# Patient Record
Sex: FEMALE | Race: WHITE | Employment: OTHER | URBAN - METROPOLITAN AREA
[De-identification: names, ages, dates, MRNs, and addresses within clinical notes are randomized per-mention and may not be internally consistent; named-entity substitution may affect disease eponyms.]

---

## 2016-11-03 LAB — INR PPP: 2 (ref 2–3)

## 2017-01-02 PROBLEM — R33.9 URINARY RETENTION: Status: ACTIVE | Noted: 2017-01-02

## 2017-01-06 ENCOUNTER — TRANSFERRED RECORDS (OUTPATIENT)
Dept: HEALTH INFORMATION MANAGEMENT | Facility: CLINIC | Age: 77
End: 2017-01-06

## 2017-01-06 ENCOUNTER — ANTICOAGULATION THERAPY VISIT (OUTPATIENT)
Dept: NURSING | Facility: CLINIC | Age: 77
End: 2017-01-06
Payer: COMMERCIAL

## 2017-01-06 DIAGNOSIS — I82.409 DVT (DEEP VENOUS THROMBOSIS) (H): ICD-10-CM

## 2017-01-06 DIAGNOSIS — Z79.01 LONG-TERM (CURRENT) USE OF ANTICOAGULANTS: Primary | ICD-10-CM

## 2017-01-06 DIAGNOSIS — I26.99 PULMONARY EMBOLISM (H): ICD-10-CM

## 2017-01-06 LAB — INR PPP: 2.1

## 2017-01-06 PROCEDURE — 99207 ZZC NO CHARGE NURSE ONLY: CPT | Performed by: FAMILY MEDICINE

## 2017-01-06 NOTE — MR AVS SNAPSHOT
Ángela Dumont   1/6/2017   Anticoagulation Therapy Visit    Description:  76 year old female   Provider:  Joaquin Rockwell MD   Department:  [facility]           INR as of 1/6/2017     Selected INR 2.1 (1/6/2017)      Anticoagulation Summary as of 1/6/2017     INR goal 2.0-3.0   Selected INR 2.1 (1/6/2017)   Full instructions 2 mg on Sun, Tue; 4 mg all other days   Next INR check 2/3/2017    Indications   Long-term (current) use of anticoagulants [Z79.01] [Z79.01]  Pulmonary embolism (H) [I26.99]  DVT (deep venous thrombosis) (H) [I82.409]         Description     Left detailed vm as advised to resume normal dosing and recheck in 1 month per        Contact Numbers     Clinic Number:         January 2017 Details    Sun Mon Tue Wed Thu Fri Sat     1               2               3               4               5               6      4 mg   See details      7      4 mg           8      2 mg         9      4 mg         10      2 mg         11      4 mg         12      4 mg         13      4 mg         14      4 mg           15      2 mg         16      4 mg         17      2 mg         18      4 mg         19      4 mg         20      4 mg         21      4 mg           22      2 mg         23      4 mg         24      2 mg         25      4 mg         26      4 mg         27      4 mg         28      4 mg           29      2 mg         30      4 mg         31      2 mg              Date Details   01/06 This INR check               How to take your warfarin dose     To take:  2 mg Take 2 of the 1 mg tablets.    To take:  4 mg Take 4 of the 1 mg tablets.           February 2017 Details    Sun Mon Tue Wed Thu Fri Sat        1      4 mg         2      4 mg         3            4                 5               6               7               8               9               10               11                 12               13               14               15               16               17               18                  19               20               21               22               23               24               25                 26               27               28                    Date Details   No additional details    Date of next INR:  2/3/2017         How to take your warfarin dose     To take:  4 mg Take 4 of the 1 mg tablets.

## 2017-01-06 NOTE — PROGRESS NOTES
ANTICOAGULATION FOLLOW-UP CLINIC VISIT    Patient Name:  Ángela Dumont  Date:  1/6/2017  Contact Type:  fax    SUBJECTIVE:     Patient Findings     Positives No Problem Findings           OBJECTIVE    INR   Date Value Ref Range Status   01/06/2017 2.1  Final       ASSESSMENT / PLAN  INR assessment THER    Recheck INR In: 4 WEEKS    INR Location Home INR      Anticoagulation Summary as of 1/6/2017     INR goal 2.0-3.0   Selected INR 2.1 (1/6/2017)   Maintenance plan 2 mg (1 mg x 2) on Sun, Tue; 4 mg (1 mg x 4) all other days   Full instructions 2 mg on Sun, Tue; 4 mg all other days   Weekly total 24 mg   No change documented Dorita Panda RN   Plan last modified Dorita Panda RN (12/9/2016)   Next INR check 2/3/2017   Target end date     Indications   Long-term (current) use of anticoagulants [Z79.01] [Z79.01]  Pulmonary embolism (H) [I26.99]  DVT (deep venous thrombosis) (H) [I82.409]         Anticoagulation Episode Summary     INR check location     Preferred lab     Send INR reminders to RV NURSE    Comments       Anticoagulation Care Providers     Provider Role Specialty Phone number    Joaquin Rockwell MD [facility] [phone]            See the Encounter Report to view Anticoagulation Flowsheet and Dosing Calendar (Go to Encounters tab in chart review, and find the Anticoagulation Therapy Visit)    Dosage adjustment made based on physician directed care plan.    Dorita Panda RN

## 2017-01-16 ENCOUNTER — ANTICOAGULATION THERAPY VISIT (OUTPATIENT)
Dept: NURSING | Facility: CLINIC | Age: 77
End: 2017-01-16
Payer: COMMERCIAL

## 2017-01-16 DIAGNOSIS — Z79.01 LONG-TERM (CURRENT) USE OF ANTICOAGULANTS: Primary | ICD-10-CM

## 2017-01-16 DIAGNOSIS — I26.99 PULMONARY EMBOLISM (H): ICD-10-CM

## 2017-01-16 DIAGNOSIS — I82.409 DVT (DEEP VENOUS THROMBOSIS) (H): ICD-10-CM

## 2017-01-16 LAB — INR PPP: 2.1

## 2017-01-16 PROCEDURE — 99207 ZZC NO CHARGE NURSE ONLY: CPT | Performed by: FAMILY MEDICINE

## 2017-01-16 NOTE — MR AVS SNAPSHOT
Ángela VITALY Dumont   1/16/2017   Anticoagulation Therapy Visit    Description:  76 year old female   Provider:  Joaquin Rockwell MD   Department:  [facility]           INR as of 1/16/2017     Selected INR 2.1 (1/16/2017)      Anticoagulation Summary as of 1/16/2017     INR goal 2.0-3.0   Selected INR 2.1 (1/16/2017)   Full instructions 2 mg on Sun, Tue; 4 mg all other days   Next INR check 2/3/2017    Indications   Long-term (current) use of anticoagulants [Z79.01] [Z79.01]  Pulmonary embolism (H) [I26.99]  DVT (deep venous thrombosis) (H) [I82.409]         Contact Numbers     Clinic Number:         January 2017 Details    Sun Mon Tue Wed Thu Fri Sat     1               2               3               4               5               6               7                 8               9               10               11               12               13               14                 15               16      4 mg   See details      17      2 mg         18      4 mg         19      4 mg         20      4 mg         21      4 mg           22      2 mg         23      4 mg         24      2 mg         25      4 mg         26      4 mg         27      4 mg         28      4 mg           29      2 mg         30      4 mg         31      2 mg              Date Details   01/16 This INR check               How to take your warfarin dose     To take:  2 mg Take 2 of the 1 mg tablets.    To take:  4 mg Take 4 of the 1 mg tablets.           February 2017 Details    Sun Mon Tue Wed Thu Fri Sat        1      4 mg         2      4 mg         3            4                 5               6               7               8               9               10               11                 12               13               14               15               16               17               18                 19               20               21               22               23               24               25                 26                27               28                    Date Details   No additional details    Date of next INR:  2/3/2017         How to take your warfarin dose     To take:  4 mg Take 4 of the 1 mg tablets.

## 2017-01-16 NOTE — PROGRESS NOTES
ANTICOAGULATION FOLLOW-UP CLINIC VISIT    Patient Name:  Ángela Dumont  Date:  1/16/2017  Contact Type:  Telephone    SUBJECTIVE:     Patient Findings     Positives No Problem Findings           OBJECTIVE    INR   Date Value Ref Range Status   01/16/2017 2.1  Final       ASSESSMENT / PLAN  INR assessment THER    Recheck INR In: 3 WEEKS    INR Location Clinic      Anticoagulation Summary as of 1/16/2017     INR goal 2.0-3.0   Selected INR 2.1 (1/16/2017)   Maintenance plan 2 mg (1 mg x 2) on Sun, Tue; 4 mg (1 mg x 4) all other days   Full instructions 2 mg on Sun, Tue; 4 mg all other days   Weekly total 24 mg   Plan last modified Dorita Panda RN (12/9/2016)   Next INR check 2/3/2017   Target end date     Indications   Long-term (current) use of anticoagulants [Z79.01] [Z79.01]  Pulmonary embolism (H) [I26.99]  DVT (deep venous thrombosis) (H) [I82.409]         Anticoagulation Episode Summary     INR check location     Preferred lab     Send INR reminders to RV NURSE    Comments       Anticoagulation Care Providers     Provider Role Specialty Phone number    Joaquin Rockwell MD [facility] [phone]            See the Encounter Report to view Anticoagulation Flowsheet and Dosing Calendar (Go to Encounters tab in chart review, and find the Anticoagulation Therapy Visit)    Dosage adjustment made based on physician directed care plan.    Dorita Panda RN

## 2017-02-03 ENCOUNTER — TRANSFERRED RECORDS (OUTPATIENT)
Dept: HEALTH INFORMATION MANAGEMENT | Facility: CLINIC | Age: 77
End: 2017-02-03

## 2017-02-03 ENCOUNTER — ANTICOAGULATION THERAPY VISIT (OUTPATIENT)
Dept: FAMILY MEDICINE | Facility: CLINIC | Age: 77
End: 2017-02-03
Payer: COMMERCIAL

## 2017-02-03 DIAGNOSIS — I82.409 DVT (DEEP VENOUS THROMBOSIS) (H): ICD-10-CM

## 2017-02-03 DIAGNOSIS — Z79.01 LONG-TERM (CURRENT) USE OF ANTICOAGULANTS: Primary | ICD-10-CM

## 2017-02-03 DIAGNOSIS — I26.99 PULMONARY EMBOLISM (H): ICD-10-CM

## 2017-02-03 LAB
INR PPP: 2.7
INR PPP: 2.7 (ref 2–3)

## 2017-02-03 PROCEDURE — 99207 ZZC NO CHARGE NURSE ONLY: CPT | Performed by: FAMILY MEDICINE

## 2017-02-03 NOTE — MR AVS SNAPSHOT
Ángela Dumont   2/3/2017   Anticoagulation Therapy Visit    Description:  76 year old female   Provider:  Joaquin Rockwell MD   Department:   Family Practice           INR as of 2/3/2017     Selected INR 2.7 (2/3/2017)      Anticoagulation Summary as of 2/3/2017     INR goal 2.0-3.0   Selected INR 2.7 (2/3/2017)   Full instructions 2 mg on Sun, Tue; 4 mg all other days   Next INR check 2/17/2017    Indications   Long-term (current) use of anticoagulants [Z79.01] [Z79.01]  Pulmonary embolism (H) [I26.99]  DVT (deep venous thrombosis) (H) [I82.409]         February 2017 Details    Sun Mon Tue Wed Thu Fri Sat        1               2               3      4 mg   See details      4      4 mg           5      2 mg         6      4 mg         7      2 mg         8      4 mg         9      4 mg         10      4 mg         11      4 mg           12      2 mg         13      4 mg         14      2 mg         15      4 mg         16      4 mg         17            18                 19               20               21               22               23               24               25                 26               27               28                    Date Details   02/03 This INR check       Date of next INR:  2/17/2017         How to take your warfarin dose     To take:  2 mg Take 2 of the 1 mg tablets.    To take:  4 mg Take 4 of the 1 mg tablets.

## 2017-02-03 NOTE — PROGRESS NOTES
ANTICOAGULATION FOLLOW-UP CLINIC VISIT    Patient Name:  Ángela Dumont  Date:  2/3/2017  Contact Type:  Telephone/ left a deatiled VM on cell phone     SUBJECTIVE:     Patient Findings     Positives No Problem Findings           OBJECTIVE    INR   Date Value Ref Range Status   02/03/2017 2.7  Final       ASSESSMENT / PLAN  No question data found.  Anticoagulation Summary as of 2/3/2017     INR goal 2.0-3.0   Selected INR 2.7 (2/3/2017)   Maintenance plan 2 mg (1 mg x 2) on Sun, Tue; 4 mg (1 mg x 4) all other days   Full instructions 2 mg on Sun, Tue; 4 mg all other days   Weekly total 24 mg   No change documented Cadence Gonzalez RN   Plan last modified Dorita Panda RN (12/9/2016)   Next INR check 2/17/2017   Target end date     Indications   Long-term (current) use of anticoagulants [Z79.01] [Z79.01]  Pulmonary embolism (H) [I26.99]  DVT (deep venous thrombosis) (H) [I82.409]         Anticoagulation Episode Summary     INR check location     Preferred lab     Send INR reminders to RV NURSE    Comments       Anticoagulation Care Providers     Provider Role Specialty Phone number    Joaquin Rockwell MD [facility] [phone]            See the Encounter Report to view Anticoagulation Flowsheet and Dosing Calendar (Go to Encounters tab in chart review, and find the Anticoagulation Therapy Visit)    Dosage adjustment made based on physician directed care plan.    Cadence Gonzalez, RN

## 2017-02-06 DIAGNOSIS — E03.9 HYPOTHYROIDISM, UNSPECIFIED TYPE: Primary | ICD-10-CM

## 2017-02-06 RX ORDER — LEVOTHYROXINE SODIUM 50 UG/1
50 TABLET ORAL DAILY
Qty: 90 TABLET | Refills: 0 | Status: SHIPPED | OUTPATIENT
Start: 2017-02-06 | End: 2017-04-21

## 2017-02-06 NOTE — TELEPHONE ENCOUNTER
Last Written Prescription Date: 5-10-16  Last Quantity: 90, # refills: 0  Last Office Visit with [facility], P or [facility] prescribing provider: 9-1-16   Next 5 appointments (look out 90 days)     Feb 21, 2017 10:40 AM   Office Visit with Joaquin Rockwell MD   [facility]    [address] [place] [id number]   [phone]                   TSH   Date Value Ref Range Status   05/04/2016 1.57 mcU/mL Final     Thank you,  Beth Silva CPhT  [facility] [place]

## 2017-02-14 DIAGNOSIS — Z53.9 DIAGNOSIS NOT YET DEFINED: Primary | ICD-10-CM

## 2017-02-20 ENCOUNTER — TELEPHONE (OUTPATIENT)
Dept: FAMILY MEDICINE | Facility: CLINIC | Age: 77
End: 2017-02-20

## 2017-02-20 ENCOUNTER — MEDICAL CORRESPONDENCE (OUTPATIENT)
Dept: HEALTH INFORMATION MANAGEMENT | Facility: CLINIC | Age: 77
End: 2017-02-20

## 2017-02-20 NOTE — TELEPHONE ENCOUNTER
Date Forms was received: February 20, 2017    Forms received by: Fax    Purpose of Form:  PT/OT Orders      How the form needs to be returned for patient:  Fax    Form currently placed  [employer]

## 2017-02-24 ENCOUNTER — TELEPHONE (OUTPATIENT)
Dept: FAMILY MEDICINE | Facility: CLINIC | Age: 77
End: 2017-02-24

## 2017-02-24 PROCEDURE — G0179 MD RECERTIFICATION HHA PT: HCPCS | Performed by: FAMILY MEDICINE

## 2017-03-03 ENCOUNTER — TELEPHONE (OUTPATIENT)
Dept: FAMILY MEDICINE | Facility: CLINIC | Age: 77
End: 2017-03-03

## 2017-03-03 ENCOUNTER — ANTICOAGULATION THERAPY VISIT (OUTPATIENT)
Dept: FAMILY MEDICINE | Facility: CLINIC | Age: 77
End: 2017-03-03
Payer: COMMERCIAL

## 2017-03-03 ENCOUNTER — TRANSFERRED RECORDS (OUTPATIENT)
Dept: HEALTH INFORMATION MANAGEMENT | Facility: CLINIC | Age: 77
End: 2017-03-03

## 2017-03-03 DIAGNOSIS — I26.99 PULMONARY EMBOLISM (H): ICD-10-CM

## 2017-03-03 DIAGNOSIS — Z79.01 LONG-TERM (CURRENT) USE OF ANTICOAGULANTS: ICD-10-CM

## 2017-03-03 DIAGNOSIS — I82.409 DVT (DEEP VENOUS THROMBOSIS) (H): ICD-10-CM

## 2017-03-03 LAB — INR PPP: 2.4

## 2017-03-03 PROCEDURE — 99207 ZZC NO CHARGE NURSE ONLY: CPT | Performed by: FAMILY MEDICINE

## 2017-03-03 NOTE — TELEPHONE ENCOUNTER
Reason for Call:  Same Day Appointment, Requested Provider:  Joaquin Rockwell MD    PCP: Joaquin Rockwell    Reason for visit: Follow up-    Duration of symptoms: follow up    Have you been treated for this in the past? Yes    Additional comments: Pt  called in, They can have an aid come and bring them on Monday 3/6/17 after 9am. They were wondering if they could get worked in that day    Can we leave a detailed message on this number? YES    Phone number patient can be reached at: Home number on file [phone] (home)    Best Time:     Call taken on 3/3/2017 at 9:37 AM by Radha Umaña

## 2017-03-03 NOTE — PROGRESS NOTES
ANTICOAGULATION FOLLOW-UP CLINIC VISIT    Patient Name:  Ángela Dumont  Date:  3/3/2017  Contact Type:  Telephone    SUBJECTIVE:     Patient Findings     Positives No Problem Findings           OBJECTIVE    INR   Date Value Ref Range Status   03/03/2017 2.4  Final       ASSESSMENT / PLAN  No question data found.  Anticoagulation Summary as of 3/3/2017     INR goal 2.0-3.0   Today's INR 2.4   Maintenance plan 2 mg (1 mg x 2) on Sun, Tue; 4 mg (1 mg x 4) all other days   Full instructions 2 mg on Sun, Tue; 4 mg all other days   Weekly total 24 mg   No change documented Cadence Gonzalez RN   Plan last modified Dorita Panda RN (12/9/2016)   Next INR check 3/31/2017   Target end date     Indications   Long-term (current) use of anticoagulants [Z79.01] [Z79.01]  Pulmonary embolism (H) [I26.99]  DVT (deep venous thrombosis) (H) [I82.409]         Anticoagulation Episode Summary     INR check location     Preferred lab     Send INR reminders to RV NURSE    Comments       Anticoagulation Care Providers     Provider Role Specialty Phone number    Joaquin Rockwell MD [facility] [phone]            See the Encounter Report to view Anticoagulation Flowsheet and Dosing Calendar (Go to Encounters tab in chart review, and find the Anticoagulation Therapy Visit)    Dosage adjustment made based on physician directed care plan.    Cadence Gonzalez, RN

## 2017-03-03 NOTE — MR AVS SNAPSHOT
Ángela Dumont   3/3/2017   Anticoagulation Therapy Visit    Description:  76 year old female   Provider:  Joaquin Rockwell MD   Department:   Family Practice           INR as of 3/3/2017     Today's INR 2.4      Anticoagulation Summary as of 3/3/2017     INR goal 2.0-3.0   Today's INR 2.4   Full instructions 2 mg on Sun, Tue; 4 mg all other days   Next INR check 3/31/2017    Indications   Long-term (current) use of anticoagulants [Z79.01] [Z79.01]  Pulmonary embolism (H) [I26.99]  DVT (deep venous thrombosis) (H) [I82.409]         March 2017 Details    Sun Mon Tue Wed Thu Fri Sat        1               2               3      4 mg   See details      4      4 mg           5      2 mg         6      4 mg         7      2 mg         8      4 mg         9      4 mg         10      4 mg         11      4 mg           12      2 mg         13      4 mg         14      2 mg         15      4 mg         16      4 mg         17      4 mg         18      4 mg           19      2 mg         20      4 mg         21      2 mg         22      4 mg         23      4 mg         24      4 mg         25      4 mg           26      2 mg         27      4 mg         28      2 mg         29      4 mg         30      4 mg         31              Date Details   03/03 This INR check       Date of next INR:  3/31/2017         How to take your warfarin dose     To take:  2 mg Take 2 of the 1 mg tablets.    To take:  4 mg Take 4 of the 1 mg tablets.

## 2017-03-06 ENCOUNTER — OFFICE VISIT (OUTPATIENT)
Dept: FAMILY MEDICINE | Facility: CLINIC | Age: 77
End: 2017-03-06
Payer: COMMERCIAL

## 2017-03-06 VITALS
HEIGHT: 64 IN | BODY MASS INDEX: 34.49 KG/M2 | SYSTOLIC BLOOD PRESSURE: 130 MMHG | OXYGEN SATURATION: 95 % | WEIGHT: 202 LBS | TEMPERATURE: 97.9 F | HEART RATE: 77 BPM | DIASTOLIC BLOOD PRESSURE: 76 MMHG

## 2017-03-06 DIAGNOSIS — Z79.01 LONG-TERM (CURRENT) USE OF ANTICOAGULANTS: ICD-10-CM

## 2017-03-06 DIAGNOSIS — I27.20 PULMONARY HYPERTENSION (H): ICD-10-CM

## 2017-03-06 DIAGNOSIS — E78.5 HYPERLIPIDEMIA WITH TARGET LDL LESS THAN 130: ICD-10-CM

## 2017-03-06 DIAGNOSIS — Z98.2 S/P VP SHUNT: ICD-10-CM

## 2017-03-06 DIAGNOSIS — I10 HYPERTENSION GOAL BP (BLOOD PRESSURE) < 140/90: ICD-10-CM

## 2017-03-06 DIAGNOSIS — G89.29 CHRONIC PAIN OF BOTH KNEES: ICD-10-CM

## 2017-03-06 DIAGNOSIS — G91.2 NPH (NORMAL PRESSURE HYDROCEPHALUS) (H): Primary | ICD-10-CM

## 2017-03-06 DIAGNOSIS — Z86.718 HISTORY OF DEEP VENOUS THROMBOSIS: ICD-10-CM

## 2017-03-06 DIAGNOSIS — R53.81 PHYSICAL DECONDITIONING: ICD-10-CM

## 2017-03-06 DIAGNOSIS — H81.13 BPPV (BENIGN PAROXYSMAL POSITIONAL VERTIGO), BILATERAL: ICD-10-CM

## 2017-03-06 DIAGNOSIS — Z86.711 HISTORY OF PULMONARY EMBOLISM: ICD-10-CM

## 2017-03-06 DIAGNOSIS — Z51.81 MEDICATION MONITORING ENCOUNTER: ICD-10-CM

## 2017-03-06 DIAGNOSIS — K21.9 GASTROESOPHAGEAL REFLUX DISEASE WITHOUT ESOPHAGITIS: ICD-10-CM

## 2017-03-06 DIAGNOSIS — E03.9 HYPOTHYROIDISM, UNSPECIFIED TYPE: ICD-10-CM

## 2017-03-06 DIAGNOSIS — M25.562 CHRONIC PAIN OF BOTH KNEES: ICD-10-CM

## 2017-03-06 DIAGNOSIS — M25.561 CHRONIC PAIN OF BOTH KNEES: ICD-10-CM

## 2017-03-06 PROCEDURE — 99214 OFFICE O/P EST MOD 30 MIN: CPT | Performed by: FAMILY MEDICINE

## 2017-03-06 NOTE — MR AVS SNAPSHOT
After Visit Summary   3/6/2017    Ángela Dumont    MRN: [id number]           Patient Information     Date Of Birth          1940        Visit Information        Provider Department      3/6/2017 4:20 PM Joaquin Rockwell MD [facility]  [place]        Today's Diagnoses     NPH (normal pressure hydrocephalus)    -  1    S/P  shunt        BPPV (benign paroxysmal positional vertigo), bilateral        Physical deconditioning        History of deep venous thrombosis        History of pulmonary embolism        Pulmonary hypertension (H)        Chronic pain of both knees        Hypertension goal BP (blood pressure) < 140/90        Hyperlipidemia with target LDL less than 130        Hypothyroidism, unspecified type        Gastroesophageal reflux disease without esophagitis        Long-term (current) use of anticoagulants [Z79.01]        Medication monitoring encounter          Care Instructions    March 6, 2017    Try taking 2 extra strength tylenol before bed to sleep every night.  Most in a day is 4-6 pills.        Shunt issues- Fevers, chills, sweats and belly pain.       PT- Consider more then 1 time a week.       Meclozine- Prescribed    Epley maneuvers Ask PT    X-Ray- Deferred to ortho     Look for tetanus booster since 2004    Future labs ordered- Follow-up       [facility]  [place]                        To reach your care team during and after hours:   [phone]  To reach our pharmacy:        [phone]    Clinic Hours                        Our clinic hours are:    Monday   7:30 am to 7:00 pm                  Tuesday through Friday 7:30 am to 5:00 pm                             Saturday   8:00 am to 12:00 pm      Sunday   Closed      Pharmacy Hours                        Our pharmacy hours are:    Monday   8:30 am to 7:00 pm       Tuesday to Friday  8:30 am to 6:00 pm                       Saturday    9:00 am to 1:00 pm              Sunday    Closed              There is also  information available at our web site:  [email or web address]    If your provider ordered any lab tests and you do not receive the results within 10 business days, please call the clinic.    If you need a medication refill please contact your pharmacy.  Please allow 2-3 business days for your refill to be completed.    Our clinic offers telephone visits and e visits.  Please ask one of your team members to explain more.      Use Billiboxhart (secure email communication and access to your chart) to send your primary care provider a message or make an appointment. Ask someone on your Team how to sign up for [employer].  Immunizations                      Immunization History   Administered Date(s) Administered     Influenza (High Dose) 3 valent vaccine 09/19/2014, 09/25/2015, 09/19/2016     Influenza (IIV3) 10/23/2003, 10/04/2010, 10/06/2011, 09/27/2012, 09/19/2013     Pneumococcal (PCV 13) 09/25/2015     Pneumococcal 23 valent 10/04/2010     Tdap (Adacel,Boostrix) 01/01/2004     Zoster vaccine, live 10/05/2007        Health Maintenance                         Health Maintenance Due   Topic Date Due     Microalbumin Lab - yearly  07/17/1941     URINE DRUG SCREEN Q1 YR  07/17/1955     Cholesterol Lab - every 5 years  07/17/1985     Bone Density Screening (Dexa)  07/17/2005     Tetanus Vaccine - every 10 years  01/01/2014     INR CLINIC REFERRAL - yearly  06/03/2016             Follow-ups after your visit        Future tests that were ordered for you today     Open Future Orders        Priority Expected Expires Ordered    Comprehensive metabolic panel Routine 4/6/2017 5/7/2017 3/7/2017    Lipid panel reflex to direct LDL Routine 4/6/2017 5/7/2017 3/7/2017    CK total Routine 4/6/2017 5/7/2017 3/7/2017    CBC with platelets Routine 4/6/2017 5/7/2017 3/7/2017    TSH with free T4 reflex Routine 4/6/2017 5/7/2017 3/7/2017    *UA reflex to Microscopic and Culture ([facility] (except [place] and [place])  "Routine  5/7/2017 3/7/2017    Urine Culture Aerobic Bacterial Routine  5/7/2017 3/7/2017    Vitamin B12 Routine  5/7/2017 3/7/2017    Vitamin D Deficiency Routine  5/7/2017 3/7/2017    T4, free Routine  5/7/2017 3/7/2017    T3, total Routine  5/7/2017 3/7/2017            Who to contact     If you have questions or need follow up information about today's clinic visit or your schedule please contact [facility] directly at [phone].  Normal or non-critical lab and imaging results will be communicated to you by [employer] Mobilehart, letter or phone within 4 business days after the clinic has received the results. If you do not hear from us within 7 days, please contact the clinic through [email or web address] or phone. If you have a critical or abnormal lab result, we will notify you by phone as soon as possible.  Submit refill requests through BeTheBeast or call your pharmacy and they will forward the refill request to us. Please allow 3 business days for your refill to be completed.          Additional Information About Your Visit        [employer] Information     BeTheBeast gives you secure access to your electronic health record. If you see a primary care provider, you can also send messages to your care team and make appointments. If you have questions, please call your primary care clinic.  If you do not have a primary care provider, please call [phone] and they will assist you.        Care EveryWhere ID     This is your Care EveryWhere ID. This could be used by other organizations to access your [place] medical records  [id number]        Your Vitals Were     Pulse Temperature Height Pulse Oximetry BMI (Body Mass Index)       77 97.9  F (36.6  C) (Oral) 5' 4\" (1.626 m) 95% 34.67 kg/m2        Blood Pressure from Last 3 Encounters:   03/06/17 130/76   09/02/16 124/62   07/13/16 132/62    Weight from Last 3 Encounters:   03/06/17 202 lb (91.6 kg)   09/02/16 188 lb (85.3 kg)   07/13/16 177 lb (80.3 kg)               "   Today's Medication Changes          These changes are accurate as of: 3/6/17 11:59 PM.  If you have any questions, ask your nurse or doctor.               Stop taking these medicines if you haven't already. Please contact your care team if you have questions.     psyllium 0.52 G capsule   Stopped by:  Joaquin Rockwell MD                    Primary Care Provider Office Phone # Fax #    Joaquin Rockwell [phone]       [facility] [address] [place] [id number]        Thank you!     Thank you for choosing [facility]  for your care. Our goal is always to provide you with excellent care. Hearing back from our patients is one way we can continue to improve our services. Please take a few minutes to complete the written survey that you may receive in the mail after your visit with us. Thank you!             Your Updated Medication List - Protect others around you: Learn how to safely use, store and throw away your medicines at www.disposemymeds.org.          This list is accurate as of: 3/6/17 11:59 PM.  Always use your most recent med list.                   Brand Name Dispense Instructions for use    acetaminophen 325 MG tablet    TYLENOL    100 tablet    Take 2 tablets (650 mg) by mouth every 4 hours as needed for other (surgical pain)       alum & mag hydroxide-simethicone 200-200-20 MG/5ML Susp suspension    MYLANTA/MAALOX     Take 30 mLs by mouth as needed for indigestion       furosemide 40 MG tablet    LASIX    90 tablet    TAKE ONE TABLET BY MOUTH DAILY AT 10AM       levothyroxine 50 MCG tablet    SYNTHROID/LEVOTHROID    90 tablet    Take 1 tablet (50 mcg) by mouth daily At 7:30am       tamsulosin 0.4 MG capsule    FLOMAX    90 capsule    TAKE ONE CAPSULE BY MOUTH DAILY AT 10AM       * warfarin 4 MG tablet    COUMADIN    90 tablet    Take 1 tablet (4 mg) by mouth daily Until INR check in two -4 days and to be dosed per your PCP       * JANTOVEN 2 MG tablet    Generic drug:  warfarin     180 tablet    TAKE ONE TO TWO TABLETS BY MOUTH EVERY DAY AS DIRECTED       * Notice:  This list has 2 medication(s) that are the same as other medications prescribed for you. Read the directions carefully, and ask your doctor or other care provider to review them with you.

## 2017-03-06 NOTE — PATIENT INSTRUCTIONS
March 6, 2017    Try taking 2 extra strength tylenol before bed to sleep every night.  Most in a day is 4-6 pills.        Shunt issues- Fevers, chills, sweats and belly pain.       PT- Consider more then 1 time a week.       Meclozine- Prescribed    Epley maneuvers Ask PT    X-Ray- Deferred to ortho     Look for tetanus booster since 2004    Future labs ordered- Follow-up       [facility]                        To reach your care team during and after hours:   [phone]  To reach our pharmacy:        [phone]    Clinic Hours                        Our clinic hours are:    Monday   7:30 am to 7:00 pm                  Tuesday through Friday 7:30 am to 5:00 pm                             Saturday   8:00 am to 12:00 pm      Sunday   Closed      Pharmacy Hours                        Our pharmacy hours are:    Monday   8:30 am to 7:00 pm       Tuesday to Friday  8:30 am to 6:00 pm                       Saturday    9:00 am to 1:00 pm              Sunday    Closed              There is also information available at our web site:  [email or web address]    If your provider ordered any lab tests and you do not receive the results within 10 business days, please call the clinic.    If you need a medication refill please contact your pharmacy.  Please allow 2-3 business days for your refill to be completed.    Our clinic offers telephone visits and e visits.  Please ask one of your team members to explain more.      Use [email or web address] (secure email communication and access to your chart) to send your primary care provider a message or make an appointment. Ask someone on your Team how to sign up for [employer].  Immunizations                      Immunization History   Administered Date(s) Administered     Influenza (High Dose) 3 valent vaccine 09/19/2014, 09/25/2015, 09/19/2016     Influenza (IIV3) 10/23/2003, 10/04/2010, 10/06/2011, 09/27/2012, 09/19/2013     Pneumococcal (PCV 13) 09/25/2015     Pneumococcal 23  valent 10/04/2010     Tdap (Adacel,Boostrix) 01/01/2004     Zoster vaccine, live 10/05/2007        Health Maintenance                         Health Maintenance Due   Topic Date Due     Microalbumin Lab - yearly  07[birth year]17/1941     URINE DRUG SCREEN Q1 YR  07/17/1955     Cholesterol Lab - every 5 years  07/17/1985     Bone Density Screening (Dexa)  07/17/2005     Tetanus Vaccine - every 10 years  01/01/2014     INR CLINIC REFERRAL - yearly  06/03/2016

## 2017-03-06 NOTE — LETTER
[facility]  [address] [place] [id number]            May 12, 2017      Ángela Dumont  [address] [facility] [address]              Dear Ángela Dumont    This is to remind you that your thyroid, vitamin B12, and urinalysis is due.    You may call our office at [phone] to schedule an appointment.    Please disregard this notice if you have had this procedure repeated or already made an appointment.        Sincerely,            Dr. Joaquin Rockwell M.D.

## 2017-03-06 NOTE — NURSING NOTE
"Chief Complaint   Patient presents with     RECHECK       Initial /76  Pulse 77  Temp 97.9  F (36.6  C) (Oral)  Ht 5' 4\" (1.626 m)  Wt 202 lb (91.6 kg)  SpO2 95%  BMI 34.67 kg/m2 Estimated body mass index is 34.67 kg/(m^2) as calculated from the following:    Height as of this encounter: 5' 4\" (1.626 m).    Weight as of this encounter: 202 lb (91.6 kg)..  BP completed using cuff size: farzaneh Francois MA  "

## 2017-03-06 NOTE — PROGRESS NOTES
"  SUBJECTIVE:                                                    Ángela Dumont is a 76 year old female who presents to clinic today for the following health issues:    Follow up    Movement around house is going well.  She received a lift to get into the house, the house is all 1 level.    They have an aid for transportation to and from the house.  Sleep has been poor and Ángela generally  winds up in the chair, moving from bed due to leg pain.  Her legs will start to hurt at 2,3,4 AM.  She is trying to control using tylenol.  She denies any appetite issues and bladder issues.     Shunt have been working well.  No complaints, desires to try to increase activity.     Vertigo  Upon getting out of bed she has episodes.  With change of position she gets the \"spins\".  Consider Epley maneuvers and meclizine.      Leg strength  Bilateral knee pain, generalized weakness. Rt femur/Hip fracture 1/2015, desires consult with ortho    Right arm  Patients  noticed right wrist has been weak and shaky.    Shingles  Using terrasil cream (OTC) for shingles (pruritus).  Using prn every few months.       Hypothyroid   TSH   Date Value Ref Range Status   05/04/2016 1.57 mcU/mL Final   03/19/2016 2.16 0.35 - 4.94 mcU/mL Final       HTN  BP Readings from Last 3 Encounters:   03/06/17 130/76   09/02/16 124/62   07/13/16 132/62       Wt Readings from Last 4 Encounters:   03/06/17 91.6 kg (202 lb)   09/02/16 85.3 kg (188 lb)   07/13/16 80.3 kg (177 lb)   06/09/16 79.4 kg (175 lb)         Problem list and histories reviewed & adjusted, as indicated.  Additional history: as documented  Reviewed and updated as needed this visit by clinical staff  Tobacco  Allergies  Meds  Med Hx  Surg Hx  Fam Hx  Soc Hx      Reviewed and updated as needed this visit by Provider       Health Maintenance    Health Maintenance Due   Topic Date Due     MICROALBUMIN Q1 YEAR( NO INBASKET)  07[birth year]17/1941     URINE DRUG SCREEN Q1 YR  07/17/1955     LIPID " SCREEN Q5 YR FEMALE (SYSTEM ASSIGNED)  07/17/1985     DEXA SCAN SCREENING (SYSTEM ASSIGNED)  07/17/2005     TETANUS IMMUNIZATION (SYSTEM ASSIGNED)  01/01/2014     OP ANNUAL INR REFERRAL  06/03/2016       Current Problem List    Patient Active Problem List   Diagnosis     S/P  shunt     NPH (normal pressure hydrocephalus)     Fracture of right femur (H)     DVT (deep venous thrombosis) (H)     Pulmonary embolism (H)     Dementia     Hypothyroid     Hypertension goal BP (blood pressure) < 140/90     Adjustment disorder with depressed mood     Hyperlipidemia with target LDL less than 130     Leukocytosis     Esophageal reflux     UTI (urinary tract infection)     Constipation     Pulmonary hypertension (H)     MR (mitral regurgitation)     Fibromyalgia     Osteoarthritis of both knees     Health Care Home     Long-term (current) use of anticoagulants [Z79.01]     Advance Care Planning     SIRS (systemic inflammatory response syndrome) (H)     History of pulmonary embolism     History of deep venous thrombosis     Urinary retention     BPPV (benign paroxysmal positional vertigo), bilateral       Past Medical History    Past Medical History   Diagnosis Date     Adjustment disorder with depressed mood      Antiplatelet or antithrombotic long-term use      Constipation      Dementia      memory/anger     Depressive disorder last few months as condition became apparent     Lack of mobility a big problem     DVT (deep venous thrombosis) (H) 4/15     bilateral     Esophageal reflux      Fibromyalgia      Fracture of right femur (H) 1/15     Heart murmur      History of Clostridium difficile      Hyperlipidemia LDL goal < 130      Hypertension goal BP (blood pressure) < 140/90      Hypothyroid      Leukocytosis      Leukocytosis, unspecified      w/u ?     Low back pain      Migraine      MR (mitral regurgitation)      Mild     NPH (normal pressure hydrocephalus) 4/15     shunt placed but removed in 7/2015 due to erosion  through the scalp, replaced 5/19/16     Osteoarthritis of both knees      Other atopic dermatitis and related conditions      PE (pulmonary embolism) 4/15     Saddle - IVC     Pulmonary hypertension (H)      EF 70-75%     Thrombosis of leg      Urinary tract infection, site not specified      MRSA - Dr Eze Gonzalez        Past Surgical History    Past Surgical History   Procedure Laterality Date     Surgical history of -   1/15     right femur/hip surgery - 1/18/2015     Hysterectomy  1976     hysterectomy     Surgical history of -   7/04     rectocele repair     Surgical history of -   1997     abdominal adhesion lysis     Colonoscopy  2008     H labor and delivery vaginal  1960, 1967     Optical tracking system implant shunt ventriculoperitoneal Right 4/10/2015      Shunt - Dr Sierra     Surgical history of -   4/15     IVC filter placement     Surgical history of -   6/15     IVC filter removal     Remove shunt ventriculoperitoneal N/A 7/3/2015     REMOVE SHUNT VENTRICULOPERITONEAL;  Surgeon: Emmanuel Sierra MD;  Location: [facility] OR     Implant shunt ventriculoperitoneal Left 5/19/2016     IMPLANT SHUNT VENTRICULOPERITONEAL - Dr Alba     Abdomen surgery  left side lower abdomen pain from Shunt we do roberth     Orthopedic surgery  Knee pain that prohibits walking       Current Medications    Current Outpatient Prescriptions   Medication Sig Dispense Refill     levothyroxine (SYNTHROID/LEVOTHROID) 50 MCG tablet Take 1 tablet (50 mcg) by mouth daily At 7:30am 90 tablet 0     tamsulosin (FLOMAX) 0.4 MG capsule TAKE ONE CAPSULE BY MOUTH DAILY AT 10AM 90 capsule 1     furosemide (LASIX) 40 MG tablet TAKE ONE TABLET BY MOUTH DAILY AT 10AM 90 tablet 1     JANTOVEN 2 MG tablet TAKE ONE TO TWO TABLETS BY MOUTH EVERY DAY AS DIRECTED 180 tablet 1     warfarin (COUMADIN) 4 MG tablet Take 1 tablet (4 mg) by mouth daily Until INR check in two -4 days and to be dosed per your PCP 90 tablet 1     acetaminophen  (TYLENOL) 325 MG tablet Take 2 tablets (650 mg) by mouth every 4 hours as needed for other (surgical pain) 100 tablet 0     alum & mag hydroxide-simethicone (MYLANTA/MAALOX) 200-200-20 MG/5ML SUSP Take 30 mLs by mouth as needed for indigestion         Allergies    Allergies   Allergen Reactions     Shellfish Allergy Anaphylaxis     Aspirin      GI issues     Contrast Dye Hives and Itching     Flushed skin     Penicillins Hives     Sulfa Drugs Hives     Nitrofurantoin Rash       Immunizations    Immunization History   Administered Date(s) Administered     Influenza (High Dose) 3 valent vaccine 09/19/2014, 09/25/2015, 09/19/2016     Influenza (IIV3) 10/23/2003, 10/04/2010, 10/06/2011, 09/27/2012, 09/19/2013     Pneumococcal (PCV 13) 09/25/2015     Pneumococcal 23 valent 10/04/2010     Tdap (Adacel,Boostrix) 01/01/2004     Zoster vaccine, live 10/05/2007       Family History    Family History   Problem Relation Age of Onset     Colon Cancer Father      Coronary Artery Disease Father      DIABETES Maternal Grandmother        Social History    Social History     Social History     Marital status:      Spouse name: Vernon     Number of children: 2     Years of education: N/A     Occupational History     Not on file.     Social History Main Topics     Smoking status: Former Smoker     Packs/day: 0.50     Years: 10.00     Types: Cigarettes     Start date: 7/17/1958     Quit date: 1/1/1982     Smokeless tobacco: Never Used      Comment: quit 1967     Alcohol use 0.0 - 0.6 oz/week      Comment: Very light     Drug use: No     Sexual activity: Yes     Partners: Male     Birth control/ protection: None      Comment: not a problem     Other Topics Concern     Parent/Sibling W/ Cabg, Mi Or Angioplasty Before 65f 55m? No     Social History Narrative       All above reviewed and updated, all stable unless otherwise noted    Recent labs reviewed    ROS:  Constitutional, HEENT, cardiovascular, pulmonary, GI, , musculoskeletal,  "neuro, skin, endocrine and psych systems are negative, except as in HPI or otherwise noted.  OBJECTIVE:                                                    /76  Pulse 77  Temp 97.9  F (36.6  C) (Oral)  Ht 5' 4\" (1.626 m)  Wt 202 lb (91.6 kg)  SpO2 95%  BMI 34.67 kg/m2  Body mass index is 34.67 kg/(m^2).  GENERAL: healthy, alert and no distress Overweight  EYES: Eyes grossly normal to inspection, extraocular movements - intact, and PERRL  HENT: ear canals- normal; TMs- normal; Nose- normal; Mouth- no ulcers, no lesions  NECK: no tenderness, no adenopathy, no asymmetry, no masses, no stiffness; thyroid- normal to palpation  RESP: lungs clear to auscultation - no rales, no rhonchi, no wheezes  CV: regular rates and rhythm, normal S1 S2, no S3 or S4 and no murmur, no click or rub -  ABDOMEN: soft, no tenderness, no  hepatosplenomegaly, no masses, normal bowel sounds  MS: extremities- no gross deformities noted, no edema  SKIN: no suspicious lesions, no rashes  NEURO: strength and tone- normal, sensory exam- grossly normal, mentation- intact, speech- normal, reflexes- symmetric  BACK: no CVA tenderness, no paralumbar tenderness  PSYCH: Alert and oriented times 3; speech- coherent , normal rate and volume; able to articulate logical thoughts, able to abstract reason, no tangential thoughts, no hallucinations or delusions, affect- normal    DIAGNOSTICS/PROCEDURES:                                                      Results for orders placed or performed in visit on 03/03/17   INR   Result Value Ref Range    INR 2.4         ASSESSMENT/PLAN:                                                        ICD-10-CM    1. NPH (normal pressure hydrocephalus) G91.2    2. S/P  shunt Z98.2    3. BPPV (benign paroxysmal positional vertigo), bilateral H81.13 Comprehensive metabolic panel     CBC with platelets     TSH with free T4 reflex     Vitamin B12     Vitamin D Deficiency   4. Physical deconditioning R53.81 Comprehensive " metabolic panel     CBC with platelets     TSH with free T4 reflex     *UA reflex to Microscopic and Culture ([facility] (except [place] and [place])     Vitamin B12     Vitamin D Deficiency   5. History of deep venous thrombosis Z86.718 CBC with platelets   6. History of pulmonary embolism Z86.711 CBC with platelets   7. Pulmonary hypertension (H) I27.2 Comprehensive metabolic panel   8. Chronic pain of both knees M25.561     M25.562     G89.29    9. Hypertension goal BP (blood pressure) < 140/90 I10 Comprehensive metabolic panel     *UA reflex to Microscopic and Culture ([facility] (except [place] and [place])   10. Hyperlipidemia with target LDL less than 130 E78.5    11. Hypothyroidism, unspecified type E03.9 TSH with free T4 reflex     T4, free     T3, total   12. Gastroesophageal reflux disease without esophagitis K21.9 CBC with platelets   13. Long-term (current) use of anticoagulants [Z79.01] Z79.01 CBC with platelets   14. Medication monitoring encounter Z51.81 Comprehensive metabolic panel     Lipid panel reflex to direct LDL     CK total     CBC with platelets     TSH with free T4 reflex     *UA reflex to Microscopic and Culture ([facility] (except [place] and [place])     Urine Culture Aerobic Bacterial     Vitamin B12     Vitamin D Deficiency     T4, free     T3, total       Discussed treatment/modality options, including risk and benefits, she desires advised alcohol consumption 1oz per day or less, advised 1 multivitamin per day, advised calcium 4152-5533 mg/d and Vitamin D 800-1200 IU/d, advised dentist every 6 months, advised diet, exercise, and weight loss, advised opthalmologist every 1-2 years, advised self breast exam q month, further health care maintenance, medication refill(s), OTC meds and observation. All diagnosis above reviewed and noted above, otherwise stable.  See [employer] orders for further  details.  Follow up for future labs and as needed.    Try taking 2 extra strength tylenol before bed to sleep every night, max daily 2 every 8 hrs. Call if any fevers, chills, sweats, headache, and abdominal  pain.  Continue PT, consider more then 1 time a week, add increased activity herself at home, with assistance.  Meclizine 25 mg every 6 hrs prn, Epley maneuvers prn through PT, bilateral knee xrays, deferred to ortho, referred to ortho MD who has treated her in the past, check on immunizations, due for TDaP, PT options reviewed, lifealert information reviewed, and further fasting labs.    Health Maintenance Due   Topic Date Due     MICROALBUMIN Q1 YEAR( NO INBASKET)  07/17/1941     URINE DRUG SCREEN Q1 YR  07/17/1955     LIPID SCREEN Q5 YR FEMALE (SYSTEM ASSIGNED)  07/17/1985     DEXA SCAN SCREENING (SYSTEM ASSIGNED)  07/17/2005     TETANUS IMMUNIZATION (SYSTEM ASSIGNED)  01/01/2014     OP ANNUAL INR REFERRAL  06/03/2016       See Patient Instructions    This document serves as a record of the services and decisions personally performed and made by Joaquin Rockwell MD [facility]. It was created on their behalf by Jonathan Blanchard, a trained medical scribe. The creation of this document is based the provider's statements to the medical scribe. Jonathan Blanchard March 6, 2017 4:48 PM           Joaquin Rockwell MD [address]  [address] [phone] Fax

## 2017-03-07 ENCOUNTER — MYC MEDICAL ADVICE (OUTPATIENT)
Dept: FAMILY MEDICINE | Facility: CLINIC | Age: 77
End: 2017-03-07

## 2017-03-07 PROBLEM — H81.13 BPPV (BENIGN PAROXYSMAL POSITIONAL VERTIGO), BILATERAL: Status: ACTIVE | Noted: 2017-03-07

## 2017-03-30 ENCOUNTER — TELEPHONE (OUTPATIENT)
Dept: NEUROSURGERY | Facility: CLINIC | Age: 77
End: 2017-03-30

## 2017-03-30 NOTE — TELEPHONE ENCOUNTER
Patient's spouse called to state that patient is having some new left lower quadrant pain. He states that she has had this pain before which comes and goes. He questions what he should do if the pain does not improve with tylenol that he gave her 15 minutes prior. Spouse is concerned as she has a shunt into the left abdomen. He denies any other symptoms including, headaches, changes in LOC, swelling at shunt sight, nausea, vomitting, or vision changes. Discussed with spouse that the pain in the left lower abdomen could be from many things and may not be related to the shunt. Advised him to follow-up with patient's PCP if pain not improved or if pain worsens, they should proceed to the ER. Spouse verbalized understanding and was encouraged to call with further questions or concerns. Advised spouse to call back with any new or worsening symptoms.     Era Chester RN

## 2017-04-03 ENCOUNTER — ANTICOAGULATION THERAPY VISIT (OUTPATIENT)
Dept: NURSING | Facility: CLINIC | Age: 77
End: 2017-04-03
Payer: COMMERCIAL

## 2017-04-03 ENCOUNTER — TRANSFERRED RECORDS (OUTPATIENT)
Dept: HEALTH INFORMATION MANAGEMENT | Facility: CLINIC | Age: 77
End: 2017-04-03

## 2017-04-03 DIAGNOSIS — I26.99 PULMONARY EMBOLISM (H): ICD-10-CM

## 2017-04-03 DIAGNOSIS — Z79.01 LONG-TERM (CURRENT) USE OF ANTICOAGULANTS: ICD-10-CM

## 2017-04-03 DIAGNOSIS — I82.409 DVT (DEEP VENOUS THROMBOSIS) (H): ICD-10-CM

## 2017-04-03 LAB — INR PPP: 3.5

## 2017-04-03 PROCEDURE — 99207 ZZC NO CHARGE NURSE ONLY: CPT | Performed by: FAMILY MEDICINE

## 2017-04-03 NOTE — PROGRESS NOTES
ANTICOAGULATION FOLLOW-UP CLINIC VISIT    Patient Name:  Ángela Dumont  Date:  4/3/2017  Contact Type:  Telephone    SUBJECTIVE:     Patient Findings     Positives No Problem Findings           OBJECTIVE    INR   Date Value Ref Range Status   04/03/2017 3.5  Final       ASSESSMENT / PLAN  INR assessment SUPRA    Recheck INR In: 4 WEEKS    INR Location Home INR      Anticoagulation Summary as of 4/3/2017     INR goal 2.0-3.0   Today's INR 3.5!   Maintenance plan 2 mg (1 mg x 2) on Sun, Tue, Thu; 4 mg (1 mg x 4) all other days   Full instructions 4/3: Hold; 4/6: 2 mg; Otherwise 2 mg on Sun, Tue, Thu; 4 mg all other days   Weekly total 22 mg   Plan last modified Dorita Panda RN (4/3/2017)   Next INR check 5/1/2017   Target end date     Indications   Long-term (current) use of anticoagulants [Z79.01] [Z79.01]  Pulmonary embolism (H) [I26.99]  DVT (deep venous thrombosis) (H) [I82.409]         Anticoagulation Episode Summary     INR check location     Preferred lab     Send INR reminders to RV NURSE    Comments       Anticoagulation Care Providers     Provider Role Specialty Phone number    Joaquin Rockwell MD Responsible [facility] [phone]            See the Encounter Report to view Anticoagulation Flowsheet and Dosing Calendar (Go to Encounters tab in chart review, and find the Anticoagulation Therapy Visit)    Dosage adjustment made based on physician directed care plan.    Dorita Panda RN

## 2017-04-03 NOTE — MR AVS SNAPSHOT
Ángela HAIDER Tim   4/3/2017   Anticoagulation Therapy Visit    Description:  76 year old female   Provider:  Joaquin Rockwell MD   Department:  R[facility] Nurse           INR as of 4/3/2017     Today's INR 3.5!      Anticoagulation Summary as of 4/3/2017     INR goal 2.0-3.0   Today's INR 3.5!   Full instructions 4/3: Hold; 4/6: 2 mg; Otherwise 2 mg on Sun, Tue, Thu; 4 mg all other days   Next INR check 5/1/2017    Indications   Long-term (current) use of anticoagulants [Z79.01] [Z79.01]  Pulmonary embolism (H) [I26.99]  DVT (deep venous thrombosis) (H) [I82.409]         Description     Pts  changed dose to 2mg 3 days per week. Changed this for until next recheck.  refused to recheck in one week per protocol.       Contact Numbers     Clinic Number:         April 2017 Details    Sun Mon Tue Wed Thu Fri Sat           1                 2               3      Hold   See details      4      2 mg         5      4 mg         6      2 mg         7      4 mg         8      4 mg           9      2 mg         10      4 mg         11      2 mg         12      4 mg         13      2 mg         14      4 mg         15      4 mg           16      2 mg         17      4 mg         18      2 mg         19      4 mg         20      2 mg         21      4 mg         22      4 mg           23      2 mg         24      4 mg         25      2 mg         26      4 mg         27      2 mg         28      4 mg         29      4 mg           30      2 mg                Date Details   04/03 This INR check               How to take your warfarin dose     To take:  2 mg Take 2 of the 1 mg tablets.    To take:  4 mg Take 4 of the 1 mg tablets.    Hold Do not take your warfarin dose. See the Details table to the right for additional instructions.                May 2017 Details    Sun Mon Tue Wed Thu Fri Sat      1            2               3               4               5               6                 7               8               9                10               11               12               13                 14               15               16               17               18               19               20                 21               22               23               24               25               26               27                 28               29               30               31                   Date Details   No additional details    Date of next INR:  5/1/2017         How to take your warfarin dose     To take:  4 mg Take 4 of the 1 mg tablets.

## 2017-04-19 ENCOUNTER — TELEPHONE (OUTPATIENT)
Dept: FAMILY MEDICINE | Facility: CLINIC | Age: 77
End: 2017-04-19

## 2017-04-19 ENCOUNTER — MEDICAL CORRESPONDENCE (OUTPATIENT)
Dept: HEALTH INFORMATION MANAGEMENT | Facility: CLINIC | Age: 77
End: 2017-04-19

## 2017-04-19 NOTE — TELEPHONE ENCOUNTER
Completed forms for pt care plan faxed back to [facility] at [phone].   Originals sent to be scanned.     Kathryn Carson

## 2017-04-21 DIAGNOSIS — E03.9 HYPOTHYROIDISM, UNSPECIFIED TYPE: ICD-10-CM

## 2017-04-24 ENCOUNTER — TELEPHONE (OUTPATIENT)
Dept: FAMILY MEDICINE | Facility: CLINIC | Age: 77
End: 2017-04-24

## 2017-04-24 RX ORDER — LEVOTHYROXINE SODIUM 50 UG/1
TABLET ORAL
Qty: 90 TABLET | Refills: 0 | Status: SHIPPED | OUTPATIENT
Start: 2017-04-24 | End: 2017-07-21

## 2017-04-24 NOTE — TELEPHONE ENCOUNTER
Medication is being filled for 1 time refill only due to:  Patient needs labs thyroid.   Rahda Panda RN

## 2017-04-24 NOTE — TELEPHONE ENCOUNTER
Levothyrpixine  Last Written Prescription Date: 02/06/2017  Last Quantity: 90, # refills: 1  Last Office Visit with G, P or [facility] prescribing/ provider: Dr Joaquin Rockwell        TSH   Date Value Ref Range Status   05/04/2016 1.57 mcU/mL Final

## 2017-04-24 NOTE — TELEPHONE ENCOUNTER
Completed forms for disability parking mailed to [employer].   copies sent to be scanned.     Kathryn Carson

## 2017-05-01 ENCOUNTER — TRANSFERRED RECORDS (OUTPATIENT)
Dept: HEALTH INFORMATION MANAGEMENT | Facility: CLINIC | Age: 77
End: 2017-05-01

## 2017-05-01 ENCOUNTER — ANTICOAGULATION THERAPY VISIT (OUTPATIENT)
Dept: FAMILY MEDICINE | Facility: CLINIC | Age: 77
End: 2017-05-01
Payer: COMMERCIAL

## 2017-05-01 DIAGNOSIS — I26.99 OTHER PULMONARY EMBOLISM WITHOUT ACUTE COR PULMONALE, UNSPECIFIED CHRONICITY (H): ICD-10-CM

## 2017-05-01 DIAGNOSIS — Z79.01 LONG-TERM (CURRENT) USE OF ANTICOAGULANTS: ICD-10-CM

## 2017-05-01 DIAGNOSIS — I82.90 DEEP VEIN THROMBOSIS (DVT) OF NON-EXTREMITY VEIN, UNSPECIFIED CHRONICITY: ICD-10-CM

## 2017-05-01 LAB — INR PPP: 1.6 (ref 2–3)

## 2017-05-01 PROCEDURE — 99207 ZZC NO CHARGE NURSE ONLY: CPT | Performed by: FAMILY MEDICINE

## 2017-05-01 NOTE — PROGRESS NOTES
ANTICOAGULATION FOLLOW-UP CLINIC VISIT    Patient Name:  Ángela Dumont  Date:  5/1/2017  Contact Type:  Telephone    SUBJECTIVE:     Patient Findings     Positives Unexplained INR or factor level change           OBJECTIVE    INR   Date Value Ref Range Status   05/01/2017 1.6  Final       ASSESSMENT / PLAN  INR assessment SUB    Recheck INR In: 4 WEEKS    INR Location Home INR      Anticoagulation Summary as of 5/1/2017     INR goal 2.0-3.0   Today's INR 1.6!   Maintenance plan 2 mg (1 mg x 2) on Sun, Thu; 4 mg (1 mg x 4) all other days   Full instructions 2 mg on Sun, Thu; 4 mg all other days   Weekly total 24 mg   Plan last modified Nereyda Bullock RN (5/1/2017)   Next INR check 6/1/2017   Target end date     Indications   Long-term (current) use of anticoagulants [Z79.01] [Z79.01]  Pulmonary embolism (H) [I26.99]  DVT (deep venous thrombosis) (H) [I82.409]         Anticoagulation Episode Summary     INR check location     Preferred lab     Send INR reminders to RV NURSE    Comments       Anticoagulation Care Providers     Provider Role Specialty Phone number    Joaquin Rockwell MD [facility] [phone]            See the Encounter Report to view Anticoagulation Flowsheet and Dosing Calendar (Go to Encounters tab in chart review, and find the Anticoagulation Therapy Visit)    Dosage adjustment made based on physician directed care plan.    Nereyda Bullock RN

## 2017-05-01 NOTE — MR AVS SNAPSHOT
Ángela Dumont   5/1/2017   Anticoagulation Therapy Visit    Description:  76 year old female   Provider:  Joaquin Rockwell MD   Department:   Family Practice           INR as of 5/1/2017     Today's INR 1.6!      Anticoagulation Summary as of 5/1/2017     INR goal 2.0-3.0   Today's INR 1.6!   Full instructions 2 mg on Sun, Thu; 4 mg all other days   Next INR check 6/1/2017    Indications   Long-term (current) use of anticoagulants [Z79.01] [Z79.01]  Pulmonary embolism (H) [I26.99]  DVT (deep venous thrombosis) (H) [I82.409]         May 2017 Details    Sun Mon Tue Wed Thu Fri Sat      1      4 mg   See details      2      4 mg         3      4 mg         4      2 mg         5      4 mg         6      4 mg           7      2 mg         8      4 mg         9      4 mg         10      4 mg         11      2 mg         12      4 mg         13      4 mg           14      2 mg         15      4 mg         16      4 mg         17      4 mg         18      2 mg         19      4 mg         20      4 mg           21      2 mg         22      4 mg         23      4 mg         24      4 mg         25      2 mg         26      4 mg         27      4 mg           28      2 mg         29      4 mg         30      4 mg         31      4 mg             Date Details   05/01 This INR check               How to take your warfarin dose     To take:  2 mg Take 2 of the 1 mg tablets.    To take:  4 mg Take 4 of the 1 mg tablets.           June 2017 Details    Sun Mon Tue Wed Thu Fri Sat         1            2               3                 4               5               6               7               8               9               10                 11               12               13               14               15               16               17                 18               19               20               21               22               23               24                 25               26               27                28               29               30                 Date Details   No additional details    Date of next INR:  6/1/2017         How to take your warfarin dose     To take:  2 mg Take 2 of the 1 mg tablets.

## 2017-05-10 DIAGNOSIS — Z53.9 DIAGNOSIS NOT YET DEFINED: Primary | ICD-10-CM

## 2017-05-10 PROCEDURE — G0179 MD RECERTIFICATION HHA PT: HCPCS | Performed by: FAMILY MEDICINE

## 2017-05-27 DIAGNOSIS — Z79.01 LONG-TERM (CURRENT) USE OF ANTICOAGULANTS: ICD-10-CM

## 2017-05-31 RX ORDER — WARFARIN SODIUM 4 MG/1
TABLET ORAL
Qty: 90 TABLET | Refills: 1 | Status: SHIPPED | OUTPATIENT
Start: 2017-05-31 | End: 2017-10-18

## 2017-06-01 ENCOUNTER — TRANSFERRED RECORDS (OUTPATIENT)
Dept: HEALTH INFORMATION MANAGEMENT | Facility: CLINIC | Age: 77
End: 2017-06-01

## 2017-06-01 ENCOUNTER — ANTICOAGULATION THERAPY VISIT (OUTPATIENT)
Dept: FAMILY MEDICINE | Facility: CLINIC | Age: 77
End: 2017-06-01
Payer: COMMERCIAL

## 2017-06-01 DIAGNOSIS — Z79.01 LONG-TERM (CURRENT) USE OF ANTICOAGULANTS: ICD-10-CM

## 2017-06-01 DIAGNOSIS — I26.99 OTHER PULMONARY EMBOLISM WITHOUT ACUTE COR PULMONALE, UNSPECIFIED CHRONICITY (H): ICD-10-CM

## 2017-06-01 DIAGNOSIS — I82.90 DEEP VEIN THROMBOSIS (DVT) OF NON-EXTREMITY VEIN, UNSPECIFIED CHRONICITY: ICD-10-CM

## 2017-06-01 LAB — INR POINT OF CARE: 2.3 (ref 0.86–1.14)

## 2017-06-01 PROCEDURE — 85610 PROTHROMBIN TIME: CPT | Mod: QW | Performed by: FAMILY MEDICINE

## 2017-06-01 PROCEDURE — 36416 COLLJ CAPILLARY BLOOD SPEC: CPT | Performed by: FAMILY MEDICINE

## 2017-06-01 ASSESSMENT — ENCOUNTER SYMPTOMS
POLYDIPSIA: 0
MEMORY LOSS: 1
TREMORS: 0
FATIGUE: 0
LEG PAIN: 1
HALLUCINATIONS: 0
WEAKNESS: 0
DECREASED APPETITE: 0
DYSURIA: 1
WEIGHT LOSS: 0
VOMITING: 0
HEMATURIA: 0
RECTAL PAIN: 1
DIZZINESS: 1
TACHYCARDIA: 0
NAIL CHANGES: 0
NAUSEA: 0
MUSCLE CRAMPS: 1
TINGLING: 1
SEIZURES: 0
FLANK PAIN: 0
CLAUDICATION: 0
POOR WOUND HEALING: 0
BOWEL INCONTINENCE: 1
JOINT SWELLING: 1
SPEECH CHANGE: 1
ARTHRALGIAS: 0
INCREASED ENERGY: 0
LOSS OF CONSCIOUSNESS: 0
BACK PAIN: 1
DIFFICULTY URINATING: 1
HYPOTENSION: 0
EXERCISE INTOLERANCE: 0
SYNCOPE: 0
BRUISES/BLEEDS EASILY: 1
SKIN CHANGES: 0
SLEEP DISTURBANCES DUE TO BREATHING: 0
JAUNDICE: 0
SWOLLEN GLANDS: 0
ORTHOPNEA: 0
WEIGHT GAIN: 0
ALTERED TEMPERATURE REGULATION: 1
DIARRHEA: 1
ABDOMINAL PAIN: 0
HYPERTENSION: 0
CONSTIPATION: 0
HEADACHES: 0
PALPITATIONS: 0
MUSCLE WEAKNESS: 0
PARALYSIS: 0
RECTAL BLEEDING: 1
POLYPHAGIA: 0
MYALGIAS: 0
LIGHT-HEADEDNESS: 1
FEVER: 0
CHILLS: 0
BLOOD IN STOOL: 0
NECK PAIN: 1
HEARTBURN: 0
STIFFNESS: 0
NUMBNESS: 0
NIGHT SWEATS: 0
BLOATING: 0
LEG SWELLING: 1
DISTURBANCES IN COORDINATION: 1

## 2017-06-01 NOTE — PROGRESS NOTES
ANTICOAGULATION FOLLOW-UP CLINIC VISIT    Patient Name:  Ángela Dumont  Date:  6/1/2017  Contact Type:  Telephone    SUBJECTIVE:        OBJECTIVE    INR Protime   Date Value Ref Range Status   06/01/2017 2.3 (A) 0.86 - 1.14 Final       ASSESSMENT / PLAN  INR assessment THER    Recheck INR In: 4 WEEKS    INR Location [facility]      Anticoagulation Summary as of 6/1/2017     INR goal 2.0-3.0   Today's INR 2.3   Maintenance plan 2 mg (1 mg x 2) on Sun, Thu; 4 mg (1 mg x 4) all other days   Full instructions 2 mg on Sun, Thu; 4 mg all other days   Weekly total 24 mg   No change documented Nereyda Bullock RN   Plan last modified Nereyda Bullock RN (5/1/2017)   Next INR check 7/6/2017   Target end date     Indications   Long-term (current) use of anticoagulants [Z79.01] [Z79.01]  Pulmonary embolism (H) [I26.99]  DVT (deep venous thrombosis) (H) [I82.409]         Anticoagulation Episode Summary     INR check location     Preferred lab     Send INR reminders to RV NURSE    Comments       Anticoagulation Care Providers     Provider Role Specialty Phone number    Joaquin Rockwell MD [facility] [phone]            See the Encounter Report to view Anticoagulation Flowsheet and Dosing Calendar (Go to Encounters tab in chart review, and find the Anticoagulation Therapy Visit)        Nereyda Bullock RN

## 2017-06-01 NOTE — MR AVS SNAPSHOT
Ángela Dumont   6/1/2017   Anticoagulation Therapy Visit    Description:  76 year old female   Provider:  Joaquin Rockwell MD   Department:   Family Practice           INR as of 6/1/2017     Today's INR 2.3      Anticoagulation Summary as of 6/1/2017     INR goal 2.0-3.0   Today's INR 2.3   Full instructions 2 mg on Sun, Thu; 4 mg all other days   Next INR check 7/6/2017    Indications   Long-term (current) use of anticoagulants [Z79.01] [Z79.01]  Pulmonary embolism (H) [I26.99]  DVT (deep venous thrombosis) (H) [I82.409]         June 2017 Details    Sun Mon Tue Wed Thu Fri Sat         1      2 mg   See details      2      4 mg         3      4 mg           4      2 mg         5      4 mg         6      4 mg         7      4 mg         8      2 mg         9      4 mg         10      4 mg           11      2 mg         12      4 mg         13      4 mg         14      4 mg         15      2 mg         16      4 mg         17      4 mg           18      2 mg         19      4 mg         20      4 mg         21      4 mg         22      2 mg         23      4 mg         24      4 mg           25      2 mg         26      4 mg         27      4 mg         28      4 mg         29      2 mg         30      4 mg           Date Details   06/01 This INR check               How to take your warfarin dose     To take:  2 mg Take 2 of the 1 mg tablets.    To take:  4 mg Take 4 of the 1 mg tablets.           July 2017 Details    Sun Mon Tue Wed Thu Fri Sat           1      4 mg           2      2 mg         3      4 mg         4      4 mg         5      4 mg         6            7               8                 9               10               11               12               13               14               15                 16               17               18               19               20               21               22                 23               24               25               26               27                28               29                 30               31                     Date Details   No additional details    Date of next INR:  7/6/2017         How to take your warfarin dose     To take:  2 mg Take 2 of the 1 mg tablets.    To take:  4 mg Take 4 of the 1 mg tablets.

## 2017-06-12 ENCOUNTER — OFFICE VISIT (OUTPATIENT)
Dept: HEMATOLOGY | Facility: CLINIC | Age: 77
End: 2017-06-12
Attending: INTERNAL MEDICINE
Payer: COMMERCIAL

## 2017-06-12 VITALS — DIASTOLIC BLOOD PRESSURE: 74 MMHG | HEART RATE: 80 BPM | SYSTOLIC BLOOD PRESSURE: 127 MMHG | TEMPERATURE: 98 F

## 2017-06-12 DIAGNOSIS — Z86.718 PERSONAL HISTORY OF VENOUS THROMBOSIS AND EMBOLISM: Primary | ICD-10-CM

## 2017-06-12 DIAGNOSIS — Z79.01 LONG TERM CURRENT USE OF ANTICOAGULANT THERAPY: ICD-10-CM

## 2017-06-12 PROCEDURE — 99214 OFFICE O/P EST MOD 30 MIN: CPT | Performed by: INTERNAL MEDICINE

## 2017-06-12 PROCEDURE — 99212 OFFICE O/P EST SF 10 MIN: CPT

## 2017-06-12 ASSESSMENT — PAIN SCALES - GENERAL: PAINLEVEL: NO PAIN (0)

## 2017-06-12 NOTE — MR AVS SNAPSHOT
After Visit Summary   6/12/2017    Ángela Dumont    MRN: [id number]           Patient Information     Date Of Birth          1940        Visit Information        Provider Department      6/12/2017 1:00 PM David Narayan MD [facility]        Today's Diagnoses     Personal history of venous thrombosis and embolism    -  1    Long term current use of anticoagulant therapy           Follow-ups after your visit        Who to contact     If you have questions or need follow up information about today's clinic visit or your schedule please contact [facility] directly at [phone].  Normal or non-critical lab and imaging results will be communicated to you by VisiQuate[employer], letter or phone within 4 business days after the clinic has received the results. If you do not hear from us within 7 days, please contact the clinic through [employer] or phone. If you have a critical or abnormal lab result, we will notify you by phone as soon as possible.  Submit refill requests through [employer] or call your pharmacy and they will forward the refill request to us. Please allow 3 business days for your refill to be completed.          Additional Information About Your Visit        MyChart Information     [employer] gives you secure access to your electronic health record. If you see a primary care provider, you can also send messages to your care team and make appointments. If you have questions, please call your primary care clinic.  If you do not have a primary care provider, please call [phone] and they will assist you.        Care EveryWhere ID     This is your Care EveryWhere ID. This could be used by other organizations to access your [facility] medical records  [id number]        Your Vitals Were     Pulse Temperature                80 98  F (36.7  C) (Oral)           Blood Pressure from Last 3 Encounters:   06/12/17 127/74   03/06/17 130/76   09/02/16 124/62    Weight  from Last 3 Encounters:   03/06/17 91.6 kg (202 lb)   09/02/16 85.3 kg (188 lb)   07/13/16 80.3 kg (177 lb)              Today, you had the following     No orders found for display       Primary Care Provider Office Phone # Fax #    Joaquin Rockwell [phone]       [facility] [address] [place] [address]        Thank you!     Thank you for choosing [facility]  for your care. Our goal is always to provide you with excellent care. Hearing back from our patients is one way we can continue to improve our services. Please take a few minutes to complete the written survey that you may receive in the mail after your visit with us. Thank you!             Your Updated Medication List - Protect others around you: Learn how to safely use, store and throw away your medicines at www.disposemymeds.org.          This list is accurate as of: 6/12/17 11:59 PM.  Always use your most recent med list.                   Brand Name Dispense Instructions for use    acetaminophen 325 MG tablet    TYLENOL    100 tablet    Take 2 tablets (650 mg) by mouth every 4 hours as needed for other (surgical pain)       alum & mag hydroxide-simethicone 200-200-20 MG/5ML Susp suspension    MYLANTA/MAALOX     Take 30 mLs by mouth as needed for indigestion       furosemide 40 MG tablet    LASIX    90 tablet    TAKE ONE TABLET BY MOUTH DAILY AT 10AM       * JANTOVEN 2 MG tablet   Generic drug:  warfarin     180 tablet    TAKE ONE TO TWO TABLETS BY MOUTH EVERY DAY AS DIRECTED       * JANTOVEN 4 MG tablet   Generic drug:  warfarin     90 tablet    TAKE ONE TABLET BY MOUTH DAILY UNTIL INR CHECK IN 2 TO 4 DAYS AND TO BE DOSED PER YOUR PCP       levothyroxine 50 MCG tablet    SYNTHROID/LEVOTHROID    90 tablet    TAKE ONE TABLET BY MOUTH EVERY DAY AT 7:30AM       tamsulosin 0.4 MG capsule    FLOMAX    90 capsule    TAKE ONE CAPSULE BY MOUTH DAILY AT 10AM       * Notice:  This list has 2  medication(s) that are the same as other medications prescribed for you. Read the directions carefully, and ask your doctor or other care provider to review them with you.

## 2017-06-12 NOTE — NURSING NOTE
"Chief Complaint   Patient presents with     RECHECK     annual follow up, shahana banda       Initial /74  Pulse 80  Temp 98  F (36.7  C) (Oral) Estimated body mass index is 34.67 kg/(m^2) as calculated from the following:    Height as of 3/6/17: 1.626 m (5' 4\").    Weight as of 3/6/17: 91.6 kg (202 lb).  Medication Reconciliation: unable or not appropriate to perform   The following encounter information was actually performed by shahana and entered later due to system downtime.      "

## 2017-06-12 NOTE — PROGRESS NOTES
[facility]  Outpatient Clinic Visit    Ángela Dumont is a 76-year-old woman who returns today with her  to review her long-term anticoagulation plan.  We previously saw her in 03/2016.  Please see that initial consultation for details of her somewhat complicated history.  In summary, she presented with bilateral proximal lower extremity deep vein thromboses and a large saddle pulmonary embolus in 04/2015.  She has been anticoagulated since then.  She is on warfarin with home INR monitoring.  She had a complicated medical course prior to the diagnosis of venous thromboembolism.  Again, details can be found in the initial consultation, but in summary there were several provoking factors including immobility, recent urinary tract infection,  shunt placement, as well as hip replacement surgery, all in proximity to the diagnosis of venous thromboembolism.  Thus, although this could be considered a provoked event, we decided that given the life-threatening nature of the saddle pulmonary embolus, as well as her ongoing immobility and history of recurrent urinary tract infections, that it would be reasonable to keep her on long-term anticoagulation as long as her bleeding risk remained low.  She is here today to re-evaluate that.      She continues to have significant immobility, and is essentially wheelchair bound.  Over the last year or so since we have seen her, she has not had any significant bleeding complications.  She has baseline dementia, and her  provides most of the history.  Although they dislike the frequency of INR monitoring, and [place], anticoagulation itself seems to be going fairly well.  Again, there have been no bleeding issues.  Her INR has been reasonably well controlled.      Over the last year, her INR has ranged from 1.6-3.5.  Her time in therapeutic range when  defined strictly as an INR of between 2 and 3 is 62%.  However, on reviewing her INR trends, the vast majority of her values are in an acceptable range, and there are no dangerous fluctuations or trends in her INR noted.        ASSESSMENT AND PLAN:   1.  History of provoked bilateral lower extremity deep vein thrombosis and large saddle pulmonary embolus in 04/2015.   2.  Long-term anticoagulation.      As outlined above, and in greater detail on our initial consultation, Ángela had a provoked episode of venous thromboembolism.  However, some of the provoking factors are still present, primarily ongoing immobility and a history of recurrent urinary tract infections.  In addition, given the severity and life-threatening nature of her episode of venous thromboembolism, we believe it is still reasonable to continue her on long-term anticoagulation, as long as her bleeding risk remains low.      After review of her course over the last year, iIt does appear that this is the case.  Her INR has been reasonably well controlled.  I stressed with Ángela and her  the importance of continuing to re-evaluate this at least annually.  At the end of our conversation, we agreed that continued anticoagulation was reasonable.  I encouraged them to call with any new questions or concerns, or any bleeding issues.  Otherwise, we would like to see her back in 6-12 months to re-evaluate her treatment plan.      Total time spent today was 25 minutes, essentially all of which was in counseling and coordination of care.       David Narayan MD  Associate Professor of Medicine  Division of Hematology, Oncology, and Transplantation  Director, [facility]

## 2017-06-19 ENCOUNTER — TELEPHONE (OUTPATIENT)
Dept: LAB | Facility: CLINIC | Age: 77
End: 2017-06-19

## 2017-06-19 NOTE — TELEPHONE ENCOUNTER
RN called patient and spoke with .  CHILANGO is on file to speak with him.  Patient scheduled for fasting lab only on 6/22/2017 at 10:30 am.    NAYELI Harris, RN, PHN  [facility]

## 2017-06-19 NOTE — TELEPHONE ENCOUNTER
Labs on 2017 have  and a letter was sent on May 12, 2017. Patient has not followed up. Routing to team to address.    [facility]

## 2017-06-22 DIAGNOSIS — I10 HYPERTENSION GOAL BP (BLOOD PRESSURE) < 140/90: ICD-10-CM

## 2017-06-22 DIAGNOSIS — Z79.01 LONG-TERM (CURRENT) USE OF ANTICOAGULANTS: ICD-10-CM

## 2017-06-22 DIAGNOSIS — R53.81 PHYSICAL DECONDITIONING: ICD-10-CM

## 2017-06-22 DIAGNOSIS — Z51.81 MEDICATION MONITORING ENCOUNTER: ICD-10-CM

## 2017-06-22 DIAGNOSIS — Z86.711 HISTORY OF PULMONARY EMBOLISM: ICD-10-CM

## 2017-06-22 DIAGNOSIS — I27.20 PULMONARY HYPERTENSION (H): ICD-10-CM

## 2017-06-22 DIAGNOSIS — K21.9 GASTROESOPHAGEAL REFLUX DISEASE WITHOUT ESOPHAGITIS: ICD-10-CM

## 2017-06-22 DIAGNOSIS — H81.13 BPPV (BENIGN PAROXYSMAL POSITIONAL VERTIGO), BILATERAL: ICD-10-CM

## 2017-06-22 DIAGNOSIS — Z86.718 HISTORY OF DEEP VENOUS THROMBOSIS: ICD-10-CM

## 2017-06-22 DIAGNOSIS — E03.9 HYPOTHYROIDISM, UNSPECIFIED TYPE: ICD-10-CM

## 2017-06-22 LAB
ALBUMIN UR-MCNC: NEGATIVE MG/DL
APPEARANCE UR: CLEAR
BILIRUB UR QL STRIP: NEGATIVE
COLOR UR AUTO: YELLOW
GLUCOSE UR STRIP-MCNC: NEGATIVE MG/DL
HGB UR QL STRIP: NEGATIVE
KETONES UR STRIP-MCNC: NEGATIVE MG/DL
LEUKOCYTE ESTERASE UR QL STRIP: NEGATIVE
NITRATE UR QL: NEGATIVE
PH UR STRIP: 5.5 PH (ref 5–7)
SP GR UR STRIP: 1.01 (ref 1–1.03)
URN SPEC COLLECT METH UR: NORMAL
UROBILINOGEN UR STRIP-ACNC: 0.2 EU/DL (ref 0.2–1)

## 2017-06-22 PROCEDURE — 87088 URINE BACTERIA CULTURE: CPT | Performed by: FAMILY MEDICINE

## 2017-06-22 PROCEDURE — 81003 URINALYSIS AUTO W/O SCOPE: CPT | Performed by: FAMILY MEDICINE

## 2017-06-22 PROCEDURE — 87086 URINE CULTURE/COLONY COUNT: CPT | Performed by: FAMILY MEDICINE

## 2017-06-23 ENCOUNTER — TELEPHONE (OUTPATIENT)
Dept: FAMILY MEDICINE | Facility: CLINIC | Age: 77
End: 2017-06-23

## 2017-06-23 NOTE — TELEPHONE ENCOUNTER
.Date Forms was received: June 23, 2017    Forms received by: Fax    Last office visit: 03/06/2017    Purpose of Form:  PT/OT Orders     When the form is due:  ASAP    How the form needs to be returned for patient:  Fax [phone]    Form currently placed  [employer]

## 2017-06-24 LAB
BACTERIA SPEC CULT: ABNORMAL
MICRO REPORT STATUS: ABNORMAL
SPECIMEN SOURCE: ABNORMAL

## 2017-06-25 ENCOUNTER — TELEPHONE (OUTPATIENT)
Dept: FAMILY MEDICINE | Facility: CLINIC | Age: 77
End: 2017-06-25

## 2017-06-25 DIAGNOSIS — N30.00 ACUTE CYSTITIS WITHOUT HEMATURIA: Primary | ICD-10-CM

## 2017-06-25 RX ORDER — CEFDINIR 300 MG/1
300 CAPSULE ORAL 2 TIMES DAILY
Qty: 20 CAPSULE | Refills: 0 | Status: SHIPPED | OUTPATIENT
Start: 2017-06-25 | End: 2017-06-26

## 2017-06-26 RX ORDER — CEFDINIR 300 MG/1
300 CAPSULE ORAL 2 TIMES DAILY
Qty: 20 CAPSULE | Refills: 0 | Status: SHIPPED | OUTPATIENT
Start: 2017-06-26 | End: 2017-08-16

## 2017-06-26 NOTE — TELEPHONE ENCOUNTER
Completed forms faxed back to [facility] at [phone].   Originals sent to be scanned.     Kathryn Carson

## 2017-06-26 NOTE — TELEPHONE ENCOUNTER
Attempt #1.    Patients wife noted they are worried about C-diff. Wasn't sure they wanted to take the antibiotic. Advised to take probiotics which the pt notes that they already do.  Advised pt to speak to pharmacy regarding concerns as well. Pt also chose [facility] pharmacy instead of [facility] as the script was put to.     The patient indicates understanding of these issues and agrees with the plan.  Radha Panda RN

## 2017-06-27 ENCOUNTER — TRANSFERRED RECORDS (OUTPATIENT)
Dept: HEALTH INFORMATION MANAGEMENT | Facility: CLINIC | Age: 77
End: 2017-06-27

## 2017-06-27 ENCOUNTER — ANTICOAGULATION THERAPY VISIT (OUTPATIENT)
Dept: FAMILY MEDICINE | Facility: CLINIC | Age: 77
End: 2017-06-27
Payer: COMMERCIAL

## 2017-06-27 DIAGNOSIS — I26.99 OTHER PULMONARY EMBOLISM WITHOUT ACUTE COR PULMONALE, UNSPECIFIED CHRONICITY (H): ICD-10-CM

## 2017-06-27 DIAGNOSIS — Z79.01 LONG-TERM (CURRENT) USE OF ANTICOAGULANTS: ICD-10-CM

## 2017-06-27 DIAGNOSIS — I82.90 DEEP VEIN THROMBOSIS (DVT) OF NON-EXTREMITY VEIN, UNSPECIFIED CHRONICITY: ICD-10-CM

## 2017-06-27 LAB — INR PPP: 1.8

## 2017-06-27 PROCEDURE — 99207 ZZC NO CHARGE NURSE ONLY: CPT | Performed by: FAMILY MEDICINE

## 2017-06-27 NOTE — PROGRESS NOTES
ANTICOAGULATION FOLLOW-UP CLINIC VISIT    Patient Name:  Ángela Dumont  Date:  6/27/2017  Contact Type:  Telephone    SUBJECTIVE:     Patient Findings     Positives No Problem Findings           OBJECTIVE    INR   Date Value Ref Range Status   06/27/2017 1.8  Final       ASSESSMENT / PLAN  No question data found.  Anticoagulation Summary as of 6/27/2017     INR goal 2.0-3.0   Today's INR 1.8!   Maintenance plan 2 mg (1 mg x 2) on Sun, Thu; 4 mg (1 mg x 4) all other days   Full instructions 6/27: 6 mg; Otherwise 2 mg on Sun, Thu; 4 mg all other days   Weekly total 24 mg   Plan last modified Nereyda Bullock RN (5/1/2017)   Next INR check 7/6/2017   Target end date     Indications   Long-term (current) use of anticoagulants [Z79.01] [Z79.01]  Pulmonary embolism (H) [I26.99]  DVT (deep venous thrombosis) (H) [I82.409]         Anticoagulation Episode Summary     INR check location     Preferred lab     Send INR reminders to RV NURSE    Comments       Anticoagulation Care Providers     Provider Role Specialty Phone number    Joaquin Rockwell MD [facility] [phone]            See the Encounter Report to view Anticoagulation Flowsheet and Dosing Calendar (Go to Encounters tab in chart review, and find the Anticoagulation Therapy Visit)    Dosage adjustment made based on physician directed care plan.    Cadence Gonzalez RN

## 2017-06-27 NOTE — MR AVS SNAPSHOT
Ángela HAIDER Tim   6/27/2017   Anticoagulation Therapy Visit    Description:  76 year old female   Provider:  Joaquin Rockwell MD   Department:   Family Practice           INR as of 6/27/2017     Today's INR 1.8!      Anticoagulation Summary as of 6/27/2017     INR goal 2.0-3.0   Today's INR 1.8!   Full instructions 6/27: 6 mg; Otherwise 2 mg on Sun, Thu; 4 mg all other days   Next INR check 7/6/2017    Indications   Long-term (current) use of anticoagulants [Z79.01] [Z79.01]  Pulmonary embolism (H) [I26.99]  DVT (deep venous thrombosis) (H) [I82.409]         June 2017 Details    Sun Mon Tue Wed Thu Fri Sat         1               2               3                 4               5               6               7               8               9               10                 11               12               13               14               15               16               17                 18               19               20               21               22               23               24                 25               26               27      6 mg   See details      28      4 mg         29      2 mg         30      4 mg           Date Details   06/27 This INR check               How to take your warfarin dose     To take:  2 mg Take 2 of the 1 mg tablets.    To take:  4 mg Take 4 of the 1 mg tablets.    To take:  6 mg Take 6 of the 1 mg tablets.           July 2017 Details    Sun Mon Tue Wed Thu Fri Sat           1      4 mg           2      2 mg         3      4 mg         4      4 mg         5      4 mg         6            7               8                 9               10               11               12               13               14               15                 16               17               18               19               20               21               22                 23               24               25               26               27               28               29                  30               31                     Date Details   No additional details    Date of next INR:  7/6/2017         How to take your warfarin dose     To take:  2 mg Take 2 of the 1 mg tablets.    To take:  4 mg Take 4 of the 1 mg tablets.

## 2017-06-30 DIAGNOSIS — I10 HYPERTENSION GOAL BP (BLOOD PRESSURE) < 140/90: ICD-10-CM

## 2017-06-30 DIAGNOSIS — R33.9 URINARY RETENTION: ICD-10-CM

## 2017-07-03 RX ORDER — FUROSEMIDE 40 MG
TABLET ORAL
Qty: 90 TABLET | Refills: 1 | Status: SHIPPED | OUTPATIENT
Start: 2017-07-03 | End: 2017-12-30

## 2017-07-03 NOTE — TELEPHONE ENCOUNTER
Furosemide approved per [facility].    Routing refill request for Tamsulosin to  provider for review/approval because:  Drug allergy warning        NAYELI Harris, RN, PHN  [facility]

## 2017-07-03 NOTE — TELEPHONE ENCOUNTER
tamsulosin (FLOMAX) 0.4 MG capsule         Last Written Prescription Date: 1/2/2017  Last Fill Quantity: 90 capsule, # refills: 1    Last Office Visit with [facility] or [facility] prescribing provider:  3/6/2017   Future Office Visit:      BP Readings from Last 3 Encounters:   06/12/17 127/74   03/06/17 130/76   09/02/16 124/62         furosemide (LASIX) 40 MG tablet      Last Written Prescription Date: 1/2/2017  Last Fill Quantity: 90 tablet, # refills: 1  Last Office Visit with [facility] or [facility] prescribing provider: 3/6/2017       Potassium   Date Value Ref Range Status   06/08/2016 3.6 3.4 - 5.3 mmol/L Final     Creatinine   Date Value Ref Range Status   06/08/2016 0.58 0.52 - 1.04 mg/dL Final     BP Readings from Last 3 Encounters:   06/12/17 127/74   03/06/17 130/76   09/02/16 124/62

## 2017-07-04 RX ORDER — TAMSULOSIN HYDROCHLORIDE 0.4 MG/1
CAPSULE ORAL
Qty: 90 CAPSULE | Refills: 3 | Status: SHIPPED | OUTPATIENT
Start: 2017-07-04 | End: 2018-07-02

## 2017-07-05 NOTE — TELEPHONE ENCOUNTER
Medications reviewed with  and are up-to-date.   She will finish [employer] tomorrow.  Appointment scheduled 8/16/2017 with TS for follow up.    NAYELI Harris, RN, PHN  [facility]

## 2017-07-05 NOTE — TELEPHONE ENCOUNTER
Non-detailed message left at home for  to call back.  CHILANGO is on file to speak with him.  Patient has history of dementia.  Female who answered the phone said her  usually answers her call.    NAYELI Harris, RN, PHN  [facility]

## 2017-07-07 ENCOUNTER — ANTICOAGULATION THERAPY VISIT (OUTPATIENT)
Dept: FAMILY MEDICINE | Facility: CLINIC | Age: 77
End: 2017-07-07
Payer: COMMERCIAL

## 2017-07-07 ENCOUNTER — TRANSFERRED RECORDS (OUTPATIENT)
Dept: HEALTH INFORMATION MANAGEMENT | Facility: CLINIC | Age: 77
End: 2017-07-07

## 2017-07-07 DIAGNOSIS — I26.99 OTHER PULMONARY EMBOLISM WITHOUT ACUTE COR PULMONALE, UNSPECIFIED CHRONICITY (H): ICD-10-CM

## 2017-07-07 DIAGNOSIS — Z79.01 LONG-TERM (CURRENT) USE OF ANTICOAGULANTS: ICD-10-CM

## 2017-07-07 DIAGNOSIS — I82.90 DEEP VEIN THROMBOSIS (DVT) OF NON-EXTREMITY VEIN, UNSPECIFIED CHRONICITY: ICD-10-CM

## 2017-07-07 LAB — INR PPP: 2.4

## 2017-07-07 PROCEDURE — 99207 ZZC NO CHARGE NURSE ONLY: CPT | Performed by: FAMILY MEDICINE

## 2017-07-07 NOTE — PROGRESS NOTES
ANTICOAGULATION FOLLOW-UP CLINIC VISIT    Patient Name:  Ángela Dumont  Date:  7/7/2017  Contact Type:  Face to Face    SUBJECTIVE:     Patient Findings     Positives No Problem Findings           OBJECTIVE    INR   Date Value Ref Range Status   07/07/2017 2.4  Final       ASSESSMENT / PLAN  No question data found.  Anticoagulation Summary as of 7/7/2017     INR goal 2.0-3.0   Today's INR 2.4   Maintenance plan 2 mg (1 mg x 2) on Sun, Thu; 4 mg (1 mg x 4) all other days   Full instructions 2 mg on Sun, Thu; 4 mg all other days   Weekly total 24 mg   No change documented Cadence Gonzalez RN   Plan last modified Nereyda Bullock RN (5/1/2017)   Next INR check 7/21/2017   Target end date     Indications   Long-term (current) use of anticoagulants [Z79.01] [Z79.01]  Pulmonary embolism (H) [I26.99]  DVT (deep venous thrombosis) (H) [I82.409]         Anticoagulation Episode Summary     INR check location     Preferred lab     Send INR reminders to RV NURSE    Comments       Anticoagulation Care Providers     Provider Role Specialty Phone number    Joaquin Rockwell MD [facility] [phone]            See the Encounter Report to view Anticoagulation Flowsheet and Dosing Calendar (Go to Encounters tab in chart review, and find the Anticoagulation Therapy Visit)    Dosage adjustment made based on physician directed care plan.    Cadence Gonzalez, RN

## 2017-07-07 NOTE — MR AVS SNAPSHOT
Ángela Dumont   7/7/2017   Anticoagulation Therapy Visit    Description:  76 year old female   Provider:  Joaquin Rockwell MD   Department:   Family Practice           INR as of 7/7/2017     Today's INR 2.4      Anticoagulation Summary as of 7/7/2017     INR goal 2.0-3.0   Today's INR 2.4   Full instructions 2 mg on Sun, Thu; 4 mg all other days   Next INR check 7/21/2017    Indications   Long-term (current) use of anticoagulants [Z79.01] [Z79.01]  Pulmonary embolism (H) [I26.99]  DVT (deep venous thrombosis) (H) [I82.409]         July 2017 Details    Sun Mon Tue Wed Thu Fri Sat           1                 2               3               4               5               6               7      4 mg   See details      8      4 mg           9      2 mg         10      4 mg         11      4 mg         12      4 mg         13      2 mg         14      4 mg         15      4 mg           16      2 mg         17      4 mg         18      4 mg         19      4 mg         20      2 mg         21            22                 23               24               25               26               27               28               29                 30               31                     Date Details   07/07 This INR check       Date of next INR:  7/21/2017         How to take your warfarin dose     To take:  2 mg Take 2 of the 1 mg tablets.    To take:  4 mg Take 4 of the 1 mg tablets.

## 2017-07-12 ENCOUNTER — TELEPHONE (OUTPATIENT)
Dept: FAMILY MEDICINE | Facility: CLINIC | Age: 77
End: 2017-07-12

## 2017-07-12 PROCEDURE — G0179 MD RECERTIFICATION HHA PT: HCPCS | Performed by: FAMILY MEDICINE

## 2017-07-12 NOTE — TELEPHONE ENCOUNTER
Date Forms was received: July 12, 2017    Forms received by: Fax    Purpose of Form:  Home Health Certification    How the form needs to be returned for patient:  Fax    Form currently placed  [employer]

## 2017-07-13 NOTE — TELEPHONE ENCOUNTER
Completed forms faxed back to [facility] at [phone].   Originals sent to be scanned.     Kathryn Carson

## 2017-07-21 DIAGNOSIS — E03.9 HYPOTHYROIDISM, UNSPECIFIED TYPE: ICD-10-CM

## 2017-07-24 NOTE — TELEPHONE ENCOUNTER
Levothyroxine (last refill states that patient is due for labs before further refills)    Last Written Prescription Date: 4/24/2017  Last Quantity: 90, # refills: 0  Last Office Visit with G, P or [facility] prescribing provider: 3/06/2017   Next 5 appointments (look out 90 days)     Aug 16, 2017 10:40 AM CDT   SHORT with Joaqiun Rockwell MD   [facility] ([facility]    [address] [place] [id number] [phone]                   TSH   Date Value Ref Range Status   05/04/2016 1.57 mcU/mL Final     Mabel Garcia, CMA

## 2017-07-25 RX ORDER — LEVOTHYROXINE SODIUM 50 UG/1
TABLET ORAL
Qty: 90 TABLET | Refills: 1 | Status: SHIPPED | OUTPATIENT
Start: 2017-07-25 | End: 2018-01-16

## 2017-07-25 NOTE — TELEPHONE ENCOUNTER
Consent to communicate with  Juan David Dumont on patient's chart. He said that the lab was unable to get any blood the last time patient came for lab only.    Advised that if the patient comes for fasting labs she can still drink water. Patient should schedule lab only again. Advise to remind the lab that she is a difficult draw.     states that he will give message to the patient.    Routing refill request to provider for review/approval because:  Adriana given x1 and patient did not follow up, please advise.  Angela Kwan RN

## 2017-07-26 DIAGNOSIS — K21.9 GASTROESOPHAGEAL REFLUX DISEASE WITHOUT ESOPHAGITIS: ICD-10-CM

## 2017-07-26 DIAGNOSIS — I10 HYPERTENSION GOAL BP (BLOOD PRESSURE) < 140/90: ICD-10-CM

## 2017-07-26 DIAGNOSIS — Z79.01 LONG-TERM (CURRENT) USE OF ANTICOAGULANTS: ICD-10-CM

## 2017-07-26 DIAGNOSIS — R53.81 PHYSICAL DECONDITIONING: ICD-10-CM

## 2017-07-26 DIAGNOSIS — I27.20 PULMONARY HYPERTENSION (H): ICD-10-CM

## 2017-07-26 DIAGNOSIS — H81.13 BPPV (BENIGN PAROXYSMAL POSITIONAL VERTIGO), BILATERAL: ICD-10-CM

## 2017-07-26 DIAGNOSIS — E03.9 HYPOTHYROIDISM, UNSPECIFIED TYPE: ICD-10-CM

## 2017-07-26 DIAGNOSIS — Z86.718 HISTORY OF DEEP VENOUS THROMBOSIS: ICD-10-CM

## 2017-07-26 DIAGNOSIS — Z86.711 HISTORY OF PULMONARY EMBOLISM: ICD-10-CM

## 2017-07-26 DIAGNOSIS — Z51.81 MEDICATION MONITORING ENCOUNTER: ICD-10-CM

## 2017-07-26 LAB
ALBUMIN SERPL-MCNC: 3.6 G/DL (ref 3.4–5)
ALP SERPL-CCNC: 106 U/L (ref 40–150)
ALT SERPL W P-5'-P-CCNC: 21 U/L (ref 0–50)
ANION GAP SERPL CALCULATED.3IONS-SCNC: 5 MMOL/L (ref 3–14)
AST SERPL W P-5'-P-CCNC: 17 U/L (ref 0–45)
BASOPHILS NFR BLD AUTO: 1 %
BILIRUB SERPL-MCNC: 0.3 MG/DL (ref 0.2–1.3)
BUN SERPL-MCNC: 24 MG/DL (ref 7–30)
CALCIUM SERPL-MCNC: 8.8 MG/DL (ref 8.5–10.1)
CHLORIDE SERPL-SCNC: 107 MMOL/L (ref 94–109)
CHOLEST SERPL-MCNC: 206 MG/DL
CK SERPL-CCNC: 35 U/L (ref 30–225)
CO2 SERPL-SCNC: 25 MMOL/L (ref 20–32)
CREAT SERPL-MCNC: 0.81 MG/DL (ref 0.52–1.04)
DIFFERENTIAL METHOD BLD: ABNORMAL
EOSINOPHIL NFR BLD AUTO: 1 %
ERYTHROCYTE [DISTWIDTH] IN BLOOD BY AUTOMATED COUNT: 13.2 % (ref 10–15)
ERYTHROCYTE [DISTWIDTH] IN BLOOD BY AUTOMATED COUNT: NORMAL % (ref 10–15)
GFR SERPL CREATININE-BSD FRML MDRD: 68 ML/MIN/1.7M2
GLUCOSE SERPL-MCNC: 108 MG/DL (ref 70–99)
HCT VFR BLD AUTO: 41.2 % (ref 35–47)
HCT VFR BLD AUTO: NORMAL % (ref 35–47)
HDLC SERPL-MCNC: 48 MG/DL
HGB BLD-MCNC: 13.3 G/DL (ref 11.7–15.7)
HGB BLD-MCNC: NORMAL G/DL (ref 11.7–15.7)
LDLC SERPL CALC-MCNC: 118 MG/DL
LYMPHOCYTES NFR BLD AUTO: 41 %
MCH RBC QN AUTO: 29.2 PG (ref 26.5–33)
MCH RBC QN AUTO: NORMAL PG (ref 26.5–33)
MCHC RBC AUTO-ENTMCNC: 32.3 G/DL (ref 31.5–36.5)
MCHC RBC AUTO-ENTMCNC: NORMAL G/DL (ref 31.5–36.5)
MCV RBC AUTO: 90 FL (ref 78–100)
MCV RBC AUTO: NORMAL FL (ref 78–100)
METAMYELOCYTES NFR BLD MANUAL: 2 %
MONOCYTES NFR BLD AUTO: 9 %
NEUTROPHILS NFR BLD AUTO: 46 %
NONHDLC SERPL-MCNC: 158 MG/DL
PLATELET # BLD AUTO: 213 10E9/L (ref 150–450)
PLATELET # BLD AUTO: NORMAL 10E9/L (ref 150–450)
PLATELET # BLD EST: NORMAL 10*3/UL
POTASSIUM SERPL-SCNC: 4 MMOL/L (ref 3.4–5.3)
PROT SERPL-MCNC: 7.4 G/DL (ref 6.8–8.8)
RBC # BLD AUTO: 4.56 10E12/L (ref 3.8–5.2)
RBC # BLD AUTO: NORMAL 10E12/L (ref 3.8–5.2)
RBC MORPH BLD: ABNORMAL
RETICS # AUTO: 58 10E9/L (ref 25–95)
RETICS/RBC NFR AUTO: 1.3 % (ref 0.5–2)
SODIUM SERPL-SCNC: 137 MMOL/L (ref 133–144)
T3 SERPL-MCNC: 81 NG/DL (ref 60–181)
T4 FREE SERPL-MCNC: 1.27 NG/DL (ref 0.76–1.46)
TRIGL SERPL-MCNC: 200 MG/DL
TSH SERPL DL<=0.005 MIU/L-ACNC: 2.51 MU/L (ref 0.4–4)
VIT B12 SERPL-MCNC: 532 PG/ML (ref 193–986)
WBC # BLD AUTO: 18.1 10E9/L (ref 4–11)
WBC # BLD AUTO: NORMAL 10E9/L (ref 4–11)

## 2017-07-26 PROCEDURE — 80061 LIPID PANEL: CPT | Performed by: FAMILY MEDICINE

## 2017-07-26 PROCEDURE — 82306 VITAMIN D 25 HYDROXY: CPT | Performed by: FAMILY MEDICINE

## 2017-07-26 PROCEDURE — 82607 VITAMIN B-12: CPT | Performed by: FAMILY MEDICINE

## 2017-07-26 PROCEDURE — 84480 ASSAY TRIIODOTHYRONINE (T3): CPT | Performed by: FAMILY MEDICINE

## 2017-07-26 PROCEDURE — 36415 COLL VENOUS BLD VENIPUNCTURE: CPT | Performed by: FAMILY MEDICINE

## 2017-07-26 PROCEDURE — 85045 AUTOMATED RETICULOCYTE COUNT: CPT | Performed by: FAMILY MEDICINE

## 2017-07-26 PROCEDURE — 80050 GENERAL HEALTH PANEL: CPT | Performed by: FAMILY MEDICINE

## 2017-07-26 PROCEDURE — 85060 BLOOD SMEAR INTERPRETATION: CPT | Performed by: FAMILY MEDICINE

## 2017-07-26 PROCEDURE — 84439 ASSAY OF FREE THYROXINE: CPT | Performed by: FAMILY MEDICINE

## 2017-07-26 PROCEDURE — 82550 ASSAY OF CK (CPK): CPT | Performed by: FAMILY MEDICINE

## 2017-07-27 LAB — DEPRECATED CALCIDIOL+CALCIFEROL SERPL-MC: 14 UG/L (ref 20–75)

## 2017-08-02 ENCOUNTER — ANTICOAGULATION THERAPY VISIT (OUTPATIENT)
Dept: FAMILY MEDICINE | Facility: CLINIC | Age: 77
End: 2017-08-02

## 2017-08-02 ENCOUNTER — MYC MEDICAL ADVICE (OUTPATIENT)
Dept: FAMILY MEDICINE | Facility: CLINIC | Age: 77
End: 2017-08-02

## 2017-08-02 DIAGNOSIS — I82.409 DVT (DEEP VENOUS THROMBOSIS) (H): ICD-10-CM

## 2017-08-02 DIAGNOSIS — I26.99 PULMONARY EMBOLISM (H): ICD-10-CM

## 2017-08-02 DIAGNOSIS — Z79.01 LONG-TERM (CURRENT) USE OF ANTICOAGULANTS: ICD-10-CM

## 2017-08-02 DIAGNOSIS — D72.829 INCREASED WHITE BLOOD CELL COUNT: Primary | ICD-10-CM

## 2017-08-04 ENCOUNTER — MYC MEDICAL ADVICE (OUTPATIENT)
Dept: FAMILY MEDICINE | Facility: CLINIC | Age: 77
End: 2017-08-04

## 2017-08-04 ENCOUNTER — TRANSFERRED RECORDS (OUTPATIENT)
Dept: HEALTH INFORMATION MANAGEMENT | Facility: CLINIC | Age: 77
End: 2017-08-04

## 2017-08-04 ENCOUNTER — ANTICOAGULATION THERAPY VISIT (OUTPATIENT)
Dept: FAMILY MEDICINE | Facility: CLINIC | Age: 77
End: 2017-08-04
Payer: COMMERCIAL

## 2017-08-04 DIAGNOSIS — I82.409 DVT (DEEP VENOUS THROMBOSIS) (H): ICD-10-CM

## 2017-08-04 DIAGNOSIS — Z79.01 LONG-TERM (CURRENT) USE OF ANTICOAGULANTS: ICD-10-CM

## 2017-08-04 DIAGNOSIS — B02.9 SHINGLES: Primary | ICD-10-CM

## 2017-08-04 DIAGNOSIS — I26.99 PULMONARY EMBOLISM (H): ICD-10-CM

## 2017-08-04 LAB — INR PPP: 3.4

## 2017-08-04 PROCEDURE — 99207 ZZC NO CHARGE NURSE ONLY: CPT | Performed by: FAMILY MEDICINE

## 2017-08-04 RX ORDER — ACYCLOVIR 800 MG/1
800 TABLET ORAL
Qty: 50 TABLET | Refills: 0 | Status: SHIPPED | OUTPATIENT
Start: 2017-08-04 | End: 2017-08-16

## 2017-08-04 NOTE — PROGRESS NOTES
ANTICOAGULATION FOLLOW-UP CLINIC VISIT    Patient Name:  Ángela Dumont  Date:  8/4/2017  Contact Type:  Telephone    SUBJECTIVE:        OBJECTIVE    INR   Date Value Ref Range Status   08/04/2017 3.4  Final       ASSESSMENT / PLAN  INR assessment SUPRA    Recheck INR In: 1 WEEK    INR Location Home INR      Anticoagulation Summary as of 8/4/2017     INR goal 2.0-3.0   Today's INR 3.4!   Maintenance plan 2 mg (1 mg x 2) on Sun, Wed, Fri; 4 mg (1 mg x 4) all other days   Full instructions 8/4: 2 mg; 8/5: 2 mg; Otherwise 2 mg on Sun, Wed, Fri; 4 mg all other days   Weekly total 22 mg   Plan last modified Dorita Panda RN (8/4/2017)   Next INR check 8/11/2017   Target end date     Indications   Long-term (current) use of anticoagulants [Z79.01] [Z79.01]  Pulmonary embolism (H) [I26.99]  DVT (deep venous thrombosis) (H) [I82.409]         Anticoagulation Episode Summary     INR check location     Preferred lab     Send INR reminders to RV NURSE    Comments       Anticoagulation Care Providers     Provider Role Specialty Phone number    Joaquin Rockwell MD [facility] [phone]            See the Encounter Report to view Anticoagulation Flowsheet and Dosing Calendar (Go to Encounters tab in chart review, and find the Anticoagulation Therapy Visit)    Dosage adjustment made based on physician directed care plan.    Dorita Panda RN

## 2017-08-04 NOTE — TELEPHONE ENCOUNTER
Called Juan David (Spouse) @ [phone] (home)     Informed of MD GUSTAVO message below  Rx Ordered & sent to [facility]   OV scheduled with GUSTAVO on 08/16/2017  Patient stated an understanding and agreed with plan.    Tessy Gandara RN  [facility]

## 2017-08-04 NOTE — MR AVS SNAPSHOT
Ángela VITALY Dumont   8/4/2017   Anticoagulation Therapy Visit    Description:  77 year old female   Provider:  Joaquin Rockwell MD   Department:   Family Practice           INR as of 8/4/2017     Today's INR 3.4!      Anticoagulation Summary as of 8/4/2017     INR goal 2.0-3.0   Today's INR 3.4!   Full instructions 8/4: 2 mg; 8/5: 2 mg; Otherwise 2 mg on Sun, Wed, Fri; 4 mg all other days   Next INR check 8/11/2017    Indications   Long-term (current) use of anticoagulants [Z79.01] [Z79.01]  Pulmonary embolism (H) [I26.99]  DVT (deep venous thrombosis) (H) [I82.409]         Description     Denies medication changes. Pt has shingles breakout right now.  Pt is having some itching. Pt is not taking any recent medication besides some Terrasil cream for this.  Pt has been taking 22mg, this was changed in pts chart per  report.  Dosing maintenance plan changed.  Pt to take 20mg within this next week with recheck in one week despite  complaints about having to test again.       August 2017 Details    Sun Mon Tue Wed Thu Fri Sat       1               2               3               4      2 mg   See details      5      2 mg           6      2 mg         7      4 mg         8      4 mg         9      2 mg         10      4 mg         11            12                 13               14               15               16               17               18               19                 20               21               22               23               24               25               26                 27               28               29               30               31                  Date Details   08/04 This INR check       Date of next INR:  8/11/2017         How to take your warfarin dose     To take:  2 mg Take 2 of the 1 mg tablets.    To take:  4 mg Take 4 of the 1 mg tablets.

## 2017-08-04 NOTE — TELEPHONE ENCOUNTER
Forwarded to Dr. Rockwell.  Please review patient's Mychart message and advise.  Nereyda Padilla, RN, BS, PHN

## 2017-08-04 NOTE — TELEPHONE ENCOUNTER
Triage had called pt regarding INR results and talked to pt and  regarding this.  Pt noted they have been diagnosed previously, has not taken any medication for this due to taking so many medications.  Pt reports having one blister that erupts about every 8 weeks or so on the L side of their buttock.  They put cream on this area which seems to help which is called Terrasil.  This helps symptoms, but never completely goes away.      Radha Panda RN

## 2017-08-04 NOTE — TELEPHONE ENCOUNTER
Please review with patient's , has she been diagnosed in the past, last outbreak?, new lesions are the same?

## 2017-08-11 ENCOUNTER — TRANSFERRED RECORDS (OUTPATIENT)
Dept: HEALTH INFORMATION MANAGEMENT | Facility: CLINIC | Age: 77
End: 2017-08-11

## 2017-08-11 LAB — INR PPP: 2.3

## 2017-08-15 LAB — COPATH REPORT: NORMAL

## 2017-08-16 ENCOUNTER — TELEPHONE (OUTPATIENT)
Dept: FAMILY MEDICINE | Facility: CLINIC | Age: 77
End: 2017-08-16

## 2017-08-16 ENCOUNTER — ANTICOAGULATION THERAPY VISIT (OUTPATIENT)
Dept: NURSING | Facility: CLINIC | Age: 77
End: 2017-08-16

## 2017-08-16 ENCOUNTER — OFFICE VISIT (OUTPATIENT)
Dept: FAMILY MEDICINE | Facility: CLINIC | Age: 77
End: 2017-08-16
Payer: COMMERCIAL

## 2017-08-16 VITALS
BODY MASS INDEX: 35.85 KG/M2 | SYSTOLIC BLOOD PRESSURE: 134 MMHG | HEART RATE: 112 BPM | OXYGEN SATURATION: 97 % | WEIGHT: 210 LBS | HEIGHT: 64 IN | TEMPERATURE: 97.6 F | DIASTOLIC BLOOD PRESSURE: 72 MMHG

## 2017-08-16 DIAGNOSIS — K21.9 GASTROESOPHAGEAL REFLUX DISEASE WITHOUT ESOPHAGITIS: ICD-10-CM

## 2017-08-16 DIAGNOSIS — G91.2 NPH (NORMAL PRESSURE HYDROCEPHALUS) (H): ICD-10-CM

## 2017-08-16 DIAGNOSIS — Z86.711 HISTORY OF PULMONARY EMBOLISM: ICD-10-CM

## 2017-08-16 DIAGNOSIS — D72.820 LYMPHOCYTOSIS: Primary | ICD-10-CM

## 2017-08-16 DIAGNOSIS — Z51.81 MEDICATION MONITORING ENCOUNTER: ICD-10-CM

## 2017-08-16 DIAGNOSIS — I10 HYPERTENSION GOAL BP (BLOOD PRESSURE) < 140/90: ICD-10-CM

## 2017-08-16 DIAGNOSIS — F03.90 DEMENTIA WITHOUT BEHAVIORAL DISTURBANCE, UNSPECIFIED DEMENTIA TYPE: ICD-10-CM

## 2017-08-16 DIAGNOSIS — I27.20 PULMONARY HYPERTENSION (H): ICD-10-CM

## 2017-08-16 DIAGNOSIS — E78.5 HYPERLIPIDEMIA WITH TARGET LDL LESS THAN 130: ICD-10-CM

## 2017-08-16 DIAGNOSIS — Z79.01 LONG-TERM (CURRENT) USE OF ANTICOAGULANTS: ICD-10-CM

## 2017-08-16 DIAGNOSIS — Z74.09 DECREASED AMBULATION STATUS: ICD-10-CM

## 2017-08-16 DIAGNOSIS — Z86.718 HISTORY OF DEEP VENOUS THROMBOSIS: ICD-10-CM

## 2017-08-16 DIAGNOSIS — I26.99 PULMONARY EMBOLISM (H): ICD-10-CM

## 2017-08-16 DIAGNOSIS — Z98.2 S/P VP SHUNT: ICD-10-CM

## 2017-08-16 DIAGNOSIS — I82.409 DVT (DEEP VENOUS THROMBOSIS) (H): ICD-10-CM

## 2017-08-16 DIAGNOSIS — H81.13 BPPV (BENIGN PAROXYSMAL POSITIONAL VERTIGO), BILATERAL: ICD-10-CM

## 2017-08-16 DIAGNOSIS — F43.21 ADJUSTMENT DISORDER WITH DEPRESSED MOOD: ICD-10-CM

## 2017-08-16 DIAGNOSIS — E03.9 HYPOTHYROIDISM, UNSPECIFIED TYPE: ICD-10-CM

## 2017-08-16 DIAGNOSIS — M79.7 FIBROMYALGIA: ICD-10-CM

## 2017-08-16 DIAGNOSIS — B02.9 HERPES ZOSTER WITHOUT COMPLICATION: ICD-10-CM

## 2017-08-16 PROCEDURE — 99214 OFFICE O/P EST MOD 30 MIN: CPT | Performed by: FAMILY MEDICINE

## 2017-08-16 NOTE — MR AVS SNAPSHOT
After Visit Summary   8/16/2017    Ángela Dumont    MRN: [id number]           Patient Information     Date Of Birth          1940        Visit Information        Provider Department      8/16/2017 10:40 AM Joaquin Rockwell MD [facility]        Today's Diagnoses     Lymphocytosis    -  1    NPH (normal pressure hydrocephalus)        S/P  shunt        Dementia without behavioral disturbance, unspecified dementia type        Pulmonary hypertension (H)        Fibromyalgia        Adjustment disorder with depressed mood        Hypothyroidism, unspecified type        Gastroesophageal reflux disease without esophagitis        BPPV (benign paroxysmal positional vertigo), bilateral        History of deep venous thrombosis        History of pulmonary embolism        Hypertension goal BP (blood pressure) < 140/90        Hyperlipidemia with target LDL less than 130        Long-term (current) use of anticoagulants [Z79.01]        Medication monitoring encounter        Herpes zoster without complication        Decreased ambulation status          Care Instructions    Follow up with Physical Medicine and Rehab, as discussed      [facility] [place] [facility]                        To reach your care team during and after hours:   [phone]  To reach our pharmacy:        [phone]    Clinic Hours                        Our clinic hours are:    Monday   7:30 am to 7:00 pm                  Tuesday through Friday 7:30 am to 5:00 pm                             Saturday   8:00 am to 12:00 pm      Sunday   Closed      Pharmacy Hours                        Our pharmacy hours are:    Monday   8:30 am to 7:00 pm       Tuesday to Friday  8:30 am to 6:00 pm                       Saturday    9:00 am to 1:00 pm              Sunday    Closed              There is also information available at our web site:  [email or web address]    If your provider ordered any lab tests and you do not receive the results within  10 business days, please call the clinic.    If you need a medication refill please contact your pharmacy.  Please allow 2-3 business days for your refill to be completed.    Our clinic offers telephone visits and e visits.  Please ask one of your team members to explain more.      Use "[employer]"t (secure email communication and access to your chart) to send your primary care provider a message or make an appointment. Ask someone on your Team how to sign up for "[employer]"t.  Immunizations                      Immunization History   Administered Date(s) Administered     Influenza (High Dose) 3 valent vaccine 09/19/2014, 09/25/2015, 09/19/2016     Influenza (IIV3) 10/23/2003, 10/04/2010, 10/06/2011, 09/27/2012, 09/19/2013     Pneumococcal (PCV 13) 09/25/2015     Pneumococcal 23 valent 10/04/2010     Tdap (Adacel,Boostrix) 01/01/2004     Zoster vaccine, live 10/05/2007        Health Maintenance                         Health Maintenance Due   Topic Date Due     Microalbumin Lab - yearly  07/17/1941     URINE DRUG SCREEN Q1 YR  07/17/1955     Bone Density Screening (Dexa)  07/17/2005     Tetanus Vaccine - every 10 years  01/01/2014     INR CLINIC REFERRAL - yearly  06/03/2016     FALL RISK ASSESSMENT  09/01/2017               Follow-ups after your visit        Additional Services     ONC/HEME ADULT REFERRAL       Your provider has referred you to: [facility]: [facility] [phone]   [email or web address]    Please be aware that coverage of these services is subject to the terms and limitations of your health insurance plan.  Call member services at your health plan with any benefit or coverage questions.      Please bring the following with you to your appointment:    (1) Any X-Rays, CTs or MRIs which have been performed.  Contact the facility where they were done to arrange for  prior to your scheduled appointment.  Any new CT, MRI or  other procedures ordered by your specialist must be performed at a [facility] or coordinated by your clinic's referral office.  (2) List of current medications  (3) This referral request   (4) Any documents/labs given to you for this referral            PHYSIATRY REFERRAL       Your provider has referred you to: [facility]: Physical Medicine [facility] Rehabilitation Clinic [facility] [phone]   [email or web address]     Please be aware that coverage of these services is subject to the terms and limitations of your health insurance plan.  Call member services at your health plan with any benefit or coverage questions.      Please bring the following to your appointment:  >>   Any x-rays, CTs or MRIs which have been performed.  Contact the facility where they were done to arrange for  prior to your scheduled appointment.    >>   List of current medications   >>   This referral request   >>   Any documents/labs given to you for this referral                  Who to contact     If you have questions or need follow up information about today's clinic visit or your schedule please contact [facility] directly at [phone].  Normal or non-critical lab and imaging results will be communicated to you by MyChart, letter or phone within 4 business days after the clinic has received the results. If you do not hear from us within 7 days, please contact the clinic through [employer] or phone. If you have a critical or abnormal lab result, we will notify you by phone as soon as possible.  Submit refill requests through [employer] or call your pharmacy and they will forward the refill request to us. Please allow 3 business days for your refill to be completed.          Additional Information About Your Visit        [employer] Information     [employer] gives you secure access to your electronic health record. If you see a primary care provider, you can also send messages to your care team and make appointments. If  "you have questions, please call your primary care clinic.  If you do not have a primary care provider, please call [phone] and they will assist you.        Care EveryWhere ID     This is your Care EveryWhere ID. This could be used by other organizations to access your [place] medical records  [id number]        Your Vitals Were     Pulse Temperature Height Pulse Oximetry BMI (Body Mass Index)       112 97.6  F (36.4  C) (Oral) 5' 4\" (1.626 m) 97% 36.05 kg/m2        Blood Pressure from Last 3 Encounters:   08/16/17 134/72   06/12/17 127/74   03/06/17 130/76    Weight from Last 3 Encounters:   08/16/17 210 lb (95.3 kg)   03/06/17 202 lb (91.6 kg)   09/02/16 188 lb (85.3 kg)              We Performed the Following     ONC/HEME ADULT REFERRAL     PHYSIATRY REFERRAL        Primary Care Provider Office Phone # Fax #    Joaquin Rockwell [phone]       [address] [place] [address]        Equal Access to Services     [facility]: Hadii heron ku hadasho Soariadna, waaxda luqadaha, qaybta kaalmada merlyn, francisco javier nunez . So [facility] [phone].    ATENCIÓN: Si habla español, tiene a vega disposición servicios gratuitos de asistencia lingüística. [facility] [phone].    We comply with applicable federal civil rights laws and [place] laws. We do not discriminate on the basis of race, color, national origin, age, disability sex, sexual orientation or gender identity.            Thank you!     Thank you for choosing [facility]  for your care. Our goal is always to provide you with excellent care. Hearing back from our patients is one way we can continue to improve our services. Please take a few minutes to complete the written survey that you may receive in the mail after your visit with us. Thank you!             Your Updated Medication List - Protect others around you: Learn how to safely use, store and throw away your medicines at " www.disposemymeds.org.          This list is accurate as of: 8/16/17 11:42 AM.  Always use your most recent med list.                   Brand Name Dispense Instructions for use Diagnosis    acetaminophen 325 MG tablet    TYLENOL    100 tablet    Take 2 tablets (650 mg) by mouth every 4 hours as needed for other (surgical pain)    Normal pressure hydrocephalus       furosemide 40 MG tablet    LASIX    90 tablet    TAKE ONE TABLET BY MOUTH DAILY AT 10AM    Hypertension goal BP (blood pressure) < 140/90       * JANTOVEN 2 MG tablet   Generic drug:  warfarin     180 tablet    TAKE ONE TO TWO TABLETS BY MOUTH EVERY DAY AS DIRECTED    History of deep venous thrombosis, History of pulmonary embolism, Long-term (current) use of anticoagulants, DVT (deep venous thrombosis) (H)       * JANTOVEN 4 MG tablet   Generic drug:  warfarin     90 tablet    TAKE ONE TABLET BY MOUTH DAILY UNTIL INR CHECK IN 2 TO 4 DAYS AND TO BE DOSED PER YOUR PCP    Long-term (current) use of anticoagulants       levothyroxine 50 MCG tablet    SYNTHROID/LEVOTHROID    90 tablet    TAKE ONE TABLET BY MOUTH EVERY DAY AT 7:30AM. DUE FOR LABS FOR FURTHER REFILLS.    Hypothyroidism, unspecified type       tamsulosin 0.4 MG capsule    FLOMAX    90 capsule    TAKE ONE CAPSULE BY MOUTH DAILY AT 10AM    Urinary retention       * Notice:  This list has 2 medication(s) that are the same as other medications prescribed for you. Read the directions carefully, and ask your doctor or other care provider to review them with you.

## 2017-08-16 NOTE — NURSING NOTE
"Chief Complaint   Patient presents with     RECHECK       Initial /72  Pulse 112  Temp 97.6  F (36.4  C) (Oral)  Ht 5' 4\" (1.626 m)  Wt 210 lb (95.3 kg)  SpO2 97%  BMI 36.05 kg/m2 Estimated body mass index is 36.05 kg/(m^2) as calculated from the following:    Height as of this encounter: 5' 4\" (1.626 m).    Weight as of this encounter: 210 lb (95.3 kg)..  BP completed using cuff size: farzaneh Francois MA  "

## 2017-08-16 NOTE — PROGRESS NOTES
"  SUBJECTIVE:                                                    Ángela Dumont is a 77 year old female who presents to clinic today with her  for the following health issues:    Hx of Pulmonary Embolism/DVT -- Jantoven 2 mg daily, intermittent use of 4 mg per INR. Ventriculoperitoneal Shunt placed 5/16 - not sure how much it has helped in regards of her being able to get more active. Has been going to PT, not sure how much it helped. She is continuing with at-home PT weekly. INR followed at home ([facility]).     Esophageal Reflux -- Her  stated that Byron has had upset stomach issues prior to bed, which has been treated with antacids. Denied heartburn, belching, etc.     Urinary -- Flomax 0.4 mg daily, Lasix 40 mg daily. Waking up 4-5 times at night.     Fibromyalgia -- Tylenol 650 mg every 4 hours prn.     Leukocytosis -- Was recently contacted regarding Hematology follow up, due to recent lab tests.     Dementia/Mood -- Byron reported that she \"has moments\" due to health issues and due to her \"nature\".     Shingles -- Recurrent to left buttocks -- treated with prescribed medications (zovirax) and OTC cream.     Hypothyroid -- Levothyroxine 50 mcg daily.     TSH   Date Value Ref Range Status   07/26/2017 2.51 0.40 - 4.00 mU/L Final   05/04/2016 1.57 mcU/mL Final   03/19/2016 2.16 0.35 - 4.94 mcU/mL Final     HTN -- Lasix 40 mg daily.     BP Readings from Last 3 Encounters:   08/16/17 134/72   06/12/17 127/74   03/06/17 130/76       Problem list and histories reviewed & adjusted, as indicated.  Additional history: as documented    Reviewed and updated as needed this visit by clinical staff  Tobacco  Allergies  Meds  Med Hx  Surg Hx  Fam Hx  Soc Hx      Reviewed and updated as needed this visit by Provider         Wt Readings from Last 4 Encounters:   08/16/17 210 lb (95.3 kg)   03/06/17 202 lb (91.6 kg)   09/02/16 188 lb (85.3 kg)   07/13/16 177 lb (80.3 kg)       Health Maintenance    Health Maintenance " Due   Topic Date Due     MICROALBUMIN Q1 YEAR  07/17/1941     URINE DRUG SCREEN Q1 YR  07/17/1955     DEXA SCAN SCREENING (SYSTEM ASSIGNED)  07/17/2005     TETANUS IMMUNIZATION (SYSTEM ASSIGNED)  01/01/2014     OP ANNUAL INR REFERRAL  06/03/2016     FALL RISK ASSESSMENT  09/01/2017       Current Problem List    Patient Active Problem List   Diagnosis     S/P  shunt     NPH (normal pressure hydrocephalus)     Fracture of right femur (H)     DVT (deep venous thrombosis) (H)     Pulmonary embolism (H)     Dementia     Hypothyroid     Hypertension goal BP (blood pressure) < 140/90     Adjustment disorder with depressed mood     Hyperlipidemia with target LDL less than 130     Leukocytosis     Esophageal reflux     UTI (urinary tract infection)     Constipation     Pulmonary hypertension (H)     MR (mitral regurgitation)     Fibromyalgia     Osteoarthritis of both knees     Health Care Home     Long-term (current) use of anticoagulants [Z79.01]     Advance Care Planning     SIRS (systemic inflammatory response syndrome) (H)     History of pulmonary embolism     History of deep venous thrombosis     Urinary retention     BPPV (benign paroxysmal positional vertigo), bilateral     Herpes zoster without complication       Past Medical History    Past Medical History:   Diagnosis Date     Adjustment disorder with depressed mood      Antiplatelet or antithrombotic long-term use      Constipation      Dementia     memory/anger     Depressive disorder last few months as condition became apparent    Lack of mobility a big problem     DVT (deep venous thrombosis) (H) 4/15    bilateral     Esophageal reflux      Fibromyalgia      Fracture of right femur (H) 1/15     Heart murmur      Herpes zoster without complication 08/16/2017    left buttocks     History of Clostridium difficile      Hyperlipidemia LDL goal < 130      Hypertension goal BP (blood pressure) < 140/90      Hypothyroid      Leukocytosis      Leukocytosis       Leukocytosis, unspecified     w/u ?     Low back pain      Migraine      MR (mitral regurgitation)     Mild     NPH (normal pressure hydrocephalus) 4/15    shunt placed but removed in 7/2015 due to erosion through the scalp, replaced 5/19/16     Osteoarthritis of both knees      Other atopic dermatitis and related conditions      PE (pulmonary embolism) 4/15    Saddle - IVC     Pulmonary hypertension (H)     EF 70-75%     Thrombosis of leg      Urinary tract infection, site not specified     MRSA - Dr Loo     Vertigo        Past Surgical History    Past Surgical History:   Procedure Laterality Date     ABDOMEN SURGERY  left side lower abdomen pain from Shunt we do roberth     COLONOSCOPY  2008     GYN SURGERY       H LABOR AND DELIVERY VAGINAL  1960, 1967     HYSTERECTOMY  1976    hysterectomy     IMPLANT SHUNT VENTRICULOPERITONEAL Left 5/19/2016    IMPLANT SHUNT VENTRICULOPERITONEAL - Dr Alba     OPTICAL TRACKING SYSTEM IMPLANT SHUNT VENTRICULOPERITONEAL Right 4/10/2015     Shunt - Dr Sierra     ORTHOPEDIC SURGERY  Knee pain that prohibits walking     REMOVE SHUNT VENTRICULOPERITONEAL N/A 7/3/2015    REMOVE SHUNT VENTRICULOPERITONEAL;  Surgeon: Emmanuel Sierra MD;  Location:  OR     SURGICAL HISTORY OF -   1/15    right femur/hip surgery - 1/18/2015     SURGICAL HISTORY OF -   7/04    rectocele repair     SURGICAL HISTORY OF -   1997    abdominal adhesion lysis     SURGICAL HISTORY OF -   4/15    IVC filter placement     SURGICAL HISTORY OF -   6/15    IVC filter removal       Current Medications    Current Outpatient Prescriptions   Medication Sig Dispense Refill     levothyroxine (SYNTHROID/LEVOTHROID) 50 MCG tablet TAKE ONE TABLET BY MOUTH EVERY DAY AT 7:30AM. DUE FOR LABS FOR FURTHER REFILLS. 90 tablet 1     tamsulosin (FLOMAX) 0.4 MG capsule TAKE ONE CAPSULE BY MOUTH DAILY AT 10AM 90 capsule 3     furosemide (LASIX) 40 MG tablet TAKE ONE TABLET BY MOUTH DAILY AT 10AM 90 tablet 1     JANTOVEN  4 MG tablet TAKE ONE TABLET BY MOUTH DAILY UNTIL INR CHECK IN 2 TO 4 DAYS AND TO BE DOSED PER YOUR PCP 90 tablet 1     JANTOVEN 2 MG tablet TAKE ONE TO TWO TABLETS BY MOUTH EVERY DAY AS DIRECTED 180 tablet 1     acetaminophen (TYLENOL) 325 MG tablet Take 2 tablets (650 mg) by mouth every 4 hours as needed for other (surgical pain) 100 tablet 0       Allergies    Allergies   Allergen Reactions     Shellfish Allergy Anaphylaxis     Aspirin      GI issues     Contrast Dye Hives and Itching     Flushed skin     Penicillins Hives     Sulfa Drugs Hives     Nitrofurantoin Rash       Immunizations    Immunization History   Administered Date(s) Administered     Influenza (High Dose) 3 valent vaccine 09/19/2014, 09/25/2015, 09/19/2016     Influenza (IIV3) 10/23/2003, 10/04/2010, 10/06/2011, 09/27/2012, 09/19/2013     Pneumococcal (PCV 13) 09/25/2015     Pneumococcal 23 valent 10/04/2010     Tdap (Adacel,Boostrix) 01/01/2004     Zoster vaccine, live 10/05/2007       Family History    Family History   Problem Relation Age of Onset     Colon Cancer Father      Coronary Artery Disease Father      DIABETES Maternal Grandmother        Social History    Social History     Social History     Marital status:      Spouse name: Vernon     Number of children: 2     Years of education: N/A     Occupational History     Not on file.     Social History Main Topics     Smoking status: Former Smoker     Packs/day: 0.50     Years: 10.00     Types: Cigarettes     Start date: 7/17/1958     Quit date: 1/1/1982     Smokeless tobacco: Never Used      Comment: quit 1967     Alcohol use 0.0 - 0.6 oz/week      Comment: Very light     Drug use: No     Sexual activity: Yes     Partners: Male     Birth control/ protection: None      Comment: not a problem     Other Topics Concern     Parent/Sibling W/ Cabg, Mi Or Angioplasty Before 65f 55m? No     Social History Narrative       All above reviewed and updated, all stable unless otherwise  "noted    Recent labs reviewed    ROS:  Constitutional, HEENT, cardiovascular, pulmonary, GI, , musculoskeletal, neuro, skin, endocrine and psych systems are negative, except as in HPI or otherwise noted     This document serves as a record of the services and decisions personally performed and made by Joaquin Rockwell MD [facility]. It was created on their behalf by Jonathan Moran, a trained medical scribe. The creation of this document is based the provider's statements to the medical scribe.  Jonathan Moran August 16, 2017 11:13 AM    OBJECTIVE:                                                    /72  Pulse 112  Temp 97.6  F (36.4  C) (Oral)  Ht 5' 4\" (1.626 m)  Wt 210 lb (95.3 kg)  SpO2 97%  BMI 36.05 kg/m2  Body mass index is 36.05 kg/(m^2).  GENERAL: healthy, alert and no distress, morbidly obese, pt in wheelchair  HENT: ear canals and TM's normal upon viewing with otoscope, nose and mouth without ulcers or lesions upon viewing with otoscope  RESP: lungs clear to auscultation - no rales, no rhonchi, no wheezes  CV: regular rates and rhythm, normal S1 S2, no S3 or S4 and no murmur, no click or rub -  MS: extremities- no gross deformities noted, bilateral 1+ LE edema  SKIN: no suspicious lesions, no rashes to visible skin  NEURO: mentation and speech at baseline  PSYCH: Alert and oriented times 3; speech- coherent , normal rate and volume; able to articulate logical thoughts, able to abstract reason, no tangential thoughts, no hallucinations or delusions, affect- normal    DIAGNOSTICS/PROCEDURES:                                                      Results for orders placed or performed in visit on 08/04/17   INR   Result Value Ref Range    INR 3.4      Pt recording INR at home - self reported 2.3 on 8/11     ASSESSMENT/PLAN:                                                        ICD-10-CM    1. Lymphocytosis D72.820 ONC/HEME ADULT REFERRAL   2. NPH (normal pressure hydrocephalus) G91.2    3. S/P  shunt Z98.2    4. " Dementia without behavioral disturbance, unspecified dementia type F03.90    5. Pulmonary hypertension (H) I27.2    6. Fibromyalgia M79.7 PHYSIATRY REFERRAL   7. Decreased ambulation status R68.89 PHYSIATRY REFERRAL   8. Herpes zoster without complication B02.9    9. Adjustment disorder with depressed mood F43.21    10. Hypothyroidism, unspecified type E03.9    11. Gastroesophageal reflux disease without esophagitis K21.9    12. BPPV (benign paroxysmal positional vertigo), bilateral H81.13    13. History of deep venous thrombosis Z86.718    14. History of pulmonary embolism Z86.711    15. Hypertension goal BP (blood pressure) < 140/90 I10    16. Hyperlipidemia with target LDL less than 130 E78.5    17. Long-term (current) use of anticoagulants [Z79.01] Z79.01    18. Medication monitoring encounter Z51.81      Discussed treatment/modality options, including risk and benefits, she desires follow up with another visit, diet discussed, further health care maintenance, OTC meds, referrals (Physical Medicine and Rehab) and observation. All diagnosis above reviewed and noted above, otherwise stable.  See Smartdate orders for further details.  Follow up in 3-6 month(s) and as needed.    Health Maintenance Due   Topic Date Due     MICROALBUMIN Q1 YEAR  07/17/1941     URINE DRUG SCREEN Q1 YR  07/17/1955     DEXA SCAN SCREENING (SYSTEM ASSIGNED)  07/17/2005     TETANUS IMMUNIZATION (SYSTEM ASSIGNED)  01/01/2014     OP ANNUAL INR REFERRAL  06/03/2016     FALL RISK ASSESSMENT  09/01/2017     Patient Instructions     Reviewed recent labs.    Follow up with Physical Medicine and Rehab/Hematology, as discussed    The information in this document, created by the medical scribe for me, accurately reflects the services I personally performed and the decisions made by me. I have reviewed and approved this document for accuracy.   Joaquin Rockwell MD FAAFP            Joaquin Rockwell MD [address] [place]  [address]  [phone] Fax

## 2017-08-16 NOTE — PATIENT INSTRUCTIONS
Follow up with Physical Medicine and Rehab, as discussed      [facility]                        To reach your care team during and after hours:   [phone]  To reach our pharmacy:        [phone]    Clinic Hours                        Our clinic hours are:    Monday   7:30 am to 7:00 pm                  Tuesday through Friday 7:30 am to 5:00 pm                             Saturday   8:00 am to 12:00 pm      Sunday   Closed      Pharmacy Hours                        Our pharmacy hours are:    Monday   8:30 am to 7:00 pm       Tuesday to Friday  8:30 am to 6:00 pm                       Saturday    9:00 am to 1:00 pm              Sunday    Closed              There is also information available at our web site:  [email or web address]    If your provider ordered any lab tests and you do not receive the results within 10 business days, please call the clinic.    If you need a medication refill please contact your pharmacy.  Please allow 2-3 business days for your refill to be completed.    Our clinic offers telephone visits and e visits.  Please ask one of your team members to explain more.      Use [employer] (secure email communication and access to your chart) to send your primary care provider a message or make an appointment. Ask someone on your Team how to sign up for [employer].  Immunizations                      Immunization History   Administered Date(s) Administered     Influenza (High Dose) 3 valent vaccine 09/19/2014, 09/25/2015, 09/19/2016     Influenza (IIV3) 10/23/2003, 10/04/2010, 10/06/2011, 09/27/2012, 09/19/2013     Pneumococcal (PCV 13) 09/25/2015     Pneumococcal 23 valent 10/04/2010     Tdap (Adacel,Boostrix) 01/01/2004     Zoster vaccine, live 10/05/2007        Health Maintenance                         Health Maintenance Due   Topic Date Due     Microalbumin Lab - yearly  07/17/1941     URINE DRUG SCREEN Q1 YR  07/17/1955     Bone Density Screening (Dexa)  07/17/2005     Tetanus  Vaccine - every 10 years  01/01/2014     INR CLINIC REFERRAL - yearly  06/03/2016     FALL RISK ASSESSMENT  09/01/2017

## 2017-08-21 ENCOUNTER — TELEPHONE (OUTPATIENT)
Dept: FAMILY MEDICINE | Facility: CLINIC | Age: 77
End: 2017-08-21

## 2017-08-21 PROCEDURE — G0179 MD RECERTIFICATION HHA PT: HCPCS | Performed by: FAMILY MEDICINE

## 2017-08-21 NOTE — TELEPHONE ENCOUNTER
Date Forms was received: August 21, 2017    Forms received by: Fax    Last office visit: 08/16/2017    Purpose of Form:  Home health Cert/Plan of Care    When the form is due:  ASAP    How the form needs to be returned for patient:  Fax to [phone]    Form currently placed  [employer]

## 2017-08-21 NOTE — TELEPHONE ENCOUNTER
Completed forms faxed back to [facility] at [phone].   Originals sent to be scanned.     Kathryn Carson

## 2017-09-05 ENCOUNTER — ANTICOAGULATION THERAPY VISIT (OUTPATIENT)
Dept: FAMILY MEDICINE | Facility: CLINIC | Age: 77
End: 2017-09-05
Payer: COMMERCIAL

## 2017-09-05 ENCOUNTER — TRANSFERRED RECORDS (OUTPATIENT)
Dept: HEALTH INFORMATION MANAGEMENT | Facility: CLINIC | Age: 77
End: 2017-09-05

## 2017-09-05 DIAGNOSIS — I26.99 PULMONARY EMBOLISM (H): ICD-10-CM

## 2017-09-05 DIAGNOSIS — Z79.01 LONG-TERM (CURRENT) USE OF ANTICOAGULANTS: ICD-10-CM

## 2017-09-05 DIAGNOSIS — I82.409 DVT (DEEP VENOUS THROMBOSIS) (H): ICD-10-CM

## 2017-09-05 LAB — INR PPP: 2.8

## 2017-09-05 PROCEDURE — 99207 ZZC NO CHARGE NURSE ONLY: CPT | Performed by: FAMILY MEDICINE

## 2017-09-05 NOTE — PROGRESS NOTES
ANTICOAGULATION FOLLOW-UP CLINIC VISIT    Patient Name:  Ángela Dumont  Date:  9/5/2017  Contact Type:  Telephone    SUBJECTIVE:     Patient Findings     Positives No Problem Findings           OBJECTIVE    INR   Date Value Ref Range Status   09/05/2017 2.8  Final       ASSESSMENT / PLAN  No question data found.  Anticoagulation Summary as of 9/5/2017     INR goal 2.0-3.0   Today's INR 2.8   Maintenance plan 2 mg (1 mg x 2) on Sun, Wed, Fri; 4 mg (1 mg x 4) all other days   Full instructions 2 mg on Sun, Wed, Fri; 4 mg all other days   Weekly total 22 mg   No change documented Cadence Gonzalez RN   Plan last modified Dorita Panda RN (8/4/2017)   Next INR check 10/3/2017   Target end date     Indications   Long-term (current) use of anticoagulants [Z79.01] [Z79.01]  Pulmonary embolism (H) [I26.99]  DVT (deep venous thrombosis) (H) [I82.409]         Anticoagulation Episode Summary     INR check location     Preferred lab     Send INR reminders to RV NURSE    Comments       Anticoagulation Care Providers     Provider Role Specialty Phone number    Joaquin Rockwell MD [facility] [phone]            See the Encounter Report to view Anticoagulation Flowsheet and Dosing Calendar (Go to Encounters tab in chart review, and find the Anticoagulation Therapy Visit)    Dosage adjustment made based on physician directed care plan.    Cadence Gonzalez, RN

## 2017-09-05 NOTE — MR AVS SNAPSHOT
Ángela Dumont   9/5/2017   Anticoagulation Therapy Visit    Description:  77 year old female   Provider:  Joaquin Rockwell MD   Department:   Family Practice           INR as of 9/5/2017     Today's INR 2.8      Anticoagulation Summary as of 9/5/2017     INR goal 2.0-3.0   Today's INR 2.8   Full instructions 2 mg on Sun, Wed, Fri; 4 mg all other days   Next INR check 10/3/2017    Indications   Long-term (current) use of anticoagulants [Z79.01] [Z79.01]  Pulmonary embolism (H) [I26.99]  DVT (deep venous thrombosis) (H) [I82.409]         September 2017 Details    Sun Mon Tue Wed Thu Fri Sat          1               2                 3               4               5      4 mg   See details      6      2 mg         7      4 mg         8      2 mg         9      4 mg           10      2 mg         11      4 mg         12      4 mg         13      2 mg         14      4 mg         15      2 mg         16      4 mg           17      2 mg         18      4 mg         19      4 mg         20      2 mg         21      4 mg         22      2 mg         23      4 mg           24      2 mg         25      4 mg         26      4 mg         27      2 mg         28      4 mg         29      2 mg         30      4 mg          Date Details   09/05 This INR check               How to take your warfarin dose     To take:  2 mg Take 2 of the 1 mg tablets.    To take:  4 mg Take 4 of the 1 mg tablets.           October 2017 Details    Sun Mon Tue Wed Thu Fri Sat     1      2 mg         2      4 mg         3            4               5               6               7                 8               9               10               11               12               13               14                 15               16               17               18               19               20               21                 22               23               24               25               26               27               28                  29               30               31                    Date Details   No additional details    Date of next INR:  10/3/2017         How to take your warfarin dose     To take:  2 mg Take 2 of the 1 mg tablets.    To take:  4 mg Take 4 of the 1 mg tablets.

## 2017-09-18 ENCOUNTER — TELEPHONE (OUTPATIENT)
Dept: LAB | Facility: CLINIC | Age: 77
End: 2017-09-18

## 2017-09-18 NOTE — TELEPHONE ENCOUNTER
Labs on 2017 have  and a letter was sent on 2017. Patient has not followed up. Routing to team to address.    [facility]

## 2017-09-18 NOTE — TELEPHONE ENCOUNTER
Patient was to have further labs drawn after 07/26/2017 labs.     Called Siva (spouse) @ [phone]    States he will have these drawn the next time patient has labs drawn at any facility as it is hard to draw labs on the patient.   Orders extended.     Tessy Gandara RN  [facility]

## 2017-09-28 ENCOUNTER — ONCOLOGY VISIT (OUTPATIENT)
Dept: ONCOLOGY | Facility: CLINIC | Age: 77
End: 2017-09-28
Attending: INTERNAL MEDICINE
Payer: COMMERCIAL

## 2017-09-28 ENCOUNTER — HOSPITAL ENCOUNTER (OUTPATIENT)
Facility: CLINIC | Age: 77
Setting detail: SPECIMEN
Discharge: HOME | End: 2017-09-28
Attending: INTERNAL MEDICINE | Admitting: INTERNAL MEDICINE
Payer: COMMERCIAL

## 2017-09-28 VITALS
BODY MASS INDEX: 34.15 KG/M2 | HEIGHT: 64 IN | WEIGHT: 200 LBS | TEMPERATURE: 98 F | HEART RATE: 88 BPM | OXYGEN SATURATION: 95 % | DIASTOLIC BLOOD PRESSURE: 88 MMHG | SYSTOLIC BLOOD PRESSURE: 128 MMHG | RESPIRATION RATE: 16 BRPM

## 2017-09-28 DIAGNOSIS — D72.829 LEUKOCYTOSIS, UNSPECIFIED TYPE: Primary | ICD-10-CM

## 2017-09-28 LAB
ALBUMIN SERPL-MCNC: 3.5 G/DL (ref 3.4–5)
ALP SERPL-CCNC: 99 U/L (ref 40–150)
ALT SERPL W P-5'-P-CCNC: 18 U/L (ref 0–50)
ANION GAP SERPL CALCULATED.3IONS-SCNC: 4 MMOL/L (ref 3–14)
AST SERPL W P-5'-P-CCNC: 14 U/L (ref 0–45)
BASOPHILS # BLD AUTO: 0 10E9/L (ref 0–0.2)
BASOPHILS NFR BLD AUTO: 0 %
BILIRUB SERPL-MCNC: 0.3 MG/DL (ref 0.2–1.3)
BUN SERPL-MCNC: 25 MG/DL (ref 7–30)
CALCIUM SERPL-MCNC: 8.9 MG/DL (ref 8.5–10.1)
CHLORIDE SERPL-SCNC: 106 MMOL/L (ref 94–109)
CO2 SERPL-SCNC: 28 MMOL/L (ref 20–32)
COPATH REPORT: NORMAL
CREAT SERPL-MCNC: 0.78 MG/DL (ref 0.52–1.04)
DIFFERENTIAL METHOD BLD: ABNORMAL
EOSINOPHIL # BLD AUTO: 0.2 10E9/L (ref 0–0.7)
EOSINOPHIL NFR BLD AUTO: 1 %
ERYTHROCYTE [DISTWIDTH] IN BLOOD BY AUTOMATED COUNT: 13.8 % (ref 10–15)
FOLATE SERPL-MCNC: 5.7 NG/ML
GFR SERPL CREATININE-BSD FRML MDRD: 72 ML/MIN/1.7M2
GLUCOSE SERPL-MCNC: 101 MG/DL (ref 70–99)
HCT VFR BLD AUTO: 40.9 % (ref 35–47)
HGB BLD-MCNC: 12.9 G/DL (ref 11.7–15.7)
LDH SERPL L TO P-CCNC: 258 U/L (ref 81–234)
LYMPHOCYTES # BLD AUTO: 11.5 10E9/L (ref 0.8–5.3)
LYMPHOCYTES NFR BLD AUTO: 66 %
MCH RBC QN AUTO: 28.4 PG (ref 26.5–33)
MCHC RBC AUTO-ENTMCNC: 31.5 G/DL (ref 31.5–36.5)
MCV RBC AUTO: 90 FL (ref 78–100)
MONOCYTES # BLD AUTO: 0.7 10E9/L (ref 0–1.3)
MONOCYTES NFR BLD AUTO: 4 %
NEUTROPHILS # BLD AUTO: 5 10E9/L (ref 1.6–8.3)
NEUTROPHILS NFR BLD AUTO: 29 %
PLATELET # BLD AUTO: 208 10E9/L (ref 150–450)
PLATELET # BLD EST: NORMAL 10*3/UL
POTASSIUM SERPL-SCNC: 4 MMOL/L (ref 3.4–5.3)
PROT SERPL-MCNC: 7.5 G/DL (ref 6.8–8.8)
RBC # BLD AUTO: 4.55 10E12/L (ref 3.8–5.2)
RBC MORPH BLD: ABNORMAL
RETICS # AUTO: 55.1 10E9/L (ref 25–95)
RETICS/RBC NFR AUTO: 1.2 % (ref 0.5–2)
SODIUM SERPL-SCNC: 138 MMOL/L (ref 133–144)
VIT B12 SERPL-MCNC: 476 PG/ML (ref 193–986)
WBC # BLD AUTO: 17.4 10E9/L (ref 4–11)

## 2017-09-28 PROCEDURE — 85060 BLOOD SMEAR INTERPRETATION: CPT | Performed by: INTERNAL MEDICINE

## 2017-09-28 PROCEDURE — 81219 CALR GENE COM VARIANTS: CPT | Performed by: INTERNAL MEDICINE

## 2017-09-28 PROCEDURE — 82746 ASSAY OF FOLIC ACID SERUM: CPT | Performed by: INTERNAL MEDICINE

## 2017-09-28 PROCEDURE — 99204 OFFICE O/P NEW MOD 45 MIN: CPT | Performed by: INTERNAL MEDICINE

## 2017-09-28 PROCEDURE — 81403 MOPATH PROCEDURE LEVEL 4: CPT | Performed by: INTERNAL MEDICINE

## 2017-09-28 PROCEDURE — 84165 PROTEIN E-PHORESIS SERUM: CPT | Performed by: INTERNAL MEDICINE

## 2017-09-28 PROCEDURE — 82607 VITAMIN B-12: CPT | Performed by: INTERNAL MEDICINE

## 2017-09-28 PROCEDURE — 99211 OFF/OP EST MAY X REQ PHY/QHP: CPT

## 2017-09-28 PROCEDURE — 36415 COLL VENOUS BLD VENIPUNCTURE: CPT

## 2017-09-28 PROCEDURE — 85045 AUTOMATED RETICULOCYTE COUNT: CPT | Performed by: INTERNAL MEDICINE

## 2017-09-28 PROCEDURE — 88185 FLOWCYTOMETRY/TC ADD-ON: CPT | Performed by: INTERNAL MEDICINE

## 2017-09-28 PROCEDURE — 40001005 ZZHCL STATISTIC FLOW >15 ABY TC 88189: Performed by: INTERNAL MEDICINE

## 2017-09-28 PROCEDURE — 83615 LACTATE (LD) (LDH) ENZYME: CPT | Performed by: INTERNAL MEDICINE

## 2017-09-28 PROCEDURE — 80053 COMPREHEN METABOLIC PANEL: CPT | Performed by: INTERNAL MEDICINE

## 2017-09-28 PROCEDURE — 81207 BCR/ABL1 GENE MINOR BP: CPT | Performed by: INTERNAL MEDICINE

## 2017-09-28 PROCEDURE — 81206 BCR/ABL1 GENE MAJOR BP: CPT | Performed by: INTERNAL MEDICINE

## 2017-09-28 PROCEDURE — 88184 FLOWCYTOMETRY/ TC 1 MARKER: CPT | Performed by: INTERNAL MEDICINE

## 2017-09-28 PROCEDURE — 85025 COMPLETE CBC W/AUTO DIFF WBC: CPT | Performed by: INTERNAL MEDICINE

## 2017-09-28 PROCEDURE — 40000847 ZZHCL STATISTIC MORPHOLOGY W/INTERP HISTOLOGY TC 85060: Performed by: INTERNAL MEDICINE

## 2017-09-28 PROCEDURE — 00000402 ZZHCL STATISTIC TOTAL PROTEIN: Performed by: INTERNAL MEDICINE

## 2017-09-28 ASSESSMENT — PAIN SCALES - GENERAL: PAINLEVEL: NO PAIN (0)

## 2017-09-28 NOTE — LETTER
9/28/2017         RE: Ángela Dumont  [address] [facility] [address]        Dear Colleague,    Thank you for referring your patient, Ángela Dumont, to the [facility] CANCER CARE. Please see a copy of my visit note below.    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  On chart review, she has had a mildly elevated WBC count since at least 2015.  In July 2017, WBC was 18.1 K, and peripheral smear showed leukocytosis with mild atypical absolute lymphocytosis and increased rouleaux.  Hemoglobin and platelets were normal.      In 2015, she was diagnosed with pulmonary embolism and DVT.  Three months prior, she had fractured her femur and had undergone surgery.  She was immobile for 3 months.  She has been on warfarin since then.      Ángela has dementia, and her  answers the majority of the questions.  They live in a one level [facility].  She is wheelchair-bound  He helps with lifting and transferring.  She does not get frequent infections.  She started taking vitamin D recently.  The last time she fell was about a year and a half ago.     Ángela is from [place] and her  is from [place].  They used to live in [place], [place], and Ángela wants to move back there.          REVIEW OF SYSTEMS:   14 point ROS was reviewed and is negative other than as noted above in HPI.       HOME MEDICATIONS:  Current Outpatient Prescriptions   Medication Sig Dispense Refill     VITAMIN D, CHOLECALCIFEROL, PO Take 1,000 Units by mouth daily       levothyroxine (SYNTHROID/LEVOTHROID) 50 MCG tablet TAKE ONE TABLET BY MOUTH EVERY DAY AT 7:30AM. DUE FOR LABS FOR FURTHER REFILLS. 90 tablet 1      tamsulosin (FLOMAX) 0.4 MG capsule TAKE ONE CAPSULE BY MOUTH DAILY AT 10AM 90 capsule 3     furosemide (LASIX) 40 MG tablet TAKE ONE TABLET BY MOUTH DAILY AT 10AM 90 tablet 1     JANTOVEN 4 MG tablet TAKE ONE TABLET BY MOUTH DAILY UNTIL INR CHECK IN 2 TO 4 DAYS AND TO BE DOSED PER YOUR PCP 90 tablet 1     JANTOVEN 2 MG tablet TAKE ONE TO TWO TABLETS BY MOUTH EVERY DAY AS DIRECTED 180 tablet 1     acetaminophen (TYLENOL) 325 MG tablet Take 2 tablets (650 mg) by mouth every 4 hours as needed for other (surgical pain) 100 tablet 0         ALLERGIES:  Allergies   Allergen Reactions     Shellfish Allergy Anaphylaxis     Aspirin      GI issues     Contrast Dye Hives and Itching     Flushed skin     Penicillins Hives     Sulfa Drugs Hives     Nitrofurantoin Rash         PAST MEDICAL HISTORY:  Past Medical History:   Diagnosis Date     Adjustment disorder with depressed mood      Antiplatelet or antithrombotic long-term use      Constipation      Dementia     memory/anger     Depressive disorder last few months as condition became apparent    Lack of mobility a big problem     DVT (deep venous thrombosis) (H) 4/15    bilateral     Esophageal reflux      Fibromyalgia      Fracture of right femur (H) 1/15     Heart murmur      Herpes zoster without complication 08/16/2017    left buttocks     History of Clostridium difficile      Hyperlipidemia LDL goal < 130      Hypertension goal BP (blood pressure) < 140/90      Hypothyroid      Leukocytosis      Leukocytosis      Leukocytosis, unspecified     w/u ?     Low back pain      Migraine      MR (mitral regurgitation)     Mild     NPH (normal pressure hydrocephalus) 4/15    shunt placed but removed in 7/2015 due to erosion through the scalp, replaced 5/19/16     Osteoarthritis of both knees      Other atopic dermatitis and related conditions      PE (pulmonary embolism) 4/15    Saddle - IVC     Pulmonary hypertension (H)     EF 70-75%     Thrombosis of leg      Urinary tract  infection, site not specified     MRSA - Dr Loo     Vertigo          PAST SURGICAL HISTORY:  Past Surgical History:   Procedure Laterality Date     ABDOMEN SURGERY  left side lower abdomen pain from Shunt we do roberth     COLONOSCOPY  2008     GYN SURGERY       H LABOR AND DELIVERY VAGINAL  1960, 1967     HYSTERECTOMY  1976    hysterectomy     IMPLANT SHUNT VENTRICULOPERITONEAL Left 5/19/2016    IMPLANT SHUNT VENTRICULOPERITONEAL - Dr Alba     OPTICAL TRACKING SYSTEM IMPLANT SHUNT VENTRICULOPERITONEAL Right 4/10/2015     Shunt - Dr Sierra     ORTHOPEDIC SURGERY  Knee pain that prohibits walking     REMOVE SHUNT VENTRICULOPERITONEAL N/A 7/3/2015    REMOVE SHUNT VENTRICULOPERITONEAL;  Surgeon: Emmanuel Sierra MD;  Location: [facility] OR     SURGICAL HISTORY OF -   1/15    right femur/hip surgery - 1/18/2015     SURGICAL HISTORY OF -   7/04    rectocele repair     SURGICAL HISTORY OF -   1997    abdominal adhesion lysis     SURGICAL HISTORY OF -   4/15    IVC filter placement     SURGICAL HISTORY OF -   6/15    IVC filter removal         SOCIAL HISTORY:  Social History     Social History     Marital status:      Spouse name: Vernon     Number of children: 2     Years of education: N/A     Occupational History     Not on file.     Social History Main Topics     Smoking status: Former Smoker     Packs/day: 0.50     Years: 10.00     Types: Cigarettes     Start date: 7/17/1958     Quit date: 1/1/1982     Smokeless tobacco: Never Used      Comment: quit 1967     Alcohol use 0.0 - 0.6 oz/week      Comment: Very light     Drug use: No     Sexual activity: Yes     Partners: Male     Birth control/ protection: None      Comment: not a problem     Other Topics Concern     Parent/Sibling W/ Cabg, Mi Or Angioplasty Before 65f 55m? No     Social History Narrative         FAMILY HISTORY:  Family History   Problem Relation Age of Onset     Colon Cancer Father      Coronary Artery Disease Father      DIABETES Maternal  "Grandmother          PHYSICAL EXAM:  Vital signs:  /88 (Patient Position: Chair)  Pulse 88  Temp 98  F (36.7  C) (Tympanic)  Resp 16  Ht 1.626 m (5' 4\")  Wt 90.7 kg (200 lb)  SpO2 95%  BMI 34.33 kg/m2   GENERAL/CONSTITUTIONAL: No acute distress.  Accompanied by .  EYES: No scleral icterus.  RESPIRATORY: Clear to auscultation bilaterally. No crackles or wheezing.   CARDIOVASCULAR: Regular rate and rhythm without murmurs, gallops, or rubs.  GASTROINTESTINAL: No tenderness. The patient has normal bowel sounds. No guarding.  MUSCULOSKELETAL: Warm and well-perfused.  NEUROLOGIC: Alert, oriented, answers questions appropriately.  INTEGUMENTARY: No jaundice.  GAIT: In wheelchair.    LABS:  CBC RESULTS:   Recent Labs   Lab Test  07/26/17   1256   WBC  18.1*   RBC  4.56   HGB  13.3   HCT  41.2   MCV  90   MCH  29.2   MCHC  32.3   RDW  13.2   PLT  213       Recent Labs   Lab Test  07/26/17   1118  06/08/16   0755   NA  137  142   POTASSIUM  4.0  3.6   CHLORIDE  107  109   CO2  25  27   ANIONGAP  5  6   GLC  108*  85   BUN  24  12   CR  0.81  0.58   TAVIA  8.8  8.6         ASSESSMENT/PLAN:  Ángela Dumont is a 77 year old female with:    1) Leukocytosis: appears chronic since at least 2015.  Peripheral smear in July 2017 showed mild atypical lymphocytosis and rouleaux.  Hemoglobin and platelet count are normal.  She does not have frequent infections, and no new medications recently, other than vitamin D.  She could have a chronic process, such as CLL or other myeloproliferative disorder.  We will obtain peripheral labs today for further evaluation.  Will hold off on any invasive procedures for now, such as bone marrow biopsy, considering her age and dementia, and she likely does not need it.    -labs today: CBC, CMP, peripheral smear, vitamin B12, folate, LDH, SPEP, peripheral flow, myeloproliferative panel.  -RTC in 2 weeks to review results    2) History of PE and DVT: diagnosed in 2015, and she has been on " warfarin ever since.  It was likely provoked at the time by surgery and immobility.  She remains wheelchair bound now, and remains at high risk for blood clots.  She has not had frequent falls, and INR has been under relatively good control.  I discussed and risks and benefits of continuing the warfarin.  She will continue the warfarin for now, considering her high risk for clotting remains.  If it becomes that the risk of bleeding starts to outweigh her risk of clots, then it would be time to stop the warfarin.       I spent a total of 45 minutes with the patient, with over >50% of the time in counseling and/or coordination of care.       Susanna Duran MD  Hematology/Oncology  [facility]      Again, thank you for allowing me to participate in the care of your patient.        Sincerely,        Susanna Duran MD

## 2017-09-28 NOTE — MR AVS SNAPSHOT
After Visit Summary   9/28/2017    Ángela Dumont    MRN: [id number]           Patient Information     Date Of Birth          1940        Visit Information        Provider Department      9/28/2017 10:45 AM Susanna Duran MD [facility] Oncology [facility]      Today's Diagnoses     Leukocytosis, unspecified type    -  1      Care Instructions          -labs today-Ruchi    -return to clinic in 2 weeks scheduled Alexandria Jeffries          Follow-ups after your visit        Your next 10 appointments already scheduled     Oct 12, 2017  2:15 PM CDT   Return Visit with Susanna Duran MD   [facility] ([facility])    [facility]  [address]  [address]  [facility] [address] [phone]              Who to contact     If you have questions or need follow up information about today's clinic visit or your schedule please contact [facility] CANCER CARE directly at [phone].  Normal or non-critical lab and imaging results will be communicated to you by [employer], letter or phone within 4 business days after the clinic has received the results. If you do not hear from us within 7 days, please contact the clinic through [employer] or phone. If you have a critical or abnormal lab result, we will notify you by phone as soon as possible.  Submit refill requests through [employer] or call your pharmacy and they will forward the refill request to us. Please allow 3 business days for your refill to be completed.          Additional Information About Your Visit        MyChart Information     [employer] gives you secure access to your electronic health record. If you see a primary care provider, you can also send messages to your care team and make appointments. If you have questions, please call your primary care clinic.  If you do not have a primary care provider, please call [phone] and they will  "assist you.        Care EveryWhere ID     This is your Care EveryWhere ID. This could be used by other organizations to access your [place] medical records  [id number]        Your Vitals Were     Pulse Temperature Respirations Height Pulse Oximetry BMI (Body Mass Index)    88 98  F (36.7  C) (Tympanic) 16 1.626 m (5' 4\") 95% 34.33 kg/m2       Blood Pressure from Last 3 Encounters:   09/28/17 128/88   08/16/17 134/72   06/12/17 127/74    Weight from Last 3 Encounters:   09/28/17 90.7 kg (200 lb)   08/16/17 95.3 kg (210 lb)   03/06/17 91.6 kg (202 lb)              We Performed the Following     BCR ABL1 Major Breakpoint Quant p210     BCR ABL1 Minor Breakpoint Quant p190     Blood Morphology Pathologist Review     CBC with platelets differential     Comprehensive metabolic panel     Folate     Lactate Dehydrogenase     Leukemia Lymphoma Evaluation (Flow Cytometry)     Next Generation Sequencing Oncology: Myeloproliferative Neoplasm Panel     Protein electrophoresis     Reticulocyte count     Vitamin B12        Primary Care Provider Office Phone # Fax #    Joaquin Rockwell [phone]       [address] [place] [address]        Equal Access to Services     [facility] Center[facility] : Hadii heron morino Soariadna, waaxda luqadaha, qaybta kaalmada adewhitley, francisco javier nunez . So [facility] [phone].    ATENCIÓN: Si habla español, tiene a vega disposición servicios gratuitos de asistencia lingüística. Llame al [phone].    We comply with applicable federal civil rights laws and [place] laws. We do not discriminate on the basis of race, color, national origin, age, disability sex, sexual orientation or gender identity.            Thank you!     Thank you for choosing [facility] CANCER [facility]  for your care. Our goal is always to provide you with excellent care. Hearing back from our patients is one way we can continue to improve our services. Please take a few minutes to " complete the written survey that you may receive in the mail after your visit with us. Thank you!             Your Updated Medication List - Protect others around you: Learn how to safely use, store and throw away your medicines at www.disposemymeds.org.          This list is accurate as of: 9/28/17 12:01 PM.  Always use your most recent med list.                   Brand Name Dispense Instructions for use Diagnosis    acetaminophen 325 MG tablet    TYLENOL    100 tablet    Take 2 tablets (650 mg) by mouth every 4 hours as needed for other (surgical pain)    Normal pressure hydrocephalus       furosemide 40 MG tablet    LASIX    90 tablet    TAKE ONE TABLET BY MOUTH DAILY AT 10AM    Hypertension goal BP (blood pressure) < 140/90       * JANTOVEN 2 MG tablet   Generic drug:  warfarin     180 tablet    TAKE ONE TO TWO TABLETS BY MOUTH EVERY DAY AS DIRECTED    History of deep venous thrombosis, History of pulmonary embolism, Long-term (current) use of anticoagulants, DVT (deep venous thrombosis) (H)       * JANTOVEN 4 MG tablet   Generic drug:  warfarin     90 tablet    TAKE ONE TABLET BY MOUTH DAILY UNTIL INR CHECK IN 2 TO 4 DAYS AND TO BE DOSED PER YOUR PCP    Long-term (current) use of anticoagulants       levothyroxine 50 MCG tablet    SYNTHROID/LEVOTHROID    90 tablet    TAKE ONE TABLET BY MOUTH EVERY DAY AT 7:30AM. DUE FOR LABS FOR FURTHER REFILLS.    Hypothyroidism, unspecified type       tamsulosin 0.4 MG capsule    FLOMAX    90 capsule    TAKE ONE CAPSULE BY MOUTH DAILY AT 10AM    Urinary retention       VITAMIN D (CHOLECALCIFEROL) PO      Take 1,000 Units by mouth daily    Leukocytosis, unspecified type       * Notice:  This list has 2 medication(s) that are the same as other medications prescribed for you. Read the directions carefully, and ask your doctor or other care provider to review them with you.

## 2017-09-28 NOTE — PROGRESS NOTES
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  On chart review, she has had a mildly elevated WBC count since at least 2015.  In July 2017, WBC was 18.1 K, and peripheral smear showed leukocytosis with mild atypical absolute lymphocytosis and increased rouleaux.  Hemoglobin and platelets were normal.      In 2015, she was diagnosed with pulmonary embolism and DVT.  Three months prior, she had fractured her femur and had undergone surgery.  She was immobile for 3 months.  She has been on warfarin since then.      Ángela has dementia, and her  answers the majority of the questions.  They live in a one level [facility].  She is wheelchair-bound  He helps with lifting and transferring.  She does not get frequent infections.  She started taking vitamin D recently.  The last time she fell was about a year and a half ago.     Ángela is from [place] and her  is from [place].  They used to live in [place], [place], and Ángela wants to move back there.          REVIEW OF SYSTEMS:   14 point ROS was reviewed and is negative other than as noted above in HPI.       HOME MEDICATIONS:  Current Outpatient Prescriptions   Medication Sig Dispense Refill     VITAMIN D, CHOLECALCIFEROL, PO Take 1,000 Units by mouth daily       levothyroxine (SYNTHROID/LEVOTHROID) 50 MCG tablet TAKE ONE TABLET BY MOUTH EVERY DAY AT 7:30AM. DUE FOR LABS FOR FURTHER REFILLS. 90 tablet 1     tamsulosin (FLOMAX) 0.4 MG capsule TAKE ONE CAPSULE BY MOUTH DAILY AT 10AM 90 capsule 3     furosemide (LASIX) 40 MG tablet TAKE ONE TABLET BY MOUTH DAILY AT 10AM 90 tablet 1     JANTOVEN 4 MG tablet TAKE ONE TABLET BY MOUTH DAILY UNTIL INR CHECK IN 2  TO 4 DAYS AND TO BE DOSED PER YOUR PCP 90 tablet 1     JANTOVEN 2 MG tablet TAKE ONE TO TWO TABLETS BY MOUTH EVERY DAY AS DIRECTED 180 tablet 1     acetaminophen (TYLENOL) 325 MG tablet Take 2 tablets (650 mg) by mouth every 4 hours as needed for other (surgical pain) 100 tablet 0         ALLERGIES:  Allergies   Allergen Reactions     Shellfish Allergy Anaphylaxis     Aspirin      GI issues     Contrast Dye Hives and Itching     Flushed skin     Penicillins Hives     Sulfa Drugs Hives     Nitrofurantoin Rash         PAST MEDICAL HISTORY:  Past Medical History:   Diagnosis Date     Adjustment disorder with depressed mood      Antiplatelet or antithrombotic long-term use      Constipation      Dementia     memory/anger     Depressive disorder last few months as condition became apparent    Lack of mobility a big problem     DVT (deep venous thrombosis) (H) 4/15    bilateral     Esophageal reflux      Fibromyalgia      Fracture of right femur (H) 1/15     Heart murmur      Herpes zoster without complication 08/16/2017    left buttocks     History of Clostridium difficile      Hyperlipidemia LDL goal < 130      Hypertension goal BP (blood pressure) < 140/90      Hypothyroid      Leukocytosis      Leukocytosis      Leukocytosis, unspecified     w/u ?     Low back pain      Migraine      MR (mitral regurgitation)     Mild     NPH (normal pressure hydrocephalus) 4/15    shunt placed but removed in 7/2015 due to erosion through the scalp, replaced 5/19/16     Osteoarthritis of both knees      Other atopic dermatitis and related conditions      PE (pulmonary embolism) 4/15    Saddle - IVC     Pulmonary hypertension (H)     EF 70-75%     Thrombosis of leg      Urinary tract infection, site not specified     MRSA - Dr Eze Gonzalez          PAST SURGICAL HISTORY:  Past Surgical History:   Procedure Laterality Date     ABDOMEN SURGERY  left side lower abdomen pain from Shunt we do roberth     COLONOSCOPY  2008     GYN  "SURGERY       H LABOR AND DELIVERY VAGINAL  1960, 1967     HYSTERECTOMY  1976    hysterectomy     IMPLANT SHUNT VENTRICULOPERITONEAL Left 5/19/2016    IMPLANT SHUNT VENTRICULOPERITONEAL - Dr Alba     OPTICAL TRACKING SYSTEM IMPLANT SHUNT VENTRICULOPERITONEAL Right 4/10/2015     Shunt - Dr Sierra     ORTHOPEDIC SURGERY  Knee pain that prohibits walking     REMOVE SHUNT VENTRICULOPERITONEAL N/A 7/3/2015    REMOVE SHUNT VENTRICULOPERITONEAL;  Surgeon: Emmanuel Sierra MD;  Location: [facility]     SURGICAL HISTORY OF -   1/15    right femur/hip surgery - 1/18/2015     SURGICAL HISTORY OF -   7/04    rectocele repair     SURGICAL HISTORY OF -   1997    abdominal adhesion lysis     SURGICAL HISTORY OF -   4/15    IVC filter placement     SURGICAL HISTORY OF -   6/15    IVC filter removal         SOCIAL HISTORY:  Social History     Social History     Marital status:      Spouse name: Vernon     Number of children: 2     Years of education: N/A     Occupational History     Not on file.     Social History Main Topics     Smoking status: Former Smoker     Packs/day: 0.50     Years: 10.00     Types: Cigarettes     Start date: 7/17/1958     Quit date: 1/1/1982     Smokeless tobacco: Never Used      Comment: quit 1967     Alcohol use 0.0 - 0.6 oz/week      Comment: Very light     Drug use: No     Sexual activity: Yes     Partners: Male     Birth control/ protection: None      Comment: not a problem     Other Topics Concern     Parent/Sibling W/ Cabg, Mi Or Angioplasty Before 65f 55m? No     Social History Narrative         FAMILY HISTORY:  Family History   Problem Relation Age of Onset     Colon Cancer Father      Coronary Artery Disease Father      DIABETES Maternal Grandmother          PHYSICAL EXAM:  Vital signs:  /88 (Patient Position: Chair)  Pulse 88  Temp 98  F (36.7  C) (Tympanic)  Resp 16  Ht 1.626 m (5' 4\")  Wt 90.7 kg (200 lb)  SpO2 95%  BMI 34.33 kg/m2   GENERAL/CONSTITUTIONAL: No acute " distress.  Accompanied by .  EYES: No scleral icterus.  RESPIRATORY: Clear to auscultation bilaterally. No crackles or wheezing.   CARDIOVASCULAR: Regular rate and rhythm without murmurs, gallops, or rubs.  GASTROINTESTINAL: No tenderness. The patient has normal bowel sounds. No guarding.  MUSCULOSKELETAL: Warm and well-perfused.  NEUROLOGIC: Alert, oriented, answers questions appropriately.  INTEGUMENTARY: No jaundice.  GAIT: In wheelchair.    LABS:  CBC RESULTS:   Recent Labs   Lab Test  07/26/17   1256   WBC  18.1*   RBC  4.56   HGB  13.3   HCT  41.2   MCV  90   MCH  29.2   MCHC  32.3   RDW  13.2   PLT  213       Recent Labs   Lab Test  07/26/17   1118  06/08/16   0755   NA  137  142   POTASSIUM  4.0  3.6   CHLORIDE  107  109   CO2  25  27   ANIONGAP  5  6   GLC  108*  85   BUN  24  12   CR  0.81  0.58   TAVIA  8.8  8.6         ASSESSMENT/PLAN:  Ángela Dumont is a 77 year old female with:    1) Leukocytosis: appears chronic since at least 2015.  Peripheral smear in July 2017 showed mild atypical lymphocytosis and rouleaux.  Hemoglobin and platelet count are normal.  She does not have frequent infections, and no new medications recently, other than vitamin D.  She could have a chronic process, such as CLL or other myeloproliferative disorder.  We will obtain peripheral labs today for further evaluation.  Will hold off on any invasive procedures for now, such as bone marrow biopsy, considering her age and dementia, and she likely does not need it.    -labs today: CBC, CMP, peripheral smear, vitamin B12, folate, LDH, SPEP, peripheral flow, myeloproliferative panel.  -RTC in 2 weeks to review results    2) History of PE and DVT: diagnosed in 2015, and she has been on warfarin ever since.  It was likely provoked at the time by surgery and immobility.  She remains wheelchair bound now, and remains at high risk for blood clots.  She has not had frequent falls, and INR has been under relatively good control.  I  discussed and risks and benefits of continuing the warfarin.  She will continue the warfarin for now, considering her high risk for clotting remains.  If it becomes that the risk of bleeding starts to outweigh her risk of clots, then it would be time to stop the warfarin.       I spent a total of 45 minutes with the patient, with over >50% of the time in counseling and/or coordination of care.       Susanna Duran MD  Hematology/Oncology  [facility]

## 2017-09-28 NOTE — NURSING NOTE
"Oncology Rooming Note    September 28, 2017 10:50 AM   Ángela Dumont is a 77 year old female who presents for:    Chief Complaint   Patient presents with     Oncology Clinic Visit     New Consult     Initial Vitals: Wt 90.7 kg (200 lb)  BMI 34.33 kg/m2 Estimated body mass index is 34.33 kg/(m^2) as calculated from the following:    Height as of 8/16/17: 1.626 m (5' 4\").    Weight as of this encounter: 90.7 kg (200 lb). Body surface area is 2.02 meters squared.  No Pain (0) Comment: Data Unavailable   No LMP recorded. Patient has had a hysterectomy.  Allergies reviewed: Yes  Medications reviewed: Yes    Medications: Medication refills not needed today.  Pharmacy name entered into Rowl:    [facility] [address] [place] [address] [facility] [place] - [address] DRUG STORE [address] [place] [address] [address] [address] AT [address] [address] & [facility]    Clinical concerns: New Consult-Referred by Dr. Rockwell    8 minutes for nursing intake (face to face time)   DISCHARGE PLAN:  Next appointments: See patient instruction section  Departure Mode: Ambulatory  Accompanied by: -Juan David  0 minutes for nursing discharge (face to face time)   Ruchi Villalobos                "

## 2017-09-29 LAB
ALBUMIN SERPL ELPH-MCNC: 4 G/DL (ref 3.7–5.1)
ALPHA1 GLOB SERPL ELPH-MCNC: 0.3 G/DL (ref 0.2–0.4)
ALPHA2 GLOB SERPL ELPH-MCNC: 0.8 G/DL (ref 0.5–0.9)
B-GLOBULIN SERPL ELPH-MCNC: 1.6 G/DL (ref 0.6–1)
GAMMA GLOB SERPL ELPH-MCNC: 0.4 G/DL (ref 0.7–1.6)
M PROTEIN SERPL ELPH-MCNC: 0.9 G/DL
PROT PATTERN SERPL ELPH-IMP: ABNORMAL

## 2017-10-02 ENCOUNTER — TELEPHONE (OUTPATIENT)
Dept: ONCOLOGY | Facility: CLINIC | Age: 77
End: 2017-10-02

## 2017-10-02 NOTE — TELEPHONE ENCOUNTER
Received a phone call from pathology fellow questioning next generation oncology testing on patient.  would like Dr. Duran to call her back at [phone]. Dr. Erazo is aware that Dr. Duran is currently out of the office. Sis Beard RN

## 2017-10-03 ENCOUNTER — TELEPHONE (OUTPATIENT)
Dept: FAMILY MEDICINE | Facility: CLINIC | Age: 77
End: 2017-10-03

## 2017-10-03 ENCOUNTER — TRANSFERRED RECORDS (OUTPATIENT)
Dept: HEALTH INFORMATION MANAGEMENT | Facility: CLINIC | Age: 77
End: 2017-10-03

## 2017-10-03 ENCOUNTER — ANTICOAGULATION THERAPY VISIT (OUTPATIENT)
Dept: NURSING | Facility: CLINIC | Age: 77
End: 2017-10-03
Payer: COMMERCIAL

## 2017-10-03 DIAGNOSIS — Z79.01 LONG-TERM (CURRENT) USE OF ANTICOAGULANTS: ICD-10-CM

## 2017-10-03 LAB
COPATH REPORT: NORMAL
COPATH REPORT: NORMAL
INR PPP: 2.2

## 2017-10-03 PROCEDURE — 99207 ZZC NO CHARGE NURSE ONLY: CPT | Performed by: FAMILY MEDICINE

## 2017-10-03 NOTE — PROGRESS NOTES
ANTICOAGULATION FOLLOW-UP CLINIC VISIT    Patient Name:  Ángela Dumont  Date:  10/3/2017  Contact Type:  Telephone    SUBJECTIVE:     Patient Findings     Positives No Problem Findings           OBJECTIVE    INR   Date Value Ref Range Status   10/03/2017 2.2  Final       ASSESSMENT / PLAN  INR assessment THER    Recheck INR In: 4 WEEKS    INR Location Home INR      Anticoagulation Summary as of 10/3/2017     INR goal 2.0-3.0   Today's INR 2.2   Maintenance plan 2 mg (1 mg x 2) on Sun, Wed, Fri; 4 mg (1 mg x 4) all other days   Full instructions 2 mg on Sun, Wed, Fri; 4 mg all other days   Weekly total 22 mg   No change documented Nereyda Padilla, RN   Plan last modified Dorita Panda RN (8/4/2017)   Next INR check 10/31/2017   Target end date     Indications   Long-term (current) use of anticoagulants [Z79.01] [Z79.01]  Pulmonary embolism (H) [I26.99]  DVT (deep venous thrombosis) (H) [I82.409]         Anticoagulation Episode Summary     INR check location     Preferred lab     Send INR reminders to RV NURSE    Comments       Anticoagulation Care Providers     Provider Role Specialty Phone number    Joaquin Rockwell MD [facility] [phone]            See the Encounter Report to view Anticoagulation Flowsheet and Dosing Calendar (Go to Encounters tab in chart review, and find the Anticoagulation Therapy Visit)    Dosage adjustment made based on physician directed care plan.    Nereyda Padilla, RN

## 2017-10-03 NOTE — MR AVS SNAPSHOT
Ángela VITALY Dumont   10/3/2017   Anticoagulation Therapy Visit    Description:  77 year old female   Provider:  Joaquin Rockwell MD   Department:  [facility]           INR as of 10/3/2017     Today's INR 2.2      Anticoagulation Summary as of 10/3/2017     INR goal 2.0-3.0   Today's INR 2.2   Full instructions 2 mg on Sun, Wed, Fri; 4 mg all other days   Next INR check 10/31/2017    Indications   Long-term (current) use of anticoagulants [Z79.01] [Z79.01]  Pulmonary embolism (H) [I26.99]  DVT (deep venous thrombosis) (H) [I82.409]         Contact Numbers     Clinic Number:         October 2017 Details    Sun Mon Tue Wed Thu Fri Sat     1               2               3      4 mg   See details      4      2 mg         5      4 mg         6      2 mg         7      4 mg           8      2 mg         9      4 mg         10      4 mg         11      2 mg         12      4 mg         13      2 mg         14      4 mg           15      2 mg         16      4 mg         17      4 mg         18      2 mg         19      4 mg         20      2 mg         21      4 mg           22      2 mg         23      4 mg         24      4 mg         25      2 mg         26      4 mg         27      2 mg         28      4 mg           29      2 mg         30      4 mg         31                 Date Details   10/03 This INR check       Date of next INR:  10/31/2017         How to take your warfarin dose     To take:  2 mg Take 2 of the 1 mg tablets.    To take:  4 mg Take 4 of the 1 mg tablets.

## 2017-10-05 LAB — COPATH REPORT: NORMAL

## 2017-10-06 LAB — COPATH REPORT: NORMAL

## 2017-10-12 ENCOUNTER — ONCOLOGY VISIT (OUTPATIENT)
Dept: ONCOLOGY | Facility: CLINIC | Age: 77
End: 2017-10-12
Attending: INTERNAL MEDICINE
Payer: COMMERCIAL

## 2017-10-12 VITALS
HEART RATE: 85 BPM | RESPIRATION RATE: 16 BRPM | TEMPERATURE: 98.2 F | HEIGHT: 64 IN | DIASTOLIC BLOOD PRESSURE: 78 MMHG | SYSTOLIC BLOOD PRESSURE: 138 MMHG | OXYGEN SATURATION: 97 %

## 2017-10-12 DIAGNOSIS — D47.Z9 LOW GRADE B CELL LYMPHOPROLIFERATIVE DISORDER (H): Primary | ICD-10-CM

## 2017-10-12 PROBLEM — D47.9 LYMPHOPROLIFERATIVE DISORDER (H): Status: ACTIVE | Noted: 2017-10-12

## 2017-10-12 PROCEDURE — 99214 OFFICE O/P EST MOD 30 MIN: CPT | Performed by: INTERNAL MEDICINE

## 2017-10-12 PROCEDURE — 99211 OFF/OP EST MAY X REQ PHY/QHP: CPT

## 2017-10-12 ASSESSMENT — PAIN SCALES - GENERAL: PAINLEVEL: NO PAIN (0)

## 2017-10-12 NOTE — NURSING NOTE
"Oncology Rooming Note    October 12, 2017 2:11 PM   Ángela Dumont is a 77 year old female who presents for:    Chief Complaint   Patient presents with     Oncology Clinic Visit     Follow-up     Initial Vitals: Resp 16  Ht 1.626 m (5' 4\") Estimated body mass index is 34.33 kg/(m^2) as calculated from the following:    Height as of 9/28/17: 1.626 m (5' 4\").    Weight as of 9/28/17: 90.7 kg (200 lb). There is no height or weight on file to calculate BSA.  No Pain (0) Comment: Data Unavailable   No LMP recorded. Patient has had a hysterectomy.  Allergies reviewed: Yes  Medications reviewed: Yes    Medications: Medication refills not needed today.  Pharmacy name entered into 9SLIDES:    [facility] [id number] [address] [facility]AC[facility] [address] [place] [address] DRUG STORE [address] [place] [address] [address] [place] [address] AT [address] [address] & [facility]    Clinical concerns: Follow-up    8 minutes for nursing intake (face to face time)   DISCHARGE PLAN:  Next appointments: See patient instruction section  Departure Mode: Ambulatory  Accompanied by:   0 minutes for nursing discharge (face to face time)   Ruchi Villalobos                "

## 2017-10-12 NOTE — LETTER
10/12/2017        RE: Ángela Dumont  [address] [facility] [address]        [facility]    Hematology/Oncology New Patient Note      Today's Date: 10/12/17    Reason for Follow-up: leukocytosis      HISTORY OF PRESENT ILLNESS: Ángela Dumont is a 77 year old female with PMHx of pulmonary embolism/DVT on chronic warfarin, esophageal reflux, fibromyalgia, dementia, hypothyroidism, HTN, who presents with leukocytosis.  On chart review, she has had a mildly elevated WBC count since at least 2015.  In July 2017, WBC was 18.1 K, and peripheral smear showed leukocytosis with mild atypical absolute lymphocytosis and increased rouleaux.  Hemoglobin and platelets were normal.      In 2015, she was diagnosed with pulmonary embolism and DVT.  Three months prior, she had fractured her femur and had undergone surgery.  She was immobile for 3 months.  She has been on warfarin since then.      Ángela has dementia, and her  answers the majority of the questions.  They live in a one level multiplex.  She is wheelchair-bound  He helps with lifting and transferring.  She does not get frequent infections.  She started taking vitamin D recently.  The last time she fell was about a year and a half ago.       INTERIM HISTORY: Ángela is here for follow-up with her .  She is overall feeling well, at her baseline.  Her  notes that she likely has had elevated white count since she was at [facility] over 10 years ago.        REVIEW OF SYSTEMS:   14 point ROS was reviewed and is negative other than as noted above in HPI.       HOME MEDICATIONS:  Current Outpatient Prescriptions   Medication Sig Dispense Refill     VITAMIN D, CHOLECALCIFEROL, PO Take 1,000 Units by mouth daily       levothyroxine (SYNTHROID/LEVOTHROID) 50 MCG tablet TAKE ONE TABLET BY MOUTH EVERY DAY AT 7:30AM. DUE FOR LABS FOR FURTHER REFILLS. 90 tablet 1     tamsulosin (FLOMAX) 0.4 MG capsule TAKE ONE CAPSULE BY MOUTH DAILY  AT 10AM 90 capsule 3     furosemide (LASIX) 40 MG tablet TAKE ONE TABLET BY MOUTH DAILY AT 10AM 90 tablet 1     JANTOVEN 4 MG tablet TAKE ONE TABLET BY MOUTH DAILY UNTIL INR CHECK IN 2 TO 4 DAYS AND TO BE DOSED PER YOUR PCP 90 tablet 1     JANTOVEN 2 MG tablet TAKE ONE TO TWO TABLETS BY MOUTH EVERY DAY AS DIRECTED 180 tablet 1     acetaminophen (TYLENOL) 325 MG tablet Take 2 tablets (650 mg) by mouth every 4 hours as needed for other (surgical pain) 100 tablet 0         ALLERGIES:  Allergies   Allergen Reactions     Shellfish Allergy Anaphylaxis     Aspirin      GI issues     Contrast Dye Hives and Itching     Flushed skin     Penicillins Hives     Sulfa Drugs Hives     Nitrofurantoin Rash         PAST MEDICAL HISTORY:  Past Medical History:   Diagnosis Date     Adjustment disorder with depressed mood      Antiplatelet or antithrombotic long-term use      Constipation      Dementia     memory/anger     Depressive disorder last few months as condition became apparent    Lack of mobility a big problem     DVT (deep venous thrombosis) (H) 4/15    bilateral     Esophageal reflux      Fibromyalgia      Fracture of right femur (H) 1/15     Heart murmur      Herpes zoster without complication 08/16/2017    left buttocks     History of Clostridium difficile      Hyperlipidemia LDL goal < 130      Hypertension goal BP (blood pressure) < 140/90      Hypothyroid      Leukocytosis      Leukocytosis      Leukocytosis, unspecified     w/u ?     Low back pain      Migraine      MR (mitral regurgitation)     Mild     NPH (normal pressure hydrocephalus) 4/15    shunt placed but removed in 7/2015 due to erosion through the scalp, replaced 5/19/16     Osteoarthritis of both knees      Other atopic dermatitis and related conditions      PE (pulmonary embolism) 4/15    Saddle - IVC     Pulmonary hypertension     EF 70-75%     Thrombosis of leg      Urinary tract infection, site not specified     MRSA - Dr Eze Gonzalez           PAST SURGICAL HISTORY:  Past Surgical History:   Procedure Laterality Date     ABDOMEN SURGERY  left side lower abdomen pain from Shunt we do [facility]     COLONOSCOPY  2008     GYN SURGERY       H LABOR AND DELIVERY VAGINAL  1960, 1967     HYSTERECTOMY  1976    hysterectomy     IMPLANT SHUNT VENTRICULOPERITONEAL Left 5/19/2016    IMPLANT SHUNT VENTRICULOPERITONEAL - Dr Alba     OPTICAL TRACKING SYSTEM IMPLANT SHUNT VENTRICULOPERITONEAL Right 4/10/2015     Shunt - Dr Sierra     ORTHOPEDIC SURGERY  Knee pain that prohibits walking     REMOVE SHUNT VENTRICULOPERITONEAL N/A 7/3/2015    REMOVE SHUNT VENTRICULOPERITONEAL;  Surgeon: Emmanuel Sierra MD;  Location: [facility] OR     SURGICAL HISTORY OF -   1/15    right femur/hip surgery - 1/18/2015     SURGICAL HISTORY OF -   7/04    rectocele repair     SURGICAL HISTORY OF -   1997    abdominal adhesion lysis     SURGICAL HISTORY OF -   4/15    IVC filter placement     SURGICAL HISTORY OF -   6/15    IVC filter removal         SOCIAL HISTORY:  Social History     Social History     Marital status:      Spouse name: Vernon     Number of children: 2     Years of education: N/A     Occupational History     Not on file.     Social History Main Topics     Smoking status: Former Smoker     Packs/day: 0.50     Years: 10.00     Types: Cigarettes     Start date: 7/17/1958     Quit date: 1/1/1982     Smokeless tobacco: Never Used      Comment: quit 1967     Alcohol use 0.0 - 0.6 oz/week      Comment: Very light     Drug use: No     Sexual activity: Yes     Partners: Male     Birth control/ protection: None      Comment: not a problem     Other Topics Concern     Parent/Sibling W/ Cabg, Mi Or Angioplasty Before 65f 55m? No     Social History Narrative   Ángela is from [place] and her  is from [place].  They used to live in [place], [place], and Ángela wants to move back there.        FAMILY HISTORY:  Family History   Problem Relation Age of Onset     Colon Cancer  "Father      Coronary Artery Disease Father      DIABETES Maternal Grandmother          PHYSICAL EXAM:  Vital signs:  /78 (Patient Position: Chair, Cuff Size: Adult Large)  Pulse 85  Temp 98.2  F (36.8  C) (Tympanic)  Resp 16  Ht 1.626 m (5' 4\")  SpO2 97%   GENERAL/CONSTITUTIONAL: No acute distress.  Accompanied by .  EYES: No scleral icterus.  NEUROLOGIC: Alert, oriented, answers questions appropriately.  INTEGUMENTARY: No jaundice.  GAIT: In wheelchair.    LABS:  CBC RESULTS:   Recent Labs   Lab Test  09/28/17   1145   WBC  17.4*   RBC  4.55   HGB  12.9   HCT  40.9   MCV  90   MCH  28.4   MCHC  31.5   RDW  13.8   PLT  208     Recent Labs   Lab Test  09/28/17   1145  07/26/17   1118   NA  138  137   POTASSIUM  4.0  4.0   CHLORIDE  106  107   CO2  28  25   ANIONGAP  4  5   GLC  101*  108*   BUN  25  24   CR  0.78  0.81   TAVIA  8.9  8.8     Lab Results   Component Value Date    AST 14 09/28/2017     Lab Results   Component Value Date    ALT 18 09/28/2017     No results found for: BILICONJ   Lab Results   Component Value Date    BILITOTAL 0.3 09/28/2017     Lab Results   Component Value Date    ALBUMIN 3.5 09/28/2017     Lab Results   Component Value Date    PROTTOTAL 7.5 09/28/2017      Lab Results   Component Value Date    ALKPHOS 99 09/28/2017     Component      Latest Ref Rng & Units 9/28/2017   Vitamin B12      193 - 986 pg/mL 476   Folate      >5.4 ng/mL 5.7   Lactate Dehydrogenase      81 - 234 U/L 258 (H)     Component      Latest Ref Rng & Units 9/28/2017   % Retic      0.5 - 2.0 % 1.2   Absolute Retic      25 - 95 10e9/L 55.1     SPEP: M-spike of 0.9 on 9/28/17    Peripheral smear 9/28/17:  FINAL DIAGNOSIS:   Peripheral blood.   - Leukocytosis with absolute atypical lymphocytosis.     COMMENT:   There is leukocytosis with absolute lymphocytosis.  Some of the   lymphocytes appear morphologically atypical.  Further evaluation to   evaluate for a circulating lymphoproliferative disorder with " flow   cytometry is recommended.  Clinical correlation is required.     Peripheral flow cytometry 9/28/17:  INTERPRETATION:   Blood:        Kappa-monotypic B cells negative for CD5 and CD10 (40%)        See comment     COMMENT:   By flow cytometry, the monotypic B cells lack CD5 and CD10. This   immunophenotype can be seen in marginal zone lymphoma, lymphoplasmacytic   lymphoma, and hairy cell leukemia, as well as rarely in follicular   lymphoma, CLL/SLL, or mantle cell lymphoma. Additional stains to exclude   Hairy cell leukemia are pending and will be reported in an addendum.   Final interpretation requires correlation with results of other   ancillary studies, morphologic and clinical features. If clinically   indicated a biopsy of involved lymph node or another tissue could be   required for final diagnosis.     This addendum is issued to reflect additional testing performed on this   case.   The abnormal B cells are positive for CD79b and  negative for CD23.   Other immunophenotypic markers (see details below) do not support the   diagnosis of hairy cell leukemia. The differential includes marginal   zone lymphoma, splenic B cell leukemia/lymphoma NOS, mantle cell   lymphoma, lymphoplasmacytic lymphoma, while other small B cell lymphoma   entities are considered less likely. In the differential diagnosis   please note that rare cases of mantle cell lymphoma can be CD5 negative.   Recommend biopsy of an involved lymph node /  tissue for diagnosis with   cytogenetics and clinical correlation. See Comment.     COMMENTS:   Additional eight-color analyses are performed for the following markers:   CD11c, CD22, CD23, CD25, CD79b, ,      The abnormal B cells are positive for CD22 (normal level), CD79b ,   predominantly negative for CD11c (84% of them are negative) and negative   for CD23, CD25,  and .         ASSESSMENT/PLAN:  Ángela Dumont is a 77 year old female with:    1) Leukocytosis: appears  chronic since at least 2015, and on further chart review from [facility] Every[facility], likely chronic since at least 2007.  Peripheral smear in July 2017 showed mild atypical lymphocytosis and rouleaux.  Hemoglobin and platelet count are normal.  She does not have frequent infections, and no new medications recently, other than vitamin D.      Peripheral flow cytometry shows concern for lymphoproliferative disorder.  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      We discussed that is usually a slow growing process, and treatment is usually not necessary.  We could do further evaluation, such as with PET scan and bone marrow biopsy.  Considering her age, dementia, immobility, and poor functional status, I favored watching and waiting and sparing her of invasive procedures that may not change her overall course.  She and her  agreed and opted to wait on getting additional scans or bone marrow biopsy at this time.  We will continue to monitor her CBC.    They would rather follow back with her PCP due to convenience to that clinic.  We agreed to have her labs checked again in April 2018 in [place], but with clinic follow-up with me here.  If labs remain stable then, then they can follow back with her PCP.      2) History of PE and DVT: diagnosed in 2015, and she has been on warfarin ever since.  It was likely provoked at the time by surgery and immobility.  She remains wheelchair bound now, and remains at high risk for blood clots.  She has not had frequent falls, and INR has been under relatively good control.  I discussed and risks and benefits of continuing the warfarin.  She will continue the warfarin for now, considering her high risk for clotting remains.  If it becomes that the risk of bleeding starts to outweigh her risk of clots, then it would be time to stop the warfarin.     3)  MGUS: M-spike of 0.9 g/dL.  -as above, will hold off on further imaging or bone marrow biopsy  -will repeat SPEP, and check additional labs, such as serum free light chains, B2MG, ANDREA, serum immunoglobulins with her next lab check      I spent a total of 25 minutes with the patient, with over >50% of the time in counseling and/or coordination of care.       Susanna Duran MD  Hematology/Oncology  [facility]        Sincerely,        Susanna Duran MD

## 2017-10-12 NOTE — PATIENT INSTRUCTIONS
-labs in 6 months, a week prior to the next appointment - can do in [place] at [place]  -return to clinic in 6 months scheduled Alexandria Jeffries

## 2017-10-12 NOTE — PROGRESS NOTES
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  On chart review, she has had a mildly elevated WBC count since at least 2015.  In July 2017, WBC was 18.1 K, and peripheral smear showed leukocytosis with mild atypical absolute lymphocytosis and increased rouleaux.  Hemoglobin and platelets were normal.      In 2015, she was diagnosed with pulmonary embolism and DVT.  Three months prior, she had fractured her femur and had undergone surgery.  She was immobile for 3 months.  She has been on warfarin since then.      Ángela has dementia, and her  answers the majority of the questions.  They live in a one level multiplex.  She is wheelchair-bound  He helps with lifting and transferring.  She does not get frequent infections.  She started taking vitamin D recently.  The last time she fell was about a year and a half ago.       INTERIM HISTORY: Ángela is here for follow-up with her .  She is overall feeling well, at her baseline.  Her  notes that she likely has had elevated white count since she was at [facility] over 10 years ago.        REVIEW OF SYSTEMS:   14 point ROS was reviewed and is negative other than as noted above in HPI.       HOME MEDICATIONS:  Current Outpatient Prescriptions   Medication Sig Dispense Refill     VITAMIN D, CHOLECALCIFEROL, PO Take 1,000 Units by mouth daily       levothyroxine (SYNTHROID/LEVOTHROID) 50 MCG tablet TAKE ONE TABLET BY MOUTH EVERY DAY AT 7:30AM. DUE FOR LABS FOR FURTHER REFILLS. 90 tablet 1     tamsulosin (FLOMAX) 0.4 MG capsule TAKE ONE CAPSULE BY MOUTH DAILY AT 10AM 90 capsule 3     furosemide (LASIX) 40 MG tablet TAKE ONE TABLET BY MOUTH DAILY AT  10AM 90 tablet 1     JANTOVEN 4 MG tablet TAKE ONE TABLET BY MOUTH DAILY UNTIL INR CHECK IN 2 TO 4 DAYS AND TO BE DOSED PER YOUR PCP 90 tablet 1     JANTOVEN 2 MG tablet TAKE ONE TO TWO TABLETS BY MOUTH EVERY DAY AS DIRECTED 180 tablet 1     acetaminophen (TYLENOL) 325 MG tablet Take 2 tablets (650 mg) by mouth every 4 hours as needed for other (surgical pain) 100 tablet 0         ALLERGIES:  Allergies   Allergen Reactions     Shellfish Allergy Anaphylaxis     Aspirin      GI issues     Contrast Dye Hives and Itching     Flushed skin     Penicillins Hives     Sulfa Drugs Hives     Nitrofurantoin Rash         PAST MEDICAL HISTORY:  Past Medical History:   Diagnosis Date     Adjustment disorder with depressed mood      Antiplatelet or antithrombotic long-term use      Constipation      Dementia     memory/anger     Depressive disorder last few months as condition became apparent    Lack of mobility a big problem     DVT (deep venous thrombosis) (H) 4/15    bilateral     Esophageal reflux      Fibromyalgia      Fracture of right femur (H) 1/15     Heart murmur      Herpes zoster without complication 08/16/2017    left buttocks     History of Clostridium difficile      Hyperlipidemia LDL goal < 130      Hypertension goal BP (blood pressure) < 140/90      Hypothyroid      Leukocytosis      Leukocytosis      Leukocytosis, unspecified     w/u ?     Low back pain      Migraine      MR (mitral regurgitation)     Mild     NPH (normal pressure hydrocephalus) 4/15    shunt placed but removed in 7/2015 due to erosion through the scalp, replaced 5/19/16     Osteoarthritis of both knees      Other atopic dermatitis and related conditions      PE (pulmonary embolism) 4/15    Saddle - IVC     Pulmonary hypertension     EF 70-75%     Thrombosis of leg      Urinary tract infection, site not specified     MRSA - Dr Eze Gonzalez          PAST SURGICAL HISTORY:  Past Surgical History:   Procedure Laterality Date     ABDOMEN  SURGERY  left side lower abdomen pain from Shunt we do roberth     COLONOSCOPY  2008     GYN SURGERY       H LABOR AND DELIVERY VAGINAL  1960, 1967     HYSTERECTOMY  1976    hysterectomy     IMPLANT SHUNT VENTRICULOPERITONEAL Left 5/19/2016    IMPLANT SHUNT VENTRICULOPERITONEAL - Dr Alba     OPTICAL TRACKING SYSTEM IMPLANT SHUNT VENTRICULOPERITONEAL Right 4/10/2015     Shunt - Dr Sierra     ORTHOPEDIC SURGERY  Knee pain that prohibits walking     REMOVE SHUNT VENTRICULOPERITONEAL N/A 7/3/2015    REMOVE SHUNT VENTRICULOPERITONEAL;  Surgeon: Emmanuel Sierra MD;  Location: [facility] OR     SURGICAL HISTORY OF -   1/15    right femur/hip surgery - 1/18/2015     SURGICAL HISTORY OF -   7/04    rectocele repair     SURGICAL HISTORY OF -   1997    abdominal adhesion lysis     SURGICAL HISTORY OF -   4/15    IVC filter placement     SURGICAL HISTORY OF -   6/15    IVC filter removal         SOCIAL HISTORY:  Social History     Social History     Marital status:      Spouse name: Vernon     Number of children: 2     Years of education: N/A     Occupational History     Not on file.     Social History Main Topics     Smoking status: Former Smoker     Packs/day: 0.50     Years: 10.00     Types: Cigarettes     Start date: 7/17/1958     Quit date: 1/1/1982     Smokeless tobacco: Never Used      Comment: quit 1967     Alcohol use 0.0 - 0.6 oz/week      Comment: Very light     Drug use: No     Sexual activity: Yes     Partners: Male     Birth control/ protection: None      Comment: not a problem     Other Topics Concern     Parent/Sibling W/ Cabg, Mi Or Angioplasty Before 65f 55m? No     Social History Narrative   Ángela is from [place] and her  is from [place].  They used to live in [place], [place], and Ángela wants to move back there.        FAMILY HISTORY:  Family History   Problem Relation Age of Onset     Colon Cancer Father      Coronary Artery Disease Father      DIABETES Maternal Grandmother          PHYSICAL  "EXAM:  Vital signs:  /78 (Patient Position: Chair, Cuff Size: Adult Large)  Pulse 85  Temp 98.2  F (36.8  C) (Tympanic)  Resp 16  Ht 1.626 m (5' 4\")  SpO2 97%   GENERAL/CONSTITUTIONAL: No acute distress.  Accompanied by .  EYES: No scleral icterus.  NEUROLOGIC: Alert, oriented, answers questions appropriately.  INTEGUMENTARY: No jaundice.  GAIT: In wheelchair.    LABS:  CBC RESULTS:   Recent Labs   Lab Test  09/28/17   1145   WBC  17.4*   RBC  4.55   HGB  12.9   HCT  40.9   MCV  90   MCH  28.4   MCHC  31.5   RDW  13.8   PLT  208     Recent Labs   Lab Test  09/28/17   1145  07/26/17   1118   NA  138  137   POTASSIUM  4.0  4.0   CHLORIDE  106  107   CO2  28  25   ANIONGAP  4  5   GLC  101*  108*   BUN  25  24   CR  0.78  0.81   TAVIA  8.9  8.8     Lab Results   Component Value Date    AST 14 09/28/2017     Lab Results   Component Value Date    ALT 18 09/28/2017     No results found for: BILICONJ   Lab Results   Component Value Date    BILITOTAL 0.3 09/28/2017     Lab Results   Component Value Date    ALBUMIN 3.5 09/28/2017     Lab Results   Component Value Date    PROTTOTAL 7.5 09/28/2017      Lab Results   Component Value Date    ALKPHOS 99 09/28/2017     Component      Latest Ref Rng & Units 9/28/2017   Vitamin B12      193 - 986 pg/mL 476   Folate      >5.4 ng/mL 5.7   Lactate Dehydrogenase      81 - 234 U/L 258 (H)     Component      Latest Ref Rng & Units 9/28/2017   % Retic      0.5 - 2.0 % 1.2   Absolute Retic      25 - 95 10e9/L 55.1     SPEP: M-spike of 0.9 on 9/28/17    Peripheral smear 9/28/17:  FINAL DIAGNOSIS:   Peripheral blood.   - Leukocytosis with absolute atypical lymphocytosis.     COMMENT:   There is leukocytosis with absolute lymphocytosis.  Some of the   lymphocytes appear morphologically atypical.  Further evaluation to   evaluate for a circulating lymphoproliferative disorder with flow   cytometry is recommended.  Clinical correlation is required.     Peripheral flow cytometry " 9/28/17:  INTERPRETATION:   Blood:        Kappa-monotypic B cells negative for CD5 and CD10 (40%)        See comment     COMMENT:   By flow cytometry, the monotypic B cells lack CD5 and CD10. This   immunophenotype can be seen in marginal zone lymphoma, lymphoplasmacytic   lymphoma, and hairy cell leukemia, as well as rarely in follicular   lymphoma, CLL/SLL, or mantle cell lymphoma. Additional stains to exclude   Hairy cell leukemia are pending and will be reported in an addendum.   Final interpretation requires correlation with results of other   ancillary studies, morphologic and clinical features. If clinically   indicated a biopsy of involved lymph node or another tissue could be   required for final diagnosis.     This addendum is issued to reflect additional testing performed on this   case.   The abnormal B cells are positive for CD79b and  negative for CD23.   Other immunophenotypic markers (see details below) do not support the   diagnosis of hairy cell leukemia. The differential includes marginal   zone lymphoma, splenic B cell leukemia/lymphoma NOS, mantle cell   lymphoma, lymphoplasmacytic lymphoma, while other small B cell lymphoma   entities are considered less likely. In the differential diagnosis   please note that rare cases of mantle cell lymphoma can be CD5 negative.   Recommend biopsy of an involved lymph node /  tissue for diagnosis with   cytogenetics and clinical correlation. See Comment.     COMMENTS:   Additional eight-color analyses are performed for the following markers:   CD11c, CD22, CD23, CD25, CD79b, ,      The abnormal B cells are positive for CD22 (normal level), CD79b ,   predominantly negative for CD11c (84% of them are negative) and negative   for CD23, CD25,  and .         ASSESSMENT/PLAN:  Ángela Dumont is a 77 year old female with:    1) Leukocytosis: appears chronic since at least 2015, and on further chart review from Care Everywhere, likely chronic  since at least 2007.  Peripheral smear in July 2017 showed mild atypical lymphocytosis and rouleaux.  Hemoglobin and platelet count are normal.  She does not have frequent infections, and no new medications recently, other than vitamin D.      Peripheral flow cytometry shows concern for lymphoproliferative disorder.  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      We discussed that is usually a slow growing process, and treatment is usually not necessary.  We could do further evaluation, such as with PET scan and bone marrow biopsy.  Considering her age, dementia, immobility, and poor functional status, I favored watching and waiting and sparing her of invasive procedures that may not change her overall course.  She and her  agreed and opted to wait on getting additional scans or bone marrow biopsy at this time.  We will continue to monitor her CBC.    They would rather follow back with her PCP due to convenience to that clinic.  We agreed to have her labs checked again in April 2018 in [place], but with clinic follow-up with me here.  If labs remain stable then, then they can follow back with her PCP.      2) History of PE and DVT: diagnosed in 2015, and she has been on warfarin ever since.  It was likely provoked at the time by surgery and immobility.  She remains wheelchair bound now, and remains at high risk for blood clots.  She has not had frequent falls, and INR has been under relatively good control.  I discussed and risks and benefits of continuing the warfarin.  She will continue the warfarin for now, considering her high risk for clotting remains.  If it becomes that the risk of bleeding starts to outweigh her risk of clots, then it would be time to stop the warfarin.     3) MGUS: M-spike of 0.9 g/dL.  -as above, will hold off on further imaging or bone marrow  biopsy  -will repeat SPEP, and check additional labs, such as serum free light chains, B2MG, ANDREA, serum immunoglobulins with her next lab check      I spent a total of 25 minutes with the patient, with over >50% of the time in counseling and/or coordination of care.       Susanna Duran MD  Hematology/Oncology  [facility]

## 2017-10-12 NOTE — MR AVS SNAPSHOT
After Visit Summary   10/12/2017    Ángela Dumont    MRN: [id number]           Patient Information     Date Of Birth          1940        Visit Information        Provider Department      10/12/2017 2:15 PM Susanna Duran MD [facility]      Today's Diagnoses     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)    -  1      Care Instructions    -labs in 6 months, a week prior to the next appointment - can do in [place] at [place]  -return to clinic in 6 months scheduled Alexandria Jeffries          Follow-ups after your visit        Your next 10 appointments already scheduled     Apr 12, 2018 12:45 PM CDT   Return Visit with Susanna Duran MD   [facility] ([facility])    [facility]  [id number] [place] [address]  [address] [id number] [phone]              Future tests that were ordered for you today     Open Future Orders        Priority Expected Expires Ordered    Comprehensive metabolic panel Routine 4/12/2018 10/12/2018 10/12/2017    CBC with platelets differential Routine 4/12/2018 10/12/2018 10/12/2017    Lactate Dehydrogenase Routine 4/12/2018 10/12/2018 10/12/2017    Beta 2 microglobulin Routine 4/12/2018 10/12/2018 10/12/2017    Immunoglobulins A G and M Routine 4/12/2018 10/12/2018 10/12/2017    Kappa and lambda light chain Routine 4/12/2018 10/12/2018 10/12/2017    Protein electrophoresis Routine 4/12/2018 10/12/2018 10/12/2017    Protein Immunofixation Serum Routine 4/12/2018 10/12/2018 10/12/2017            Who to contact     If you have questions or need follow up information about today's clinic visit or your schedule please contact [facility] directly at [phone].  Normal or non-critical lab and imaging results will be communicated to you by MyChart, letter or phone within 4 business days after the clinic has received the  "results. If you do not hear from us within 7 days, please contact the clinic through [employer] or phone. If you have a critical or abnormal lab result, we will notify you by phone as soon as possible.  Submit refill requests through [employer] or call your pharmacy and they will forward the refill request to us. Please allow 3 business days for your refill to be completed.          Additional Information About Your Visit        [email or web address] Information     [employer] gives you secure access to your electronic health record. If you see a primary care provider, you can also send messages to your care team and make appointments. If you have questions, please call your primary care clinic.  If you do not have a primary care provider, please call [phone] and they will assist you.        Care EveryWhere ID     This is your Care EveryWhere ID. This could be used by other organizations to access your [place] medical records  [id number]        Your Vitals Were     Pulse Temperature Respirations Height Pulse Oximetry       85 98.2  F (36.8  C) (Tympanic) 16 1.626 m (5' 4\") 97%        Blood Pressure from Last 3 Encounters:   10/12/17 138/78   09/28/17 128/88   08/16/17 134/72    Weight from Last 3 Encounters:   09/28/17 90.7 kg (200 lb)   08/16/17 95.3 kg (210 lb)   03/06/17 91.6 kg (202 lb)               Primary Care Provider Office Phone # Fax #    Joaquin Rockwell [phone]       [facility] [address]        Equal Access to Services     [facility] AH: Hadii aad ku hadasho Soomaali, waaxda luqadaha, qaybta kaalmada adeegyada, waxay padmaja awan. So [facility] [phone].    ATENCIÓN: Si habla español, tiene a vega disposición servicios gratuitos de asistencia lingüística. Llame al [phone].    We comply with applicable federal civil rights laws and [place] laws. We do not discriminate on the basis of race, color, national origin, age, disability, sex, sexual orientation, " or gender identity.            Thank you!     Thank you for choosing [facility]  for your care. Our goal is always to provide you with excellent care. Hearing back from our patients is one way we can continue to improve our services. Please take a few minutes to complete the written survey that you may receive in the mail after your visit with us. Thank you!             Your Updated Medication List - Protect others around you: Learn how to safely use, store and throw away your medicines at www.disposemymeds.org.          This list is accurate as of: 10/12/17  2:41 PM.  Always use your most recent med list.                   Brand Name Dispense Instructions for use Diagnosis    acetaminophen 325 MG tablet    TYLENOL    100 tablet    Take 2 tablets (650 mg) by mouth every 4 hours as needed for other (surgical pain)    Normal pressure hydrocephalus       furosemide 40 MG tablet    LASIX    90 tablet    TAKE ONE TABLET BY MOUTH DAILY AT 10AM    Hypertension goal BP (blood pressure) < 140/90       * JANTOVEN 2 MG tablet   Generic drug:  warfarin     180 tablet    TAKE ONE TO TWO TABLETS BY MOUTH EVERY DAY AS DIRECTED    History of deep venous thrombosis, History of pulmonary embolism, Long-term (current) use of anticoagulants, DVT (deep venous thrombosis) (H)       * JANTOVEN 4 MG tablet   Generic drug:  warfarin     90 tablet    TAKE ONE TABLET BY MOUTH DAILY UNTIL INR CHECK IN 2 TO 4 DAYS AND TO BE DOSED PER YOUR PCP    Long-term (current) use of anticoagulants       levothyroxine 50 MCG tablet    SYNTHROID/LEVOTHROID    90 tablet    TAKE ONE TABLET BY MOUTH EVERY DAY AT 7:30AM. DUE FOR LABS FOR FURTHER REFILLS.    Hypothyroidism, unspecified type       tamsulosin 0.4 MG capsule    FLOMAX    90 capsule    TAKE ONE CAPSULE BY MOUTH DAILY AT 10AM    Urinary retention       VITAMIN D (CHOLECALCIFEROL) PO      Take 1,000 Units by mouth daily    Leukocytosis, unspecified type       * Notice:  This  list has 2 medication(s) that are the same as other medications prescribed for you. Read the directions carefully, and ask your doctor or other care provider to review them with you.

## 2017-10-17 ENCOUNTER — OFFICE VISIT (OUTPATIENT)
Dept: FAMILY MEDICINE | Facility: CLINIC | Age: 77
End: 2017-10-17
Payer: COMMERCIAL

## 2017-10-17 VITALS
SYSTOLIC BLOOD PRESSURE: 128 MMHG | OXYGEN SATURATION: 94 % | DIASTOLIC BLOOD PRESSURE: 70 MMHG | WEIGHT: 200 LBS | BODY MASS INDEX: 34.15 KG/M2 | TEMPERATURE: 98.6 F | HEIGHT: 64 IN | HEART RATE: 102 BPM

## 2017-10-17 DIAGNOSIS — I27.20 PULMONARY HYPERTENSION (H): ICD-10-CM

## 2017-10-17 DIAGNOSIS — B35.6 TINEA CRURIS: Primary | ICD-10-CM

## 2017-10-17 DIAGNOSIS — I10 HYPERTENSION GOAL BP (BLOOD PRESSURE) < 140/90: ICD-10-CM

## 2017-10-17 DIAGNOSIS — Z79.01 LONG-TERM (CURRENT) USE OF ANTICOAGULANTS: ICD-10-CM

## 2017-10-17 DIAGNOSIS — D72.829 LEUKOCYTOSIS, UNSPECIFIED TYPE: ICD-10-CM

## 2017-10-17 DIAGNOSIS — Z98.2 S/P VP SHUNT: ICD-10-CM

## 2017-10-17 DIAGNOSIS — Z51.81 MEDICATION MONITORING ENCOUNTER: ICD-10-CM

## 2017-10-17 DIAGNOSIS — Z86.711 HISTORY OF PULMONARY EMBOLISM: ICD-10-CM

## 2017-10-17 DIAGNOSIS — G91.2 NPH (NORMAL PRESSURE HYDROCEPHALUS) (H): ICD-10-CM

## 2017-10-17 DIAGNOSIS — D47.9 LYMPHOPROLIFERATIVE DISORDER (H): ICD-10-CM

## 2017-10-17 DIAGNOSIS — F03.90 DEMENTIA WITHOUT BEHAVIORAL DISTURBANCE, UNSPECIFIED DEMENTIA TYPE: ICD-10-CM

## 2017-10-17 DIAGNOSIS — Z86.718 HISTORY OF DEEP VENOUS THROMBOSIS: ICD-10-CM

## 2017-10-17 PROCEDURE — 99214 OFFICE O/P EST MOD 30 MIN: CPT | Performed by: FAMILY MEDICINE

## 2017-10-17 NOTE — PROGRESS NOTES
SUBJECTIVE:   Ángela Dumont is a 77 year old female who presents to clinic today for the following health issues:    Rash noticed 6 days ago between upper right thigh and genital area and noticed today that it is a yeast infection and bleeding - using A&D ointment and vaseline helped on top of the thigh but not but her genitals. Byron's  reports that she felt hot last night and notes that he switched laundry detergents recently. Denies itching, pain, recent antibiotics, changes in appetite, fever, chills, difficulty swallowing, and cough/colds.     Hypothyroid -- controlled with levothyroxine.     Lab Results   Component Value Date    TSH 2.51 07/26/2017     Hyperlipidemia --    Recent Labs   Lab Test  07/26/17   1118   CHOL  206*   HDL  48*   LDL  118*   TRIG  200*     Hypertension/DVT/MR --    BP Readings from Last 3 Encounters:   10/17/17 128/70   10/12/17 138/78   09/28/17 128/88     Oncology Dr. Duran - recently helen labs and advised to follow up 4/12/18.     Hematology -- Dr. Narayan.     Problem list and histories reviewed & adjusted, as indicated.  Additional history: as documented    Reviewed and updated as needed this visit by clinical staff  Tobacco  Allergies  Meds  Med Hx  Surg Hx  Fam Hx  Soc Hx      Reviewed and updated as needed this visit by Provider         Wt Readings from Last 4 Encounters:   10/17/17 200 lb (90.7 kg)   09/28/17 200 lb (90.7 kg)   08/16/17 210 lb (95.3 kg)   03/06/17 202 lb (91.6 kg)       Health Maintenance    Health Maintenance Due   Topic Date Due     MICROALBUMIN Q1 YEAR  07/17/1941     DEXA SCAN SCREENING (SYSTEM ASSIGNED)  07/17/2005     TETANUS IMMUNIZATION (SYSTEM ASSIGNED)  01/01/2014     OP ANNUAL INR REFERRAL  06/03/2016     FALL RISK ASSESSMENT  09/01/2017       Current Problem List    Patient Active Problem List   Diagnosis     S/P  shunt     NPH (normal pressure hydrocephalus)     Fracture of right femur (H)     DVT (deep venous thrombosis) (H)      Pulmonary embolism (H)     Dementia     Hypothyroid     Hypertension goal BP (blood pressure) < 140/90     Adjustment disorder with depressed mood     Hyperlipidemia with target LDL less than 130     Leukocytosis     Esophageal reflux     UTI (urinary tract infection)     Constipation     Pulmonary hypertension     MR (mitral regurgitation)     Fibromyalgia     Osteoarthritis of both knees     Health Care Home     Long-term (current) use of anticoagulants [Z79.01]     Advance Care Planning     SIRS (systemic inflammatory response syndrome) (H)     History of pulmonary embolism     History of deep venous thrombosis     Urinary retention     BPPV (benign paroxysmal positional vertigo), bilateral     Herpes zoster without complication     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)     MGUS (monoclonal gammopathy of unknown significance)     Lymphoproliferative disorder (H)       Past Medical History    Past Medical History:   Diagnosis Date     Adjustment disorder with depressed mood      Antiplatelet or antithrombotic long-term use      Constipation      Dementia     memory/anger     Depressive disorder last few months as condition became apparent    Lack of mobility a big problem     DVT (deep venous thrombosis) (H) 4/15    bilateral     Esophageal reflux      Fibromyalgia      Fracture of right femur (H) 1/15     Heart murmur      Herpes zoster without complication 08/16/2017    left buttocks     History of Clostridium difficile      Hyperlipidemia LDL goal < 130      Hypertension goal BP (blood pressure) < 140/90      Hypothyroid      Leukocytosis      Leukocytosis      Leukocytosis, unspecified     w/u ?     Low back pain      Lymphoproliferative disorder (H)     Dr Duran     MGUS (monoclonal gammopathy of unknown significance)     Dr Duran     Migraine      MR (mitral regurgitation)     Mild     NPH (normal pressure hydrocephalus) 4/15    shunt placed but removed in 7/2015 due to erosion through the scalp,  replaced 5/19/16     Osteoarthritis of both knees      Other atopic dermatitis and related conditions      PE (pulmonary embolism) 4/15    Saddle - IVC     Pulmonary hypertension     EF 70-75%     Thrombosis of leg      Urinary tract infection, site not specified     MRSA - Dr Loo     Vertigo        Past Surgical History    Past Surgical History:   Procedure Laterality Date     ABDOMEN SURGERY  left side lower abdomen pain from Shunt we do roberth     COLONOSCOPY  2008     GYN SURGERY       H LABOR AND DELIVERY VAGINAL  1960, 1967     HYSTERECTOMY  1976    hysterectomy     IMPLANT SHUNT VENTRICULOPERITONEAL Left 5/19/2016    IMPLANT SHUNT VENTRICULOPERITONEAL - Dr Alba     OPTICAL TRACKING SYSTEM IMPLANT SHUNT VENTRICULOPERITONEAL Right 4/10/2015     Shunt - Dr Sierra     ORTHOPEDIC SURGERY  Knee pain that prohibits walking     REMOVE SHUNT VENTRICULOPERITONEAL N/A 7/3/2015    REMOVE SHUNT VENTRICULOPERITONEAL;  Surgeon: Emmanuel Sierra MD;  Location: [facility] OR     SURGICAL HISTORY OF -   1/15    right femur/hip surgery - 1/18/2015     SURGICAL HISTORY OF -   7/04    rectocele repair     SURGICAL HISTORY OF -   1997    abdominal adhesion lysis     SURGICAL HISTORY OF -   4/15    IVC filter placement     SURGICAL HISTORY OF -   6/15    IVC filter removal       Current Medications    Current Outpatient Prescriptions   Medication Sig Dispense Refill     VITAMIN D, CHOLECALCIFEROL, PO Take 1,000 Units by mouth daily       levothyroxine (SYNTHROID/LEVOTHROID) 50 MCG tablet TAKE ONE TABLET BY MOUTH EVERY DAY AT 7:30AM. DUE FOR LABS FOR FURTHER REFILLS. 90 tablet 1     tamsulosin (FLOMAX) 0.4 MG capsule TAKE ONE CAPSULE BY MOUTH DAILY AT 10AM 90 capsule 3     furosemide (LASIX) 40 MG tablet TAKE ONE TABLET BY MOUTH DAILY AT 10AM 90 tablet 1     JANTOVEN 4 MG tablet TAKE ONE TABLET BY MOUTH DAILY UNTIL INR CHECK IN 2 TO 4 DAYS AND TO BE DOSED PER YOUR PCP 90 tablet 1     JANTOVEN 2 MG tablet TAKE ONE TO TWO  TABLETS BY MOUTH EVERY DAY AS DIRECTED 180 tablet 1     acetaminophen (TYLENOL) 325 MG tablet Take 2 tablets (650 mg) by mouth every 4 hours as needed for other (surgical pain) 100 tablet 0       Allergies    Allergies   Allergen Reactions     Shellfish Allergy Anaphylaxis     Aspirin      GI issues     Contrast Dye Hives and Itching     Flushed skin     Penicillins Hives     Sulfa Drugs Hives     Nitrofurantoin Rash       Immunizations    Immunization History   Administered Date(s) Administered     Influenza (High Dose) 3 valent vaccine 09/19/2014, 09/25/2015, 09/19/2016, 10/05/2017     Influenza (IIV3) 10/23/2003, 10/04/2010, 10/06/2011, 09/27/2012, 09/19/2013     Pneumococcal (PCV 13) 09/25/2015     Pneumococcal 23 valent 10/04/2010     Tdap (Adacel,Boostrix) 01/01/2004     Zoster vaccine, live 10/05/2007       Family History    Family History   Problem Relation Age of Onset     Colon Cancer Father      Coronary Artery Disease Father      DIABETES Maternal Grandmother        Social History    Social History     Social History     Marital status:      Spouse name: Vernon     Number of children: 2     Years of education: N/A     Occupational History     Not on file.     Social History Main Topics     Smoking status: Former Smoker     Packs/day: 0.50     Years: 10.00     Types: Cigarettes     Start date: 7/17/1958     Quit date: 1/1/1982     Smokeless tobacco: Never Used      Comment: quit 1967     Alcohol use 0.0 - 0.6 oz/week      Comment: Very light     Drug use: No     Sexual activity: Yes     Partners: Male     Birth control/ protection: None      Comment: not a problem     Other Topics Concern     Parent/Sibling W/ Cabg, Mi Or Angioplasty Before 65f 55m? No     Social History Narrative       All above reviewed and updated, all stable unless otherwise noted    Recent labs reviewed    ROS:  10 point ROS of systems including Constitutional, Eyes, Respiratory, Cardiovascular, Gastroenterology, Genitourinary,  "Integumentary, Muscularskeletal, Psychiatric were all negative except for pertinent positives noted in my HPI.    OBJECTIVE:                                                    /70  Pulse 102  Temp 98.6  F (37  C) (Oral)  Ht 5' 4\" (1.626 m)  Wt 200 lb (90.7 kg)  SpO2 94%  BMI 34.33 kg/m2  Body mass index is 34.33 kg/(m^2).  GENERAL: healthy, alert and no distress  EYES: Eyes grossly normal to inspection, extraocular movements - intact, and PERRL  HENT: ear canals and TM's normal upon viewing with otoscope, nose and mouth without ulcers or lesions upon viewing with otoscope  RESP: lungs clear to auscultation - no rales, no rhonchi, no wheezes  CV: regular rates and rhythm, normal S1 S2, no S3 or S4 and no murmur, no click or rub -  ABDOMEN: soft, no tenderness, no  hepatosplenomegaly, no masses, normal bowel sounds  MS: extremities- no gross deformities noted, no edema  SKIN: no suspicious lesions, rash reported inner thigh/genital area - tinea cruris? - difficult exam failed secondary to mobility and strength.    NEURO: mentation intact and speech normal  BACK: no CVA tenderness, no paralumbar tenderness  PSYCH: Alert and oriented times 3; speech- coherent , normal rate and volume; able to articulate logical thoughts, able to abstract reason, no tangential thoughts, no hallucinations or delusions, affect- normal    DIAGNOSTICS/PROCEDURES:                                                      No results found for this or any previous visit (from the past 24 hour(s)).     ASSESSMENT/PLAN:                                                        ICD-10-CM    1. Tinea cruris B35.6    2. Dementia without behavioral disturbance, unspecified dementia type F03.90    3. NPH (normal pressure hydrocephalus) G91.2    4. S/P  shunt Z98.2    5. Pulmonary hypertension I27.20    6. Lymphoproliferative disorder (H) D47.9    7. Leukocytosis, unspecified type D72.829    8. Hypertension goal BP (blood pressure) < 140/90 I10  "   9. History of pulmonary embolism Z86.711    10. History of deep venous thrombosis Z86.718    11. Long-term (current) use of anticoagulants [Z79.01] Z79.01    12. Medication monitoring encounter Z51.81      Discussed treatment/modality options, including risk and benefits, she desires further health care maintenance, OTC meds(lotrimin cream, hydrocortisone cream) and observation. All diagnosis above reviewed and noted above, otherwise stable.  See [facility] orders for further details.  Follow up as needed.    OTC lotrimin cream and hydrocortisone cream 1%.    Follow up if not improving.     Continue with Dr. Duran - 4/12/18.     Consider stronger steroid cream if needed.     Byron declined the tetanus booster today.     Health Maintenance Due   Topic Date Due     MICROALBUMIN Q1 YEAR  07[birth year]17/1941     DEXA SCAN SCREENING (SYSTEM ASSIGNED)  07/17/2005     TETANUS IMMUNIZATION (SYSTEM ASSIGNED)  01/01/2014     OP ANNUAL INR REFERRAL  06/03/2016     FALL RISK ASSESSMENT  09/01/2017       See Patient Instructions    This document serves as a record of the services and decisions personally performed and made by Joaquin Rockwell MD [facility]. It was created on their behalf by Mae Douglas, a trained medical scribe. The creation of this document is based the provider's statements to the medical scribe. Mae Douglas October 17, 2017 12:41 PM              Joaquin Rockwell MD [address]  [address] [phone] Fax

## 2017-10-17 NOTE — MR AVS SNAPSHOT
After Visit Summary   10/17/2017    Ángela Dumont    MRN: [id number]           Patient Information     Date Of Birth          1940        Visit Information        Provider Department      10/17/2017 12:00 PM Joaquin Rockwell MD [facility]  [place]        Today's Diagnoses     Tinea cruris    -  1    Dementia without behavioral disturbance, unspecified dementia type        NPH (normal pressure hydrocephalus)        S/P  shunt        Pulmonary hypertension        Lymphoproliferative disorder (H)        Hypertension goal BP (blood pressure) < 140/90        History of pulmonary embolism        History of deep venous thrombosis        Long-term (current) use of anticoagulants [Z79.01]        Medication monitoring encounter          Care Instructions    Continue with Dr. Duran -- 4/12/18.     OTC Lotrimin cream and Hydrocortisone cream 1%.    Follow up if not improving.      [facility] - [place]                        To reach your care team during and after hours:   [phone]  To reach our pharmacy:        [phone]    Clinic Hours                        Our clinic hours are:    Monday   7:30 am to 7:00 pm                  Tuesday through Friday 7:30 am to 5:00 pm                             Saturday   8:00 am to 12:00 pm      Sunday   Closed      Pharmacy Hours                        Our pharmacy hours are:    Monday   8:30 am to 7:00 pm       Tuesday to Friday  8:30 am to 6:00 pm                       Saturday    9:00 am to 1:00 pm              Sunday    Closed              There is also information available at our web site:  [email or web address]    If your provider ordered any lab tests and you do not receive the results within 10 business days, please call the clinic.    If you need a medication refill please contact your pharmacy.  Please allow 2-3 business days for your refill to be completed.    Our clinic offers telephone visits and e visits.  Please ask one of your team  members to explain more.      Use [employer] (secure email communication and access to your chart) to send your primary care provider a message or make an appointment. Ask someone on your Team how to sign up for [employer].  Immunizations                      Immunization History   Administered Date(s) Administered     Influenza (High Dose) 3 valent vaccine 09/19/2014, 09/25/2015, 09/19/2016, 10/05/2017     Influenza (IIV3) 10/23/2003, 10/04/2010, 10/06/2011, 09/27/2012, 09/19/2013     Pneumococcal (PCV 13) 09/25/2015     Pneumococcal 23 valent 10/04/2010     Tdap (Adacel,Boostrix) 01/01/2004     Zoster vaccine, live 10/05/2007        Health Maintenance                         Health Maintenance Due   Topic Date Due     Microalbumin Lab - yearly  07[birth year]17/1941     URINE DRUG SCREEN Q1 YR  07/17/1955     Bone Density Screening (Dexa)  07/17/2005     Tetanus Vaccine - every 10 years  01/01/2014     INR CLINIC REFERRAL - yearly  06/03/2016     FALL RISK ASSESSMENT  09/01/2017               Follow-ups after your visit        Your next 10 appointments already scheduled     Apr 12, 2018 12:45 PM CDT   Return Visit with Susanna Duran MD   [facility] ([facility])    [facility]  [address]  [facility] [address]  [facility] [address] [phone]              Who to contact     If you have questions or need follow up information about today's clinic visit or your schedule please contact [facility] directly at [phone].  Normal or non-critical lab and imaging results will be communicated to you by MyChart, letter or phone within 4 business days after the clinic has received the results. If you do not hear from us within 7 days, please contact the clinic through MyChart or phone. If you have a critical or abnormal lab result, we will notify you by phone as soon as possible.  Submit refill requests through [employer] or call your pharmacy and they  "will forward the refill request to us. Please allow 3 business days for your refill to be completed.          Additional Information About Your Visit        [employer] Information     [employer] gives you secure access to your electronic health record. If you see a primary care provider, you can also send messages to your care team and make appointments. If you have questions, please call your primary care clinic.  If you do not have a primary care provider, please call [phone] and they will assist you.        Care EveryWhere ID     This is your Care EveryWhere ID. This could be used by other organizations to access your [facility] medical records  [id number]        Your Vitals Were     Pulse Temperature Height Pulse Oximetry BMI (Body Mass Index)       102 98.6  F (37  C) (Oral) 5' 4\" (1.626 m) 94% 34.33 kg/m2        Blood Pressure from Last 3 Encounters:   10/17/17 128/70   10/12/17 138/78   09/28/17 128/88    Weight from Last 3 Encounters:   10/17/17 200 lb (90.7 kg)   09/28/17 200 lb (90.7 kg)   08/16/17 210 lb (95.3 kg)              Today, you had the following     No orders found for display       Primary Care Provider Office Phone # Fax #    Joaquin Rockwell [phone] [address] [facility] [address]        Equal Access to Services     THEODORA CARRINGTON : Hadii heron ku hadasho Soomaali, waaxda luqadaha, qaybta kaalmada adeegyada, francisco javier awan. So [facility] [phone].    ATENCIÓN: Si habla español, tiene a vega disposición servicios gratuitos de asistencia lingüística. [place] al [phone].    We comply with applicable federal civil rights laws and [place] laws. We do not discriminate on the basis of race, color, national origin, age, disability, sex, sexual orientation, or gender identity.            Thank you!     Thank you for choosing [facility]  for your care. Our goal is always to provide you with excellent care. Hearing back from our " patients is one way we can continue to improve our services. Please take a few minutes to complete the written survey that you may receive in the mail after your visit with us. Thank you!             Your Updated Medication List - Protect others around you: Learn how to safely use, store and throw away your medicines at www.disposemymeds.org.          This list is accurate as of: 10/17/17  1:05 PM.  Always use your most recent med list.                   Brand Name Dispense Instructions for use Diagnosis    acetaminophen 325 MG tablet    TYLENOL    100 tablet    Take 2 tablets (650 mg) by mouth every 4 hours as needed for other (surgical pain)    Normal pressure hydrocephalus       furosemide 40 MG tablet    LASIX    90 tablet    TAKE ONE TABLET BY MOUTH DAILY AT 10AM    Hypertension goal BP (blood pressure) < 140/90       * JANTOVEN 2 MG tablet   Generic drug:  warfarin     180 tablet    TAKE ONE TO TWO TABLETS BY MOUTH EVERY DAY AS DIRECTED    History of deep venous thrombosis, History of pulmonary embolism, Long-term (current) use of anticoagulants, DVT (deep venous thrombosis) (H)       * JANTOVEN 4 MG tablet   Generic drug:  warfarin     90 tablet    TAKE ONE TABLET BY MOUTH DAILY UNTIL INR CHECK IN 2 TO 4 DAYS AND TO BE DOSED PER YOUR PCP    Long-term (current) use of anticoagulants       levothyroxine 50 MCG tablet    SYNTHROID/LEVOTHROID    90 tablet    TAKE ONE TABLET BY MOUTH EVERY DAY AT 7:30AM. DUE FOR LABS FOR FURTHER REFILLS.    Hypothyroidism, unspecified type       tamsulosin 0.4 MG capsule    FLOMAX    90 capsule    TAKE ONE CAPSULE BY MOUTH DAILY AT 10AM    Urinary retention       VITAMIN D (CHOLECALCIFEROL) PO      Take 1,000 Units by mouth daily    Leukocytosis, unspecified type       * Notice:  This list has 2 medication(s) that are the same as other medications prescribed for you. Read the directions carefully, and ask your doctor or other care provider to review them with you.

## 2017-10-17 NOTE — NURSING NOTE
"Chief Complaint   Patient presents with     Rash       Initial /70  Pulse 102  Temp 98.6  F (37  C) (Oral)  Ht 5' 4\" (1.626 m)  Wt 200 lb (90.7 kg)  SpO2 94%  BMI 34.33 kg/m2 Estimated body mass index is 34.33 kg/(m^2) as calculated from the following:    Height as of this encounter: 5' 4\" (1.626 m).    Weight as of this encounter: 200 lb (90.7 kg)..  BP completed using cuff size: farzaneh Francois MA  "

## 2017-10-17 NOTE — PATIENT INSTRUCTIONS
Continue with Dr. Duran -- 4/12/18.     OTC Lotrimin cream and Hydrocortisone cream 1%.    Follow up if not improving.      [facility] - [place]                        To reach your care team during and after hours:   [phone]  To reach our pharmacy:        [phone]    Clinic Hours                        Our clinic hours are:    Monday   7:30 am to 7:00 pm                  Tuesday through Friday 7:30 am to 5:00 pm                             Saturday   8:00 am to 12:00 pm      Sunday   Closed      Pharmacy Hours                        Our pharmacy hours are:    Monday   8:30 am to 7:00 pm       Tuesday to Friday  8:30 am to 6:00 pm                       Saturday    9:00 am to 1:00 pm              Sunday    Closed              There is also information available at our web site:  [email or web address]    If your provider ordered any lab tests and you do not receive the results within 10 business days, please call the clinic.    If you need a medication refill please contact your pharmacy.  Please allow 2-3 business days for your refill to be completed.    Our clinic offers telephone visits and e visits.  Please ask one of your team members to explain more.      Use [employer] (secure email communication and access to your chart) to send your primary care provider a message or make an appointment. Ask someone on your Team how to sign up for [employer].  Immunizations                      Immunization History   Administered Date(s) Administered     Influenza (High Dose) 3 valent vaccine 09/19/2014, 09/25/2015, 09/19/2016, 10/05/2017     Influenza (IIV3) 10/23/2003, 10/04/2010, 10/06/2011, 09/27/2012, 09/19/2013     Pneumococcal (PCV 13) 09/25/2015     Pneumococcal 23 valent 10/04/2010     Tdap (Adacel,Boostrix) 01/01/2004     Zoster vaccine, live 10/05/2007        Health Maintenance                         Health Maintenance Due   Topic Date Due     Microalbumin Lab - yearly  07[birth year]17/1941     URINE DRUG SCREEN Q1  YR  07/17/1955     Bone Density Screening (Dexa)  07/17/2005     Tetanus Vaccine - every 10 years  01/01/2014     INR CLINIC REFERRAL - yearly  06/03/2016     FALL RISK ASSESSMENT  09/01/2017

## 2017-10-18 ENCOUNTER — APPOINTMENT (OUTPATIENT)
Dept: CT IMAGING | Facility: CLINIC | Age: 77
End: 2017-10-18
Attending: EMERGENCY MEDICINE
Payer: COMMERCIAL

## 2017-10-18 ENCOUNTER — TELEPHONE (OUTPATIENT)
Dept: FAMILY MEDICINE | Facility: CLINIC | Age: 77
End: 2017-10-18

## 2017-10-18 ENCOUNTER — APPOINTMENT (OUTPATIENT)
Dept: GENERAL RADIOLOGY | Facility: CLINIC | Age: 77
End: 2017-10-18
Payer: COMMERCIAL

## 2017-10-18 ENCOUNTER — HOSPITAL ENCOUNTER (OUTPATIENT)
Facility: CLINIC | Age: 77
Setting detail: OBSERVATION
Discharge: HOME | End: 2017-10-19
Attending: EMERGENCY MEDICINE | Admitting: ORTHOPAEDIC SURGERY
Payer: COMMERCIAL

## 2017-10-18 ENCOUNTER — APPOINTMENT (OUTPATIENT)
Dept: GENERAL RADIOLOGY | Facility: CLINIC | Age: 77
End: 2017-10-18
Attending: EMERGENCY MEDICINE
Payer: COMMERCIAL

## 2017-10-18 DIAGNOSIS — G91.9 EXTERNAL HYDROCEPHALUS (H): ICD-10-CM

## 2017-10-18 DIAGNOSIS — K63.89 COLONIC MASS: ICD-10-CM

## 2017-10-18 DIAGNOSIS — Z98.2 PRESENCE OF CEREBROSPINAL FLUID DRAINAGE DEVICE: ICD-10-CM

## 2017-10-18 PROBLEM — R11.2 NAUSEA & VOMITING: Status: ACTIVE | Noted: 2017-10-18

## 2017-10-18 PROBLEM — R10.9 ABDOMINAL PAIN: Status: ACTIVE | Noted: 2017-10-18

## 2017-10-18 LAB
ALBUMIN SERPL-MCNC: 3.1 G/DL (ref 3.4–5)
ALBUMIN UR-MCNC: 10 MG/DL
ALP SERPL-CCNC: 96 U/L (ref 40–150)
ALT SERPL W P-5'-P-CCNC: 20 U/L (ref 0–50)
AMORPH CRY #/AREA URNS HPF: ABNORMAL /HPF
ANION GAP SERPL CALCULATED.3IONS-SCNC: 8 MMOL/L (ref 3–14)
APPEARANCE UR: ABNORMAL
AST SERPL W P-5'-P-CCNC: 26 U/L (ref 0–45)
BASOPHILS # BLD AUTO: 0.1 10E9/L (ref 0–0.2)
BASOPHILS NFR BLD AUTO: 0.3 %
BILIRUB SERPL-MCNC: 0.7 MG/DL (ref 0.2–1.3)
BILIRUB UR QL STRIP: NEGATIVE
BUN SERPL-MCNC: 23 MG/DL (ref 7–30)
CALCIUM SERPL-MCNC: 8.5 MG/DL (ref 8.5–10.1)
CHLORIDE SERPL-SCNC: 110 MMOL/L (ref 94–109)
CO2 SERPL-SCNC: 24 MMOL/L (ref 20–32)
COLOR UR AUTO: YELLOW
CREAT SERPL-MCNC: 0.79 MG/DL (ref 0.52–1.04)
CRP SERPL-MCNC: 4.3 MG/L (ref 0–8)
DIFFERENTIAL METHOD BLD: ABNORMAL
EOSINOPHIL # BLD AUTO: 0.2 10E9/L (ref 0–0.7)
EOSINOPHIL NFR BLD AUTO: 1.3 %
ERYTHROCYTE [DISTWIDTH] IN BLOOD BY AUTOMATED COUNT: 14.1 % (ref 10–15)
GFR SERPL CREATININE-BSD FRML MDRD: 70 ML/MIN/1.7M2
GLUCOSE SERPL-MCNC: 102 MG/DL (ref 70–99)
GLUCOSE UR STRIP-MCNC: NEGATIVE MG/DL
HCT VFR BLD AUTO: 39.7 % (ref 35–47)
HGB BLD-MCNC: 12.5 G/DL (ref 11.7–15.7)
HGB UR QL STRIP: NEGATIVE
IMM GRANULOCYTES # BLD: 0.1 10E9/L (ref 0–0.4)
IMM GRANULOCYTES NFR BLD: 0.3 %
INR PPP: 2.43 (ref 0.86–1.14)
INTERPRETATION ECG - MUSE: NORMAL
KETONES UR STRIP-MCNC: NEGATIVE MG/DL
LACTATE BLD-SCNC: 1.3 MMOL/L (ref 0.7–2)
LEUKOCYTE ESTERASE UR QL STRIP: ABNORMAL
LYMPHOCYTES # BLD AUTO: 9.6 10E9/L (ref 0.8–5.3)
LYMPHOCYTES NFR BLD AUTO: 50.2 %
MCH RBC QN AUTO: 28.5 PG (ref 26.5–33)
MCHC RBC AUTO-ENTMCNC: 31.5 G/DL (ref 31.5–36.5)
MCV RBC AUTO: 90 FL (ref 78–100)
MONOCYTES # BLD AUTO: 0.7 10E9/L (ref 0–1.3)
MONOCYTES NFR BLD AUTO: 3.7 %
MUCOUS THREADS #/AREA URNS LPF: PRESENT /LPF
NEUTROPHILS # BLD AUTO: 8.5 10E9/L (ref 1.6–8.3)
NEUTROPHILS NFR BLD AUTO: 44.2 %
NITRATE UR QL: NEGATIVE
NRBC # BLD AUTO: 0 10*3/UL
NRBC BLD AUTO-RTO: 0 /100
PH UR STRIP: 6.5 PH (ref 5–7)
PLATELET # BLD AUTO: 208 10E9/L (ref 150–450)
PLATELET # BLD EST: ABNORMAL 10*3/UL
POTASSIUM SERPL-SCNC: 4.4 MMOL/L (ref 3.4–5.3)
PROCALCITONIN SERPL-MCNC: <0.05 NG/ML
PROT SERPL-MCNC: 6.9 G/DL (ref 6.8–8.8)
RBC # BLD AUTO: 4.39 10E12/L (ref 3.8–5.2)
RBC #/AREA URNS AUTO: 2 /HPF (ref 0–2)
SODIUM SERPL-SCNC: 142 MMOL/L (ref 133–144)
SOURCE: ABNORMAL
SP GR UR STRIP: 1.02 (ref 1–1.03)
SQUAMOUS #/AREA URNS AUTO: 1 /HPF (ref 0–1)
UROBILINOGEN UR STRIP-MCNC: NORMAL MG/DL (ref 0–2)
WBC # BLD AUTO: 19.2 10E9/L (ref 4–11)
WBC #/AREA URNS AUTO: 0 /HPF (ref 0–2)

## 2017-10-18 PROCEDURE — 84145 PROCALCITONIN (PCT): CPT | Performed by: STUDENT IN AN ORGANIZED HEALTH CARE EDUCATION/TRAINING PROGRAM

## 2017-10-18 PROCEDURE — 96361 HYDRATE IV INFUSION ADD-ON: CPT | Performed by: EMERGENCY MEDICINE

## 2017-10-18 PROCEDURE — 85610 PROTHROMBIN TIME: CPT | Performed by: STUDENT IN AN ORGANIZED HEALTH CARE EDUCATION/TRAINING PROGRAM

## 2017-10-18 PROCEDURE — 96376 TX/PRO/DX INJ SAME DRUG ADON: CPT

## 2017-10-18 PROCEDURE — 96375 TX/PRO/DX INJ NEW DRUG ADDON: CPT | Performed by: EMERGENCY MEDICINE

## 2017-10-18 PROCEDURE — 25000128 H RX IP 250 OP 636: Performed by: STUDENT IN AN ORGANIZED HEALTH CARE EDUCATION/TRAINING PROGRAM

## 2017-10-18 PROCEDURE — 74176 CT ABD & PELVIS W/O CONTRAST: CPT

## 2017-10-18 PROCEDURE — 93005 ELECTROCARDIOGRAM TRACING: CPT | Performed by: EMERGENCY MEDICINE

## 2017-10-18 PROCEDURE — 83605 ASSAY OF LACTIC ACID: CPT | Performed by: EMERGENCY MEDICINE

## 2017-10-18 PROCEDURE — 96374 THER/PROPH/DIAG INJ IV PUSH: CPT | Performed by: EMERGENCY MEDICINE

## 2017-10-18 PROCEDURE — 25800025 ZZH RX 258: Performed by: STUDENT IN AN ORGANIZED HEALTH CARE EDUCATION/TRAINING PROGRAM

## 2017-10-18 PROCEDURE — 25000128 H RX IP 250 OP 636: Performed by: EMERGENCY MEDICINE

## 2017-10-18 PROCEDURE — 71010 XR CHEST PORT 1 VW: CPT

## 2017-10-18 PROCEDURE — 81001 URINALYSIS AUTO W/SCOPE: CPT | Performed by: EMERGENCY MEDICINE

## 2017-10-18 PROCEDURE — 86140 C-REACTIVE PROTEIN: CPT | Performed by: EMERGENCY MEDICINE

## 2017-10-18 PROCEDURE — 85025 COMPLETE CBC W/AUTO DIFF WBC: CPT | Performed by: EMERGENCY MEDICINE

## 2017-10-18 PROCEDURE — 84145 PROCALCITONIN (PCT): CPT | Performed by: EMERGENCY MEDICINE

## 2017-10-18 PROCEDURE — 70250 X-RAY EXAM OF SKULL: CPT

## 2017-10-18 PROCEDURE — 99285 EMERGENCY DEPT VISIT HI MDM: CPT | Mod: 25 | Performed by: EMERGENCY MEDICINE

## 2017-10-18 PROCEDURE — 99220 ZZC INITIAL OBSERVATION CARE,LEVL III: CPT | Mod: AI | Performed by: ORTHOPAEDIC SURGERY

## 2017-10-18 PROCEDURE — S5010 5% DEXTROSE AND 0.45% SALINE: HCPCS | Performed by: STUDENT IN AN ORGANIZED HEALTH CARE EDUCATION/TRAINING PROGRAM

## 2017-10-18 PROCEDURE — 36415 COLL VENOUS BLD VENIPUNCTURE: CPT | Performed by: STUDENT IN AN ORGANIZED HEALTH CARE EDUCATION/TRAINING PROGRAM

## 2017-10-18 PROCEDURE — G0378 HOSPITAL OBSERVATION PER HR: HCPCS

## 2017-10-18 PROCEDURE — 74020 XR SHUNT MALFUNCTION SURVEY: CPT

## 2017-10-18 PROCEDURE — 80053 COMPREHEN METABOLIC PANEL: CPT | Performed by: EMERGENCY MEDICINE

## 2017-10-18 PROCEDURE — 87040 BLOOD CULTURE FOR BACTERIA: CPT | Performed by: STUDENT IN AN ORGANIZED HEALTH CARE EDUCATION/TRAINING PROGRAM

## 2017-10-18 PROCEDURE — 93010 ELECTROCARDIOGRAM REPORT: CPT | Mod: Z6 | Performed by: EMERGENCY MEDICINE

## 2017-10-18 PROCEDURE — 87086 URINE CULTURE/COLONY COUNT: CPT | Performed by: EMERGENCY MEDICINE

## 2017-10-18 RX ORDER — SODIUM CHLORIDE 9 MG/ML
1000 INJECTION, SOLUTION INTRAVENOUS CONTINUOUS
Status: DISCONTINUED | OUTPATIENT
Start: 2017-10-18 | End: 2017-10-18

## 2017-10-18 RX ORDER — POLYETHYLENE GLYCOL 3350 17 G/17G
17 POWDER, FOR SOLUTION ORAL DAILY PRN
Status: DISCONTINUED | OUTPATIENT
Start: 2017-10-18 | End: 2017-10-19

## 2017-10-18 RX ORDER — ACETAMINOPHEN 325 MG/1
650 TABLET ORAL EVERY 4 HOURS PRN
Status: DISCONTINUED | OUTPATIENT
Start: 2017-10-18 | End: 2017-10-19 | Stop reason: HOSPADM

## 2017-10-18 RX ORDER — METOCLOPRAMIDE HYDROCHLORIDE 5 MG/ML
10 INJECTION INTRAMUSCULAR; INTRAVENOUS ONCE
Status: COMPLETED | OUTPATIENT
Start: 2017-10-18 | End: 2017-10-18

## 2017-10-18 RX ORDER — OXYCODONE HYDROCHLORIDE 5 MG/1
5 TABLET ORAL EVERY 4 HOURS PRN
Status: DISCONTINUED | OUTPATIENT
Start: 2017-10-18 | End: 2017-10-19 | Stop reason: HOSPADM

## 2017-10-18 RX ORDER — WARFARIN SODIUM 2 MG/1
2 TABLET ORAL ONCE
Status: COMPLETED | OUTPATIENT
Start: 2017-10-18 | End: 2017-10-19

## 2017-10-18 RX ORDER — PROCHLORPERAZINE MALEATE 5 MG
5 TABLET ORAL EVERY 6 HOURS PRN
Status: DISCONTINUED | OUTPATIENT
Start: 2017-10-18 | End: 2017-10-19 | Stop reason: HOSPADM

## 2017-10-18 RX ORDER — ONDANSETRON 2 MG/ML
4 INJECTION INTRAMUSCULAR; INTRAVENOUS ONCE
Status: COMPLETED | OUTPATIENT
Start: 2017-10-18 | End: 2017-10-18

## 2017-10-18 RX ORDER — CLOTRIMAZOLE 1 %
CREAM (GRAM) TOPICAL 2 TIMES DAILY
Status: DISCONTINUED | OUTPATIENT
Start: 2017-10-18 | End: 2017-10-19 | Stop reason: HOSPADM

## 2017-10-18 RX ORDER — NALOXONE HYDROCHLORIDE 0.4 MG/ML
.1-.4 INJECTION, SOLUTION INTRAMUSCULAR; INTRAVENOUS; SUBCUTANEOUS
Status: DISCONTINUED | OUTPATIENT
Start: 2017-10-18 | End: 2017-10-19 | Stop reason: HOSPADM

## 2017-10-18 RX ORDER — LEVOTHYROXINE SODIUM 50 UG/1
50 TABLET ORAL DAILY
Status: DISCONTINUED | OUTPATIENT
Start: 2017-10-19 | End: 2017-10-19 | Stop reason: HOSPADM

## 2017-10-18 RX ORDER — ONDANSETRON 2 MG/ML
4 INJECTION INTRAMUSCULAR; INTRAVENOUS EVERY 8 HOURS
Status: DISCONTINUED | OUTPATIENT
Start: 2017-10-18 | End: 2017-10-19 | Stop reason: HOSPADM

## 2017-10-18 RX ORDER — PROCHLORPERAZINE 25 MG
12.5 SUPPOSITORY, RECTAL RECTAL EVERY 12 HOURS PRN
Status: DISCONTINUED | OUTPATIENT
Start: 2017-10-18 | End: 2017-10-19 | Stop reason: HOSPADM

## 2017-10-18 RX ORDER — AMOXICILLIN 250 MG
1-2 CAPSULE ORAL 2 TIMES DAILY PRN
Status: DISCONTINUED | OUTPATIENT
Start: 2017-10-18 | End: 2017-10-19 | Stop reason: HOSPADM

## 2017-10-18 RX ORDER — LIDOCAINE 40 MG/G
CREAM TOPICAL
Status: DISCONTINUED | OUTPATIENT
Start: 2017-10-18 | End: 2017-10-19 | Stop reason: HOSPADM

## 2017-10-18 RX ORDER — TAMSULOSIN HYDROCHLORIDE 0.4 MG/1
0.4 CAPSULE ORAL DAILY
Status: DISCONTINUED | OUTPATIENT
Start: 2017-10-19 | End: 2017-10-19 | Stop reason: HOSPADM

## 2017-10-18 RX ADMIN — SODIUM CHLORIDE 1000 ML: 9 INJECTION, SOLUTION INTRAVENOUS at 16:11

## 2017-10-18 RX ADMIN — ONDANSETRON 4 MG: 2 INJECTION INTRAMUSCULAR; INTRAVENOUS at 22:12

## 2017-10-18 RX ADMIN — ONDANSETRON 4 MG: 2 INJECTION INTRAMUSCULAR; INTRAVENOUS at 11:39

## 2017-10-18 RX ADMIN — DEXTROSE AND SODIUM CHLORIDE 1000 ML: 5; 450 INJECTION, SOLUTION INTRAVENOUS at 22:12

## 2017-10-18 RX ADMIN — METOCLOPRAMIDE 10 MG: 5 INJECTION, SOLUTION INTRAMUSCULAR; INTRAVENOUS at 13:40

## 2017-10-18 NOTE — IP AVS SNAPSHOT
[address] [address]    Phone:  [phone]                                       After Visit Summary   10/18/2017    Ángela Dumont    MRN: [id number]           After Visit Summary Signature Page     I have received my discharge instructions, and my questions have been answered. I have discussed any challenges I see with this plan with the nurse or doctor.    ..........................................................................................................................................  Patient/Patient Representative Signature      ..........................................................................................................................................  Patient Representative Print Name and Relationship to Patient    ..................................................               ................................................  Date                                            Time    ..........................................................................................................................................  Reviewed by Signature/Title    ...................................................              ..............................................  Date                                                            Time

## 2017-10-18 NOTE — ED PROVIDER NOTES
History     Chief Complaint   Patient presents with     Abdominal Pain     HPI  Ángela Dumont is a 77 year old female with multiple medical problems including dementia, fibromyalgia, normal pressure hydrocephalus status post  shunt placement, history of pulmonary embolism, MGUS, presenting with sudden onset of left lower abdominal discomfort. Patient was in the bathroom began to experience severe pain.  called EMS patient was brought to the emergency department. Prior to the symptoms she had been feeling well earlier in the morning and the night before. She is unable to provide further history and all history is obtained essentially through the .   This part of the medical record was transcribed by Kelly Smith, Medical Scribe, from a dictation done by Dr. Charline MD.       I have reviewed the Medications, Allergies, Past Medical and Surgical History, and Social History in the Epic system.    Review of Systems   Reason unable to perform ROS: HPI provided by .   All other systems reviewed and are negative.           Physical Exam   BP: 160/76  Pulse: 71  Heart Rate: 69  Temp: 98  F (36.7  C)  Resp: 16  Weight: 90 kg (198 lb 6.6 oz)  SpO2: 92 %       Physical Exam   GEN: confused but alert   HEENT: The head is normocephalic and atraumatic. Pupils are equal round and reactive to light. Extraocular motions are intact. There is no facial swelling. The neck is nontender and supple.   CV: Regular rate and rhythm without murmurs rubs or gallops. 2+ radial pulses bilaterally.  PULM: Clear to auscultation bilaterally.  ABD: Obese, soft tender to palpation in LLQ.   EXT: Full range of motion.  No edema.  NEURO: Cranial nerves II through XII are intact and symmetric. Bilateral upper and lower extremities grossly show full range of motion without any focal deficits.   PSYCH: Calm and cooperative, interactive.      ED Course     ED Course     Procedures            EKG Interpretation:      Interpreted by  Dr. Olivier  Time reviewed: 11:12  Symptoms at time of EKG: abdominal pain   Rhythm: normal sinus   Rate: 70 bpm  Axis: nl  Ectopy: none  Conduction: nl  ST Segments/ T Waves: no evidence of ischemia  Q Waves: III aVF  Comparison to prior: Unchanged    Clinical Impression: abnormal    Recent Results (from the past 24 hour(s))   XR Shunt Malfunction Surgery Survey    Narrative    XR SHUNT MALFUNCTION SURVEY 10/18/2017 10:06 PM    History: eval for shunt kink or misplacement to assess for cause of  left lower quadrant abdominal pain    Comparison: CT 6/5/2017    Findings: Left frontal approach ventriculostomy catheter is in place.  The visualized radiopaque portions are contiguous and intact.  Postoperative changes of right femoral fracture fixation with  medullary nail. Nonobstructive bowel gas pattern. No focal pulmonary  opacities.      Impression    Impression: Intact left-sided  shunt catheter.    I have personally reviewed the examination and initial interpretation  and I agree with the findings.    ANJELICA WHITMAN MD           Labs Ordered and Resulted from Time of ED Arrival Up to the Time of Departure from the ED   CBC WITH PLATELETS DIFFERENTIAL - Abnormal; Notable for the following:        Result Value    WBC 19.2 (*)     Absolute Neutrophil 8.5 (*)     Absolute Lymphocytes 9.6 (*)     All other components within normal limits   COMPREHENSIVE METABOLIC PANEL - Abnormal; Notable for the following:     Chloride 110 (*)     Glucose 102 (*)     Albumin 3.1 (*)     All other components within normal limits   ROUTINE UA WITH MICROSCOPIC REFLEX TO CULTURE - Abnormal; Notable for the following:     Protein Albumin Urine 10 (*)     Leukocyte Esterase Urine Trace (*)     Mucous Urine Present (*)     Amorphous Crystals Few (*)     All other components within normal limits   LACTIC ACID WHOLE BLOOD            Assessments & Plan (with Medical Decision Making)     77-year-old female with multiple medical problems  including left lower quadrant abdominal pain. This is the area of her  shunt outlet. She is tender in the left lower quadrant.    Differential diagnosis is broad including bowel obstruction, colitis, diverticulitis, obstruction, mesenteric ischemia, ischemic colitis, GI bleed, among other causes.  Laboratories reviewed notable for leukocytosis 19.2,   CT abdomen and pelvis as noted above. Findings are concerning for infection versus malignancy. This was discussed with the patient.    Patient received IV hydration, analgesics, antiemetics resulting in general improvement in her symptoms, though she has persistent left lower quadrant abdominal pain.  She was discussed and presented to the internal medicine hospitalist for observation admission, consideration for colonoscopy. Will hold off antibiotics for now pending further evaluation and possible colonoscopy.      I have reviewed the nursing notes.  This part of the medical record was transcribed by Kelly Smith, Medical Scribe, from a dictation done by Dr. Charline MD.     I have reviewed the findings, diagnosis, plan and need for follow up with the patient.    Discharge Medication List as of 10/19/2017  2:17 PM      START taking these medications    Details   polyethylene glycol (MIRALAX/GLYCOLAX) Packet Take 17 g by mouth daily as needed for constipation, Disp-7 packet, R-0, E-Prescribe      senna-docusate (SENOKOT-S;PERICOLACE) 8.6-50 MG per tablet Take 1 tablet by mouth 2 times daily, Disp-100 tablet, R-0, E-Prescribe             Final diagnoses:   Colonic mass       10/18/2017   [facility], EMERGENCY DEPARTMENT     Pineda Olivier MD  10/19/17 7721

## 2017-10-18 NOTE — ED NOTES
Bed: ED18  Expected date: 10/18/17  Expected time: 10:46 AM  Means of arrival: Ambulance  Comments:  A518  77 female  Acute left side abd pain  Given 0.5mg dilaudid  Yellow

## 2017-10-18 NOTE — PHARMACY-ADMISSION MEDICATION HISTORY
Admission medication history interview status for the 10/18/2017 admission is complete. See Epic admission navigator for allergy information, pharmacy, prior to admission medications and immunization status.     Medication history interview sources:  patient interview    Changes made to PTA medication list (reason)  Added: N/A  Deleted: N/A  Changed: N/A    Additional medication history information (including reliability of information, actions taken by pharmacist): Patient was at anticoagulation clinic on 10/3/17 and was instructed to take 2mg on Sunday, Wednesday and Friday and 4mg ROW. Patient's  states that the patient has been taking 2mg on Monday Thursday and Saturday and 4mg ROW. Next anticoagulation clinic visit is on 10/31/17.      Prior to Admission medications    Medication Sig Last Dose Taking? Auth Provider   Warfarin Sodium (JANTOVEN PO) Take 2mg by mouth on Sunday, Wednesday, Friday and take 4mg rest of week. 10/17/2017 at Unknown time Yes Unknown, Entered By History   VITAMIN D, CHOLECALCIFEROL, PO Take 1,000 Units by mouth daily 10/17/2017 at Unknown time Yes Reported, Patient   levothyroxine (SYNTHROID/LEVOTHROID) 50 MCG tablet TAKE ONE TABLET BY MOUTH EVERY DAY AT 7:30AM. DUE FOR LABS FOR FURTHER REFILLS. 10/17/2017 at Unknown time Yes Joqauin Rockwell MD   tamsulosin (FLOMAX) 0.4 MG capsule TAKE ONE CAPSULE BY MOUTH DAILY AT 10AM 10/17/2017 at Unknown time Yes Joaquin Rockwell MD   furosemide (LASIX) 40 MG tablet TAKE ONE TABLET BY MOUTH DAILY AT 10AM 10/17/2017 at Unknown time Yes Joaquin Rockwell MD   acetaminophen (TYLENOL) 325 MG tablet Take 2 tablets (650 mg) by mouth every 4 hours as needed for other (surgical pain) PRN Yes Patrizia Adrian, APRN CNP         Medication history completed by: Pineda Ochoa, PharmD Candidate

## 2017-10-18 NOTE — IP AVS SNAPSHOT
MRN:[id number]                      After Visit Summary   10/18/2017    Ángela Dumont    MRN: [id number]           Thank you!     Thank you for choosing [facility] for your care. Our goal is always to provide you with excellent care. Hearing back from our patients is one way we can continue to improve our services. Please take a few minutes to complete the written survey that you may receive in the mail after you visit with us. Thank you!        Patient Information     Date Of Birth          1940        Designated Caregiver       Most Recent Value    Caregiver    Will someone help with your care after discharge? yes    Name of designated caregiver [facility]    Phone number of caregiver [phone]    Caregiver address [address] [address]      About your hospital stay     You were admitted on:  October 18, 2017 You last received care in the:  [address] [facility]    You were discharged on:  October 19, 2017        Reason for your hospital stay       You were admitted for abdominal pain. A CT revealed a rectal mass and numerous enlarged lymph nodes. No definite cause of pain was identified however constipation was likely the cause. The possible cancer was not investigated further after discussion. We will set up home hospice at discharge and start a more regular regimen for constipation.                  Who to Call     For medical emergencies, please call 911.  For non-urgent questions about your medical care, please call your primary care provider or clinic, [phone]          Attending Provider     Provider Specialty    Pineda Olivier MD Emergency Medicine    [facility]       Primary Care Provider Office Phone # Fax #    Joaquin Rockwell [phone]      After Care Instructions     Activity       Your activity upon discharge: activity as tolerated            Diet       Follow this diet upon discharge: Orders Placed  This Encounter      Advance Diet as Tolerated: Regular Diet Adult                  Follow-up Appointments     Adult [facility] Follow-up and recommended labs and tests       Follow up with primary care provider, Joaquin Rockwell, within 7 days for hospital follow- up.  No follow up labs or test are needed.      Appointments on [place] and/or [facility] (with [facility] or [facility] provider or service). Call [phone] if you haven't heard regarding these appointments within 7 days of discharge.                  Your next 10 appointments already scheduled     Apr 12, 2018 12:45 PM CDT   Return Visit with Susanna Duran MD   [facility] ([facility])    [facility]  [address]  [address]  [facility] [address] [phone]              Warfarin Instruction     You have started taking a medicine called warfarin. This is a blood-thinning medicine (anticoagulant). It helps prevent and treat blood clots.      Before leaving the hospital, make sure you know how much to take and how long to take it.      You will need regular blood tests to make sure your blood is clotting safely. It is very important to see your doctor for regular blood tests.    Talk to your doctor before taking any new medicine (this includes over-the-counter drugs and herbal products). Many medicines can interact with warfarin. This may cause more bleeding or too much clotting.     Eating a lot of vitamin K--found in green, leafy vegetables--can change the way warfarin works in your body. Do NOT avoid these foods. Instead, try to eat the same amount each day.     Bleeding is the most common side-effect of warfarin. You may notice bleeding gums, a bloody nose, bruises and bleeding longer when you cut yourself. See a doctor at once if:   o You cough up blood  o You find blood in your stool (poop)  o You have a deep cut, or a cut that bleeds longer than 10 minutes   o You have a bad cut,  "hard fall, accident or hit your head (go to urgent care or the emergency room).    For women who can get pregnant: This medicine can harm an unborn baby. Be very careful not to get pregnant while taking this medicine. If you think you might be pregnant, call your doctor right away.    For more information, read \"Guide to Warfarin Therapy,  the booklet you received in the hospital.        Pending Results     Date and Time Order Name Status Description    10/18/2017 2047 Blood culture Preliminary     10/18/2017 2047 Blood culture Preliminary     10/18/2017 1122 Urine Culture Aerobic Bacterial Preliminary             Statement of Approval     Ordered          10/19/17 1415  I have reviewed and agree with all the recommendations and orders detailed in this document.  EFFECTIVE NOW     Approved and electronically signed by:  Mumtaz Davidson MD             Admission Information     Date & Time Provider Department Dept. Phone    10/18/2017 Zak Ewing MD [address] [facility] [phone]      Your Vitals Were     Blood Pressure Pulse Temperature Respirations Weight Pulse Oximetry    156/63 (BP Location: Left arm) 85 96.3  F (35.7  C) (Oral) 18 96.6 kg (212 lb 15.4 oz) 93%    BMI (Body Mass Index)                   36.56 kg/m2           MyChart Information     [facility] gives you secure access to your electronic health record. If you see a primary care provider, you can also send messages to your care team and make appointments. If you have questions, please call your primary care clinic.  If you do not have a primary care provider, please call [phone] and they will assist you.        Care EveryWhere ID     This is your Care EveryWhere ID. This could be used by other organizations to access your [facility] medical records  [id number]        Equal Access to Services     QASIM CARRINGTON AH: dash Mortensen, francisco javier lima " labárbara ahSangeetha So [facility] [phone].    ATENCIÓN: Si habla benjamin, tiene a vega disposición servicios gratuitos de asistencia lingüística. Rayo al [phone].    We comply with applicable federal civil rights laws and [place] laws. We do not discriminate on the basis of race, color, national origin, age, disability, sex, sexual orientation, or gender identity.               Review of your medicines      START taking        Dose / Directions    polyethylene glycol Packet   Commonly known as:  MIRALAX/GLYCOLAX   Used for:  Colonic mass        Dose:  17 g   Take 17 g by mouth daily as needed for constipation   Quantity:  7 packet   Refills:  0       senna-docusate 8.6-50 MG per tablet   Commonly known as:  SENOKOT-S;PERICOLACE   Used for:  Colonic mass        Dose:  1 tablet   Take 1 tablet by mouth 2 times daily   Quantity:  100 tablet   Refills:  0         CONTINUE these medicines which have NOT CHANGED        Dose / Directions    acetaminophen 325 MG tablet   Commonly known as:  TYLENOL   Used for:  Normal pressure hydrocephalus        Dose:  650 mg   Take 2 tablets (650 mg) by mouth every 4 hours as needed for other (surgical pain)   Quantity:  100 tablet   Refills:  0       furosemide 40 MG tablet   Commonly known as:  LASIX   Used for:  Hypertension goal BP (blood pressure) < 140/90        TAKE ONE TABLET BY MOUTH DAILY AT 10AM   Quantity:  90 tablet   Refills:  1       JANTOVEN PO        Take 2mg by mouth on Sunday, Wednesday, Friday and take 4mg rest of week.   Refills:  0       levothyroxine 50 MCG tablet   Commonly known as:  SYNTHROID/LEVOTHROID   Used for:  Hypothyroidism, unspecified type        TAKE ONE TABLET BY MOUTH EVERY DAY AT 7:30AM. DUE FOR LABS FOR FURTHER REFILLS.   Quantity:  90 tablet   Refills:  1       tamsulosin 0.4 MG capsule   Commonly known as:  FLOMAX   Used for:  Urinary retention        TAKE ONE CAPSULE BY MOUTH DAILY AT 10AM   Quantity:  90 capsule   Refills:  3       VITAMIN D  (CHOLECALCIFEROL) PO   Used for:  Leukocytosis, unspecified type        Dose:  1000 Units   Take 1,000 Units by mouth daily   Refills:  0            Where to get your medicines      These medications were sent to [facility] - [place], [place] - [address]  [address], [facility] [address]     Phone:  [phone]     polyethylene glycol Packet    senna-docusate 8.6-50 MG per tablet                Protect others around you: Learn how to safely use, store and throw away your medicines at www.disposemymeds.org.             Medication List: This is a list of all your medications and when to take them. Check marks below indicate your daily home schedule. Keep this list as a reference.      Medications           Morning Afternoon Evening Bedtime As Needed    acetaminophen 325 MG tablet   Commonly known as:  TYLENOL   Take 2 tablets (650 mg) by mouth every 4 hours as needed for other (surgical pain)   Last time this was given:  650 mg on 10/19/2017  7:44 AM                                furosemide 40 MG tablet   Commonly known as:  LASIX   TAKE ONE TABLET BY MOUTH DAILY AT 10AM                                JANTOVEN PO   Take 2mg by mouth on Sunday, Wednesday, Friday and take 4mg rest of week.   Last time this was given:  2 mg on 10/19/2017  1:04 AM                                levothyroxine 50 MCG tablet   Commonly known as:  SYNTHROID/LEVOTHROID   TAKE ONE TABLET BY MOUTH EVERY DAY AT 7:30AM. DUE FOR LABS FOR FURTHER REFILLS.   Last time this was given:  50 mcg on 10/19/2017  7:44 AM                                polyethylene glycol Packet   Commonly known as:  MIRALAX/GLYCOLAX   Take 17 g by mouth daily as needed for constipation                                senna-docusate 8.6-50 MG per tablet   Commonly known as:  SENOKOT-S;PERICOLACE   Take 1 tablet by mouth 2 times daily                                tamsulosin 0.4 MG capsule   Commonly known as:  FLOMAX   TAKE ONE CAPSULE  BY MOUTH DAILY AT 10AM   Last time this was given:  0.4 mg on 10/19/2017  7:45 AM                                VITAMIN D (CHOLECALCIFEROL) PO   Take 1,000 Units by mouth daily

## 2017-10-18 NOTE — TELEPHONE ENCOUNTER
"ABDOMINAL   PAIN     Onset: this morning     Description:   Character: Sharp  Location: left lower quadrant  Radiation: None    Intensity: severe, 10/10    Progression of Symptoms:  worsening    Accompanying Signs & Symptoms:  Fever/Chills?: no   Gas/Bloating: no   Nausea: no   Vomitting: no   Diarrhea?: no   Constipation:no   \" BM have been good\"  Dysuria or Hematuria: no    History:   Trauma: no   Previous similar pain: no    Previous tests done: none    Precipitating factors:   Does the pain change with:     Food: no      BM: no     Urination: no     Alleviating factors:  None noted     Therapies Tried and outcome: tylenol - has not helped     LMP:  not applicable    Pt is in the background moaning and crying due to the pain being so bad         Advised pt's  he needs to take her to the ER ASAP - he stated he will call an ambulance due to not getting her to move to the car from the chair.     Cadence Gonzalez RN, BSN  [facility]          "

## 2017-10-18 NOTE — H&P
.  [facility], [place]    Internal Medicine History and Physical - [facility] Service       Date of Admission:  10/18/2017    Chief Complaint   LLQ abdominal pain    History is obtained from the patient    History of Present Illness   Ángela Dumont is a 77 year old female with h/o NPH dementia a/w falls, now wheel-chair bound after right femur/hip fracture in 2015, complicated by provoked PE/DVT, on warfarin. Pt underwent  shunt to RLQ in 2015, later repositioned to LLQ after malfunctioning in 5/2016. Also with h/o MGUS, and chronic atypical leukocytosis since 2007 c/f lymphoproliferative disorder. Pt was evaluated by heme/onc on 10/12/17, who felt bone marrow and PET scan would not change her overall course, and instead favored watching and waiting given her age, dementia, immobility, and poor functional status. In the ED, pt reaffirmed her desire to be DNR/DNI and would not want chemotherapy if she was diagnosed with cancer.    Pt now presenting to the ED for 9/10 sharp, constant LLQ pain radiating to the back that developed on the morning of 10/18/17. Pt reports one episode of emesis in the ED and intermittent rigors. No sick contacts. Pt and  eat the same meals except for a bowl of rice with beef and gravy which she ate on the evening of 10/17/17. Last bowel movement on 10/18/17. No recent constipation, diarrhea, melena, hematochezia, or dysuria. No fevers or weight loss but does report intermittent night sweats. Last colonoscopy in 2008, and flex-sig in 2016 which were both unremarkable. No h/o abnormal colonoscopies. Pt has h/o recurrent C. Diff s/p 6 months PO vanc, ended Oct 2016, but otherwise no recent antibiotics.     Review of Systems   The 10 point Review of Systems is negative other than noted in the HPI.    Past Medical History    I have reviewed this patient's medical history and updated it with pertinent information if needed.   Past Medical History:   Diagnosis  Date     Adjustment disorder with depressed mood      Antiplatelet or antithrombotic long-term use      Constipation      Dementia     memory/anger     Depressive disorder last few months as condition became apparent    Lack of mobility a big problem     DVT (deep venous thrombosis) (H) 4/15    bilateral     Esophageal reflux      Fibromyalgia      Fracture of right femur (H) 1/15     Heart murmur      Herpes zoster without complication 08/16/2017    left buttocks     History of Clostridium difficile      Hyperlipidemia LDL goal < 130      Hypertension goal BP (blood pressure) < 140/90      Hypothyroid      Leukocytosis      Leukocytosis      Leukocytosis, unspecified     w/u ?     Low back pain      Lymphoproliferative disorder (H)     Dr Duran     MGUS (monoclonal gammopathy of unknown significance)     Dr Duran     Migraine      MR (mitral regurgitation)     Mild     NPH (normal pressure hydrocephalus) 4/15    shunt placed but removed in 7/2015 due to erosion through the scalp, replaced 5/19/16     Osteoarthritis of both knees      Other atopic dermatitis and related conditions      PE (pulmonary embolism) 4/15    Saddle - IVC     Pulmonary hypertension     EF 70-75%     Thrombosis of leg      Urinary tract infection, site not specified     MRSA - Dr Eze Gonzalez         Past Surgical History   I have reviewed this patient's surgical history and updated it with pertinent information if needed.  Past Surgical History:   Procedure Laterality Date     ABDOMEN SURGERY  left side lower abdomen pain from Shunt we do roberth     COLONOSCOPY  2008     GYN SURGERY       H LABOR AND DELIVERY VAGINAL  1960, 1967     HYSTERECTOMY  1976    hysterectomy     IMPLANT SHUNT VENTRICULOPERITONEAL Left 5/19/2016    IMPLANT SHUNT VENTRICULOPERITONEAL - Dr Alba     OPTICAL TRACKING SYSTEM IMPLANT SHUNT VENTRICULOPERITONEAL Right 4/10/2015     Shunt - Dr Sierra     ORTHOPEDIC SURGERY  Knee pain that prohibits walking     REMOVE  SHUNT VENTRICULOPERITONEAL N/A 7/3/2015    REMOVE SHUNT VENTRICULOPERITONEAL;  Surgeon: Emmanuel Sierra MD;  Location: [facility] OR     SURGICAL HISTORY OF -   1/15    right femur/hip surgery - 1/18/2015     SURGICAL HISTORY OF -   7/04    rectocele repair     SURGICAL HISTORY OF -   1997    abdominal adhesion lysis     SURGICAL HISTORY OF -   4/15    IVC filter placement     SURGICAL HISTORY OF -   6/15    IVC filter removal       Social History   Social History   Substance Use Topics     Smoking status: Former Smoker     Packs/day: 0.50     Years: 10.00     Types: Cigarettes     Start date: 7/17/1958     Quit date: 1/1/1982     Smokeless tobacco: Never Used      Comment: quit 1967     Alcohol use 0.0 - 0.6 oz/week      Comment: Very light       Family History   I have reviewed this patient's family history and updated it with pertinent information if needed.   Family History   Problem Relation Age of Onset     Colon Cancer Father      Coronary Artery Disease Father      DIABETES Maternal Grandmother        Prior to Admission Medications   Prior to Admission Medications   Prescriptions Last Dose Informant Patient Reported? Taking?   VITAMIN D, CHOLECALCIFEROL, PO 10/17/2017 at Unknown time  Yes Yes   Sig: Take 1,000 Units by mouth daily   Warfarin Sodium (JANTOVEN PO) 10/17/2017 at Unknown time  Yes Yes   Sig: Take 2mg by mouth on Sunday, Wednesday, Friday and take 4mg rest of week.   acetaminophen (TYLENOL) 325 MG tablet PRN  No Yes   Sig: Take 2 tablets (650 mg) by mouth every 4 hours as needed for other (surgical pain)   furosemide (LASIX) 40 MG tablet 10/17/2017 at Unknown time  No Yes   Sig: TAKE ONE TABLET BY MOUTH DAILY AT 10AM   levothyroxine (SYNTHROID/LEVOTHROID) 50 MCG tablet 10/17/2017 at Unknown time  No Yes   Sig: TAKE ONE TABLET BY MOUTH EVERY DAY AT 7:30AM. DUE FOR LABS FOR FURTHER REFILLS.   tamsulosin (FLOMAX) 0.4 MG capsule 10/17/2017 at Unknown time  No Yes   Sig: TAKE ONE CAPSULE BY MOUTH  DAILY AT 10AM      Facility-Administered Medications: None     Allergies   Allergies   Allergen Reactions     Shellfish Allergy Anaphylaxis     Aspirin      GI issues     Contrast Dye Hives and Itching     Flushed skin     Penicillins Hives     Sulfa Drugs Hives     Nitrofurantoin Rash       Physical Exam   Vital Signs: Temp: 98  F (36.7  C) Temp src: Oral BP: 144/67   Heart Rate: 74 Resp: 13 SpO2: 97 % O2 Device: None (Room air)    Weight: 198 lbs 6.62 oz    General Appearance: well appearing  HEENT: no scleral icterus  Respiratory: CTAB, no wheezing  Cardiovascular: RRR, no murmurs  GI: soft, ND, NT  Lymph/Hematologic: no cervical LAD  Skin: inguinal fold erythematous patch   Musculoskeletal: normal bulk and ROM  Neurologic: A&Ox3, CN II-XII grossly intact  Psychiatric: normal affect    Assessment & Plan   Ángela Dumont is a 77 year old female w/NPH s/p  shunt, DVT/PE on warfarin, and MGUS/chronic atypical leukocytosis presenting with LLQ abdominal pain and extensive pelvic-abdominal lymphadenopathy on CT c/f malignancy.    # LLQ Abdominal Pain  Diffuse LAD on CT with h/o chronic atypical leukocytosis is most c/f malignancy.   DDx also includes viral gastroenteritis and B. Cereus enterotoxin, albeit suspicion for this is low given only one episode of emesis. No recent fever, constipation, or diarrhea, and no evidence of diverticulitis, colitis, or SBO on CT. Negative UA. Pt does not want colonoscopy or other invasive w/u given she would not want chemotherapy if malignancy was confirmed.  - check blood culture, CRP, procal  -  shunt XR series to r/o LLQ shunt malposition/malfunction  - supportive cares w/IVF, scheduled zofran, PRN tylenol, PRN oxycodone, ADAT    Chronic Stable Medical Problems  # DVT/PE in 2015 - INR 2.4 (10/18/17), pharmacy to dose warfarin consult  # Hypothyroidism - TSH 2.5 (7/26/17), continue PTA levothyroxine  # Dysfunctional voiding - continue PTA tamsulosin; hold lasix while giving  IVF    Diet:  ADAT  Fluids: D5W 1/2 NS 1L bolus x8h  DVT Prophylaxis: Warfarin  Code Status: DNR / DNI    Disposition Plan   Expected discharge: Tomorrow; recommended to prior living arrangement once adequate pain management/ tolerating PO medications.     Entered: Ian Argueta 10/18/2017, 6:16 PM   Information in the above section will display in the discharge planner report.    The patient was discussed with Dr. Kaylin Argueta  [facility]   Pager: Please see sticky note or cross cover information      Data   Data     Recent Labs  Lab 10/18/17  2124 10/18/17  1133   WBC  --  19.2*   HGB  --  12.5   MCV  --  90   PLT  --  208   INR 2.43*  --    NA  --  142   POTASSIUM  --  4.4   CHLORIDE  --  110*   CO2  --  24   BUN  --  23   CR  --  0.79   ANIONGAP  --  8   TAVIA  --  8.5   GLC  --  102*   ALBUMIN  --  3.1*   PROTTOTAL  --  6.9   BILITOTAL  --  0.7   ALKPHOS  --  96   ALT  --  20   AST  --  26

## 2017-10-19 VITALS
OXYGEN SATURATION: 93 % | RESPIRATION RATE: 18 BRPM | HEART RATE: 85 BPM | WEIGHT: 212.96 LBS | DIASTOLIC BLOOD PRESSURE: 63 MMHG | BODY MASS INDEX: 36.56 KG/M2 | TEMPERATURE: 96.3 F | SYSTOLIC BLOOD PRESSURE: 156 MMHG

## 2017-10-19 LAB
ANION GAP SERPL CALCULATED.3IONS-SCNC: 6 MMOL/L (ref 3–14)
BASOPHILS # BLD AUTO: 0 10E9/L (ref 0–0.2)
BASOPHILS NFR BLD AUTO: 0.2 %
BUN SERPL-MCNC: 17 MG/DL (ref 7–30)
CALCIUM SERPL-MCNC: 8.4 MG/DL (ref 8.5–10.1)
CHLORIDE SERPL-SCNC: 108 MMOL/L (ref 94–109)
CO2 SERPL-SCNC: 25 MMOL/L (ref 20–32)
CREAT SERPL-MCNC: 0.84 MG/DL (ref 0.52–1.04)
DIFFERENTIAL METHOD BLD: ABNORMAL
EOSINOPHIL # BLD AUTO: 0.2 10E9/L (ref 0–0.7)
EOSINOPHIL NFR BLD AUTO: 1 %
ERYTHROCYTE [DISTWIDTH] IN BLOOD BY AUTOMATED COUNT: 14.3 % (ref 10–15)
GFR SERPL CREATININE-BSD FRML MDRD: 66 ML/MIN/1.7M2
GLUCOSE SERPL-MCNC: 83 MG/DL (ref 70–99)
HCT VFR BLD AUTO: 37.2 % (ref 35–47)
HGB BLD-MCNC: 11.1 G/DL (ref 11.7–15.7)
IMM GRANULOCYTES # BLD: 0.1 10E9/L (ref 0–0.4)
IMM GRANULOCYTES NFR BLD: 0.3 %
INR PPP: 2.95 (ref 0.86–1.14)
LYMPHOCYTES # BLD AUTO: 9.4 10E9/L (ref 0.8–5.3)
LYMPHOCYTES NFR BLD AUTO: 52.8 %
MCH RBC QN AUTO: 27.8 PG (ref 26.5–33)
MCHC RBC AUTO-ENTMCNC: 29.8 G/DL (ref 31.5–36.5)
MCV RBC AUTO: 93 FL (ref 78–100)
MONOCYTES # BLD AUTO: 0.8 10E9/L (ref 0–1.3)
MONOCYTES NFR BLD AUTO: 4.7 %
NEUTROPHILS # BLD AUTO: 7.3 10E9/L (ref 1.6–8.3)
NEUTROPHILS NFR BLD AUTO: 41 %
NRBC # BLD AUTO: 0 10*3/UL
NRBC BLD AUTO-RTO: 0 /100
PLATELET # BLD AUTO: 179 10E9/L (ref 150–450)
PLATELET # BLD EST: ABNORMAL 10*3/UL
POTASSIUM SERPL-SCNC: 3.7 MMOL/L (ref 3.4–5.3)
RBC # BLD AUTO: 4 10E12/L (ref 3.8–5.2)
SODIUM SERPL-SCNC: 139 MMOL/L (ref 133–144)
WBC # BLD AUTO: 17.8 10E9/L (ref 4–11)

## 2017-10-19 PROCEDURE — 85025 COMPLETE CBC W/AUTO DIFF WBC: CPT | Performed by: STUDENT IN AN ORGANIZED HEALTH CARE EDUCATION/TRAINING PROGRAM

## 2017-10-19 PROCEDURE — 99217 ZZC OBSERVATION CARE DISCHARGE: CPT | Mod: GC | Performed by: INTERNAL MEDICINE

## 2017-10-19 PROCEDURE — G0378 HOSPITAL OBSERVATION PER HR: HCPCS

## 2017-10-19 PROCEDURE — 25000132 ZZH RX MED GY IP 250 OP 250 PS 637

## 2017-10-19 PROCEDURE — 96375 TX/PRO/DX INJ NEW DRUG ADDON: CPT

## 2017-10-19 PROCEDURE — 25000128 H RX IP 250 OP 636: Performed by: STUDENT IN AN ORGANIZED HEALTH CARE EDUCATION/TRAINING PROGRAM

## 2017-10-19 PROCEDURE — 96376 TX/PRO/DX INJ SAME DRUG ADON: CPT

## 2017-10-19 PROCEDURE — 80048 BASIC METABOLIC PNL TOTAL CA: CPT | Performed by: STUDENT IN AN ORGANIZED HEALTH CARE EDUCATION/TRAINING PROGRAM

## 2017-10-19 PROCEDURE — 25000132 ZZH RX MED GY IP 250 OP 250 PS 637: Performed by: STUDENT IN AN ORGANIZED HEALTH CARE EDUCATION/TRAINING PROGRAM

## 2017-10-19 PROCEDURE — 40000893 ZZH STATISTIC PT IP EVAL DEFER: Performed by: PHYSICAL THERAPIST

## 2017-10-19 PROCEDURE — 85610 PROTHROMBIN TIME: CPT | Performed by: STUDENT IN AN ORGANIZED HEALTH CARE EDUCATION/TRAINING PROGRAM

## 2017-10-19 PROCEDURE — 36415 COLL VENOUS BLD VENIPUNCTURE: CPT | Performed by: STUDENT IN AN ORGANIZED HEALTH CARE EDUCATION/TRAINING PROGRAM

## 2017-10-19 RX ORDER — POLYETHYLENE GLYCOL 3350 17 G/17G
17 POWDER, FOR SOLUTION ORAL DAILY PRN
Qty: 7 PACKET | Refills: 0 | Status: SHIPPED | OUTPATIENT
Start: 2017-10-19

## 2017-10-19 RX ORDER — POLYETHYLENE GLYCOL 3350 17 G/17G
17 POWDER, FOR SOLUTION ORAL DAILY PRN
Status: DISCONTINUED | OUTPATIENT
Start: 2017-10-19 | End: 2017-10-19 | Stop reason: HOSPADM

## 2017-10-19 RX ORDER — WARFARIN SODIUM 3 MG/1
3 TABLET ORAL
Status: DISCONTINUED | OUTPATIENT
Start: 2017-10-19 | End: 2017-10-19 | Stop reason: HOSPADM

## 2017-10-19 RX ORDER — AMOXICILLIN 250 MG
1 CAPSULE ORAL 2 TIMES DAILY
Qty: 100 TABLET | Refills: 0 | Status: SHIPPED | OUTPATIENT
Start: 2017-10-19 | End: 2018-09-07

## 2017-10-19 RX ORDER — AMOXICILLIN 250 MG
1 CAPSULE ORAL 2 TIMES DAILY
Status: DISCONTINUED | OUTPATIENT
Start: 2017-10-19 | End: 2017-10-19 | Stop reason: HOSPADM

## 2017-10-19 RX ADMIN — ONDANSETRON 4 MG: 2 INJECTION INTRAMUSCULAR; INTRAVENOUS at 04:39

## 2017-10-19 RX ADMIN — CLOTRIMAZOLE: 10 CREAM TOPICAL at 07:46

## 2017-10-19 RX ADMIN — ACETAMINOPHEN 650 MG: 325 TABLET, FILM COATED ORAL at 07:44

## 2017-10-19 RX ADMIN — ACETAMINOPHEN 650 MG: 325 TABLET, FILM COATED ORAL at 01:04

## 2017-10-19 RX ADMIN — LEVOTHYROXINE SODIUM 50 MCG: 50 TABLET ORAL at 07:44

## 2017-10-19 RX ADMIN — CLOTRIMAZOLE: 10 CREAM TOPICAL at 01:08

## 2017-10-19 RX ADMIN — WARFARIN SODIUM 2 MG: 2 TABLET ORAL at 01:04

## 2017-10-19 RX ADMIN — TAMSULOSIN HYDROCHLORIDE 0.4 MG: 0.4 CAPSULE ORAL at 07:45

## 2017-10-19 RX ADMIN — PROCHLORPERAZINE EDISYLATE 5 MG: 5 INJECTION INTRAMUSCULAR; INTRAVENOUS at 01:04

## 2017-10-19 ASSESSMENT — PAIN DESCRIPTION - DESCRIPTORS: DESCRIPTORS: PATIENT UNABLE TO DESCRIBE

## 2017-10-19 NOTE — DISCHARGE SUMMARY
[facility], [place]    Observation Discharge Summary  Internal Medicine    Date of Admission:  10/18/2017  Date of Discharge:  10/19/2017  Discharging Provider: Mumtaz Davidson    Discharge Diagnoses   1. Constipation  2. Rectal mass  3. Diffuse lymphadenopathy  4. NPH s/p  shunt  5. Dementia     History of Present Illness   Ángela Dumont is an 77 year old female with h/p NPH s/p  shunt, DVT/PE on warfarin, and atypical lymphocytosis concerning for malginancy who presented with LLQ pain.     Hospital Course   Ángela Dumont was admitted on 10/18/2017.  The following problems were addressed during her hospitalization:    # Abdominal pain, resolved  # Constipation   # Rectal mass with associated lymphadenopathy  Patient presented with one day of severe abdominal pain. CT remarkable for moderate stool burden without obstruction, rectal mass, and diffuse lymphadenopathy. Declined further work up although suspicion for metastatic cancer is extremely high.  shunt series XR without problems with shunt. Started on bowel regimen. Pain resolved. Agreeable to home hospice services, which will begin tomorrow with initial consultation.     # Dementia   Patient lives with  who provides majority of her care. Has home health aid daily but agreeable to additional resources that would be provided by hospice agency.     Significant Results and Procedures   None    Pending Results   None    Code Status   Full Code    Primary Care Physician   Joaquin Rockwell    Physical Exam   Temp: 96.3  F (35.7  C) Temp src: Oral BP: 156/63 Pulse: 85 Heart Rate: 78 Resp: 18 SpO2: 93 % O2 Device: None (Room air) Oxygen Delivery: 2 LPM  Vitals:    10/18/17 1102 10/19/17 1032   Weight: 90 kg (198 lb 6.6 oz) 96.6 kg (212 lb 15.4 oz)     Vital Signs with Ranges  Temp:  [96.3  F (35.7  C)-98  F (36.7  C)] 96.3  F (35.7  C)  Pulse:  [71-85] 85  Heart Rate:  [] 78  Resp:  [13-27] 18  BP: (130-162)/(50-86)  156/63  SpO2:  [93 %-98 %] 93 %  I/O last 3 completed shifts:  In: 1500 [P.O.:500; IV Piggyback:1000]  Out: 40 [Urine:40]    Constitutional: No distress  Respiratory: CTAB  Cardiovascular: RRR, normal S1 S2  GI: NABS, soft, nontender, no masses   Skin: No rashes      Discharge Disposition   Discharged to home  Condition at discharge: Stable    Consultations This Hospital Stay   MEDICATION HISTORY IP PHARMACY CONSULT  OCCUPATIONAL THERAPY ADULT IP CONSULT  PHYSICAL THERAPY ADULT IP CONSULT  PHARMACY TO DOSE WARFARIN    Discharge Orders     Reason for your hospital stay   You were admitted for abdominal pain. A CT revealed a rectal mass and numerous enlarged lymph nodes. No definite cause of pain was identified however constipation was likely the cause. The possible cancer was not investigated further after discussion. We will set up home hospice at discharge and start a more regular regimen for constipation.     Adult [facility] Follow-up and recommended labs and tests   Follow up with primary care provider, Joaquin Rockwell, within 7 days for hospital follow- up.  No follow up labs or test are needed.      Appointments on [place] and/or [facility] (with [facility] or [facility] provider or service). Call [phone] if you haven't heard regarding these appointments within 7 days of discharge.     Activity   Your activity upon discharge: activity as tolerated     DNR/DNI     Diet   Follow this diet upon discharge: Orders Placed This Encounter     Advance Diet as Tolerated: Regular Diet Adult       Discharge Medications   Current Discharge Medication List      START taking these medications    Details   polyethylene glycol (MIRALAX/GLYCOLAX) Packet Take 17 g by mouth daily as needed for constipation  Qty: 7 packet, Refills: 0    Associated Diagnoses: Colonic mass      senna-docusate (SENOKOT-S;PERICOLACE) 8.6-50 MG per tablet Take 1 tablet by mouth 2 times daily  Qty: 100 tablet, Refills: 0    Associated Diagnoses: Colonic mass          CONTINUE these medications which have NOT CHANGED    Details   Warfarin Sodium (JANTOVEN PO) Take 2mg by mouth on Sunday, Wednesday, Friday and take 4mg rest of week.      VITAMIN D, CHOLECALCIFEROL, PO Take 1,000 Units by mouth daily    Associated Diagnoses: Leukocytosis, unspecified type      levothyroxine (SYNTHROID/LEVOTHROID) 50 MCG tablet TAKE ONE TABLET BY MOUTH EVERY DAY AT 7:30AM. DUE FOR LABS FOR FURTHER REFILLS.  Qty: 90 tablet, Refills: 1    Associated Diagnoses: Hypothyroidism, unspecified type      tamsulosin (FLOMAX) 0.4 MG capsule TAKE ONE CAPSULE BY MOUTH DAILY AT 10AM  Qty: 90 capsule, Refills: 3    Associated Diagnoses: Urinary retention      furosemide (LASIX) 40 MG tablet TAKE ONE TABLET BY MOUTH DAILY AT 10AM  Qty: 90 tablet, Refills: 1    Associated Diagnoses: Hypertension goal BP (blood pressure) < 140/90      acetaminophen (TYLENOL) 325 MG tablet Take 2 tablets (650 mg) by mouth every 4 hours as needed for other (surgical pain)  Qty: 100 tablet, Refills: 0    Associated Diagnoses: Normal pressure hydrocephalus           Allergies   Allergies   Allergen Reactions     Shellfish Allergy Anaphylaxis     Aspirin      GI issues     Contrast Dye Hives and Itching     Flushed skin     Penicillins Hives     Sulfa Drugs Hives     Nitrofurantoin Rash     Data   Most Recent 3 CBC's:  Recent Labs   Lab Test  10/19/17   0841  10/18/17   1133  09/28/17   1145   WBC  17.8*  19.2*  17.4*   HGB  11.1*  12.5  12.9   MCV  93  90  90   PLT  179  208  208      Most Recent 3 BMP's:  Recent Labs   Lab Test  10/19/17   0841  10/18/17   1133  09/28/17   1145   NA  139  142  138   POTASSIUM  3.7  4.4  4.0   CHLORIDE  108  110*  106   CO2  25  24  28   BUN  17  23  25   CR  0.84  0.79  0.78   ANIONGAP  6  8  4   TAVIA  8.4*  8.5  8.9   GLC  83  102*  101*     Most Recent 2 LFT's:  Recent Labs   Lab Test  10/18/17   1133  09/28/17   1145   AST  26  14   ALT  20  18   ALKPHOS  96  99   BILITOTAL  0.7  0.3        Most Recent 6 Bacteria Isolates From Any Culture (See EPIC Reports for Culture Details):  Recent Labs   Lab Test  10/18/17   2129  10/18/17   2124  10/18/17   1802  06/22/17   1357  06/05/16   1625  06/05/16   1548   CULT  No growth after 7 hours  No growth after 7 hours  Culture negative < 24 hours, reincubate  50,000 to 100,000 colonies/mL Beta hemolytic Streptococcus group B Beta Hemolytic   Streptococcus groups A and B are susceptible to ampicillin, penicillin,   vancomycin, and the cephalosporins.  Susceptibility testing is not routinely   done on these organisms isolated from urine.  <10,000 colonies/mL mixed urogenital carmen Susceptibility testing not routinely   done  *  No growth  No growth     Results for orders placed or performed during the hospital encounter of 10/18/17   XR Chest Port 1 View    Narrative    Exam: XR CHEST PORT 1 VW, 10/18/2017 11:45 AM    Indication: weakness, vomiting    Comparison: Chest radiograph 6/5/2016    Findings:   Single AP view of the chest at 45 degrees. Ventriculoperitoneal shunt.  The patient is rotated slightly to the left. The trachea is midline.  The cardiac silhouette is within normal limits. No focal airspace  opacity. No pleural effusion or pneumothorax. The visualized upper  abdomen is unremarkable.      Impression    Impression: No acute cardiopulmonary abnormality.    I have personally reviewed the examination and initial interpretation  and I agree with the findings.    AMY COBOS MD   CT Abdomen Pelvis w/o Contrast    Narrative    EXAMINATION: CT ABDOMEN PELVIS W/O CONTRAST, 10/18/2017 4:06 PM    TECHNIQUE:  Helical CT images from the lung bases through the pubic  symphysis were obtained  without IV contrast.     COMPARISON: No comparison    HISTORY: llq pain    FINDINGS:    Abdomen and pelvis:   Ventriculoperitoneal shunt, tip in the left lower quadrant of the  abdomen. No evidence of associated fluid collection.    There appears to be a short segment  of noncircumferential wall  thickening involving the low rectum near the anorectal junction  (series 5, image 385). Multiple prominent borderline enlarged  johnathon-rectal lymph nodes, including 1.0 cm short axis diameter lymph  node (series 2 image 120), 0.9 cm short axis diameter lymph node  (series 2 image 110).     Numerous additional prominent lymph nodes, with mildly prominent  retroperitoneal, mesenteric and inguinal chain adenopathy. Enlarged  bilateral iliac chain lymph nodes, including 1.9 cm short axis  diameter right iliac chain node (series 2 image 138), 1.5 cm short  axis diameter left iliac chain lymph node (series 2 image 132), and  1.5 cm short axis diameter left iliac chain node (series 2 image 137).      Gallbladder is distended, but there are no convincing pericholecystic  inflammatory changes and there are no dense stones. Mild prominence of  the extrahepatic and central intrahepatic biliary tree, measuring up  to 1.0 cm, without imaging evidence of obstructing stone or mass.    Otherwise, the liver, pancreas, spleen, and adrenals are within normal  limits.  Simple fluid attenuation left renal cyst. No hydronephrosis.  Bladder is unremarkable. Moderate colonic stool. Rectus sheath  diastasis. Small bowel is within normal limits I: No bowel  obstruction. Aorta is normal in caliber, with mild-moderate scattered  atherosclerotic disease. No free air or significant free fluid.     Lung bases:  Mild atelectasis. Small hiatal hernia. There are a few  prominent, not technically enlarged chest wall/axillary lymph nodes.    Bones and soft tissues: Orthopedic hardware in the proximal right  femur. No acute fracture or suspicious bone lesion.      Impression    IMPRESSION:   1. No acute findings in the abdomen or pelvis.  2. Moderate colonic stool.  3a. Apparent asymmetric wall thickening involving the posterior aspect  of the low rectum near the anorectal junction.  This is poorly  demonstrated on this CT  examination, but given extensive  perirectal/mesorectal adenopathy, findings are concerning for  colorectal neoplasm. Correlation with physical examination and direct  visualization with colonoscopy/sigmoidoscopy would be beneficial.  3b. Additional enlarged bilateral iliac chain lymph nodes, borderline  enlarged perirectal lymph nodes and diffusely mildly prominent lymph  nodes elsewhere throughout the abdomen and pelvis. Findings are  nonspecific but cannot exclude metastatic disease or lymphoma.  Recommend short-term follow-up or further workup.    I have personally reviewed the examination and initial interpretation  and I agree with the findings.    XENIA CARRANZA MD   XR Shunt Malfunction Surgery Survey    Narrative    XR SHUNT MALFUNCTION SURVEY 10/18/2017 10:06 PM    History: eval for shunt kink or misplacement to assess for cause of  left lower quadrant abdominal pain    Comparison: CT 6/5/2017    Findings: Left frontal approach ventriculostomy catheter is in place.  The visualized radiopaque portions are contiguous and intact.  Postoperative changes of right femoral fracture fixation with  medullary nail. Nonobstructive bowel gas pattern. No focal pulmonary  opacities.      Impression    Impression: Intact left-sided  shunt catheter.    I have personally reviewed the examination and initial interpretation  and I agree with the findings.    ANJELICA WHITMAN MD

## 2017-10-19 NOTE — PHARMACY-ANTICOAGULATION SERVICE
Clinical Pharmacy - Warfarin Dosing Consult     Pharmacy has been consulted to manage this patient s warfarin therapy.  Indication: DVT/PE Prophylaxis  Therapy Goal: INR 2-3  Warfarin Prior to Admission: Yes  Warfarin PTA Regimen: 2 mg Sun,Wed,Fri and 4 mg all other days    INR   Date Value Ref Range Status   10/18/2017 2.43 (H) 0.86 - 1.14 Final   10/03/2017 2.2  Final       Recommend warfarin 2 mg today (Home dose).  Pharmacy will monitor Ángela Dumont daily and order warfarin doses to achieve specified goal.      Please contact pharmacy as soon as possible if the warfarin needs to be held for a procedure or if the warfarin goals change.

## 2017-10-19 NOTE — PROGRESS NOTES
Observation Goals  Resolution of nausea: ALIA - pt sleeping at 12 am, will re-assess  Resolution of vomiting: Met. No emesis since arrival to unit.  Resolution of vertigo symptoms: Met. Pt denies vertigo sx

## 2017-10-19 NOTE — PLAN OF CARE
Problem: Patient Care Overview  Goal: Individualization & Mutuality  Outcome: Adequate for Discharge Date Met:  10/19/17  Reviewed discharge order/recommendation with patient and patient's . Removed iv line. Street cloth on.Discharged.  left to bring car and patient's personal wheelchair .Please assist patient with transfer and wheel her to the main exit.

## 2017-10-19 NOTE — PROGRESS NOTES
Observation Goals  Resolution of nausea: Goal in progress, pt c/o nausea @0100, gave prn compazine in between scheduled zofran with relief of symptoms  Resolution of vomiting: Met. No emesis since arrival to unit.  Resolution of vertigo symptoms: Met. Pt denies vertigo sx

## 2017-10-19 NOTE — PLAN OF CARE
Problem: Patient Care Overview  Goal: Plan of Care/Patient Progress Review  AVSS for pt - HTN baseline. Alert, oriented to self only - hx dementia. Bed alarm on at all times for safety. Pt did not use call light this shift - called out once. C/o abdominal, generalized (also has hx of chronic knee pain) pain at beginning of shift, gave po tylenol. Pt then denied nausea rest of shift. C/o nausea at beginning of shift, on scheduled IV zofran, gave prn compazine with relief. Pt very concerned that her  left overnight, reassured that he is coming back this morning - pt forgetful. Rested between cares. Pt turns OK with assist of 1, otherwise assist of 2 in bed, assist of 3 if even attempting to get out of bed. Bedrest with commode privileges, bedpan in room. Pt has been incontinent all shift, johnathon cares done, barrier cream applied to coccyx, redness blanchable. Groin rash moist - cream applied. Left PIV SL after bolus finished. Clear liquid diet, had only sips, cont to encourage po intake. H/o recurrent c diff, no BM this shift (none since arrival to unit 10/18 2000). Contact precautions maintained for MRSA.

## 2017-10-19 NOTE — PROGRESS NOTES
Observation Goals  Resolution of nausea: Met. Pt has denied nausea since arrival to unit.  Resolution of vomiting: Met. No emesis since arrival to unit.  Resolution of vertigo symptoms: Met. Pt denies vertigo sx.

## 2017-10-19 NOTE — PLAN OF CARE
Problem: Patient Care Overview  Goal: Individualization & Mutuality  Outcome: Improving  Alert. Oriented to self. VS'S. On room air. Back and generalized body aches fully managed with Tylenol and reposition. Denies nausea/emesis. Tolerating clear and regular diet. Fair appetite. Plan: continue care. Discharge to home this afternoon.

## 2017-10-19 NOTE — PLAN OF CARE
Problem: Patient Care Overview  Goal: Plan of Care/Patient Progress Review  PT 7C: Orders received for PT evaluation. Per chart review, pt has discharge plan in place. Pt going home with assist from , hospice consult tomorrow. No IP PT needs to assess mobility/discharge recommendations. PT order completed.

## 2017-10-19 NOTE — PROGRESS NOTES
Social Work: Assessment with Discharge Plan    Patient Name:  Ángela Gan  :  1940  Age:  77 year old  MRN:  [id number]  Risk/Complexity Score:  Filed Complexity Screen Score: 7  Completed assessment with:  Pt, Pt's  (Vernon)    Presenting Information   Reason for Referral:  Hospice needs  Date of Intake:  2017  Referral Source:  Physician and Nurse  Decision Maker:  Pt  Alternate Decision Maker:  Per NOK,  Vernon  Health Care Directive:  Not on file  Living Situation:  House, 1 level. Lives with . Has HHA coming in 2hrs/day 6 days/wk.   Previous Functional Status:  Assistance with Laundry:  , Housekeeping:   and Bathing:  HHA  Patient and family understanding of hospitalization:  Vernon demonstrated good understanding of hospitalization. Pt appeared confused, and was alert to herself.   Cultural/Language/Spiritual Considerations:  Non-Latter day  Adjustment to Illness:  Unable to assess.    Physical Health  Reason for Admission:    1. Colonic mass      Services Needed/Recommended:  Hospice    Mental Health/Chemical Dependency  Diagnosis:  Per medical record, Pt has DX adjustment d/o and depressive d/o. No CD noted.  Support/Services in Place:  None identified  Services Needed/Recommended:  None identified    Support System  Significant relationship at present time:   Vernon is involved and supportive  Family of origin is available for support:  Yes  Other support available:  Yes  Gaps in support system:  None noted  Patient is caregiver to:  None     Provider Information   Primary Care Physician:  Joaquin Rockwell   [phone]   Clinic:  [address] [place] [address] / [facility] [address]      :  None identified    Financial   Income Source:  SSI  Financial Concerns:  None identified  Insurance:    Payor/Plan Subscriber Name Rel Member # Group #   [employer] - [employer]* ÁNGELA GAN  [id number]       [id number]       Discharge Plan   Patient and family  discharge goal:  DC home and resume HHA services that Pt's  pays for out of pocket. They are agreeable to hospice consult at DC. AYALA provided list of Hospice agencies in their area, and their preference was [facility]. SW made referral today.  Provided education on discharge plan:  YES  Patient agreeable to discharge plan:  YES  Barriers to discharge:  Spouse reports that the medical team wants to make sure Pt is eating OK prior to DC but that she will likely DC home today.    Discharge Recommendations   Anticipated Disposition:  Home, with plan to have hospice consult tomorrow  Transportation Needs:  Family:   Vernon plans to provide DC transportation    Additional comments   AYALA met with Pt and her  Vernon in hospital room today following conversation with medical team re: hospice services being recommended.     SW answered all questions Pt and Vernon had re: hospice. AYALA provided them with medicare.gov list of Hospice agencies in their area. They stated preference for [facility] ([phone]). Vernon said that he would not want a consult today, and would prefer tomorrow. He asked that Hospice call him directly to schedule date/time of consult.    AYALA spoke with Jamee at [facility]. She said they would have availability tomorrow; SW provided her with 's contact information so that she can schedule directly with him per Pt/his preference. She faxed a Hospice Order form to AYALA to provide to MD for signature with terminal DX. AYALA spoke with MD Buckner ([id number]) who said he would sign order today so AYALA could fax back.     Vernon plans to transport Pt home when she is medically stable/cleared for DC from the hospital. He reported no other questions/concerns, and declined any further resources.     AYALA remains available if further needs arise.    ABIDA Gupta, LGSW   Surgical Oncology Unit   [phone]  Pager: [phone]

## 2017-10-19 NOTE — PLAN OF CARE
Problem: Patient Care Overview  Goal: Plan of Care/Patient Progress Review  Outcome: No Change  [id number]: Pt arrived to unit around 2000 from ED. Pt presenting with acute abdominal pain mainly in LLQ. Pt has denied any pain or nausea since arrival to unit. Oriented to self only. VSS on RA ex hypertensive WNL. Pt had an X-ray shunt malfunction survey this evening. L PIV patent and infusing ordered bolus. On clear liquid diet. On bedrest with commode privileges. Pt is up with heavy Ax2 and very unsteady. , Vernon, was at bedside most of evening, will come back in AM. Continue to monitor and follow POC.

## 2017-10-20 ENCOUNTER — TELEPHONE (OUTPATIENT)
Dept: FAMILY MEDICINE | Facility: CLINIC | Age: 77
End: 2017-10-20

## 2017-10-20 PROBLEM — K62.89 RECTAL MASS: Status: ACTIVE | Noted: 2017-10-01

## 2017-10-20 LAB
BACTERIA SPEC CULT: NORMAL
BACTERIA SPEC CULT: NORMAL
Lab: NORMAL
SPECIMEN SOURCE: NORMAL

## 2017-10-20 NOTE — TELEPHONE ENCOUNTER
Attempt #2  Called [phone] (home) - Left a non-detailed message to call back and speak with any triage nurse.    Tessy Gandara RN  [place] [facility]

## 2017-10-20 NOTE — TELEPHONE ENCOUNTER
Attempt #1  Called   Telephone Information:   Mobile [phone]     Left a non-detailed message to call back and speak with any triage nurse.    Tessy Gandara RN  [place] [facility]

## 2017-10-20 NOTE — TELEPHONE ENCOUNTER
Please call and review recent admission with  Vernon.    Diagnosed with rectal mass after episode of constipation, offer follow up appointment to review options observation versus further testing.    See if there is anything we can help with

## 2017-10-23 NOTE — TELEPHONE ENCOUNTER
Attempt #3  Called [phone] (home) - Left a non-detailed message to call back and speak with any triage nurse.    Routing to PCP as an FYI.    Tessy Gandara RN  [place] Triage

## 2017-10-24 LAB
BACTERIA SPEC CULT: NO GROWTH
BACTERIA SPEC CULT: NO GROWTH
SPECIMEN SOURCE: NORMAL
SPECIMEN SOURCE: NORMAL

## 2017-10-25 ENCOUNTER — TELEPHONE (OUTPATIENT)
Dept: FAMILY MEDICINE | Facility: CLINIC | Age: 77
End: 2017-10-25

## 2017-10-25 DIAGNOSIS — Z53.9 DIAGNOSIS NOT YET DEFINED: Primary | ICD-10-CM

## 2017-10-25 PROCEDURE — G0179 MD RECERTIFICATION HHA PT: HCPCS | Performed by: FAMILY MEDICINE

## 2017-10-25 NOTE — TELEPHONE ENCOUNTER
Date Forms was received: October 25, 2017    Forms received by: Fax    Purpose of Form:  PT/OT Orders  - Plan of Care    How the form needs to be returned for patient:  Fax    Form currently placed  [employer]

## 2017-10-27 ENCOUNTER — MEDICAL CORRESPONDENCE (OUTPATIENT)
Dept: HEALTH INFORMATION MANAGEMENT | Facility: CLINIC | Age: 77
End: 2017-10-27

## 2017-10-27 NOTE — TELEPHONE ENCOUNTER
Completed forms faxed back to [facility] at [phone].   Originals sent to be scanned.     Kathryn Carson

## 2017-10-30 ENCOUNTER — ANTICOAGULATION THERAPY VISIT (OUTPATIENT)
Dept: NURSING | Facility: CLINIC | Age: 77
End: 2017-10-30
Payer: COMMERCIAL

## 2017-10-30 ENCOUNTER — TRANSFERRED RECORDS (OUTPATIENT)
Dept: HEALTH INFORMATION MANAGEMENT | Facility: CLINIC | Age: 77
End: 2017-10-30

## 2017-10-30 DIAGNOSIS — I82.409 DVT (DEEP VENOUS THROMBOSIS) (H): ICD-10-CM

## 2017-10-30 DIAGNOSIS — Z79.01 LONG-TERM (CURRENT) USE OF ANTICOAGULANTS: ICD-10-CM

## 2017-10-30 DIAGNOSIS — I26.99 PULMONARY EMBOLISM (H): ICD-10-CM

## 2017-10-30 LAB — INR PPP: 2.4 (ref 2–3)

## 2017-10-30 PROCEDURE — 99207 ZZC NO CHARGE NURSE ONLY: CPT | Performed by: FAMILY MEDICINE

## 2017-10-30 NOTE — MR AVS SNAPSHOT
Ángela VITALY Dumont   10/30/2017   Anticoagulation Therapy Visit    Description:  77 year old female   Provider:  Joaquin Rockwell MD   Department:  Rv Nurse           INR as of 10/30/2017     Today's INR 2.4      Anticoagulation Summary as of 10/30/2017     INR goal 2.0-3.0   Today's INR 2.4   Full instructions 2 mg on Sun, Wed, Fri; 4 mg all other days   Next INR check 11/27/2017    Indications   Long-term (current) use of anticoagulants [Z79.01] [Z79.01]  Pulmonary embolism (H) [I26.99]  DVT (deep venous thrombosis) (H) [I82.409]         Contact Numbers     Clinic Number:         October 2017 Details    Sun Mon Tue Wed Thu Fri Sat     1               2               3               4               5               6               7                 8               9               10               11               12               13               14                 15               16               17               18               19               20               21                 22               23               24               25               26               27               28                 29               30      4 mg   See details      31      4 mg              Date Details   10/30 This INR check               How to take your warfarin dose     To take:  4 mg Take 4 of the 1 mg tablets.           November 2017 Details    Sun Mon Tue Wed Thu Fri Sat        1      2 mg         2      4 mg         3      2 mg         4      4 mg           5      2 mg         6      4 mg         7      4 mg         8      2 mg         9      4 mg         10      2 mg         11      4 mg           12      2 mg         13      4 mg         14      4 mg         15      2 mg         16      4 mg         17      2 mg         18      4 mg           19      2 mg         20      4 mg         21      4 mg         22      2 mg         23      4 mg         24      2 mg         25      4 mg           26      2 mg         27             28               29               30                  Date Details   No additional details    Date of next INR:  11/27/2017         How to take your warfarin dose     To take:  2 mg Take 2 of the 1 mg tablets.    To take:  4 mg Take 4 of the 1 mg tablets.

## 2017-10-31 ENCOUNTER — OFFICE VISIT (OUTPATIENT)
Dept: FAMILY MEDICINE | Facility: CLINIC | Age: 77
End: 2017-10-31
Payer: COMMERCIAL

## 2017-10-31 VITALS
SYSTOLIC BLOOD PRESSURE: 131 MMHG | OXYGEN SATURATION: 95 % | DIASTOLIC BLOOD PRESSURE: 81 MMHG | WEIGHT: 213 LBS | HEART RATE: 81 BPM | BODY MASS INDEX: 36.37 KG/M2 | HEIGHT: 64 IN | TEMPERATURE: 97.8 F

## 2017-10-31 DIAGNOSIS — Z86.711 HISTORY OF PULMONARY EMBOLISM: ICD-10-CM

## 2017-10-31 DIAGNOSIS — E03.9 HYPOTHYROIDISM, UNSPECIFIED TYPE: ICD-10-CM

## 2017-10-31 DIAGNOSIS — Z86.718 HISTORY OF DEEP VENOUS THROMBOSIS: ICD-10-CM

## 2017-10-31 DIAGNOSIS — K52.9 COLITIS, NONSPECIFIC: ICD-10-CM

## 2017-10-31 DIAGNOSIS — D47.2 MGUS (MONOCLONAL GAMMOPATHY OF UNKNOWN SIGNIFICANCE): ICD-10-CM

## 2017-10-31 DIAGNOSIS — Z23 NEED FOR TD VACCINE: ICD-10-CM

## 2017-10-31 DIAGNOSIS — K62.89 RECTAL MASS: ICD-10-CM

## 2017-10-31 DIAGNOSIS — F03.90 DEMENTIA WITHOUT BEHAVIORAL DISTURBANCE, UNSPECIFIED DEMENTIA TYPE: ICD-10-CM

## 2017-10-31 DIAGNOSIS — Z98.2 S/P VP SHUNT: ICD-10-CM

## 2017-10-31 DIAGNOSIS — D47.9 LYMPHOPROLIFERATIVE DISORDER (H): ICD-10-CM

## 2017-10-31 DIAGNOSIS — D47.Z9 LOW GRADE B CELL LYMPHOPROLIFERATIVE DISORDER (H): ICD-10-CM

## 2017-10-31 DIAGNOSIS — R59.0 PELVIC LYMPHADENOPATHY: Primary | ICD-10-CM

## 2017-10-31 DIAGNOSIS — I27.20 PULMONARY HYPERTENSION (H): ICD-10-CM

## 2017-10-31 DIAGNOSIS — G91.2 NPH (NORMAL PRESSURE HYDROCEPHALUS) (H): ICD-10-CM

## 2017-10-31 DIAGNOSIS — I10 HYPERTENSION GOAL BP (BLOOD PRESSURE) < 140/90: ICD-10-CM

## 2017-10-31 DIAGNOSIS — Z51.81 MEDICATION MONITORING ENCOUNTER: ICD-10-CM

## 2017-10-31 PROCEDURE — 90714 TD VACC NO PRESV 7 YRS+ IM: CPT | Performed by: FAMILY MEDICINE

## 2017-10-31 PROCEDURE — 99495 TRANSJ CARE MGMT MOD F2F 14D: CPT | Performed by: FAMILY MEDICINE

## 2017-10-31 PROCEDURE — 90471 IMMUNIZATION ADMIN: CPT | Performed by: FAMILY MEDICINE

## 2017-10-31 NOTE — PATIENT INSTRUCTIONS
Consider colorectal surgeon referral.    Continue with Dr. Duran - ask to review CT.    Consider chest CT.    Continue with Palliative Care/Hospice 11/5/17.    Continue with Miralax and Desitin cream.    Received tetanus booster today.       [facility]                        To reach your care team during and after hours:   [phone]  To reach our pharmacy:        [phone]    Clinic Hours                        Our clinic hours are:    Monday   7:30 am to 7:00 pm                  Tuesday through Friday 7:30 am to 5:00 pm                             Saturday   8:00 am to 12:00 pm      Sunday   Closed      Pharmacy Hours                        Our pharmacy hours are:    Monday   8:30 am to 7:00 pm       Tuesday to Friday  8:30 am to 6:00 pm                       Saturday    9:00 am to 1:00 pm              Sunday    Closed              There is also information available at our web site:  [email or web address]    If your provider ordered any lab tests and you do not receive the results within 10 business days, please call the clinic.    If you need a medication refill please contact your pharmacy.  Please allow 2-3 business days for your refill to be completed.    Our clinic offers telephone visits and e visits.  Please ask one of your team members to explain more.      Use [email or web address] (secure email communication and access to your chart) to send your primary care provider a message or make an appointment. Ask someone on your Team how to sign up for [facility].  Immunizations                      Immunization History   Administered Date(s) Administered     Influenza (High Dose) 3 valent vaccine 09/19/2014, 09/25/2015, 09/19/2016, 10/05/2017     Influenza (IIV3) 10/23/2003, 10/04/2010, 10/06/2011, 09/27/2012, 09/19/2013     Pneumococcal (PCV 13) 09/25/2015     Pneumococcal 23 valent 10/04/2010     Tdap (Adacel,Boostrix) 01/01/2004     Zoster vaccine, live 10/05/2007        Health Maintenance                          Health Maintenance Due   Topic Date Due     Microalbumin Lab - yearly  07/17/1941     Bone Density Screening (Dexa)  07/17/2005     Tetanus Vaccine - every 10 years  01/01/2014     INR CLINIC REFERRAL - yearly  06/03/2016     FALL RISK ASSESSMENT  09/01/2017

## 2017-10-31 NOTE — PROGRESS NOTES
SUBJECTIVE:   Ángela Dumont is a 77 year old female who presents to clinic today for the following health issues:    Byron reported to [facility] on 10/18/17 and was discharged on 10/19/17 for constipation/rectal mass. Constipation improved with Miralax while at hospital. Abdominal/pelvic CT completed - showed rectal thickening and diffuse abdominal lymphadenopathy. Byron's  reports that Byron complains of left sided pain around dinner time between 6-7 pm which is improved with Tylenol. Pain is subsided after bowel movements. Currently denies constipation, vomiting, nausea, changes in urination, changes in appetite, changes with sleep - wakes up to use restroom.     Byron's  reports improvement of right thigh/genital rash. Byron denies itching or pain.     Byron's  notes an appointment with palliative care/hospice appointment on 11/5/17.     Hospital Follow-up Visit:    Hospital/Nursing Home/IP Rehab Facility: [facility]  Date of Admission: 10/18/17  Date of Discharge: 10/19/17  Reason(s) for Admission: constipation/rectal mass            Problems taking medications regularly:  None       Medication changes since discharge: added a moisture barrier cream but it's not really helping       Problems adhering to non-medication therapy:  None    Summary of hospitalization:  [facility] discharge summary reviewed  Diagnostic Tests/Treatments reviewed.  Follow up needed: none  Other Healthcare Providers Involved in Patient s Care:         Specialist appointment - oncology - Dr. Duran  Update since discharge: improved.     Post Discharge Medication Reconciliation: discharge medications reconciled, continue medications without change.  Plan of care communicated with patient and      Coding guidelines for this visit:  Type of Medical   Decision Making Face-to-Face Visit       within 7 Days of discharge Face-to-Face Visit        within 14 days of discharge    Moderate Complexity [id number]   High Complexity [id number]          10/18/17 -- [facility]    History of Present Illness     Ángela Dumont is an 77 year old female with h/p NPH s/p  shunt, DVT/PE on warfarin, and atypical lymphocytosis concerning for malginancy who presented with LLQ pain.    # Abdominal pain, resolved  # Constipation   # Rectal mass with associated lymphadenopathy  Patient presented with one day of severe abdominal pain. CT remarkable for moderate stool burden without obstruction, rectal mass, and diffuse lymphadenopathy. Declined further work up although suspicion for metastatic cancer is extremely high.  shunt series XR without problems with shunt. Started on bowel regimen. Pain resolved. Agreeable to home hospice services, which will begin tomorrow with initial consultation.      # Dementia   Patient lives with  who provides majority of her care. Has home health aid daily but agreeable to additional resources that would be provided by hospice agency.     Reason for your hospital stay   You were admitted for abdominal pain. A CT revealed a rectal mass and numerous enlarged lymph nodes. No definite cause of pain was identified however constipation was likely the cause. The possible cancer was not investigated further after discussion. We will set up home hospice at discharge and start a more regular regimen for constipation.      Adult [facility] Follow-up and recommended labs and tests   Follow up with primary care provider, Joaquin Rockwell, within 7 days for hospital follow- up.  No follow up labs or test are needed.      Appointments on [place] and/or [facility] (with [facility] or [facility] provider or service). Call [phone] if you haven't heard regarding these appointments within 7 days of discharge.      Activity   Your activity upon discharge: activity as tolerated     Diet   Follow this diet upon discharge: Orders Placed This Encounter     Advance Diet as  Tolerated: Regular Diet Adult     START taking these medications     Details   polyethylene glycol (MIRALAX/GLYCOLAX) Packet Take 17 g by mouth daily as needed for constipation  Qty: 7 packet, Refills: 0     Associated Diagnoses: Colonic mass       senna-docusate (SENOKOT-S;PERICOLACE) 8.6-50 MG per tablet Take 1 tablet by mouth 2 times daily  Qty: 100 tablet, Refills: 0     Associated Diagnoses: Colonic mass     10/17/18 --    Rash noticed 6 days ago between upper right thigh and genital area and noticed today that it is a yeast infection and bleeding - using A&D ointment and vaseline helped on top of the thigh but not but her genitals. Byron's  reports that she felt hot last night and notes that he switched laundry detergents recently. Denies itching, pain, recent antibiotics, changes in appetite, fever, chills, difficulty swallowing, and cough/colds.    Problem list and histories reviewed & adjusted, as indicated.  Additional history: as documented    Reviewed and updated as needed this visit by clinical staff  Tobacco  Allergies  Meds  Med Hx  Surg Hx  Fam Hx  Soc Hx      Reviewed and updated as needed this visit by Provider       BP Readings from Last 3 Encounters:   10/31/17 131/81   10/19/17 156/63   10/17/17 128/70       Wt Readings from Last 4 Encounters:   10/31/17 213 lb (96.6 kg)   10/19/17 212 lb 15.4 oz (96.6 kg)   10/17/17 200 lb (90.7 kg)   09/28/17 200 lb (90.7 kg)       Health Maintenance    Health Maintenance Due   Topic Date Due     MICROALBUMIN Q1 YEAR  07/17/1941     DEXA SCAN SCREENING (SYSTEM ASSIGNED)  07/17/2005     TETANUS IMMUNIZATION (SYSTEM ASSIGNED)  01/01/2014     OP ANNUAL INR REFERRAL  06/03/2016     FALL RISK ASSESSMENT  09/01/2017       Current Problem List    Patient Active Problem List   Diagnosis     S/P  shunt     NPH (normal pressure hydrocephalus)     Fracture of right femur (H)     DVT (deep venous thrombosis) (H)     Pulmonary embolism (H)     Dementia      Hypothyroid     Hypertension goal BP (blood pressure) < 140/90     Adjustment disorder with depressed mood     Hyperlipidemia with target LDL less than 130     Leukocytosis     Esophageal reflux     UTI (urinary tract infection)     Constipation     Pulmonary hypertension     MR (mitral regurgitation)     Fibromyalgia     Osteoarthritis of both knees     Health Care Home     Long-term (current) use of anticoagulants [Z79.01]     Advance Care Planning     SIRS (systemic inflammatory response syndrome) (H)     History of pulmonary embolism     History of deep venous thrombosis     Urinary retention     BPPV (benign paroxysmal positional vertigo), bilateral     Herpes zoster without complication     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)     MGUS (monoclonal gammopathy of unknown significance)     Lymphoproliferative disorder (H)     Nausea & vomiting     Abdominal pain     Rectal mass     Pelvic lymphadenopathy     Colitis, nonspecific       Past Medical History    Past Medical History:   Diagnosis Date     Adjustment disorder with depressed mood      Antiplatelet or antithrombotic long-term use      Colitis, nonspecific 10/31/2017     Constipation      Dementia     memory/anger     Depressive disorder last few months as condition became apparent    Lack of mobility a big problem     DVT (deep venous thrombosis) (H) 4/15    bilateral     Esophageal reflux      Fibromyalgia      Fracture of right femur (H) 1/15     Heart murmur      Herpes zoster without complication 08/16/2017    left buttocks     History of Clostridium difficile      Hyperlipidemia LDL goal < 130      Hypertension goal BP (blood pressure) < 140/90      Hypothyroid      Leukocytosis      Leukocytosis      Leukocytosis      Leukocytosis, unspecified     w/u ?     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)      Lymphoproliferative disorder (H)     Dr Duran     Lymphoproliferative disorder (H)      MGUS (monoclonal  gammopathy of unknown significance)     Dr Duran     MGUS (monoclonal gammopathy of unknown significance)      Migraine      MR (mitral regurgitation)     Mild     NPH (normal pressure hydrocephalus) 4/15    shunt placed but removed in 7/2015 due to erosion through the scalp, replaced 5/19/16     Osteoarthritis of both knees      Other atopic dermatitis and related conditions      PE (pulmonary embolism) 4/15    Saddle - IVC     Pelvic lymphadenopathy 10/31/2017     Pulmonary hypertension     EF 70-75%     Rectal mass 10/2017     Rectal mass 10/1/2017     Thrombosis of leg      Urinary tract infection, site not specified     MRSA - Dr Loo     Vertigo        Past Surgical History    Past Surgical History:   Procedure Laterality Date     ABDOMEN SURGERY  left side lower abdomen pain from Shunt we do roberth     COLONOSCOPY  2008     GYN SURGERY       H LABOR AND DELIVERY VAGINAL  1960, 1967     HERNIA REPAIR       HYSTERECTOMY  1976    hysterectomy     IMPLANT SHUNT VENTRICULOPERITONEAL Left 5/19/2016    IMPLANT SHUNT VENTRICULOPERITONEAL - Dr Alba     OPTICAL TRACKING SYSTEM IMPLANT SHUNT VENTRICULOPERITONEAL Right 4/10/2015     Shunt - Dr Sierra     ORTHOPEDIC SURGERY  Knee pain that prohibits walking     REMOVE SHUNT VENTRICULOPERITONEAL N/A 7/3/2015    REMOVE SHUNT VENTRICULOPERITONEAL;  Surgeon: Emmanuel Sierra MD;  Location:  OR     SURGICAL HISTORY OF -   1/15    right femur/hip surgery - 1/18/2015     SURGICAL HISTORY OF -   7/04    rectocele repair     SURGICAL HISTORY OF -   1997    abdominal adhesion lysis     SURGICAL HISTORY OF -   4/15    IVC filter placement     SURGICAL HISTORY OF -   6/15    IVC filter removal       Current Medications    Current Outpatient Prescriptions   Medication Sig Dispense Refill     polyethylene glycol (MIRALAX/GLYCOLAX) Packet Take 17 g by mouth daily as needed for constipation 7 packet 0     senna-docusate (SENOKOT-S;PERICOLACE) 8.6-50 MG per tablet Take 1  tablet by mouth 2 times daily 100 tablet 0     Warfarin Sodium (JANTOVEN PO) Take 2mg by mouth on Sunday, Wednesday, Friday and take 4mg rest of week.       VITAMIN D, CHOLECALCIFEROL, PO Take 1,000 Units by mouth daily       levothyroxine (SYNTHROID/LEVOTHROID) 50 MCG tablet TAKE ONE TABLET BY MOUTH EVERY DAY AT 7:30AM. DUE FOR LABS FOR FURTHER REFILLS. 90 tablet 1     tamsulosin (FLOMAX) 0.4 MG capsule TAKE ONE CAPSULE BY MOUTH DAILY AT 10AM 90 capsule 3     furosemide (LASIX) 40 MG tablet TAKE ONE TABLET BY MOUTH DAILY AT 10AM 90 tablet 1     acetaminophen (TYLENOL) 325 MG tablet Take 2 tablets (650 mg) by mouth every 4 hours as needed for other (surgical pain) 100 tablet 0       Allergies    Allergies   Allergen Reactions     Shellfish Allergy Anaphylaxis     Aspirin      GI issues     Contrast Dye Hives and Itching     Flushed skin     Penicillins Hives     Sulfa Drugs Hives     Nitrofurantoin Rash       Immunizations    Immunization History   Administered Date(s) Administered     Influenza (High Dose) 3 valent vaccine 09/19/2014, 09/25/2015, 09/19/2016, 10/05/2017     Influenza (IIV3) 10/23/2003, 10/04/2010, 10/06/2011, 09/27/2012, 09/19/2013     Pneumococcal (PCV 13) 09/25/2015     Pneumococcal 23 valent 10/04/2010     TD (ADULT, 7+) 10/31/2017     Tdap (Adacel,Boostrix) 01/01/2004     Zoster vaccine, live 10/05/2007       Family History    Family History   Problem Relation Age of Onset     Colon Cancer Father      Coronary Artery Disease Father      DIABETES Maternal Grandmother        Social History    Social History     Social History     Marital status:      Spouse name: Vernon     Number of children: 2     Years of education: N/A     Occupational History     Not on file.     Social History Main Topics     Smoking status: Former Smoker     Packs/day: 0.50     Years: 10.00     Types: Cigarettes     Start date: 7/17/1958     Quit date: 1/1/1982     Smokeless tobacco: Never Used      Comment: quit 1967  "    Alcohol use 0.0 - 0.6 oz/week      Comment: Very light     Drug use: No     Sexual activity: Not Currently     Partners: Male     Birth control/ protection: None      Comment: not a problem     Other Topics Concern     Parent/Sibling W/ Cabg, Mi Or Angioplasty Before 65f 55m? No     Social History Narrative       All above reviewed and updated, all stable unless otherwise noted    Recent labs reviewed    ROS:  10 point ROS of systems including Constitutional, Eyes, Respiratory, Cardiovascular, Gastroenterology, Genitourinary, Integumentary, Muscularskeletal, Psychiatric were all negative except for pertinent positives noted in my HPI.    OBJECTIVE:                                                    /81  Pulse 81  Temp 97.8  F (36.6  C) (Oral)  Ht 5' 4\" (1.626 m)  Wt 213 lb (96.6 kg)  SpO2 95%  BMI 36.56 kg/m2  Body mass index is 36.56 kg/(m^2).  GENERAL: healthy, alert and no distress  MS: extremities- no gross deformities noted, no edema  SKIN: no suspicious lesions, no rashes  NEURO: mentation intact and speech normal  PSYCH: Alert and oriented times 3; speech- coherent , normal rate and volume; able to articulate logical thoughts, able to abstract reason, no tangential thoughts, no hallucinations or delusions, affect- normal    DIAGNOSTICS/PROCEDURES:                                                      No results found for this or any previous visit (from the past 24 hour(s)).     ASSESSMENT/PLAN:                                                        ICD-10-CM    1. Pelvic lymphadenopathy R59.0    2. Rectal mass K62.9    3. Colitis, nonspecific K52.9    4. 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 MGUS (monoclonal gammopathy of unknown significance) D47.2    6. Lymphoproliferative disorder (H) D47.9    7. Pulmonary hypertension I27.20    8. Dementia without behavioral disturbance, unspecified dementia type F03.90    9. NPH (normal pressure hydrocephalus) G91.2    10. S/P  " shunt Z98.2    11. History of deep venous thrombosis Z86.718    12. History of pulmonary embolism Z86.711    13. Hypertension goal BP (blood pressure) < 140/90 I10    14. Hypothyroidism, unspecified type E03.9    15. Medication monitoring encounter Z51.81    16. Need for TD vaccine Z23 TD PRESERV FREE >=7 YRS ADS IM     Discussed treatment/modality options, including risk and benefits, she desires further health care maintenance, immunization(tetanus booster), OTC meds(miralax, tylenol) and observation. All diagnosis above reviewed and noted above, otherwise stable.  See [employer] orders for further details.  Follow up as needed.    Byron has declined most treatment options at this time.     Consider colorectal surgeon referral and/or chest CT.    Continue with Dr. Duran -- next appointment 4/18. Consider requesting them to review abdominal/pelvic CT, with possible f/u soon.    Continue with Palliative Care/hospice appointment 11/5/17.    Continue with Miralax and lotrimin/HC 1%/Desitin cream.    Tetanus booster received today.     Health Maintenance Due   Topic Date Due     MICROALBUMIN Q1 YEAR  07/17/1941     DEXA SCAN SCREENING (SYSTEM ASSIGNED)  07/17/2005     TETANUS IMMUNIZATION (SYSTEM ASSIGNED)  01/01/2014     OP ANNUAL INR REFERRAL  06/03/2016     FALL RISK ASSESSMENT  09/01/2017     See Patient Instructions    This document serves as a record of the services and decisions personally performed and made by Joaquin Rockwell MD [facility]. It was created on their behalf by Mae Douglas, a trained medical scribe. The creation of this document is based the provider's statements to the medical scribe. Mae Douglas October 31, 2017 1:04 PM              Joaquin Rockwell MD [address]  [address] [phone] Fax

## 2017-10-31 NOTE — NURSING NOTE
"Chief Complaint   Patient presents with     Hospital F/U       Initial /81  Pulse 81  Temp 97.8  F (36.6  C) (Oral)  Ht 5' 4\" (1.626 m)  Wt 213 lb (96.6 kg)  SpO2 95%  BMI 36.56 kg/m2 Estimated body mass index is 36.56 kg/(m^2) as calculated from the following:    Height as of this encounter: 5' 4\" (1.626 m).    Weight as of this encounter: 213 lb (96.6 kg)..  BP completed using cuff size: farzaneh Francois MA  "

## 2017-10-31 NOTE — MR AVS SNAPSHOT
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    Continue with Dr. Duran - ask to review CT.    Consider chest CT.    Continue with Palliative Care/Hospice 11/5/17.    Continue with Miralax and Desitin cream.    Received tetanus booster today.       [facility]                        To reach your care team during and after hours:   [phone]  To reach our pharmacy:        [phone]    Clinic Hours                        Our clinic hours are:    Monday   7:30 am to 7:00 pm                  Tuesday through Friday 7:30 am to 5:00 pm                             Saturday   8:00 am to 12:00 pm      Sunday   Closed      Pharmacy Hours                        Our pharmacy hours are:    Monday   8:30 am to 7:00 pm       Tuesday to Friday  8:30 am to 6:00 pm                       Saturday    9:00 am to 1:00 pm              Sunday    Closed              There is also information available at our web site:  [email or web address]    If your provider ordered any lab tests  and you do not receive the results within 10 business days, please call the clinic.    If you need a medication refill please contact your pharmacy.  Please allow 2-3 business days for your refill to be completed.    Our clinic offers telephone visits and e visits.  Please ask one of your team members to explain more.      Use [employer]hart (secure email communication and access to your chart) to send your primary care provider a message or make an appointment. Ask someone on your Team how to sign up for [employer].  Immunizations                      Immunization History   Administered Date(s) Administered     Influenza (High Dose) 3 valent vaccine 09/19/2014, 09/25/2015, 09/19/2016, 10/05/2017     Influenza (IIV3) 10/23/2003, 10/04/2010, 10/06/2011, 09/27/2012, 09/19/2013     Pneumococcal (PCV 13) 09/25/2015     Pneumococcal 23 valent 10/04/2010     Tdap (Adacel,Boostrix) 01/01/2004     Zoster vaccine, live 10/05/2007        Health Maintenance                         Health Maintenance Due   Topic Date Due     Microalbumin Lab - yearly  07/17/1941     Bone Density Screening (Dexa)  07/17/2005     Tetanus Vaccine - every 10 years  01/01/2014     INR CLINIC REFERRAL - yearly  06/03/2016     FALL RISK ASSESSMENT  09/01/2017               Follow-ups after your visit        Your next 10 appointments already scheduled     Apr 12, 2018 12:45 PM CDT   Return Visit with Susanna Duran MD   [facility] ([facility])    [facility]  [address] [place] [address] [id number] [phone]              Who to contact     If you have questions or need follow up information about today's clinic visit or your schedule please contact [facility] directly at [phone].  Normal or non-critical lab and imaging results will be communicated to you by MyChart, letter or phone within 4 business days after the clinic has received the results. If you do  "not hear from us within 7 days, please contact the clinic through [employer] or phone. If you have a critical or abnormal lab result, we will notify you by phone as soon as possible.  Submit refill requests through [employer] or call your pharmacy and they will forward the refill request to us. Please allow 3 business days for your refill to be completed.          Additional Information About Your Visit        [employer] Information     [employer] gives you secure access to your electronic health record. If you see a primary care provider, you can also send messages to your care team and make appointments. If you have questions, please call your primary care clinic.  If you do not have a primary care provider, please call [phone] and they will assist you.        Care EveryWhere ID     This is your Care EveryWhere ID. This could be used by other organizations to access your [place] medical records  [id number]        Your Vitals Were     Pulse Temperature Height Pulse Oximetry BMI (Body Mass Index)       81 97.8  F (36.6  C) (Oral) 5' 4\" (1.626 m) 95% 36.56 kg/m2        Blood Pressure from Last 3 Encounters:   10/31/17 131/81   10/19/17 156/63   10/17/17 128/70    Weight from Last 3 Encounters:   10/31/17 213 lb (96.6 kg)   10/19/17 212 lb 15.4 oz (96.6 kg)   10/17/17 200 lb (90.7 kg)              We Performed the Following     TD PRESERV FREE >=7 YRS ADS         Primary Care Provider Office Phone # Fax #    Joaquin Rockwell [phone]       [facility] [address]        Equal Access to Services     [facility]: Hadii heron robb hadasho Soomaali, waaxda luqadaha, qaybta kaalmafrancisco javier kaplan. So [facility] [phone].    ATENCIÓN: Si habla español, tiene a vega disposición servicios gratuitos de asistencia lingüística. Llame al [phone].    We comply with applicable federal civil rights laws and [place] laws. We do not discriminate on the basis of " race, color, national origin, age, disability, sex, sexual orientation, or gender identity.            Thank you!     Thank you for choosing [facility]  for your care. Our goal is always to provide you with excellent care. Hearing back from our patients is one way we can continue to improve our services. Please take a few minutes to complete the written survey that you may receive in the mail after your visit with us. Thank you!             Your Updated Medication List - Protect others around you: Learn how to safely use, store and throw away your medicines at www.disposemymeds.org.          This list is accurate as of: 10/31/17  8:23 PM.  Always use your most recent med list.                   Brand Name Dispense Instructions for use Diagnosis    acetaminophen 325 MG tablet    TYLENOL    100 tablet    Take 2 tablets (650 mg) by mouth every 4 hours as needed for other (surgical pain)    Normal pressure hydrocephalus       furosemide 40 MG tablet    LASIX    90 tablet    TAKE ONE TABLET BY MOUTH DAILY AT 10AM    Hypertension goal BP (blood pressure) < 140/90       JANTOVEN PO      Take 2mg by mouth on Sunday, Wednesday, Friday and take 4mg rest of week.        levothyroxine 50 MCG tablet    SYNTHROID/LEVOTHROID    90 tablet    TAKE ONE TABLET BY MOUTH EVERY DAY AT 7:30AM. DUE FOR LABS FOR FURTHER REFILLS.    Hypothyroidism, unspecified type       polyethylene glycol Packet    MIRALAX/GLYCOLAX    7 packet    Take 17 g by mouth daily as needed for constipation    Colonic mass       senna-docusate 8.6-50 MG per tablet    SENOKOT-S;PERICOLACE    100 tablet    Take 1 tablet by mouth 2 times daily    Colonic mass       tamsulosin 0.4 MG capsule    FLOMAX    90 capsule    TAKE ONE CAPSULE BY MOUTH DAILY AT 10AM    Urinary retention       VITAMIN D (CHOLECALCIFEROL) PO      Take 1,000 Units by mouth daily    Leukocytosis, unspecified type

## 2017-11-03 ENCOUNTER — MYC MEDICAL ADVICE (OUTPATIENT)
Dept: FAMILY MEDICINE | Facility: CLINIC | Age: 77
End: 2017-11-03

## 2017-11-03 DIAGNOSIS — R21 RASH: Primary | ICD-10-CM

## 2017-11-03 NOTE — TELEPHONE ENCOUNTER
Forwarded to TS.  Rash noted in last office note.  Please review patient's Mychart message and advise.  Nereyda Padilla, RN, BS, PHN

## 2017-11-06 ENCOUNTER — TELEPHONE (OUTPATIENT)
Dept: ONCOLOGY | Facility: CLINIC | Age: 77
End: 2017-11-06

## 2017-11-06 RX ORDER — CLOTRIMAZOLE AND BETAMETHASONE DIPROPIONATE 10; .64 MG/G; MG/G
CREAM TOPICAL 2 TIMES DAILY
Qty: 60 G | Refills: 1 | Status: SHIPPED | OUTPATIENT
Start: 2017-11-06 | End: 2018-04-12

## 2017-11-06 NOTE — TELEPHONE ENCOUNTER
Pt's  left a voicemail message.  States that pt has some symptoms that he would like to discuss.  Aditi Espino RN BSN

## 2017-11-06 NOTE — TELEPHONE ENCOUNTER
Advise no powders    Ok for lotrisone cream with bactroban BID after thoroughly cleaning and drying - please rx 60 gm each, r x 1.  Please have home care evaluate and follow

## 2017-11-06 NOTE — TELEPHONE ENCOUNTER
Patient's , Siva, called to pass on information from patient's primary MD, Dr. Joaquin Rockwell.  Patient had a CT completed which Dr. Rockwell felt Dr. Duran might want to view.  Writer will send in-basket note to MD and will contact patient if there is any change in appointment scheduling needed.  Patient /spouse are in agreement with the POC.  Misha Mcconnell, RN, BSN, OCN  [facility]  Patient Care Coordinator  .

## 2017-11-07 ENCOUNTER — TELEPHONE (OUTPATIENT)
Dept: ONCOLOGY | Facility: CLINIC | Age: 77
End: 2017-11-07

## 2017-11-07 NOTE — TELEPHONE ENCOUNTER
Called patient and left a message ~ Ct completed by her primary MD Dr. Joaquin Rockwell.  Advised patient that Dr. Duran would like to see her either this Thursday or next.  Penciled patient in for appt on 11/9/17, but asked patient to please call and confirm.  Misha Mcconnell, RN, BSN, OCN  [facility]  Patient Care Coordinator

## 2017-11-16 ENCOUNTER — ONCOLOGY VISIT (OUTPATIENT)
Dept: ONCOLOGY | Facility: CLINIC | Age: 77
End: 2017-11-16
Attending: INTERNAL MEDICINE
Payer: COMMERCIAL

## 2017-11-16 VITALS
DIASTOLIC BLOOD PRESSURE: 58 MMHG | TEMPERATURE: 97.8 F | BODY MASS INDEX: 36.37 KG/M2 | HEIGHT: 64 IN | HEART RATE: 103 BPM | OXYGEN SATURATION: 96 % | SYSTOLIC BLOOD PRESSURE: 134 MMHG | WEIGHT: 213 LBS | RESPIRATION RATE: 20 BRPM

## 2017-11-16 DIAGNOSIS — D72.829 LEUKOCYTOSIS, UNSPECIFIED TYPE: ICD-10-CM

## 2017-11-16 DIAGNOSIS — K62.89 RECTAL MASS: ICD-10-CM

## 2017-11-16 DIAGNOSIS — D47.Z9 LOW GRADE B CELL LYMPHOPROLIFERATIVE DISORDER (H): ICD-10-CM

## 2017-11-16 DIAGNOSIS — D47.2 MGUS (MONOCLONAL GAMMOPATHY OF UNKNOWN SIGNIFICANCE): Primary | ICD-10-CM

## 2017-11-16 PROCEDURE — 99211 OFF/OP EST MAY X REQ PHY/QHP: CPT

## 2017-11-16 PROCEDURE — 99214 OFFICE O/P EST MOD 30 MIN: CPT | Performed by: INTERNAL MEDICINE

## 2017-11-16 NOTE — LETTER
11/16/2017         RE: Ángela Dumont  [address]  [place] [address]        Dear Colleague,    Thank you for referring your patient, Ángela Dumont, to the [facility]. Please see a copy of my visit note below.    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  On chart review, she has had a mildly elevated WBC count since at least 2015.  In July 2017, WBC was 18.1 K, and peripheral smear showed leukocytosis with mild atypical absolute lymphocytosis and increased rouleaux.  Hemoglobin and platelets were normal.      In 2015, she was diagnosed with pulmonary embolism and DVT.  Three months prior, she had fractured her femur and had undergone surgery.  She was immobile for 3 months.  She has been on warfarin since then.      Ángela has dementia, and her  answers the majority of the questions.  They live in a one level [facility].  She is wheelchair-bound  He helps with lifting and transferring.  She does not get frequent infections.  She started taking vitamin D recently.  The last time she fell was about a year and a half ago.       INTERIM HISTORY: Ángela is here for follow-up with her .  She was admitted to the hospital on 10/18/17-10/19/17 with abdominal pain and constipation.  CT scan on 10/18/17 showed asymmetric wall thickening involving the posterior aspect of the low rectum near the anorectal junction.  She also had extensive perirectal/mesorectal adenopathy, concerning for colorectal neoplasm.  She also has additional enlarged bilateral iliac chain lymph nodes, borderline enlarged perirectal lymph nodes  and diffusely mildly prominent lymph nodes elsewhere throughout the abdomen and pelvis.     At the time, patient and her  declined further evaluation and agreed to home hospice services.            REVIEW OF SYSTEMS:   14 point ROS was reviewed and is negative other than as noted above in HPI.       HOME MEDICATIONS:  Current Outpatient Prescriptions   Medication Sig Dispense Refill     clotrimazole-betamethasone (LOTRISONE) cream Apply topically 2 times daily 60 g 1     polyethylene glycol (MIRALAX/GLYCOLAX) Packet Take 17 g by mouth daily as needed for constipation 7 packet 0     senna-docusate (SENOKOT-S;PERICOLACE) 8.6-50 MG per tablet Take 1 tablet by mouth 2 times daily 100 tablet 0     Warfarin Sodium (JANTOVEN PO) Take 2mg by mouth on Sunday, Wednesday, Friday and take 4mg rest of week.       VITAMIN D, CHOLECALCIFEROL, PO Take 1,000 Units by mouth daily       levothyroxine (SYNTHROID/LEVOTHROID) 50 MCG tablet TAKE ONE TABLET BY MOUTH EVERY DAY AT 7:30AM. DUE FOR LABS FOR FURTHER REFILLS. 90 tablet 1     tamsulosin (FLOMAX) 0.4 MG capsule TAKE ONE CAPSULE BY MOUTH DAILY AT 10AM 90 capsule 3     furosemide (LASIX) 40 MG tablet TAKE ONE TABLET BY MOUTH DAILY AT 10AM 90 tablet 1     acetaminophen (TYLENOL) 325 MG tablet Take 2 tablets (650 mg) by mouth every 4 hours as needed for other (surgical pain) 100 tablet 0         ALLERGIES:  Allergies   Allergen Reactions     Shellfish Allergy Anaphylaxis     Aspirin      GI issues     Contrast Dye Hives and Itching     Flushed skin     Penicillins Hives     Sulfa Drugs Hives     Nitrofurantoin Rash         PAST MEDICAL HISTORY:  Past Medical History:   Diagnosis Date     Adjustment disorder with depressed mood      Antiplatelet or antithrombotic long-term use      Colitis, nonspecific 10/31/2017     Constipation      Dementia     memory/anger     Depressive disorder last few months as condition became apparent    Lack of mobility a big problem     DVT (deep  venous thrombosis) (H) 4/15    bilateral     Esophageal reflux      Fibromyalgia      Fracture of right femur (H) 1/15     Heart murmur      Herpes zoster without complication 08/16/2017    left buttocks     History of Clostridium difficile      Hyperlipidemia LDL goal < 130      Hypertension goal BP (blood pressure) < 140/90      Hypothyroid      Leukocytosis      Leukocytosis      Leukocytosis      Leukocytosis, unspecified     w/u ?     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)      Lymphoproliferative disorder (H)     Dr Duran     Lymphoproliferative disorder (H)      MGUS (monoclonal gammopathy of unknown significance)     Dr Duran     MGUS (monoclonal gammopathy of unknown significance)      Migraine      MR (mitral regurgitation)     Mild     NPH (normal pressure hydrocephalus) 4/15    shunt placed but removed in 7/2015 due to erosion through the scalp, replaced 5/19/16     Osteoarthritis of both knees      Other atopic dermatitis and related conditions      PE (pulmonary embolism) 4/15    Saddle - IVC     Pelvic lymphadenopathy 10/31/2017     Pulmonary hypertension     EF 70-75%     Rectal mass 10/2017     Rectal mass 10/1/2017     Thrombosis of leg      Urinary tract infection, site not specified     MRSA - Dr Loo     [place]          PAST SURGICAL HISTORY:  Past Surgical History:   Procedure Laterality Date     ABDOMEN SURGERY  left side lower abdomen pain from Shunt we do roberth     COLONOSCOPY  2008     GYN SURGERY       H LABOR AND DELIVERY VAGINAL  1960, 1967     HERNIA REPAIR       HYSTERECTOMY  1976    hysterectomy     IMPLANT SHUNT VENTRICULOPERITONEAL Left 5/19/2016    IMPLANT SHUNT VENTRICULOPERITONEAL - Dr Alba     OPTICAL TRACKING SYSTEM IMPLANT SHUNT VENTRICULOPERITONEAL Right 4/10/2015     Shunt - Dr Sierra     ORTHOPEDIC SURGERY  Knee pain that prohibits walking     REMOVE SHUNT VENTRICULOPERITONEAL N/A 7/3/2015    REMOVE SHUNT VENTRICULOPERITONEAL;  Surgeon:  "Emmanuel Sierra MD;  Location:  OR     SURGICAL HISTORY OF -   1/15    right femur/hip surgery - 1/18/2015     SURGICAL HISTORY OF -   7/04    rectocele repair     SURGICAL HISTORY OF -   1997    abdominal adhesion lysis     SURGICAL HISTORY OF -   4/15    IVC filter placement     SURGICAL HISTORY OF -   6/15    IVC filter removal         SOCIAL HISTORY:  Social History     Social History     Marital status:      Spouse name: Vernon     Number of children: 2     Years of education: N/A     Occupational History     Not on file.     Social History Main Topics     Smoking status: Former Smoker     Packs/day: 0.50     Years: 10.00     Types: Cigarettes     Start date: 7/17/1958     Quit date: 1/1/1982     Smokeless tobacco: Never Used      Comment: quit 1967     Alcohol use 0.0 - 0.6 oz/week      Comment: Very light     Drug use: No     Sexual activity: Not Currently     Partners: Male     Birth control/ protection: None      Comment: not a problem     Other Topics Concern     Parent/Sibling W/ Cabg, Mi Or Angioplasty Before 65f 55m? No     Social History Narrative   Ágnela is from [place] and her  is from [place].  They used to live in [place], [place], and Ángela wants to move back there.        FAMILY HISTORY:  Family History   Problem Relation Age of Onset     Colon Cancer Father      Coronary Artery Disease Father      DIABETES Maternal Grandmother          PHYSICAL EXAM:  Vital signs:  /58  Pulse 103  Temp 97.8  F (36.6  C) (Tympanic)  Resp 20  Ht 1.626 m (5' 4\")  Wt 96.6 kg (213 lb)  SpO2 96%  BMI 36.56 kg/m2   GENERAL/CONSTITUTIONAL: No acute distress.  Accompanied by .  EYES: No scleral icterus.  INTEGUMENTARY: No jaundice.  GAIT: In wheelchair.    LABS:  CBC RESULTS:   Recent Labs   Lab Test  10/19/17   0841   WBC  17.8*   RBC  4.00   HGB  11.1*   HCT  37.2   MCV  93   MCH  27.8   MCHC  29.8*   RDW  14.3   PLT  179     Recent Labs   Lab Test  10/19/17   0841  10/18/17   " 1133   NA  139  142   POTASSIUM  3.7  4.4   CHLORIDE  108  110*   CO2  25  24   ANIONGAP  6  8   GLC  83  102*   BUN  17  23   CR  0.84  0.79   TAVIA  8.4*  8.5     Lab Results   Component Value Date    AST 26 10/18/2017     Lab Results   Component Value Date    ALT 20 10/18/2017     No results found for: BILICONJ   Lab Results   Component Value Date    BILITOTAL 0.7 10/18/2017     Lab Results   Component Value Date    ALBUMIN 3.1 10/18/2017     Lab Results   Component Value Date    PROTTOTAL 6.9 10/18/2017      Lab Results   Component Value Date    ALKPHOS 96 10/18/2017       SPEP: M-spike of 0.9 on 9/28/17    Peripheral smear 9/28/17:  FINAL DIAGNOSIS:   Peripheral blood.   - Leukocytosis with absolute atypical lymphocytosis.     COMMENT:   There is leukocytosis with absolute lymphocytosis.  Some of the   lymphocytes appear morphologically atypical.  Further evaluation to   evaluate for a circulating lymphoproliferative disorder with flow   cytometry is recommended.  Clinical correlation is required.     Peripheral flow cytometry 9/28/17:  INTERPRETATION:   Blood:        Kappa-monotypic B cells negative for CD5 and CD10 (40%)        See comment     COMMENT:   By flow cytometry, the monotypic B cells lack CD5 and CD10. This   immunophenotype can be seen in marginal zone lymphoma, lymphoplasmacytic   lymphoma, and hairy cell leukemia, as well as rarely in follicular   lymphoma, CLL/SLL, or mantle cell lymphoma. Additional stains to exclude   Hairy cell leukemia are pending and will be reported in an addendum.   Final interpretation requires correlation with results of other   ancillary studies, morphologic and clinical features. If clinically   indicated a biopsy of involved lymph node or another tissue could be   required for final diagnosis.     This addendum is issued to reflect additional testing performed on this   case.   The abnormal B cells are positive for CD79b and  negative for CD23.   Other  immunophenotypic markers (see details below) do not support the   diagnosis of hairy cell leukemia. The differential includes marginal   zone lymphoma, splenic B cell leukemia/lymphoma NOS, mantle cell   lymphoma, lymphoplasmacytic lymphoma, while other small B cell lymphoma   entities are considered less likely. In the differential diagnosis   please note that rare cases of mantle cell lymphoma can be CD5 negative.   Recommend biopsy of an involved lymph node /  tissue for diagnosis with   cytogenetics and clinical correlation. See Comment.     COMMENTS:   Additional eight-color analyses are performed for the following markers:   CD11c, CD22, CD23, CD25, CD79b, ,      The abnormal B cells are positive for CD22 (normal level), CD79b ,   predominantly negative for CD11c (84% of them are negative) and negative   for CD23, CD25,  and .         ASSESSMENT/PLAN:  Ángela Dumont is a 77 year old female with:    1) Leukocytosis: appears chronic since at least 2015, and on further chart review from Care Everywhere, likely chronic since at least 2007.  Peripheral smear in July 2017 showed mild atypical lymphocytosis and rouleaux.  Hemoglobin and platelet count are normal.  She does not have frequent infections, and no new medications recently, other than vitamin D.      Peripheral flow cytometry shows concern for lymphoproliferative disorder.  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      We discussed that is usually a slow growing process, and treatment is usually not necessary.  We could do further evaluation, such as with PET scan and bone marrow biopsy.  Considering her age, dementia, immobility, and poor functional status, I favored watching and waiting and sparing her of invasive procedures that may not change her overall course.  She and her  agreed and opted to  wait on getting additional scans or bone marrow biopsy at this time.        2) History of PE and DVT: diagnosed in 2015, and she has been on warfarin ever since.  It was likely provoked at the time by surgery and immobility.  She remains wheelchair bound now, and remains at high risk for blood clots.  She has not had frequent falls, and INR has been under relatively good control.  I discussed and risks and benefits of continuing the warfarin.  She will continue the warfarin for now, considering her high risk for clotting remains.  If it becomes that the risk of bleeding starts to outweigh her risk of clots, then it would be time to stop the warfarin.     3) MGUS: M-spike of 0.9 g/dL.  -as above, will hold off on further imaging or bone marrow biopsy    4) Suspected rectal cancer: CT scan on 10/18/17 showed wall thickening at the posterior aspect of the low rectum near the anorectal junction, concerning for rectal cancer, with perirectal/mesorectal adenopathy.  She has symptoms of constipation and abdominal pain.  In the hospital, they declined further evaluation.  Her  says that they met with hospice, but has not enrolled yet.  I discussed that this does look like a rectal cancer on imaging, but only way to confirm it is to obtain pathology with a colonoscopy and biopsy.  Considering Ángela's dementia, immobility, and overall quite poor functional status, she would be a poor candidate for treatments such as surgery, chemotherapy or radiation.  I think it is reasonable to not further work this up and focus on patient's comfort and quality of life.  I recommended that she enroll on home hospice.  Her  says that he will think about it, and he wants to keep her April follow-up for now.        I spent a total of 25 minutes with the patient, with over >50% of the time in counseling and/or coordination of care.       Susanna Duran MD  Hematology/Oncology  [facility]      Again,  thank you for allowing me to participate in the care of your patient.        Sincerely,        Susanna Duran MD

## 2017-11-16 NOTE — PROGRESS NOTES
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  On chart review, she has had a mildly elevated WBC count since at least 2015.  In July 2017, WBC was 18.1 K, and peripheral smear showed leukocytosis with mild atypical absolute lymphocytosis and increased rouleaux.  Hemoglobin and platelets were normal.      In 2015, she was diagnosed with pulmonary embolism and DVT.  Three months prior, she had fractured her femur and had undergone surgery.  She was immobile for 3 months.  She has been on warfarin since then.      Ángela has dementia, and her  answers the majority of the questions.  They live in a one level multiplex.  She is wheelchair-bound  He helps with lifting and transferring.  She does not get frequent infections.  She started taking vitamin D recently.  The last time she fell was about a year and a half ago.       INTERIM HISTORY: Ángela is here for follow-up with her .  She was admitted to the hospital on 10/18/17-10/19/17 with abdominal pain and constipation.  CT scan on 10/18/17 showed asymmetric wall thickening involving the posterior aspect of the low rectum near the anorectal junction.  She also had extensive perirectal/mesorectal adenopathy, concerning for colorectal neoplasm.  She also has additional enlarged bilateral iliac chain lymph nodes, borderline enlarged perirectal lymph nodes and diffusely mildly prominent lymph nodes elsewhere throughout the abdomen and pelvis.     At the time, patient and her  declined further evaluation and agreed to home hospice services.            REVIEW OF SYSTEMS:   14 point ROS was reviewed and  is negative other than as noted above in HPI.       HOME MEDICATIONS:  Current Outpatient Prescriptions   Medication Sig Dispense Refill     clotrimazole-betamethasone (LOTRISONE) cream Apply topically 2 times daily 60 g 1     polyethylene glycol (MIRALAX/GLYCOLAX) Packet Take 17 g by mouth daily as needed for constipation 7 packet 0     senna-docusate (SENOKOT-S;PERICOLACE) 8.6-50 MG per tablet Take 1 tablet by mouth 2 times daily 100 tablet 0     Warfarin Sodium (JANTOVEN PO) Take 2mg by mouth on Sunday, Wednesday, Friday and take 4mg rest of week.       VITAMIN D, CHOLECALCIFEROL, PO Take 1,000 Units by mouth daily       levothyroxine (SYNTHROID/LEVOTHROID) 50 MCG tablet TAKE ONE TABLET BY MOUTH EVERY DAY AT 7:30AM. DUE FOR LABS FOR FURTHER REFILLS. 90 tablet 1     tamsulosin (FLOMAX) 0.4 MG capsule TAKE ONE CAPSULE BY MOUTH DAILY AT 10AM 90 capsule 3     furosemide (LASIX) 40 MG tablet TAKE ONE TABLET BY MOUTH DAILY AT 10AM 90 tablet 1     acetaminophen (TYLENOL) 325 MG tablet Take 2 tablets (650 mg) by mouth every 4 hours as needed for other (surgical pain) 100 tablet 0         ALLERGIES:  Allergies   Allergen Reactions     Shellfish Allergy Anaphylaxis     Aspirin      GI issues     Contrast Dye Hives and Itching     Flushed skin     Penicillins Hives     Sulfa Drugs Hives     Nitrofurantoin Rash         PAST MEDICAL HISTORY:  Past Medical History:   Diagnosis Date     Adjustment disorder with depressed mood      Antiplatelet or antithrombotic long-term use      Colitis, nonspecific 10/31/2017     Constipation      Dementia     memory/anger     Depressive disorder last few months as condition became apparent    Lack of mobility a big problem     DVT (deep venous thrombosis) (H) 4/15    bilateral     Esophageal reflux      Fibromyalgia      Fracture of right femur (H) 1/15     Heart murmur      Herpes zoster without complication 08/16/2017    left buttocks     History of Clostridium difficile       Hyperlipidemia LDL goal < 130      Hypertension goal BP (blood pressure) < 140/90      Hypothyroid      Leukocytosis      Leukocytosis      Leukocytosis      Leukocytosis, unspecified     w/u ?     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)      Lymphoproliferative disorder (H)     Dr Duran     Lymphoproliferative disorder (H)      MGUS (monoclonal gammopathy of unknown significance)     Dr Duran     MGUS (monoclonal gammopathy of unknown significance)      Migraine      MR (mitral regurgitation)     Mild     NPH (normal pressure hydrocephalus) 4/15    shunt placed but removed in 7/2015 due to erosion through the scalp, replaced 5/19/16     Osteoarthritis of both knees      Other atopic dermatitis and related conditions      PE (pulmonary embolism) 4/15    Saddle - IVC     Pelvic lymphadenopathy 10/31/2017     Pulmonary hypertension     EF 70-75%     Rectal mass 10/2017     Rectal mass 10/1/2017     Thrombosis of leg      Urinary tract infection, site not specified     MRSA - Dr Eze Gonzalez          PAST SURGICAL HISTORY:  Past Surgical History:   Procedure Laterality Date     ABDOMEN SURGERY  left side lower abdomen pain from Shunt we do roberth     COLONOSCOPY  2008     GYN SURGERY       H LABOR AND DELIVERY VAGINAL  1960, 1967     HERNIA REPAIR       HYSTERECTOMY  1976    hysterectomy     IMPLANT SHUNT VENTRICULOPERITONEAL Left 5/19/2016    IMPLANT SHUNT VENTRICULOPERITONEAL - Dr Alba     OPTICAL TRACKING SYSTEM IMPLANT SHUNT VENTRICULOPERITONEAL Right 4/10/2015     Shunt - Dr Sierra     ORTHOPEDIC SURGERY  Knee pain that prohibits walking     REMOVE SHUNT VENTRICULOPERITONEAL N/A 7/3/2015    REMOVE SHUNT VENTRICULOPERITONEAL;  Surgeon: Emmanuel Sierra MD;  Location:  OR     SURGICAL HISTORY OF -   1/15    right femur/hip surgery - 1/18/2015     SURGICAL HISTORY OF -   7/04    rectocele repair     SURGICAL HISTORY OF -   1997    abdominal adhesion lysis     SURGICAL  "HISTORY OF -   4/15    IVC filter placement     SURGICAL HISTORY OF -   6/15    IVC filter removal         SOCIAL HISTORY:  Social History     Social History     Marital status:      Spouse name: Vernon     Number of children: 2     Years of education: N/A     Occupational History     Not on file.     Social History Main Topics     Smoking status: Former Smoker     Packs/day: 0.50     Years: 10.00     Types: Cigarettes     Start date: 7/17/1958     Quit date: 1/1/1982     Smokeless tobacco: Never Used      Comment: quit 1967     Alcohol use 0.0 - 0.6 oz/week      Comment: Very light     Drug use: No     Sexual activity: Not Currently     Partners: Male     Birth control/ protection: None      Comment: not a problem     Other Topics Concern     Parent/Sibling W/ Cabg, Mi Or Angioplasty Before 65f 55m? No     Social History Narrative   Ángela is from [place] and her  is from [place].  They used to live in [place], [place], and Ángela wants to move back there.        FAMILY HISTORY:  Family History   Problem Relation Age of Onset     Colon Cancer Father      Coronary Artery Disease Father      DIABETES Maternal Grandmother          PHYSICAL EXAM:  Vital signs:  /58  Pulse 103  Temp 97.8  F (36.6  C) (Tympanic)  Resp 20  Ht 1.626 m (5' 4\")  Wt 96.6 kg (213 lb)  SpO2 96%  BMI 36.56 kg/m2   GENERAL/CONSTITUTIONAL: No acute distress.  Accompanied by .  EYES: No scleral icterus.  INTEGUMENTARY: No jaundice.  GAIT: In wheelchair.    LABS:  CBC RESULTS:   Recent Labs   Lab Test  10/19/17   0841   WBC  17.8*   RBC  4.00   HGB  11.1*   HCT  37.2   MCV  93   MCH  27.8   MCHC  29.8*   RDW  14.3   PLT  179     Recent Labs   Lab Test  10/19/17   0841  10/18/17   1133   NA  139  142   POTASSIUM  3.7  4.4   CHLORIDE  108  110*   CO2  25  24   ANIONGAP  6  8   GLC  83  102*   BUN  17  23   CR  0.84  0.79   TAVIA  8.4*  8.5     Lab Results   Component Value Date    AST 26 10/18/2017     Lab Results   Component " Value Date    ALT 20 10/18/2017     No results found for: BILICONJ   Lab Results   Component Value Date    BILITOTAL 0.7 10/18/2017     Lab Results   Component Value Date    ALBUMIN 3.1 10/18/2017     Lab Results   Component Value Date    PROTTOTAL 6.9 10/18/2017      Lab Results   Component Value Date    ALKPHOS 96 10/18/2017       SPEP: M-spike of 0.9 on 9/28/17    Peripheral smear 9/28/17:  FINAL DIAGNOSIS:   Peripheral blood.   - Leukocytosis with absolute atypical lymphocytosis.     COMMENT:   There is leukocytosis with absolute lymphocytosis.  Some of the   lymphocytes appear morphologically atypical.  Further evaluation to   evaluate for a circulating lymphoproliferative disorder with flow   cytometry is recommended.  Clinical correlation is required.     Peripheral flow cytometry 9/28/17:  INTERPRETATION:   Blood:        Kappa-monotypic B cells negative for CD5 and CD10 (40%)        See comment     COMMENT:   By flow cytometry, the monotypic B cells lack CD5 and CD10. This   immunophenotype can be seen in marginal zone lymphoma, lymphoplasmacytic   lymphoma, and hairy cell leukemia, as well as rarely in follicular   lymphoma, CLL/SLL, or mantle cell lymphoma. Additional stains to exclude   Hairy cell leukemia are pending and will be reported in an addendum.   Final interpretation requires correlation with results of other   ancillary studies, morphologic and clinical features. If clinically   indicated a biopsy of involved lymph node or another tissue could be   required for final diagnosis.     This addendum is issued to reflect additional testing performed on this   case.   The abnormal B cells are positive for CD79b and  negative for CD23.   Other immunophenotypic markers (see details below) do not support the   diagnosis of hairy cell leukemia. The differential includes marginal   zone lymphoma, splenic B cell leukemia/lymphoma NOS, mantle cell   lymphoma, lymphoplasmacytic lymphoma, while other small  B cell lymphoma   entities are considered less likely. In the differential diagnosis   please note that rare cases of mantle cell lymphoma can be CD5 negative.   Recommend biopsy of an involved lymph node /  tissue for diagnosis with   cytogenetics and clinical correlation. See Comment.     COMMENTS:   Additional eight-color analyses are performed for the following markers:   CD11c, CD22, CD23, CD25, CD79b, ,      The abnormal B cells are positive for CD22 (normal level), CD79b ,   predominantly negative for CD11c (84% of them are negative) and negative   for CD23, CD25,  and .         ASSESSMENT/PLAN:  Ángela Dumont is a 77 year old female with:    1) Leukocytosis: appears chronic since at least 2015, and on further chart review from Care Everywhere, likely chronic since at least 2007.  Peripheral smear in July 2017 showed mild atypical lymphocytosis and rouleaux.  Hemoglobin and platelet count are normal.  She does not have frequent infections, and no new medications recently, other than vitamin D.      Peripheral flow cytometry shows concern for lymphoproliferative disorder.  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      We discussed that is usually a slow growing process, and treatment is usually not necessary.  We could do further evaluation, such as with PET scan and bone marrow biopsy.  Considering her age, dementia, immobility, and poor functional status, I favored watching and waiting and sparing her of invasive procedures that may not change her overall course.  She and her  agreed and opted to wait on getting additional scans or bone marrow biopsy at this time.        2) History of PE and DVT: diagnosed in 2015, and she has been on warfarin ever since.  It was likely provoked at the time by surgery and immobility.  She remains wheelchair bound now,  and remains at high risk for blood clots.  She has not had frequent falls, and INR has been under relatively good control.  I discussed and risks and benefits of continuing the warfarin.  She will continue the warfarin for now, considering her high risk for clotting remains.  If it becomes that the risk of bleeding starts to outweigh her risk of clots, then it would be time to stop the warfarin.     3) MGUS: M-spike of 0.9 g/dL.  -as above, will hold off on further imaging or bone marrow biopsy    4) Suspected rectal cancer: CT scan on 10/18/17 showed wall thickening at the posterior aspect of the low rectum near the anorectal junction, concerning for rectal cancer, with perirectal/mesorectal adenopathy.  She has symptoms of constipation and abdominal pain.  In the hospital, they declined further evaluation.  Her  says that they met with hospice, but has not enrolled yet.  I discussed that this does look like a rectal cancer on imaging, but only way to confirm it is to obtain pathology with a colonoscopy and biopsy.  Considering Ángela's dementia, immobility, and overall quite poor functional status, she would be a poor candidate for treatments such as surgery, chemotherapy or radiation.  I think it is reasonable to not further work this up and focus on patient's comfort and quality of life.  I recommended that she enroll on home hospice.  Her  says that he will think about it, and he wants to keep her April follow-up for now.        I spent a total of 25 minutes with the patient, with over >50% of the time in counseling and/or coordination of care.       Susanna Duran MD  Hematology/Oncology  [facility]

## 2017-11-16 NOTE — NURSING NOTE
"Oncology Rooming Note    November 16, 2017 10:51 AM   Ángela Dumont is a 77 year old female who presents for:    Chief Complaint   Patient presents with     Oncology Clinic Visit     Follow up      Initial Vitals: /58  Pulse 103  Temp 97.8  F (36.6  C) (Tympanic)  Resp 20  Ht 1.626 m (5' 4\")  Wt 96.6 kg (213 lb)  SpO2 96%  BMI 36.56 kg/m2 Estimated body mass index is 36.56 kg/(m^2) as calculated from the following:    Height as of this encounter: 1.626 m (5' 4\").    Weight as of this encounter: 96.6 kg (213 lb). Body surface area is 2.09 meters squared.  Data Unavailable Comment: Data Unavailable   No LMP recorded. Patient has had a hysterectomy.  Allergies reviewed: Yes  Medications reviewed: Yes    Medications: Medication refills not needed today.  Pharmacy name entered into happn:    [id number] [place] [address] [facility] [address] [facility] [address] [place] [address] [address] [place] [address] AT [place] [address] & [facility]    Clinical concerns: Follow up    8 minutes for nursing intake (face to face time)     Carin Taylor CMA     DISCHARGE PLAN:  Next appointments: See patient instruction section  Departure Mode: Ambulatory  Accompanied by:   0 minutes for nursing discharge (face to face time)   Carin Taylor CMA                  "

## 2017-11-16 NOTE — MR AVS SNAPSHOT
After Visit Summary   11/16/2017    Ángela Dumont    MRN: [id number]           Patient Information     Date Of Birth          1940        Visit Information        Provider Department      11/16/2017 10:45 AM Susanna Duran MD 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 ([facility])    [facility]  [address]  [address] [address] [phone]              Who to contact     If you have questions or need follow up information about today's clinic visit or your schedule please contact [facility] directly at [phone].  Normal or non-critical lab and imaging results will be communicated to you by [email or web address], letter or phone within 4 business days after the clinic has received the results. If you do not hear from us within 7 days, please contact the clinic through [employer] or phone. If you have a critical or abnormal lab result, we will notify you by phone as soon as possible.  Submit refill requests through [email or web address] or call your pharmacy and they will forward the refill request to us. Please allow 3 business days for your refill to be completed.          Additional Information About Your Visit        365lookshart Information     [email or web address] gives you secure access to your electronic health record. If you see a primary care provider, you can also send messages to your care team and make appointments. If you have questions, please call your primary care clinic.  " If you do not have a primary care provider, please call [phone] and they will assist you.        Care EveryWhere ID     This is your Care EveryWhere ID. This could be used by other organizations to access your [place] medical records  [id number]        Your Vitals Were     Pulse Temperature Respirations Height Pulse Oximetry BMI (Body Mass Index)    103 97.8  F (36.6  C) (Tympanic) 20 1.626 m (5' 4\") 96% 36.56 kg/m2       Blood Pressure from Last 3 Encounters:   11/16/17 134/58   10/31/17 131/81   10/19/17 156/63    Weight from Last 3 Encounters:   11/16/17 96.6 kg (213 lb)   10/31/17 96.6 kg (213 lb)   10/19/17 96.6 kg (212 lb 15.4 oz)              Today, you had the following     No orders found for display       Primary Care Provider Office Phone # Fax #    Joaquin Rockwell [phone]       [address] [place] [address]        Equal Access to Services     : Hadii heron ku hadasho Soariadna, waaxda luqadaha, qaybta kaalmada adewhitley, francisco javier nunez . So [facility] [phone].    ATENCIÓN: Si habla español, tiene a vega disposición servicios gratuitos de asistencia lingüística. Ll[facility]eter[facility] Administration [phone].    We comply with applicable federal civil rights laws and [place] laws. We do not discriminate on the basis of race, color, national origin, age, disability, sex, sexual orientation, or gender identity.            Thank you!     Thank you for choosing [facility]  for your care. Our goal is always to provide you with excellent care. Hearing back from our patients is one way we can continue to improve our services. Please take a few minutes to complete the written survey that you may receive in the mail after your visit with us. Thank you!             Your Updated Medication List - Protect others around you: Learn how to safely use, store and throw away your medicines at www.disposemymeds.org.          This list is accurate as " of: 11/16/17 11:42 AM.  Always use your most recent med list.                   Brand Name Dispense Instructions for use Diagnosis    acetaminophen 325 MG tablet    TYLENOL    100 tablet    Take 2 tablets (650 mg) by mouth every 4 hours as needed for other (surgical pain)    Normal pressure hydrocephalus       clotrimazole-betamethasone cream    LOTRISONE    60 g    Apply topically 2 times daily    Rash       furosemide 40 MG tablet    LASIX    90 tablet    TAKE ONE TABLET BY MOUTH DAILY AT 10AM    Hypertension goal BP (blood pressure) < 140/90       JANTOVEN PO      Take 2mg by mouth on Sunday, Wednesday, Friday and take 4mg rest of week.        levothyroxine 50 MCG tablet    SYNTHROID/LEVOTHROID    90 tablet    TAKE ONE TABLET BY MOUTH EVERY DAY AT 7:30AM. DUE FOR LABS FOR FURTHER REFILLS.    Hypothyroidism, unspecified type       polyethylene glycol Packet    MIRALAX/GLYCOLAX    7 packet    Take 17 g by mouth daily as needed for constipation    Colonic mass       senna-docusate 8.6-50 MG per tablet    SENOKOT-S;PERICOLACE    100 tablet    Take 1 tablet by mouth 2 times daily    Colonic mass       tamsulosin 0.4 MG capsule    FLOMAX    90 capsule    TAKE ONE CAPSULE BY MOUTH DAILY AT 10AM    Urinary retention       VITAMIN D (CHOLECALCIFEROL) PO      Take 1,000 Units by mouth daily    Leukocytosis, unspecified type

## 2017-11-22 ENCOUNTER — TELEPHONE (OUTPATIENT)
Dept: FAMILY MEDICINE | Facility: CLINIC | Age: 77
End: 2017-11-22

## 2017-11-22 DIAGNOSIS — R21 RASH: Primary | ICD-10-CM

## 2017-11-22 RX ORDER — FLUCONAZOLE 150 MG/1
150 TABLET ORAL
Qty: 4 TABLET | Refills: 0 | Status: SHIPPED | OUTPATIENT
Start: 2017-11-22 | End: 2018-02-20

## 2017-11-22 NOTE — TELEPHONE ENCOUNTER
Rash  Onset: before 10/31/17 last OV    Description:   Location: vaginal area  Character: painful, red, small white discharge x1  Itching (Pruritis): YES    Progression of Symptoms:  worsening    Accompanying Signs & Symptoms:  Fever: no   Body aches or joint pain: no   Sore throat symptoms: no   Recent cold symptoms: no     History:   Previous similar rash: YES- see 10/31/2017 OV notes    Precipitating factors:   Exposure to similar rash: no   New exposures: None   Recent travel: no     Alleviating factors:  Two creams below helped at first and now are not working very well.     Therapies Tried and outcome: lotrisone and bactroban creams- not effective     wanted this routed to TS for recommendations.     Pt uses [facility] pharmacy. [phone] call back Vernon.    Routing to PCP for further review/recommendations/orders.  Radha Panda RN  [place] Triage

## 2017-11-22 NOTE — TELEPHONE ENCOUNTER
Reason for Call:  Other     Detailed comments: Patient's  calling back regarding this for Ángela. They want a call back to see what to do next.    Phone Number Patient can be reached at: Home number on file [phone] (home)    Best Time: Anytime    Can we leave a detailed message on this number? YES    Call taken on 11/22/2017 at 2:12 PM by Zakia Reis

## 2017-11-22 NOTE — TELEPHONE ENCOUNTER
I let the patients  know that we put through Rx to pharmacy.  Ok per Dr Rockwell to put Rx through with high over ride warning.  He isn't sure if he wants a referral to skin but I did tell him I put it through and he can call back PRN  Itzel Regalado RN- Triage [employer]

## 2017-11-27 ENCOUNTER — TRANSFERRED RECORDS (OUTPATIENT)
Dept: HEALTH INFORMATION MANAGEMENT | Facility: CLINIC | Age: 77
End: 2017-11-27

## 2017-11-27 ENCOUNTER — ANTICOAGULATION THERAPY VISIT (OUTPATIENT)
Dept: FAMILY MEDICINE | Facility: CLINIC | Age: 77
End: 2017-11-27
Payer: COMMERCIAL

## 2017-11-27 DIAGNOSIS — Z79.01 LONG-TERM (CURRENT) USE OF ANTICOAGULANTS: ICD-10-CM

## 2017-11-27 DIAGNOSIS — I82.409 DVT (DEEP VENOUS THROMBOSIS) (H): ICD-10-CM

## 2017-11-27 DIAGNOSIS — I26.99 PULMONARY EMBOLISM (H): ICD-10-CM

## 2017-11-27 LAB — INR PPP: 3.3 (ref 2–3)

## 2017-11-27 PROCEDURE — 99207 ZZC NO CHARGE NURSE ONLY: CPT | Performed by: FAMILY MEDICINE

## 2017-11-27 NOTE — PROGRESS NOTES
ANTICOAGULATION FOLLOW-UP CLINIC VISIT    Patient Name:  Ángela Dumont  Date:  11/27/2017  Contact Type:  Face to Face    SUBJECTIVE:     Patient Findings     Positives No Problem Findings, Unexplained INR or factor level change           OBJECTIVE    INR   Date Value Ref Range Status   11/27/2017 3.3  Final       ASSESSMENT / PLAN  No question data found.  Anticoagulation Summary as of 11/27/2017     INR goal 2.0-3.0   Today's INR 3.3!   Maintenance plan 2 mg (1 mg x 2) on Sun, Wed, Fri; 4 mg (1 mg x 4) all other days   Full instructions 11/27: 2 mg; Otherwise 2 mg on Sun, Wed, Fri; 4 mg all other days   Weekly total 22 mg   Plan last modified Dorita Panda RN (8/4/2017)   Next INR check 12/4/2017   Target end date     Indications   Long-term (current) use of anticoagulants [Z79.01] [Z79.01]  Pulmonary embolism (H) [I26.99]  DVT (deep venous thrombosis) (H) [I82.409]         Anticoagulation Episode Summary     INR check location     Preferred lab     Send INR reminders to RV NURSE    Comments       Anticoagulation Care Providers     Provider Role Specialty Phone number    Joaquin Rockwell MD [facility] [phone]            See the Encounter Report to view Anticoagulation Flowsheet and Dosing Calendar (Go to Encounters tab in chart review, and find the Anticoagulation Therapy Visit)    Dosage adjustment made based on physician directed care plan.    Cadence Gonzalez RN

## 2017-11-27 NOTE — MR AVS SNAPSHOT
Ángela VITALY Dumont   11/27/2017   Anticoagulation Therapy Visit    Description:  77 year old female   Provider:  Joaquin Rockwell MD   Department:   Family Practice           INR as of 11/27/2017     Today's INR 3.3!      Anticoagulation Summary as of 11/27/2017     INR goal 2.0-3.0   Today's INR 3.3!   Full instructions 11/27: 2 mg; Otherwise 2 mg on Sun, Wed, Fri; 4 mg all other days   Next INR check 12/4/2017    Indications   Long-term (current) use of anticoagulants [Z79.01] [Z79.01]  Pulmonary embolism (H) [I26.99]  DVT (deep venous thrombosis) (H) [I82.409]         November 2017 Details    Sun Mon Tue Wed Thu Fri Sat        1               2               3               4                 5               6               7               8               9               10               11                 12               13               14               15               16               17               18                 19               20               21               22               23               24               25                 26               27      2 mg   See details      28      4 mg         29      2 mg         30      4 mg            Date Details   11/27 This INR check               How to take your warfarin dose     To take:  2 mg Take 2 of the 1 mg tablets.    To take:  4 mg Take 4 of the 1 mg tablets.           December 2017 Details    Sun Mon Tue Wed Thu Fri Sat          1      2 mg         2      4 mg           3      2 mg         4            5               6               7               8               9                 10               11               12               13               14               15               16                 17               18               19               20               21               22               23                 24               25               26               27               28               29               30                 31                       Date Details   No additional details    Date of next INR:  12/4/2017         How to take your warfarin dose     To take:  2 mg Take 2 of the 1 mg tablets.    To take:  4 mg Take 4 of the 1 mg tablets.

## 2017-12-04 ENCOUNTER — TELEPHONE (OUTPATIENT)
Dept: FAMILY MEDICINE | Facility: CLINIC | Age: 77
End: 2017-12-04

## 2017-12-04 ENCOUNTER — ANTICOAGULATION THERAPY VISIT (OUTPATIENT)
Dept: FAMILY MEDICINE | Facility: CLINIC | Age: 77
End: 2017-12-04
Payer: COMMERCIAL

## 2017-12-04 ENCOUNTER — TRANSFERRED RECORDS (OUTPATIENT)
Dept: HEALTH INFORMATION MANAGEMENT | Facility: CLINIC | Age: 77
End: 2017-12-04

## 2017-12-04 DIAGNOSIS — Z79.01 LONG-TERM (CURRENT) USE OF ANTICOAGULANTS: ICD-10-CM

## 2017-12-04 DIAGNOSIS — I82.409 DVT (DEEP VENOUS THROMBOSIS) (H): ICD-10-CM

## 2017-12-04 DIAGNOSIS — I26.99 PULMONARY EMBOLISM (H): ICD-10-CM

## 2017-12-04 LAB
INR PPP: 5.8
INR PPP: 5.8 (ref 2–3)

## 2017-12-04 PROCEDURE — 99207 ZZC NO CHARGE LOS: CPT | Performed by: FAMILY MEDICINE

## 2017-12-04 PROCEDURE — 99207 ZZC NO CHARGE NURSE ONLY: CPT | Performed by: FAMILY MEDICINE

## 2017-12-04 NOTE — TELEPHONE ENCOUNTER
Called # below     Left a non detailed VM     Cadence Gonzalez RN, BSN  [facility]

## 2017-12-04 NOTE — PROGRESS NOTES
ANTICOAGULATION FOLLOW-UP CLINIC VISIT    Patient Name:  Ángela Dumont  Date:  12/4/2017  Contact Type:  Telephone - left a detailed VM at [phone] (home)       SUBJECTIVE:     Patient Findings     Positives Unexplained INR or factor level change           OBJECTIVE    INR   Date Value Ref Range Status   12/04/2017 5.8  Final       ASSESSMENT / PLAN  No question data found.  Anticoagulation Summary as of 12/4/2017     INR goal 2.0-3.0   Today's INR 5.8!   Maintenance plan 2 mg (1 mg x 2) on Sun, Wed, Fri; 4 mg (1 mg x 4) all other days   Full instructions 12/4: Hold; 12/5: Hold; Otherwise 2 mg on Sun, Wed, Fri; 4 mg all other days   Weekly total 22 mg   Plan last modified Dorita Panda RN (8/4/2017)   Next INR check 12/6/2017   Target end date     Indications   Long-term (current) use of anticoagulants [Z79.01] [Z79.01]  Pulmonary embolism (H) [I26.99]  DVT (deep venous thrombosis) (H) [I82.409]         Anticoagulation Episode Summary     INR check location     Preferred lab     Send INR reminders to RV NURSE    Comments       Anticoagulation Care Providers     Provider Role Specialty Phone number    Joaquin Rockwell MD [facility] [phone]            See the Encounter Report to view Anticoagulation Flowsheet and Dosing Calendar (Go to Encounters tab in chart review, and find the Anticoagulation Therapy Visit)    Dosage adjustment made based on physician directed care plan.    Cadence Gonzalez RN

## 2017-12-04 NOTE — MR AVS SNAPSHOT
Ángela HAIDER Tim   12/4/2017   Anticoagulation Therapy Visit    Description:  77 year old female   Provider:  Joaquin Rockwell MD   Department:   Family Practice           INR as of 12/4/2017     Today's INR 5.8!      Anticoagulation Summary as of 12/4/2017     INR goal 2.0-3.0   Today's INR 5.8!   Full instructions 12/4: Hold; 12/5: Hold; Otherwise 2 mg on Sun, Wed, Fri; 4 mg all other days   Next INR check 12/6/2017    Indications   Long-term (current) use of anticoagulants [Z79.01] [Z79.01]  Pulmonary embolism (H) [I26.99]  DVT (deep venous thrombosis) (H) [I82.409]         December 2017 Details    Sun Mon Tue Wed Thu Fri Sat          1               2                 3               4      Hold   See details      5      Hold         6            7               8               9                 10               11               12               13               14               15               16                 17               18               19               20               21               22               23                 24               25               26               27               28               29               30                 31                      Date Details   12/04 This INR check       Date of next INR:  12/6/2017         How to take your warfarin dose     To take:  2 mg Take 2 of the 1 mg tablets.    Hold Do not take your warfarin dose. See the Details table to the right for additional instructions.

## 2017-12-04 NOTE — TELEPHONE ENCOUNTER
Reason for Call:  Request for results:    Name of test or procedure: INR Results    Date of test of procedure: 12/4/2017    Location of the test or procedure: pt's home    OK to leave the result message on voice mail or with a family member? YES    Phone number Patient can be reached at:  Other phone number: [phone] [employer]     Additional comments: Pt's home monitoring company called this afternoon to report an INR result of 5.8. If there are any questions or anything please give pt's home facility a call back ASAP. Thank you.    Call taken on 12/4/2017 at 2:08 PM by Ayanna Riley

## 2017-12-04 NOTE — PROGRESS NOTES
See other INR encounter.      Nereyda Padilla, BS, RN, N  [facility]) [phone]

## 2017-12-04 NOTE — PROGRESS NOTES
ANTICOAGULATION FOLLOW-UP CLINIC VISIT    Patient Name:  Ángela Dumont  Date:  12/4/2017  Contact Type:  Telephone/ Spoke with  Siva    SUBJECTIVE:     Patient Findings     Positives Change in medications (Taking Diflucan weekly x 4 weeks per dermatology and nystatin cream)           OBJECTIVE    INR   Date Value Ref Range Status   12/04/2017 5.8  Final   12/04/2017 5.8  Final       ASSESSMENT / PLAN  INR assessment SUPRA    Recheck INR In: 3 DAYS    INR Location Home INR      Anticoagulation Summary as of 12/4/2017     INR goal 2.0-3.0   Today's INR 5.8!   Maintenance plan 2 mg (1 mg x 2) on Sun, Wed, Fri; 4 mg (1 mg x 4) all other days   Full instructions 12/4: Hold; 12/5: Hold; Otherwise 2 mg on Sun, Wed, Fri; 4 mg all other days   Weekly total 22 mg   Plan last modified Dorita Panda, RN (8/4/2017)   Next INR check 12/6/2017   Target end date     Indications   Long-term (current) use of anticoagulants [Z79.01] [Z79.01]  Pulmonary embolism (H) [I26.99]  DVT (deep venous thrombosis) (H) [I82.409]         Anticoagulation Episode Summary     INR check location     Preferred lab     Send INR reminders to RV NURSE    Comments       Anticoagulation Care Providers     Provider Role Specialty Phone number    Joaquin Rockwell MD [facility] [phone]            See the Encounter Report to view Anticoagulation Flowsheet and Dosing Calendar (Go to Encounters tab in chart review, and find the Anticoagulation Therapy Visit)    Dosage adjustment made based on physician directed care plan.    Per Juan David patient is taking Diflucan weekly x 4 weeks and Nystatin cream as ordered by her dermatology.  He said he used the last test strip today and hopes to get more before Wednesday.  If he does not get more strips he will call us to schedule clinic INR appointment.    Nereyda Padilla, RN

## 2017-12-05 NOTE — TELEPHONE ENCOUNTER
Pts  calling - he called joi about the high read of 5.8 INR and he learned there is dried blood on the reader and was told by joi that the dried blood can cause an error with the number  Rn advised that if it was dried blood it could of brought the INR down than up, but he still should stay on the same plan as directed   He will still recheck the INR tomorrow and will call clinic with result     Cadence Gonzalez RN, BSN  [facility]

## 2017-12-06 ENCOUNTER — ANTICOAGULATION THERAPY VISIT (OUTPATIENT)
Dept: FAMILY MEDICINE | Facility: CLINIC | Age: 77
End: 2017-12-06
Payer: COMMERCIAL

## 2017-12-06 ENCOUNTER — TRANSFERRED RECORDS (OUTPATIENT)
Dept: HEALTH INFORMATION MANAGEMENT | Facility: CLINIC | Age: 77
End: 2017-12-06

## 2017-12-06 DIAGNOSIS — I82.409 DVT (DEEP VENOUS THROMBOSIS) (H): ICD-10-CM

## 2017-12-06 DIAGNOSIS — I26.99 PULMONARY EMBOLISM (H): ICD-10-CM

## 2017-12-06 DIAGNOSIS — Z79.01 LONG-TERM (CURRENT) USE OF ANTICOAGULANTS: ICD-10-CM

## 2017-12-06 LAB — INR PPP: 3.8 (ref 2–3)

## 2017-12-06 PROCEDURE — 99207 ZZC NO CHARGE NURSE ONLY: CPT | Performed by: FAMILY MEDICINE

## 2017-12-06 NOTE — MR AVS SNAPSHOT
Ángela VITALY Dumont   12/6/2017   Anticoagulation Therapy Visit    Description:  77 year old female   Provider:  Joaquin Rockwell MD   Department:   Family Practice           INR as of 12/6/2017     Today's INR 3.8!      Anticoagulation Summary as of 12/6/2017     INR goal 2.0-3.0   Today's INR 3.8!   Full instructions 12/7: 2 mg; Otherwise 2 mg on Sun, Wed, Fri; 4 mg all other days   Next INR check 12/11/2017    Indications   Long-term (current) use of anticoagulants [Z79.01] [Z79.01]  Pulmonary embolism (H) [I26.99]  DVT (deep venous thrombosis) (H) [I82.409]         December 2017 Details    Sun Mon Tue Wed Thu Fri Sat          1               2                 3               4               5               6      2 mg   See details      7      2 mg         8      2 mg         9      4 mg           10      2 mg         11            12               13               14               15               16                 17               18               19               20               21               22               23                 24               25               26               27               28               29               30                 31                      Date Details   12/06 This INR check       Date of next INR:  12/11/2017         How to take your warfarin dose     To take:  2 mg Take 2 of the 1 mg tablets.    To take:  4 mg Take 4 of the 1 mg tablets.

## 2017-12-06 NOTE — PROGRESS NOTES
ANTICOAGULATION FOLLOW-UP CLINIC VISIT    Patient Name:  Ángela Dumont  Date:  12/6/2017  Contact Type:  Telephone    SUBJECTIVE:     Patient Findings     Positives Intentional hold of therapy           OBJECTIVE    INR   Date Value Ref Range Status   12/06/2017 3.8  Final       ASSESSMENT / PLAN  INR assessment SUPRA    Recheck INR In: 5 DAYS    INR Location Home INR      Anticoagulation Summary as of 12/6/2017     INR goal 2.0-3.0   Today's INR 3.8!   Maintenance plan 2 mg (1 mg x 2) on Sun, Wed, Fri; 4 mg (1 mg x 4) all other days   Full instructions 12/7: 2 mg; Otherwise 2 mg on Sun, Wed, Fri; 4 mg all other days   Weekly total 22 mg   Plan last modified Dorita Panda RN (8/4/2017)   Next INR check 12/11/2017   Target end date     Indications   Long-term (current) use of anticoagulants [Z79.01] [Z79.01]  Pulmonary embolism (H) [I26.99]  DVT (deep venous thrombosis) (H) [I82.409]         Anticoagulation Episode Summary     INR check location     Preferred lab     Send INR reminders to RV NURSE    Comments       Anticoagulation Care Providers     Provider Role Specialty Phone number    Joaquin Rockwell MD [facility] [phone]            See the Encounter Report to view Anticoagulation Flowsheet and Dosing Calendar (Go to Encounters tab in chart review, and find the Anticoagulation Therapy Visit)    Dosage adjustment made based on physician directed care plan.    Dorita Panda RN

## 2017-12-11 ENCOUNTER — ANTICOAGULATION THERAPY VISIT (OUTPATIENT)
Dept: FAMILY MEDICINE | Facility: CLINIC | Age: 77
End: 2017-12-11
Payer: COMMERCIAL

## 2017-12-11 ENCOUNTER — TRANSFERRED RECORDS (OUTPATIENT)
Dept: HEALTH INFORMATION MANAGEMENT | Facility: CLINIC | Age: 77
End: 2017-12-11

## 2017-12-11 DIAGNOSIS — Z79.01 LONG-TERM (CURRENT) USE OF ANTICOAGULANTS: ICD-10-CM

## 2017-12-11 DIAGNOSIS — I82.409 DVT (DEEP VENOUS THROMBOSIS) (H): ICD-10-CM

## 2017-12-11 DIAGNOSIS — I26.99 PULMONARY EMBOLISM (H): ICD-10-CM

## 2017-12-11 LAB — INR PPP: 3.8 (ref 2–3)

## 2017-12-11 PROCEDURE — 99207 ZZC NO CHARGE NURSE ONLY: CPT | Performed by: FAMILY MEDICINE

## 2017-12-11 NOTE — PROGRESS NOTES
ANTICOAGULATION FOLLOW-UP CLINIC VISIT    Patient Name:  Ángela Dumont  Date:  12/11/2017  Contact Type:  Face to Face    SUBJECTIVE:     Patient Findings     Positives No Problem Findings, Unexplained INR or factor level change           OBJECTIVE    INR   Date Value Ref Range Status   12/11/2017 3.8  Final       ASSESSMENT / PLAN  No question data found.  Anticoagulation Summary as of 12/11/2017     INR goal 2.0-3.0   Today's INR 3.8!   Maintenance plan 2 mg (1 mg x 2) on Sun, Wed, Fri; 4 mg (1 mg x 4) all other days   Full instructions 12/11: Hold; 12/12: 2 mg; 12/19: 2 mg; Otherwise 2 mg on Sun, Wed, Fri; 4 mg all other days   Weekly total 22 mg   Plan last modified Dorita Panda, RN (8/4/2017)   Next INR check 12/20/2017   Target end date     Indications   Long-term (current) use of anticoagulants [Z79.01] [Z79.01]  Pulmonary embolism (H) [I26.99]  DVT (deep venous thrombosis) (H) [I82.409]         Anticoagulation Episode Summary     INR check location     Preferred lab     Send INR reminders to RV NURSE    Comments       Anticoagulation Care Providers     Provider Role Specialty Phone number    Joaquin Rockwell MD [facility] [phone]            Advised pts  on the above dosing - pts  wanted to go out a few weeks Rn advised that he should be checking sooner  Than a few weeks due to making sure th change did bring pt into range     Patient's  stated an understanding and agreed with plan.      See the Encounter Report to view Anticoagulation Flowsheet and Dosing Calendar (Go to Encounters tab in chart review, and find the Anticoagulation Therapy Visit)    Dosage adjustment made based on physician directed care plan.    Cadence Gonzalez, RN

## 2017-12-11 NOTE — MR AVS SNAPSHOT
Ángela VITALY Dumont   12/11/2017   Anticoagulation Therapy Visit    Description:  77 year old female   Provider:  Joaquin Rockwell MD   Department:   Family Practice           INR as of 12/11/2017     Today's INR 3.8!      Anticoagulation Summary as of 12/11/2017     INR goal 2.0-3.0   Today's INR 3.8!   Full instructions 12/11: Hold; 12/12: 2 mg; 12/19: 2 mg; Otherwise 2 mg on Sun, Wed, Fri; 4 mg all other days   Next INR check 12/20/2017    Indications   Long-term (current) use of anticoagulants [Z79.01] [Z79.01]  Pulmonary embolism (H) [I26.99]  DVT (deep venous thrombosis) (H) [I82.409]         Description             December 2017 Details    Sun Mon Tue Wed Thu Fri Sat          1               2                 3               4               5               6               7               8               9                 10               11      Hold   See details      12      2 mg         13      2 mg         14      4 mg         15      2 mg         16      4 mg           17      2 mg         18      4 mg         19      2 mg         20            21               22               23                 24               25               26               27               28               29               30                 31                      Date Details   12/11 This INR check       Date of next INR:  12/20/2017         How to take your warfarin dose     To take:  2 mg Take 2 of the 1 mg tablets.    To take:  4 mg Take 4 of the 1 mg tablets.    Hold Do not take your warfarin dose. See the Details table to the right for additional instructions.

## 2017-12-19 ENCOUNTER — TELEPHONE (OUTPATIENT)
Dept: FAMILY MEDICINE | Facility: CLINIC | Age: 77
End: 2017-12-19

## 2017-12-19 DIAGNOSIS — Z53.9 DIAGNOSIS NOT YET DEFINED: Primary | ICD-10-CM

## 2017-12-19 PROCEDURE — G0179 MD RECERTIFICATION HHA PT: HCPCS | Performed by: FAMILY MEDICINE

## 2017-12-19 NOTE — TELEPHONE ENCOUNTER
Completed forms form home health cert and plan of care faxed back to [facility] at [phone].   Originals sent to be scanned.     Kathryn Carson

## 2017-12-20 ENCOUNTER — ANTICOAGULATION THERAPY VISIT (OUTPATIENT)
Dept: NURSING | Facility: CLINIC | Age: 77
End: 2017-12-20
Payer: COMMERCIAL

## 2017-12-20 ENCOUNTER — TRANSFERRED RECORDS (OUTPATIENT)
Dept: HEALTH INFORMATION MANAGEMENT | Facility: CLINIC | Age: 77
End: 2017-12-20

## 2017-12-20 DIAGNOSIS — I26.99 PULMONARY EMBOLISM (H): ICD-10-CM

## 2017-12-20 DIAGNOSIS — Z79.01 LONG-TERM (CURRENT) USE OF ANTICOAGULANTS: ICD-10-CM

## 2017-12-20 DIAGNOSIS — I82.409 DVT (DEEP VENOUS THROMBOSIS) (H): ICD-10-CM

## 2017-12-20 LAB — INR PPP: 2.8 (ref 2–3)

## 2017-12-20 PROCEDURE — 99207 ZZC NO CHARGE NURSE ONLY: CPT | Performed by: FAMILY MEDICINE

## 2017-12-20 NOTE — PROGRESS NOTES
ANTICOAGULATION FOLLOW-UP CLINIC VISIT    Patient Name:  Ángela Dumont  Date:  12/20/2017  Contact Type:  Telephone    SUBJECTIVE:        OBJECTIVE    INR   Date Value Ref Range Status   12/20/2017 2.8  Final       ASSESSMENT / PLAN  INR assessment THER    Recheck INR In: 2 WEEKS    INR Location Clinic      Anticoagulation Summary as of 12/20/2017     INR goal 2.0-3.0   Today's INR 2.8   Maintenance plan 2 mg (1 mg x 2) on Sun, Wed, Fri; 4 mg (1 mg x 4) all other days   Full instructions 12/21: 2 mg; 12/23: 2 mg; 12/25: 2 mg; 12/26: 2 mg; 12/28: 2 mg; 12/30: 2 mg; 1/1: 2 mg; 1/2: 2 mg; Otherwise 2 mg on Sun, Wed, Fri; 4 mg all other days   Weekly total 22 mg   Plan last modified Dorita Panda RN (8/4/2017)   Next INR check 1/3/2018   Target end date     Indications   Long-term (current) use of anticoagulants [Z79.01] [Z79.01]  Pulmonary embolism (H) [I26.99]  DVT (deep venous thrombosis) (H) [I82.409]         Anticoagulation Episode Summary     INR check location     Preferred lab     Send INR reminders to RV NURSE    Comments       Anticoagulation Care Providers     Provider Role Specialty Phone number    Joaquin Rockwell MD [facility] [phone]            See the Encounter Report to view Anticoagulation Flowsheet and Dosing Calendar (Go to Encounters tab in chart review, and find the Anticoagulation Therapy Visit)    Dosage adjustment made based on physician directed care plan.    Dorita Panda RN

## 2017-12-20 NOTE — MR AVS SNAPSHOT
Ángela HAIDER Tim   12/20/2017   Anticoagulation Therapy Visit    Description:  77 year old female   Provider:  Joaquin Rockwell MD   Department:  Rv Nurse           INR as of 12/20/2017     Today's INR 2.8      Anticoagulation Summary as of 12/20/2017     INR goal 2.0-3.0   Today's INR 2.8   Full instructions 12/21: 2 mg; 12/23: 2 mg; 12/25: 2 mg; 12/26: 2 mg; 12/28: 2 mg; 12/30: 2 mg; 1/1: 2 mg; 1/2: 2 mg; Otherwise 2 mg on Sun, Wed, Fri; 4 mg all other days   Next INR check 1/3/2018    Indications   Long-term (current) use of anticoagulants [Z79.01] [Z79.01]  Pulmonary embolism (H) [I26.99]  DVT (deep venous thrombosis) (H) [I82.409]         Description     pts  says that they held on Thursday and Monday of this weeks dosing schedule. This means the pt only took 12mg for this week with holds in between.  Advised for pt to take 14mg for a one week period and recheck next week on 12/27/17. Patient refused. They will check in 2 weeks against anticoagulation nurse recommendations.       Contact Numbers     Clinic Number:         December 2017 Details    Sun Mon Tue Wed Thu Fri Sat          1               2                 3               4               5               6               7               8               9                 10               11               12               13               14               15               16                 17               18               19               20      2 mg   See details      21      2 mg         22      2 mg         23      2 mg           24      2 mg         25      2 mg         26      2 mg         27      2 mg         28      2 mg         29      2 mg         30      2 mg           31      2 mg                Date Details   12/20 This INR check               How to take your warfarin dose     To take:  2 mg Take 2 of the 1 mg tablets.           January 2018 Details    Sun Mon Tue Wed Thu Fri Sat      1      2 mg         2      2 mg         3             4               5               6                 7               8               9               10               11               12               13                 14               15               16               17               18               19               20                 21               22               23               24               25               26               27                 28               29               30               31                   Date Details   No additional details    Date of next INR:  1/3/2018         How to take your warfarin dose     To take:  2 mg Take 2 of the 1 mg tablets.

## 2017-12-30 DIAGNOSIS — I10 HYPERTENSION GOAL BP (BLOOD PRESSURE) < 140/90: ICD-10-CM

## 2018-01-04 ENCOUNTER — TRANSFERRED RECORDS (OUTPATIENT)
Dept: HEALTH INFORMATION MANAGEMENT | Facility: CLINIC | Age: 78
End: 2018-01-04

## 2018-01-04 ENCOUNTER — ANTICOAGULATION THERAPY VISIT (OUTPATIENT)
Dept: NURSING | Facility: CLINIC | Age: 78
End: 2018-01-04
Payer: COMMERCIAL

## 2018-01-04 DIAGNOSIS — Z79.01 LONG-TERM (CURRENT) USE OF ANTICOAGULANTS: ICD-10-CM

## 2018-01-04 LAB
INR POINT OF CARE: 2 (ref 0.86–1.14)
INR PPP: 2 (ref 2–3)

## 2018-01-04 PROCEDURE — 85610 PROTHROMBIN TIME: CPT | Mod: QW | Performed by: FAMILY MEDICINE

## 2018-01-04 PROCEDURE — 36416 COLLJ CAPILLARY BLOOD SPEC: CPT | Performed by: FAMILY MEDICINE

## 2018-01-04 NOTE — MR AVS SNAPSHOT
Ángela Dumont   1/4/2018   Anticoagulation Therapy Visit    Description:  77 year old female   Provider:  Joaquin Rockwell MD   Department:  R[facility] Nurse           INR as of 1/4/2018     Today's INR 2      Anticoagulation Summary as of 1/4/2018     INR goal 2.0-3.0   Today's INR 2   Full instructions 4 mg on Sun, Wed, Fri; 2 mg all other days   Next INR check 2/1/2018    Indications   Long-term (current) use of anticoagulants [Z79.01] [Z79.01]  Pulmonary embolism (H) [I26.99]  DVT (deep venous thrombosis) (H) [I82.409]         Contact Numbers     Clinic Number:         January 2018 Details    Sun Mon Tue Wed Thu Fri Sat      1               2               3               4      2 mg   See details      5      4 mg         6      2 mg           7      4 mg         8      2 mg         9      2 mg         10      4 mg         11      2 mg         12      4 mg         13      2 mg           14      4 mg         15      2 mg         16      2 mg         17      4 mg         18      2 mg         19      4 mg         20      2 mg           21      4 mg         22      2 mg         23      2 mg         24      4 mg         25      2 mg         26      4 mg         27      2 mg           28      4 mg         29      2 mg         30      2 mg         31      4 mg             Date Details   01/04 This INR check               How to take your warfarin dose     To take:  2 mg Take 2 of the 1 mg tablets.    To take:  4 mg Take 4 of the 1 mg tablets.           February 2018 Details    Sun Mon Tue Wed Thu Fri Sat         1            2               3                 4               5               6               7               8               9               10                 11               12               13               14               15               16               17                 18               19               20               21               22               23               24                 25                26               27               28                   Date Details   No additional details    Date of next INR:  2/1/2018         How to take your warfarin dose     To take:  2 mg Take 2 of the 1 mg tablets.

## 2018-01-04 NOTE — PROGRESS NOTES
ANTICOAGULATION FOLLOW-UP CLINIC VISIT    Patient Name:  Ángela Dumont  Date:  1/4/2018  Contact Type:  Telephone    SUBJECTIVE:     Patient Findings     Positives No Problem Findings           OBJECTIVE    INR   Date Value Ref Range Status   01/04/2018 2  Corrected     INR Protime   Date Value Ref Range Status   01/04/2018 2.0 (A) 0.86 - 1.14 Final       ASSESSMENT / PLAN  INR assessment THER    Recheck INR In: 4 WEEKS    INR Location Home INR      Anticoagulation Summary as of 1/4/2018     INR goal 2.0-3.0   Today's INR 2   Maintenance plan 4 mg (1 mg x 4) on Sun, Wed, Fri; 2 mg (1 mg x 2) all other days   Full instructions 4 mg on Sun, Wed, Fri; 2 mg all other days   Weekly total 20 mg   Plan last modified Nereyda Padilla RN (1/4/2018)   Next INR check 2/1/2018   Target end date     Indications   Long-term (current) use of anticoagulants [Z79.01] [Z79.01]  Pulmonary embolism (H) [I26.99]  DVT (deep venous thrombosis) (H) [I82.409]         Anticoagulation Episode Summary     INR check location     Preferred lab     Send INR reminders to RV NURSE    Comments       Anticoagulation Care Providers     Provider Role Specialty Phone number    Joaquin Rockwell MD Responsible [facility] [phone]            See the Encounter Report to view Anticoagulation Flowsheet and Dosing Calendar (Go to Encounters tab in chart review, and find the Anticoagulation Therapy Visit)    Dosage adjustment made based on physician directed care plan.    Maintenance dose decreased slightly due to recent high INR readings.    Nereyda Padilla, SMITA

## 2018-01-05 RX ORDER — FUROSEMIDE 40 MG
TABLET ORAL
Qty: 90 TABLET | Refills: 1 | Status: SHIPPED | OUTPATIENT
Start: 2018-01-05 | End: 2018-07-02

## 2018-01-10 ENCOUNTER — TELEPHONE (OUTPATIENT)
Dept: FAMILY MEDICINE | Facility: CLINIC | Age: 78
End: 2018-01-10

## 2018-01-10 ENCOUNTER — MEDICAL CORRESPONDENCE (OUTPATIENT)
Dept: HEALTH INFORMATION MANAGEMENT | Facility: CLINIC | Age: 78
End: 2018-01-10

## 2018-01-10 NOTE — TELEPHONE ENCOUNTER
Date Forms was received: January 10, 2018    Forms received by: Fax    Purpose of Form:  PT/OT Orders - [facility]    How the form needs to be returned for patient:  Fax    Form currently placed  [facility]

## 2018-01-16 DIAGNOSIS — E03.9 HYPOTHYROIDISM, UNSPECIFIED TYPE: ICD-10-CM

## 2018-01-16 RX ORDER — LEVOTHYROXINE SODIUM 50 UG/1
TABLET ORAL
Qty: 90 TABLET | Refills: 1 | Status: SHIPPED | OUTPATIENT
Start: 2018-01-16 | End: 2018-07-13

## 2018-01-16 NOTE — TELEPHONE ENCOUNTER
"Requested Prescriptions   Pending Prescriptions Disp Refills     levothyroxine (SYNTHROID/LEVOTHROID) 50 MCG tablet [Pharmacy Med Name: LEVOTHYROXINE SODIUM 50MCG TABS] 90 tablet 1     Sig: TAKE ONE TABLET BY MOUTH EVERY DAY AT 7:30AM. DUE FOR LABS FOR FURTHER REFILLS.    Thyroid Protocol Passed    1/16/2018 11:25 AM       Passed - Patient is 12 years or older       Passed - Recent or future visit with authorizing provider's specialty    Patient had office visit in the last year or has a visit in the next 30 days with authorizing provider.  See \"Patient Info\" tab in inbasket, or \"Choose Columns\" in Meds & Orders section of the refill encounter.              Passed - Normal TSH on file in past 12 months    Recent Labs   Lab Test  07/26/17   1118   TSH  2.51             Passed - No active pregnancy on record    If patient is pregnant or has had a positive pregnancy test, please check TSH.         Passed - No positive pregnancy test in past 12 months    If patient is pregnant or has had a positive pregnancy test, please check TSH.            Prescription approved per [facility] Refill Protocol.    Nereyda Padilla, NAYELI, RN, PHN  [facility]) [phone]      "

## 2018-01-17 ENCOUNTER — TELEPHONE (OUTPATIENT)
Dept: FAMILY MEDICINE | Facility: CLINIC | Age: 78
End: 2018-01-17

## 2018-01-17 NOTE — TELEPHONE ENCOUNTER
Date Forms was received: January 17, 2018    Forms received by: Fax    Last office visit: 10/2017    Purpose of Form:  PT/ Orders Missed Visit     When the form is due:  ASAP    How the form needs to be returned for patient:  Fax to [phone]    Form currently placed  North File

## 2018-01-17 NOTE — TELEPHONE ENCOUNTER
Completed forms faxed back to [facility] at [phone].   Originals sent to be scanned.     Kathryn Carson

## 2018-01-23 ENCOUNTER — TELEPHONE (OUTPATIENT)
Dept: FAMILY MEDICINE | Facility: CLINIC | Age: 78
End: 2018-01-23

## 2018-01-25 ENCOUNTER — TELEPHONE (OUTPATIENT)
Dept: FAMILY MEDICINE | Facility: CLINIC | Age: 78
End: 2018-01-25

## 2018-01-25 NOTE — TELEPHONE ENCOUNTER
Zulema with [facility] RN. [phone]    Pt has a small open area on left upper crease between buttock.      Orders for dressing foam for wound care of this area. 1cm by .5cm in width.  No drainage.   will change the area Q 3days and they will teach him how to do this.     Pt has nystatin from Dr. Santiago that they don't need.    Verbal orders given.     Radha Panda RN  [facility]

## 2018-01-29 ENCOUNTER — MEDICAL CORRESPONDENCE (OUTPATIENT)
Dept: HEALTH INFORMATION MANAGEMENT | Facility: CLINIC | Age: 78
End: 2018-01-29

## 2018-01-30 ENCOUNTER — TELEPHONE (OUTPATIENT)
Dept: FAMILY MEDICINE | Facility: CLINIC | Age: 78
End: 2018-01-30

## 2018-01-30 NOTE — TELEPHONE ENCOUNTER
Completed forms for physician orders faxed back to [facility] at [phone].   Originals sent to be scanned.     Kathryn Carson

## 2018-02-06 ENCOUNTER — ANTICOAGULATION THERAPY VISIT (OUTPATIENT)
Dept: FAMILY MEDICINE | Facility: CLINIC | Age: 78
End: 2018-02-06
Payer: COMMERCIAL

## 2018-02-06 ENCOUNTER — TRANSFERRED RECORDS (OUTPATIENT)
Dept: HEALTH INFORMATION MANAGEMENT | Facility: CLINIC | Age: 78
End: 2018-02-06

## 2018-02-06 DIAGNOSIS — I82.409 DVT (DEEP VENOUS THROMBOSIS) (H): ICD-10-CM

## 2018-02-06 DIAGNOSIS — I26.99 PULMONARY EMBOLISM (H): ICD-10-CM

## 2018-02-06 DIAGNOSIS — Z79.01 LONG-TERM (CURRENT) USE OF ANTICOAGULANTS: ICD-10-CM

## 2018-02-06 LAB — INR POINT OF CARE: 2.2 (ref 0.86–1.14)

## 2018-02-06 PROCEDURE — 36416 COLLJ CAPILLARY BLOOD SPEC: CPT | Performed by: FAMILY MEDICINE

## 2018-02-06 PROCEDURE — 85610 PROTHROMBIN TIME: CPT | Mod: QW | Performed by: FAMILY MEDICINE

## 2018-02-06 NOTE — PROGRESS NOTES
ANTICOAGULATION FOLLOW-UP CLINIC VISIT    Patient Name:  Ángela Dumont  Date:  2/6/2018  Contact Type:  Telephone/ spoke with     SUBJECTIVE:     Patient Findings     Positives No Problem Findings           OBJECTIVE    INR Protime   Date Value Ref Range Status   02/06/2018 2.2 (A) 0.86 - 1.14 Final       ASSESSMENT / PLAN  No question data found.  Anticoagulation Summary as of 2/6/2018     INR goal 2.0-3.0   Today's INR 2.2   Maintenance plan 4 mg (1 mg x 4) on Sun, Wed, Fri; 2 mg (1 mg x 2) all other days   Full instructions 4 mg on Sun, Wed, Fri; 2 mg all other days   Weekly total 20 mg   No change documented Cadence Gonzalez RN   Plan last modified Nereyda Padilla RN (1/4/2018)   Next INR check 3/6/2018   Target end date     Indications   Long-term (current) use of anticoagulants [Z79.01] [Z79.01]  Pulmonary embolism (H) [I26.99]  DVT (deep venous thrombosis) (H) [I82.409]         Anticoagulation Episode Summary     INR check location     Preferred lab     Send INR reminders to RV NURSE    Comments       Anticoagulation Care Providers     Provider Role Specialty Phone number    Joaquin Rockwell MD [facility] [phone]            See the Encounter Report to view Anticoagulation Flowsheet and Dosing Calendar (Go to Encounters tab in chart review, and find the Anticoagulation Therapy Visit)    Dosage adjustment made based on physician directed care plan.    Cadence Gonzalez, RN

## 2018-02-09 ENCOUNTER — TELEPHONE (OUTPATIENT)
Dept: FAMILY MEDICINE | Facility: CLINIC | Age: 78
End: 2018-02-09

## 2018-02-09 ENCOUNTER — MEDICAL CORRESPONDENCE (OUTPATIENT)
Dept: HEALTH INFORMATION MANAGEMENT | Facility: CLINIC | Age: 78
End: 2018-02-09

## 2018-02-09 NOTE — TELEPHONE ENCOUNTER
Date Forms was received: February 9, 2018    Forms received by: Fax    Last office visit: 10/31/2017    Purpose of Form:  PT/OT Orders PT Discharge Summary    When the form is due:  asap    How the form needs to be returned for patient:  Fax - [phone]    Form currently placed  North File -- Folder on Jonna's Desk

## 2018-02-09 NOTE — TELEPHONE ENCOUNTER
Completed forms faxed back to [facility] at [phone].   Originals sent to be scanned.     Kathryn Carson

## 2018-02-20 ENCOUNTER — OFFICE VISIT (OUTPATIENT)
Dept: FAMILY MEDICINE | Facility: CLINIC | Age: 78
End: 2018-02-20
Payer: COMMERCIAL

## 2018-02-20 ENCOUNTER — RADIANT APPOINTMENT (OUTPATIENT)
Dept: GENERAL RADIOLOGY | Facility: CLINIC | Age: 78
End: 2018-02-20
Attending: FAMILY MEDICINE
Payer: COMMERCIAL

## 2018-02-20 ENCOUNTER — NURSE TRIAGE (OUTPATIENT)
Dept: NURSING | Facility: CLINIC | Age: 78
End: 2018-02-20

## 2018-02-20 VITALS
WEIGHT: 213 LBS | HEART RATE: 86 BPM | TEMPERATURE: 98.7 F | OXYGEN SATURATION: 97 % | BODY MASS INDEX: 36.37 KG/M2 | HEIGHT: 64 IN

## 2018-02-20 DIAGNOSIS — J20.9 ACUTE BRONCHITIS, UNSPECIFIED ORGANISM: ICD-10-CM

## 2018-02-20 DIAGNOSIS — R05.9 COUGH: ICD-10-CM

## 2018-02-20 DIAGNOSIS — R05.9 COUGH: Primary | ICD-10-CM

## 2018-02-20 PROBLEM — E66.01 MORBID OBESITY (H): Status: ACTIVE | Noted: 2018-02-20

## 2018-02-20 PROCEDURE — 99213 OFFICE O/P EST LOW 20 MIN: CPT | Performed by: FAMILY MEDICINE

## 2018-02-20 PROCEDURE — 71046 X-RAY EXAM CHEST 2 VIEWS: CPT | Mod: FY

## 2018-02-20 RX ORDER — AZITHROMYCIN 250 MG/1
TABLET, FILM COATED ORAL
Qty: 6 TABLET | Refills: 0 | Status: SHIPPED | OUTPATIENT
Start: 2018-02-20 | End: 2018-04-12

## 2018-02-20 NOTE — TELEPHONE ENCOUNTER
Additional Information    Negative: Severe difficulty breathing (e.g., struggling for each breath, speaks in single words)    Negative: Bluish lips, tongue, or face now    Negative: [1] Rapid onset of cough AND [2] has hives    Negative: Coughing started suddenly after medicine, an allergic food or bee sting    Negative: [1] Difficulty breathing AND [2] exposure to flames, smoke, or fumes    Negative: [1] Stridor AND [2] difficulty breathing    Negative: Sounds like a life-threatening emergency to the triager    Negative: Choked on object of food that could be caught in the throat    Negative: Chest pain is main symptom    Negative: [1] Previous asthma attacks AND [2] this feels like asthma attack    Negative: Cough lasts > 3 weeks    Negative: Wet (productive) cough (i.e., white-yellow, yellow, green, or nicole colored sputum)    Negative: Chest pain  (Exception: MILD central chest pain, present only when coughing)    Negative: Difficulty breathing    Negative: Patient sounds very sick or weak to the triager    Negative: [1] Coughed up blood AND [2] > 1 tablespoon (15 ml) (Exception: blood-tinged sputum)    Negative: Fever > 103 F (39.4 C)    Negative: [1] Fever > 101 F (38.3 C) AND [2] age > 60    Negative: [1] Fever > 101 F (38.3 C) AND [2] bedridden (e.g., nursing home patient, CVA, chronic illness, recovering from surgery)    Negative: [1] Fever > 100.5 F (38.1 C) AND [2] diabetes mellitus or weak immune system (e.g., HIV positive, cancer chemo, splenectomy, organ transplant, chronic steroids)    Negative: Wheezing is present    Negative: [1] Ankle swelling AND [2] swelling is increasing    Negative: SEVERE coughing spells (e.g., whooping sound after coughing, vomiting after coughing)    Negative: [1] Continuous (nonstop) coughing interferes with work or school AND [2] no improvement using cough treatment per protocol    Negative: Fever present > 3 days (72 hours)    Negative: [1] Fever returns after gone for  "over 24 hours AND [2] symptoms worse or not improved    Negative: [1] Sinus pain (around cheekbone or eye) AND [2] present > 24 hours using nasal washes and pain meds    Negative: Earache is present    Negative: [1] Blood-tinged sputum has been coughed up AND [2] more than once    Negative: Cough present > 10 days    Negative: [1] Nasal discharge AND [2] present > 10 days    Negative: [1] Patient also has allergy symptoms (e.g., itchy eyes, clear nasal discharge, postnasal drip) AND [2] they are acting up    Negative: Taking an ACE Inhibitor medication  (e.g., benazepril/LOTENSIN, captopril/CAPOTEN, enalapril/VASOTEC, lisinopril/ZESTRIL)    Negative: Exposure to TB (Tuberculosis)    Negative: Cough  (all triage questions negative)    Cough with cold symptoms (e.g., runny nose, postnasal drip, throat clearing) (all triage questions negative)    Answer Assessment - Initial Assessment Questions  1. ONSET: \"When did the cough begin?\"       A week ago  2. SEVERITY: \"How bad is the cough today?\"       Increases when lying down  3. RESPIRATORY DISTRESS: \"Describe your breathing.\"       no  4. FEVER: \"Do you have a fever?\" If so, ask: \"What is your temperature, how was it measured, and when did it start?\"      no  5. HEMOPTYSIS: \"Are you coughing up any blood?\" If so ask: \"How much?\" (flecks, streaks, tablespoons, etc.)      no  6. TREATMENT: \"What have you done so far to treat the cough?\" (e.g., meds, fluids, humidifier)      Cough medicine  7. CARDIAC HISTORY: \"Do you have any history of heart disease?\" (e.g., heart attack, congestive heart failure)       no  8. LUNG HISTORY: \"Do you have any history of lung disease?\"  (e.g., pulmonary embolus, asthma, emphysema)      PE  9. PE RISK FACTORS: \"Do you have a history of blood clots?\" (or: recent major surgery, recent prolonged travel, bedridden )      immobile  10. OTHER SYMPTOMS: \"Do you have any other symptoms? (e.g., runny nose, wheezing, chest pain)        no  11. " "PREGNANCY: \"Is there any chance you are pregnant?\" \"When was your last menstrual period?\"        no  12. TRAVEL: \"Have you traveled out of the country in the last month?\" (e.g., travel history, exposures)        no    Protocols used: COUGH - ACUTE NON-PRODUCTIVE-ADULT-AH    "

## 2018-02-20 NOTE — MR AVS SNAPSHOT
After Visit Summary   2/20/2018    Ángela Dumont    MRN: [id number]           Patient Information     Date Of Birth          1940        Visit Information        Provider Department      2/20/2018 10:00 AM Mingo Weinberg MD [facility]        Today's Diagnoses     Cough    -  1    Acute bronchitis, unspecified organism          Care Instructions    For this week take 2 mg 5 days per week and 4 mg 2 days per week.           Follow-ups after your visit        Your next 10 appointments already scheduled     Apr 12, 2018 12:45 PM CDT   Return Visit with Susanna Duran MD   [facility]    [facility]  [address]  [address]  [facility] [address] [phone]              Who to contact     If you have questions or need follow up information about today's clinic visit or your schedule please contact [facility] directly at [phone].  Normal or non-critical lab and imaging results will be communicated to you by MyChart, letter or phone within 4 business days after the clinic has received the results. If you do not hear from us within 7 days, please contact the clinic through Admittedlyhart or phone. If you have a critical or abnormal lab result, we will notify you by phone as soon as possible.  Submit refill requests through Rew[employer] Return or call your pharmacy and they will forward the refill request to us. Please allow 3 business days for your refill to be completed.          Additional Information About Your Visit        MyChart Information     [employer] Return gives you secure access to your electronic health record. If you see a primary care provider, you can also send messages to your care team and make appointments. If you have questions, please call your primary care clinic.  If you do not have a primary care provider, please call [phone] and they will assist you.        Care EveryWhere  "ID     This is your Care EveryWhere ID. This could be used by other organizations to access your [facility] medical records  [id number]        Your Vitals Were     Pulse Temperature Height Pulse Oximetry BMI (Body Mass Index)       86 98.7  F (37.1  C) (Oral) 5' 4\" (1.626 m) 97% 36.56 kg/m2        Blood Pressure from Last 3 Encounters:   11/16/17 134/58   10/31/17 131/81   10/19/17 156/63    Weight from Last 3 Encounters:   02/20/18 213 lb (96.6 kg)   11/16/17 213 lb (96.6 kg)   10/31/17 213 lb (96.6 kg)                 Today's Medication Changes          These changes are accurate as of 2/20/18 11:20 AM.  If you have any questions, ask your nurse or doctor.               Start taking these medicines.        Dose/Directions    azithromycin 250 MG tablet   Commonly known as:  ZITHROMAX   Used for:  Acute bronchitis, unspecified organism   Started by:  Mingo Weinberg MD        Two tablets first day, then one tablet daily for four days   Quantity:  6 tablet   Refills:  0         Stop taking these medicines if you haven't already. Please contact your care team if you have questions.     fluconazole 150 MG tablet   Commonly known as:  DIFLUCAN   Stopped by:  Mingo Weinberg MD                Where to get your medicines      These medications were sent to [facility] [place] [address]     Phone:  [phone]     azithromycin 250 MG tablet                Primary Care Provider Office Phone # Fax #    Joaquin Rockwell [phone]       [address] [place], [place] [address]        Equal Access to Services     THEODORA CARRINGTON AH: Hadii heron rivera Soariadna, waaxda luqadaha, qaybta kaalmada merlyn, francisco javier awan. So [facility] [phone].    ATENCIÓN: Si habla español, tiene a vega disposición servicios gratuitos de asistencia lingüística. Llame al [phone].    We comply with applicable " federal civil rights laws and [place] laws. We do not discriminate on the basis of race, color, national origin, age, disability, sex, sexual orientation, or gender identity.            Thank you!     Thank you for choosing [facility]  for your care. Our goal is always to provide you with excellent care. Hearing back from our patients is one way we can continue to improve our services. Please take a few minutes to complete the written survey that you may receive in the mail after your visit with us. Thank you!             Your Updated Medication List - Protect others around you: Learn how to safely use, store and throw away your medicines at www.disposemymeds.org.          This list is accurate as of 2/20/18 11:20 AM.  Always use your most recent med list.                   Brand Name Dispense Instructions for use Diagnosis    acetaminophen 325 MG tablet    TYLENOL    100 tablet    Take 2 tablets (650 mg) by mouth every 4 hours as needed for other (surgical pain)    Normal pressure hydrocephalus       azithromycin 250 MG tablet    ZITHROMAX    6 tablet    Two tablets first day, then one tablet daily for four days    Acute bronchitis, unspecified organism       clotrimazole-betamethasone cream    LOTRISONE    60 g    Apply topically 2 times daily    Rash       furosemide 40 MG tablet    LASIX    90 tablet    TAKE ONE TABLET BY MOUTH DAILY AT 10AM    Hypertension goal BP (blood pressure) < 140/90       JANTOVEN PO      Take 2mg by mouth on Sunday, Wednesday, Friday and take 4mg rest of week.        levothyroxine 50 MCG tablet    SYNTHROID/LEVOTHROID    90 tablet    TAKE ONE TABLET BY MOUTH EVERY DAY AT 7:30AM. DUE FOR LABS FOR FURTHER REFILLS.    Hypothyroidism, unspecified type       polyethylene glycol Packet    MIRALAX/GLYCOLAX    7 packet    Take 17 g by mouth daily as needed for constipation    Colonic mass       senna-docusate 8.6-50 MG per tablet    SENOKOT-S;PERICOLACE    100 tablet    Take  1 tablet by mouth 2 times daily    Colonic mass       tamsulosin 0.4 MG capsule    FLOMAX    90 capsule    TAKE ONE CAPSULE BY MOUTH DAILY AT 10AM    Urinary retention       VITAMIN D (CHOLECALCIFEROL) PO      Take 1,000 Units by mouth daily    Leukocytosis, unspecified type

## 2018-02-20 NOTE — NURSING NOTE
"Chief Complaint   Patient presents with     Cough       Initial Pulse 86  Temp 98.7  F (37.1  C) (Oral)  Ht 5' 4\" (1.626 m)  Wt 213 lb (96.6 kg)  SpO2 97%  BMI 36.56 kg/m2 Estimated body mass index is 36.56 kg/(m^2) as calculated from the following:    Height as of this encounter: 5' 4\" (1.626 m).    Weight as of this encounter: 213 lb (96.6 kg).  Medication Reconciliation: complete  "

## 2018-02-20 NOTE — PROGRESS NOTES
"  SUBJECTIVE:                                                    Ángela Dumont is a 77 year old female who presents to clinic today for the following health issues:    Acute Illness   Acute illness concerns: Cough  Onset: 1 week    Fever: no    Chills/Sweats: YES- little    Headache (location?): no    Sinus Pressure:no    Conjunctivitis:  no    Ear Pain: no    Rhinorrhea: no     Congestion: YES    Sore Throat: no     Cough: YES-non-productive worse when laying down    Wheeze: no     Decreased Appetite: no    Nausea: no    Vomiting: no    Diarrhea:  no    Dysuria/Freq.: no    Fatigue/Achiness: no    Sick/Strep Exposure: no     Therapies Tried and outcome: cough medicine- delsym zafar dillard plus    - Pt has been experiencing a cough for about 1 week now, exacerbated when she lays down in bed. The cough sounds \"wet\" or mucousy, though the patient reports she typically has some congestion. She denies any sinus congestion, fever or severe sore throat; denies any known illness exposure. Her  has been giving her OTC cough suppressants/decongestants, with mild success.           Problem list and histories reviewed & adjusted, as indicated.  Additional history: as documented    ROS:   Constitutional, HEENT, cardiovascular, pulmonary, GI, , musculoskeletal, neuro, skin, endocrine and psych systems are negative, except as otherwise noted.    This document serves as a record of the services and decisions personally performed and made by Mingo Weinberg MD. It was created on his behalf by La Foster, a trained medical scribe. The creation of this document is based the provider's statements to the medical scribe.  Scribe La Foster 10:29 AM, February 20, 2018    OBJECTIVE:                     Pulse 86  Temp 98.7  F (37.1  C) (Oral)  Ht 1.626 m (5' 4\")  Wt 96.6 kg (213 lb)  SpO2 97%  BMI 36.56 kg/m2  GENERAL: no apparent distress  EYES: Conjunctiva are not injected, no discharge.  EARS: Left TM -no erythema, no " effusion,  not bulged.               Right TM -no erythema, no effusion,  not bulged.  NOSE: no discharge, no sinus tenderness  THROAT: no erythema, no exudate, no lesions  NECK: supple, no adenopathy.  CARDIAC: regular rate and rhythm, no murmur  RESP: clear, no wheezing, no rales, mild rhonchi  ABD: soft, no distension, no tenderness  SKIN: No rashes  MS: Distal third 1+ edema; otherwise normal    Diagnostic test results:  Chest X-ray: No signs of pneumonia seen    ASSESSMENT/PLAN:                       Ángela was seen today for cough.    Diagnoses and all orders for this visit:    Cough/Acute bronchitis, unspecified organism - X-ray showed no signs of pneumonia; Antibiotics given,  will administer & monitor symptoms        -     azithromycin (ZITHROMAX) 250 MG tablet; Two tablets first day, then one tablet             daily for four days  -     XR Chest 2 Views; Future        Symptomatic cares and fever control(if indicated) discussed.  Risks and benefits of meds discussed.    See patient instructions.    Health Maintenance Topics with due status: Overdue       Topic Date Due    MICROALBUMIN Q1 YEAR 07/17/1941    DEXA SCAN SCREENING (SYSTEM ASSIGNED) 07/17/2005    OP ANNUAL INR REFERRAL 06/03/2016    FALL RISK ASSESSMENT 09/01/2017             The information in this document, created by the medical scribe for me, accurately reflects the services I personally performed and the decisions made by me. I have reviewed and approved this document for accuracy prior to leaving the patient care area.  10:29 AM, 02/20/18        Gerald Weinberg MD

## 2018-02-21 ENCOUNTER — TELEPHONE (OUTPATIENT)
Dept: FAMILY MEDICINE | Facility: CLINIC | Age: 78
End: 2018-02-21

## 2018-02-21 NOTE — TELEPHONE ENCOUNTER
Date Forms was received: February 21, 2018    Forms received by: Fax    Purpose of Form:  PT/OT Orders  - missed visit report    How the form needs to be returned for patient:  Fax    Form currently placed  North File

## 2018-02-27 ENCOUNTER — ANTICOAGULATION THERAPY VISIT (OUTPATIENT)
Dept: FAMILY MEDICINE | Facility: CLINIC | Age: 78
End: 2018-02-27
Payer: COMMERCIAL

## 2018-02-27 ENCOUNTER — TRANSFERRED RECORDS (OUTPATIENT)
Dept: HEALTH INFORMATION MANAGEMENT | Facility: CLINIC | Age: 78
End: 2018-02-27

## 2018-02-27 DIAGNOSIS — I82.409 DVT (DEEP VENOUS THROMBOSIS) (H): ICD-10-CM

## 2018-02-27 DIAGNOSIS — Z79.01 LONG-TERM (CURRENT) USE OF ANTICOAGULANTS: ICD-10-CM

## 2018-02-27 DIAGNOSIS — I26.99 PULMONARY EMBOLISM (H): ICD-10-CM

## 2018-02-27 LAB — INR PPP: 2.5

## 2018-02-27 PROCEDURE — 99207 ZZC NO CHARGE NURSE ONLY: CPT | Performed by: FAMILY MEDICINE

## 2018-02-27 NOTE — MR AVS SNAPSHOT
Ángela Dumont   2/27/2018   Anticoagulation Therapy Visit    Description:  77 year old female   Provider:  Joaquin Rockwell MD   Department:   Family Practice           INR as of 2/27/2018     Today's INR 2.5      Anticoagulation Summary as of 2/27/2018     INR goal 2.0-3.0   Today's INR 2.5   Full instructions 4 mg on Sun, Wed, Fri; 2 mg all other days   Next INR check 3/27/2018    Indications   Long-term (current) use of anticoagulants [Z79.01] [Z79.01]  Pulmonary embolism (H) [I26.99]  DVT (deep venous thrombosis) (H) [I82.409]         February 2018 Details    Sun Mon Tue Wed Thu Fri Sat         1               2               3                 4               5               6               7               8               9               10                 11               12               13               14               15               16               17                 18               19               20               21               22               23               24                 25               26               27      2 mg   See details      28      4 mg             Date Details   02/27 This INR check               How to take your warfarin dose     To take:  2 mg Take 2 of the 1 mg tablets.    To take:  4 mg Take 4 of the 1 mg tablets.           March 2018 Details    Sun Mon Tue Wed Thu Fri Sat         1      2 mg         2      4 mg         3      2 mg           4      4 mg         5      2 mg         6      2 mg         7      4 mg         8      2 mg         9      4 mg         10      2 mg           11      4 mg         12      2 mg         13      2 mg         14      4 mg         15      2 mg         16      4 mg         17      2 mg           18      4 mg         19      2 mg         20      2 mg         21      4 mg         22      2 mg         23      4 mg         24      2 mg           25      4 mg         26      2 mg         27            28               29               30                31                Date Details   No additional details    Date of next INR:  3/27/2018         How to take your warfarin dose     To take:  2 mg Take 2 of the 1 mg tablets.    To take:  4 mg Take 4 of the 1 mg tablets.

## 2018-02-27 NOTE — PROGRESS NOTES
ANTICOAGULATION FOLLOW-UP CLINIC VISIT    Patient Name:  Ángela Dumont  Date:  2/27/2018  Contact Type:  Telephone/ spoek with      SUBJECTIVE:     Patient Findings     Positives No Problem Findings           OBJECTIVE    INR   Date Value Ref Range Status   02/27/2018 2.5  Final       ASSESSMENT / PLAN  No question data found.  Anticoagulation Summary as of 2/27/2018     INR goal 2.0-3.0   Today's INR 2.5   Maintenance plan 4 mg (1 mg x 4) on Sun, Wed, Fri; 2 mg (1 mg x 2) all other days   Full instructions 4 mg on Sun, Wed, Fri; 2 mg all other days   Weekly total 20 mg   No change documented Cadence Gonzalez RN   Plan last modified Nereyda Padilla RN (1/4/2018)   Next INR check 3/27/2018   Target end date     Indications   Long-term (current) use of anticoagulants [Z79.01] [Z79.01]  Pulmonary embolism (H) [I26.99]  DVT (deep venous thrombosis) (H) [I82.409]         Anticoagulation Episode Summary     INR check location     Preferred lab     Send INR reminders to RV NURSE    Comments       Anticoagulation Care Providers     Provider Role Specialty Phone number    Joaquin Rockwell MD [facility] [phone]            See the Encounter Report to view Anticoagulation Flowsheet and Dosing Calendar (Go to Encounters tab in chart review, and find the Anticoagulation Therapy Visit)    Dosage adjustment made based on physician directed care plan.    Cadence Gonzalez, RN

## 2018-03-20 ENCOUNTER — MEDICAL CORRESPONDENCE (OUTPATIENT)
Dept: HEALTH INFORMATION MANAGEMENT | Facility: CLINIC | Age: 78
End: 2018-03-20

## 2018-03-26 ENCOUNTER — TRANSFERRED RECORDS (OUTPATIENT)
Dept: HEALTH INFORMATION MANAGEMENT | Facility: CLINIC | Age: 78
End: 2018-03-26

## 2018-03-26 ENCOUNTER — ANTICOAGULATION THERAPY VISIT (OUTPATIENT)
Dept: FAMILY MEDICINE | Facility: CLINIC | Age: 78
End: 2018-03-26
Payer: COMMERCIAL

## 2018-03-26 DIAGNOSIS — I82.409 DVT (DEEP VENOUS THROMBOSIS) (H): ICD-10-CM

## 2018-03-26 DIAGNOSIS — Z79.01 LONG-TERM (CURRENT) USE OF ANTICOAGULANTS: ICD-10-CM

## 2018-03-26 DIAGNOSIS — I26.99 PULMONARY EMBOLISM (H): ICD-10-CM

## 2018-03-26 LAB — INR PPP: 3.2

## 2018-03-26 PROCEDURE — 99207 ZZC NO CHARGE NURSE ONLY: CPT | Performed by: FAMILY MEDICINE

## 2018-03-26 NOTE — MR AVS SNAPSHOT
Ángela VITALY Dumont   3/26/2018   Anticoagulation Therapy Visit    Description:  77 year old female   Provider:  Joaquin Rockwell MD   Department:   Family Practice           INR as of 3/26/2018     Today's INR 3.2!      Anticoagulation Summary as of 3/26/2018     INR goal 2.0-3.0   Today's INR 3.2!   Full instructions 4 mg on Sun, Wed, Fri; 2 mg all other days   Next INR check 4/5/2018    Indications   Long-term (current) use of anticoagulants [Z79.01] [Z79.01]  Pulmonary embolism (H) [I26.99]  DVT (deep venous thrombosis) (H) [I82.409]         Description     Advised to eat more greens         March 2018 Details    Sun Mon Tue Wed Thu Fri Sat         1               2               3                 4               5               6               7               8               9               10                 11               12               13               14               15               16               17                 18               19               20               21               22               23               24                 25               26      2 mg   See details      27      2 mg         28      4 mg         29      2 mg         30      4 mg         31      2 mg          Date Details   03/26 This INR check               How to take your warfarin dose     To take:  2 mg Take 2 of the 1 mg tablets.    To take:  4 mg Take 4 of the 1 mg tablets.           April 2018 Details    Sun Mon Tue Wed Thu Fri Sat     1      4 mg         2      2 mg         3      2 mg         4      4 mg         5            6               7                 8               9               10               11               12               13               14                 15               16               17               18               19               20               21                 22               23               24               25               26               27               28                 29                30                     Date Details   No additional details    Date of next INR:  4/5/2018         How to take your warfarin dose     To take:  2 mg Take 2 of the 1 mg tablets.    To take:  4 mg Take 4 of the 1 mg tablets.

## 2018-03-26 NOTE — PROGRESS NOTES
ANTICOAGULATION FOLLOW-UP CLINIC VISIT    Patient Name:  Ángela Dumont  Date:  3/26/2018  Contact Type:  Telephone/ spoek with  on the phone to have pt have a additional serving of greens     SUBJECTIVE:     Patient Findings     Positives No Problem Findings           OBJECTIVE    INR   Date Value Ref Range Status   03/26/2018 3.2  Final       ASSESSMENT / PLAN  No question data found.  Anticoagulation Summary as of 3/26/2018     INR goal 2.0-3.0   Today's INR 3.2!   Maintenance plan 4 mg (1 mg x 4) on Sun, Wed, Fri; 2 mg (1 mg x 2) all other days   Full instructions 4 mg on Sun, Wed, Fri; 2 mg all other days   Weekly total 20 mg   No change documented Cadence Gonzalez, RN   Plan last modified Nereyda Padilla RN (1/4/2018)   Next INR check 4/5/2018   Target end date     Indications   Long-term (current) use of anticoagulants [Z79.01] [Z79.01]  Pulmonary embolism (H) [I26.99]  DVT (deep venous thrombosis) (H) [I82.409]         Anticoagulation Episode Summary     INR check location     Preferred lab     Send INR reminders to RV NURSE    Comments       Anticoagulation Care Providers     Provider Role Specialty Phone number    Joaquin Rockwell MD [facility] [phone]            See the Encounter Report to view Anticoagulation Flowsheet and Dosing Calendar (Go to Encounters tab in chart review, and find the Anticoagulation Therapy Visit)    Dosage adjustment made based on physician directed care plan.    aCdence Gonzalez, RN

## 2018-04-02 ENCOUNTER — MEDICAL CORRESPONDENCE (OUTPATIENT)
Dept: HEALTH INFORMATION MANAGEMENT | Facility: CLINIC | Age: 78
End: 2018-04-02

## 2018-04-02 ENCOUNTER — TELEPHONE (OUTPATIENT)
Dept: FAMILY MEDICINE | Facility: CLINIC | Age: 78
End: 2018-04-02

## 2018-04-02 NOTE — TELEPHONE ENCOUNTER
Date Forms was received: April 2, 2018    Forms received by: Fax    Purpose of Form:  discharge from [facility] services    How the form needs to be returned for patient:  Fax    Form currently placed  [employer]

## 2018-04-03 NOTE — TELEPHONE ENCOUNTER
Completed forms faxed back to [employer] at [phone].   Originals sent to be scanned.     Kathryn Carson

## 2018-04-05 ENCOUNTER — TRANSFERRED RECORDS (OUTPATIENT)
Dept: HEALTH INFORMATION MANAGEMENT | Facility: CLINIC | Age: 78
End: 2018-04-05

## 2018-04-05 LAB — INR PPP: 2.7 (ref 2–3)

## 2018-04-06 ENCOUNTER — ANTICOAGULATION THERAPY VISIT (OUTPATIENT)
Dept: FAMILY MEDICINE | Facility: CLINIC | Age: 78
End: 2018-04-06
Payer: COMMERCIAL

## 2018-04-06 DIAGNOSIS — Z79.01 LONG-TERM (CURRENT) USE OF ANTICOAGULANTS: ICD-10-CM

## 2018-04-06 LAB — INR PPP: 2.7

## 2018-04-06 PROCEDURE — G0250 MD INR TEST REVIE INTER MGMT: HCPCS | Performed by: FAMILY MEDICINE

## 2018-04-06 NOTE — PROGRESS NOTES
ANTICOAGULATION FOLLOW-UP CLINIC VISIT    Patient Name:  Ángela Dumont  Date:  4/6/2018  Contact Type:  Telephone/ Detailed VM message left with dosing instructions    SUBJECTIVE:        OBJECTIVE    INR   Date Value Ref Range Status   04/06/2018 2.7  Final       ASSESSMENT / PLAN  INR assessment THER    Recheck INR In: 2 WEEKS    INR Location Home INR    Billed home INR Yes      Anticoagulation Summary as of 4/6/2018     INR goal 2.0-3.0   Today's INR 2.7   Maintenance plan 4 mg (1 mg x 4) on Sun, Wed, Fri; 2 mg (1 mg x 2) all other days   Full instructions 4 mg on Sun, Wed, Fri; 2 mg all other days   Weekly total 20 mg   Plan last modified Nereyda Padilla RN (1/4/2018)   Next INR check 4/20/2018   Target end date     Indications   Long-term (current) use of anticoagulants [Z79.01] [Z79.01]  Pulmonary embolism (H) [I26.99]  DVT (deep venous thrombosis) (H) [I82.409]         Anticoagulation Episode Summary     INR check location     Preferred lab     Send INR reminders to RV NURSE    Comments       Anticoagulation Care Providers     Provider Role Specialty Phone number    Joaquin Rockwell MD [facility] [phone]            See the Encounter Report to view Anticoagulation Flowsheet and Dosing Calendar (Go to Encounters tab in chart review, and find the Anticoagulation Therapy Visit)    Dosage adjustment made based on physician directed care plan.    Nereyda Padilla, SMITA

## 2018-04-06 NOTE — MR AVS SNAPSHOT
Ángela Dumont   4/6/2018   Anticoagulation Therapy Visit    Description:  77 year old female   Provider:  Joaquin Rockwell MD   Department:   Family Practice           INR as of 4/6/2018     Today's INR 2.7      Anticoagulation Summary as of 4/6/2018     INR goal 2.0-3.0   Today's INR 2.7   Full instructions 4 mg on Sun, Wed, Fri; 2 mg all other days   Next INR check 4/20/2018    Indications   Long-term (current) use of anticoagulants [Z79.01] [Z79.01]  Pulmonary embolism (H) [I26.99]  DVT (deep venous thrombosis) (H) [I82.409]         April 2018 Details    Sun Mon Tue Wed Thu Fri Sat     1               2               3               4               5               6      4 mg   See details      7      2 mg           8      4 mg         9      2 mg         10      2 mg         11      4 mg         12      2 mg         13      4 mg         14      2 mg           15      4 mg         16      2 mg         17      2 mg         18      4 mg         19      2 mg         20            21                 22               23               24               25               26               27               28                 29               30                     Date Details   04/06 This INR check       Date of next INR:  4/20/2018         How to take your warfarin dose     To take:  2 mg Take 2 of the 1 mg tablets.    To take:  4 mg Take 4 of the 1 mg tablets.

## 2018-04-12 ENCOUNTER — HOSPITAL ENCOUNTER (OUTPATIENT)
Facility: CLINIC | Age: 78
Setting detail: SPECIMEN
Discharge: HOME | End: 2018-04-12
Attending: INTERNAL MEDICINE | Admitting: INTERNAL MEDICINE
Payer: COMMERCIAL

## 2018-04-12 ENCOUNTER — ONCOLOGY VISIT (OUTPATIENT)
Dept: ONCOLOGY | Facility: CLINIC | Age: 78
End: 2018-04-12
Attending: INTERNAL MEDICINE
Payer: COMMERCIAL

## 2018-04-12 VITALS
RESPIRATION RATE: 16 BRPM | SYSTOLIC BLOOD PRESSURE: 124 MMHG | OXYGEN SATURATION: 94 % | TEMPERATURE: 97.9 F | HEIGHT: 64 IN | DIASTOLIC BLOOD PRESSURE: 80 MMHG | HEART RATE: 85 BPM

## 2018-04-12 DIAGNOSIS — D47.Z9 LOW GRADE B CELL LYMPHOPROLIFERATIVE DISORDER (H): ICD-10-CM

## 2018-04-12 DIAGNOSIS — D47.2 MGUS (MONOCLONAL GAMMOPATHY OF UNKNOWN SIGNIFICANCE): Primary | ICD-10-CM

## 2018-04-12 LAB
ALBUMIN SERPL-MCNC: 3.4 G/DL (ref 3.4–5)
ALP SERPL-CCNC: 100 U/L (ref 40–150)
ALT SERPL W P-5'-P-CCNC: 25 U/L (ref 0–50)
ANION GAP SERPL CALCULATED.3IONS-SCNC: 6 MMOL/L (ref 3–14)
AST SERPL W P-5'-P-CCNC: 17 U/L (ref 0–45)
BASOPHILS # BLD AUTO: 0 10E9/L (ref 0–0.2)
BASOPHILS NFR BLD AUTO: 0 %
BILIRUB SERPL-MCNC: 0.6 MG/DL (ref 0.2–1.3)
BUN SERPL-MCNC: 19 MG/DL (ref 7–30)
CALCIUM SERPL-MCNC: 9 MG/DL (ref 8.5–10.1)
CHLORIDE SERPL-SCNC: 107 MMOL/L (ref 94–109)
CO2 SERPL-SCNC: 26 MMOL/L (ref 20–32)
CREAT SERPL-MCNC: 0.78 MG/DL (ref 0.52–1.04)
DIFFERENTIAL METHOD BLD: ABNORMAL
EOSINOPHIL # BLD AUTO: 0.2 10E9/L (ref 0–0.7)
EOSINOPHIL NFR BLD AUTO: 1 %
ERYTHROCYTE [DISTWIDTH] IN BLOOD BY AUTOMATED COUNT: 13.7 % (ref 10–15)
GFR SERPL CREATININE-BSD FRML MDRD: 72 ML/MIN/1.7M2
GLUCOSE SERPL-MCNC: 98 MG/DL (ref 70–99)
HCT VFR BLD AUTO: 41.9 % (ref 35–47)
HGB BLD-MCNC: 13.1 G/DL (ref 11.7–15.7)
LDH SERPL L TO P-CCNC: 189 U/L (ref 81–234)
LYMPHOCYTES # BLD AUTO: 9.5 10E9/L (ref 0.8–5.3)
LYMPHOCYTES NFR BLD AUTO: 63 %
MCH RBC QN AUTO: 27.9 PG (ref 26.5–33)
MCHC RBC AUTO-ENTMCNC: 31.3 G/DL (ref 31.5–36.5)
MCV RBC AUTO: 89 FL (ref 78–100)
METAMYELOCYTES # BLD: 0.2 10E9/L
METAMYELOCYTES NFR BLD MANUAL: 1 %
MONOCYTES # BLD AUTO: 0.6 10E9/L (ref 0–1.3)
MONOCYTES NFR BLD AUTO: 4 %
NEUTROPHILS # BLD AUTO: 4.7 10E9/L (ref 1.6–8.3)
NEUTROPHILS NFR BLD AUTO: 31 %
PLATELET # BLD AUTO: 197 10E9/L (ref 150–450)
PLATELET # BLD EST: ABNORMAL 10*3/UL
POTASSIUM SERPL-SCNC: 4.1 MMOL/L (ref 3.4–5.3)
PROT SERPL-MCNC: 7.6 G/DL (ref 6.8–8.8)
RBC # BLD AUTO: 4.7 10E12/L (ref 3.8–5.2)
RBC MORPH BLD: ABNORMAL
SODIUM SERPL-SCNC: 139 MMOL/L (ref 133–144)
VARIANT LYMPHS BLD QL SMEAR: PRESENT
WBC # BLD AUTO: 15.1 10E9/L (ref 4–11)

## 2018-04-12 PROCEDURE — 84165 PROTEIN E-PHORESIS SERUM: CPT | Performed by: INTERNAL MEDICINE

## 2018-04-12 PROCEDURE — 82784 ASSAY IGA/IGD/IGG/IGM EACH: CPT | Performed by: INTERNAL MEDICINE

## 2018-04-12 PROCEDURE — 86334 IMMUNOFIX E-PHORESIS SERUM: CPT | Performed by: INTERNAL MEDICINE

## 2018-04-12 PROCEDURE — G0463 HOSPITAL OUTPT CLINIC VISIT: HCPCS

## 2018-04-12 PROCEDURE — 80053 COMPREHEN METABOLIC PANEL: CPT | Performed by: INTERNAL MEDICINE

## 2018-04-12 PROCEDURE — 82232 ASSAY OF BETA-2 PROTEIN: CPT | Performed by: INTERNAL MEDICINE

## 2018-04-12 PROCEDURE — 83883 ASSAY NEPHELOMETRY NOT SPEC: CPT | Performed by: INTERNAL MEDICINE

## 2018-04-12 PROCEDURE — 99214 OFFICE O/P EST MOD 30 MIN: CPT | Performed by: INTERNAL MEDICINE

## 2018-04-12 PROCEDURE — 00000402 ZZHCL STATISTIC TOTAL PROTEIN: Performed by: INTERNAL MEDICINE

## 2018-04-12 PROCEDURE — 83615 LACTATE (LD) (LDH) ENZYME: CPT | Performed by: INTERNAL MEDICINE

## 2018-04-12 PROCEDURE — 85025 COMPLETE CBC W/AUTO DIFF WBC: CPT | Performed by: INTERNAL MEDICINE

## 2018-04-12 ASSESSMENT — PAIN SCALES - GENERAL: PAINLEVEL: NO PAIN (0)

## 2018-04-12 NOTE — PROGRESS NOTES
[facility]    Hematology/Oncology Established Patient Note      Today's Date: 4/12/18    Reason for Follow-up: leukocytosis    HISTORY OF PRESENT ILLNESS: Ángela Dumont is a 77 year old female with PMHx of pulmonary embolism/DVT on chronic warfarin, esophageal reflux, fibromyalgia, dementia, hypothyroidism, HTN, who presents with leukocytosis.  On chart review, she has had a mildly elevated WBC count since at least 2015.  In July 2017, WBC was 18.1 K, and peripheral smear showed leukocytosis with mild atypical absolute lymphocytosis and increased rouleaux.  Hemoglobin and platelets were normal.      In 2015, she was diagnosed with pulmonary embolism and DVT.  Three months prior, she had fractured her femur and had undergone surgery.  She was immobile for 3 months.  She has been on warfarin since then.      Ángela has dementia, and her  answers the majority of the questions.  They live in a one level multiplex.  She is wheelchair-bound  He helps with lifting and transferring.  She does not get frequent infections.  She started taking vitamin D recently.      She was admitted to the hospital on 10/18/17-10/19/17 with abdominal pain and constipation.  CT scan on 10/18/17 showed asymmetric wall thickening involving the posterior aspect of the low rectum near the anorectal junction.  She also had extensive perirectal/mesorectal adenopathy, concerning for colorectal neoplasm.  She also has additional enlarged bilateral iliac chain lymph nodes, borderline enlarged perirectal lymph nodes and diffusely mildly prominent lymph nodes elsewhere throughout the abdomen and pelvis.  At the time, patient and her  declined further evaluation and agreed to home hospice services, but did not enroll.          INTERIM HISTORY: Ángela is here for follow-up with her  and her daughter.  She says that she has been doing fine the last few months.  She denies any new symptoms.  She denies  fever/chills, nausea/vomiting.  She has chronic constipation.            REVIEW OF SYSTEMS:   14 point ROS was reviewed and is negative other than as noted above in HPI.       HOME MEDICATIONS:  Current Outpatient Prescriptions   Medication Sig Dispense Refill     levothyroxine (SYNTHROID/LEVOTHROID) 50 MCG tablet TAKE ONE TABLET BY MOUTH EVERY DAY AT 7:30AM. DUE FOR LABS FOR FURTHER REFILLS. 90 tablet 1     furosemide (LASIX) 40 MG tablet TAKE ONE TABLET BY MOUTH DAILY AT 10AM 90 tablet 1     polyethylene glycol (MIRALAX/GLYCOLAX) Packet Take 17 g by mouth daily as needed for constipation 7 packet 0     senna-docusate (SENOKOT-S;PERICOLACE) 8.6-50 MG per tablet Take 1 tablet by mouth 2 times daily 100 tablet 0     Warfarin Sodium (JANTOVEN PO) Take 2mg by mouth on Sunday, Wednesday, Friday and take 4mg rest of week.       tamsulosin (FLOMAX) 0.4 MG capsule TAKE ONE CAPSULE BY MOUTH DAILY AT 10AM 90 capsule 3     acetaminophen (TYLENOL) 325 MG tablet Take 2 tablets (650 mg) by mouth every 4 hours as needed for other (surgical pain) 100 tablet 0         ALLERGIES:  Allergies   Allergen Reactions     Shellfish Allergy Anaphylaxis     Aspirin      GI issues     Contrast Dye Hives and Itching     Flushed skin     Penicillins Hives     Sulfa Drugs Hives     Nitrofurantoin Rash         PAST MEDICAL HISTORY:  Past Medical History:   Diagnosis Date     Adjustment disorder with depressed mood      Antiplatelet or antithrombotic long-term use      Colitis, nonspecific 10/31/2017     Constipation      Dementia     memory/anger     Depressive disorder last few months as condition became apparent    Lack of mobility a big problem     DVT (deep venous thrombosis) (H) 4/15    bilateral     Esophageal reflux      Fibromyalgia      Fracture of right femur (H) 1/15     Heart murmur      Herpes zoster without complication 08/16/2017    left buttocks     History of Clostridium difficile      Hyperlipidemia LDL goal < 130       Hypertension goal BP (blood pressure) < 140/90      Hypothyroid      Leukocytosis      Leukocytosis      Leukocytosis      Leukocytosis, unspecified     w/u ?     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)      Lymphoproliferative disorder (H)     Dr Duran     Lymphoproliferative disorder (H)      MGUS (monoclonal gammopathy of unknown significance)     Dr Duran     MGUS (monoclonal gammopathy of unknown significance)      Migraine      MR (mitral regurgitation)     Mild     NPH (normal pressure hydrocephalus) 4/15    shunt placed but removed in 7/2015 due to erosion through the scalp, replaced 5/19/16     Osteoarthritis of both knees      Other atopic dermatitis and related conditions      PE (pulmonary embolism) 4/15    Saddle - IVC     Pelvic lymphadenopathy 10/31/2017     Pulmonary hypertension     EF 70-75%     Rectal mass 10/2017     Rectal mass 10/1/2017     Thrombosis of leg      Urinary tract infection, site not specified     MRSA - Dr Eze Gonzalez          PAST SURGICAL HISTORY:  Past Surgical History:   Procedure Laterality Date     ABDOMEN SURGERY  left side lower abdomen pain from Shunt we do roberth     COLONOSCOPY  2008     GYN SURGERY       H LABOR AND DELIVERY VAGINAL  1960, 1967     HERNIA REPAIR       HYSTERECTOMY  1976    hysterectomy     IMPLANT SHUNT VENTRICULOPERITONEAL Left 5/19/2016    IMPLANT SHUNT VENTRICULOPERITONEAL - Dr Alba     OPTICAL TRACKING SYSTEM IMPLANT SHUNT VENTRICULOPERITONEAL Right 4/10/2015     Shunt - Dr Sierra     ORTHOPEDIC SURGERY  Knee pain that prohibits walking     REMOVE SHUNT VENTRICULOPERITONEAL N/A 7/3/2015    REMOVE SHUNT VENTRICULOPERITONEAL;  Surgeon: Emmanuel Sierra MD;  Location:  OR     SURGICAL HISTORY OF -   1/15    right femur/hip surgery - 1/18/2015     SURGICAL HISTORY OF -   7/04    rectocele repair     SURGICAL HISTORY OF -   1997    abdominal adhesion lysis     SURGICAL HISTORY OF -   4/15    IVC filter  "placement     SURGICAL HISTORY OF -   6/15    IVC filter removal         SOCIAL HISTORY:  Social History     Social History     Marital status:      Spouse name: Vernon     Number of children: 2     Years of education: N/A     Occupational History     Not on file.     Social History Main Topics     Smoking status: Former Smoker     Packs/day: 0.50     Years: 10.00     Types: Cigarettes     Start date: 7/17/1958     Quit date: 1/1/1982     Smokeless tobacco: Never Used      Comment: quit 1967     Alcohol use 0.0 - 0.6 oz/week      Comment: Very light     Drug use: No     Sexual activity: Not Currently     Partners: Male     Birth control/ protection: None      Comment: not a problem     Other Topics Concern     Parent/Sibling W/ Cabg, Mi Or Angioplasty Before 65f 55m? No     Social History Narrative   Ángela is from [place] and her  is from [place].  They used to live in [place], [place], and Ángela wants to move back there.        FAMILY HISTORY:  Family History   Problem Relation Age of Onset     Colon Cancer Father      Coronary Artery Disease Father      DIABETES Maternal Grandmother          PHYSICAL EXAM:  Vital signs:  /80  Pulse 85  Temp 97.9  F (36.6  C) (Tympanic)  Resp 16  Ht 1.626 m (5' 4\")  SpO2 94%   GENERAL/CONSTITUTIONAL: No acute distress.  Accompanied by  and daughter.  EYES: No scleral icterus.  INTEGUMENTARY: No jaundice.  GAIT: In wheelchair.    LABS:  CBC RESULTS:   Recent Labs   Lab Test  04/12/18   1257   WBC  15.1*   RBC  4.70   HGB  13.1   HCT  41.9   MCV  89   MCH  27.9   MCHC  31.3*   RDW  13.7   PLT  197     Recent Labs   Lab Test  04/12/18   1257  10/19/17   0841   NA  139  139   POTASSIUM  4.1  3.7   CHLORIDE  107  108   CO2  26  25   ANIONGAP  6  6   GLC  98  83   BUN  19  17   CR  0.78  0.84   TAVIA  9.0  8.4*     Lab Results   Component Value Date    AST 17 04/12/2018     Lab Results   Component Value Date    ALT 25 04/12/2018     No results found for: ETHAN "   Lab Results   Component Value Date    BILITOTAL 0.6 04/12/2018     Lab Results   Component Value Date    ALBUMIN 3.4 04/12/2018     Lab Results   Component Value Date    PROTTOTAL 7.6 04/12/2018      Lab Results   Component Value Date    ALKPHOS 100 04/12/2018           SPEP: M-spike of 0.9 on 9/28/17 4/12/18: pending    Peripheral smear 9/28/17:  FINAL DIAGNOSIS:   Peripheral blood.   - Leukocytosis with absolute atypical lymphocytosis.     COMMENT:   There is leukocytosis with absolute lymphocytosis.  Some of the   lymphocytes appear morphologically atypical.  Further evaluation to   evaluate for a circulating lymphoproliferative disorder with flow   cytometry is recommended.  Clinical correlation is required.     Peripheral flow cytometry 9/28/17:  INTERPRETATION:   Blood:        Kappa-monotypic B cells negative for CD5 and CD10 (40%)        See comment     COMMENT:   By flow cytometry, the monotypic B cells lack CD5 and CD10. This   immunophenotype can be seen in marginal zone lymphoma, lymphoplasmacytic   lymphoma, and hairy cell leukemia, as well as rarely in follicular   lymphoma, CLL/SLL, or mantle cell lymphoma. Additional stains to exclude   Hairy cell leukemia are pending and will be reported in an addendum.   Final interpretation requires correlation with results of other   ancillary studies, morphologic and clinical features. If clinically   indicated a biopsy of involved lymph node or another tissue could be   required for final diagnosis.     This addendum is issued to reflect additional testing performed on this   case.   The abnormal B cells are positive for CD79b and  negative for CD23.   Other immunophenotypic markers (see details below) do not support the   diagnosis of hairy cell leukemia. The differential includes marginal   zone lymphoma, splenic B cell leukemia/lymphoma NOS, mantle cell   lymphoma, lymphoplasmacytic lymphoma, while other small B cell lymphoma   entities are  considered less likely. In the differential diagnosis   please note that rare cases of mantle cell lymphoma can be CD5 negative.   Recommend biopsy of an involved lymph node /  tissue for diagnosis with   cytogenetics and clinical correlation. See Comment.     COMMENTS:   Additional eight-color analyses are performed for the following markers:   CD11c, CD22, CD23, CD25, CD79b, ,      The abnormal B cells are positive for CD22 (normal level), CD79b ,   predominantly negative for CD11c (84% of them are negative) and negative   for CD23, CD25,  and .         ASSESSMENT/PLAN:  Ángela Dumont is a 77 year old female with:    1) Leukocytosis: appears chronic since at least 2015, and on further chart review from [facility] Everywhere, likely chronic since at least 2007.  Peripheral smear in July 2017 showed mild atypical lymphocytosis and rouleaux.  Hemoglobin and platelet count are normal.  She does not have frequent infections.      Peripheral flow cytometry shows concern for lymphoproliferative disorder.  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      We discussed that is usually a slow growing process, and treatment is usually not necessary.  We could do further evaluation, such as with PET scan and bone marrow biopsy.  Considering her age, dementia, immobility, and poor functional status, I favored watching and waiting and sparing her of invasive procedures that may not change her overall course.  She and her  agreed and opted to wait on getting additional scans or bone marrow biopsy at this time.        CBC today reviewed.  WBC remains mildly elevated but trended down some to 15.1K.    2) History of PE and DVT: diagnosed in 2015, and she has been on warfarin ever since.  It was likely provoked at the time by surgery and immobility.  She remains wheelchair bound now, and  remains at high risk for blood clots.  She has not had frequent falls, and INR has been under relatively good control.  I discussed and risks and benefits of continuing the warfarin.  She will continue the warfarin for now, considering her high risk for clotting remains.  If it becomes that the risk of bleeding starts to outweigh her risk of clots, then it would be time to stop the warfarin.     3) MGUS: M-spike of 0.9 g/dL.  -as above, will hold off on further imaging or bone marrow biopsy  -SPEP checked today    4) Suspected rectal cancer: CT scan on 10/18/17 showed wall thickening at the posterior aspect of the low rectum near the anorectal junction, concerning for rectal cancer, with perirectal/mesorectal adenopathy.  She has symptoms of constipation and abdominal pain.  In the hospital, they declined further evaluation.  Her  says that they met with hospice, but has not enrolled yet.  I discussed that this does look like a rectal cancer on imaging, but only way to confirm it is to obtain pathology with a colonoscopy and biopsy.  Considering Ángela's dementia, immobility, and overall quite poor functional status, she would be a poor candidate for treatments such as surgery, chemotherapy or radiation.  I think it is reasonable to not further work this up and focus on patient's comfort and quality of life.  We discussed this again, and patient, , and daughter all agree.       I spent a total of 25 minutes with the patient, with over >50% of the time in counseling and/or coordination of care.       Susanna Duran MD  Hematology/Oncology  [facility]

## 2018-04-12 NOTE — LETTER
4/12/2018         RE: Ángela Dumont  [address]  [place] [facility] [address]        Dear Colleague,    Thank you for referring your patient, Ángela Dumont, to the [facility]. Please see a copy of my visit note below.    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  On chart review, she has had a mildly elevated WBC count since at least 2015.  In July 2017, WBC was 18.1 K, and peripheral smear showed leukocytosis with mild atypical absolute lymphocytosis and increased rouleaux.  Hemoglobin and platelets were normal.      In 2015, she was diagnosed with pulmonary embolism and DVT.  Three months prior, she had fractured her femur and had undergone surgery.  She was immobile for 3 months.  She has been on warfarin since then.      Ángela has dementia, and her  answers the majority of the questions.  They live in a one level [facility].  She is wheelchair-bound  He helps with lifting and transferring.  She does not get frequent infections.  She started taking vitamin D recently.      She was admitted to the hospital on 10/18/17-10/19/17 with abdominal pain and constipation.  CT scan on 10/18/17 showed asymmetric wall thickening involving the posterior aspect of the low rectum near the anorectal junction.  She also had extensive perirectal/mesorectal adenopathy, concerning for colorectal neoplasm.  She also has additional enlarged bilateral iliac chain lymph nodes, borderline enlarged perirectal lymph nodes and diffusely mildly prominent lymph nodes elsewhere throughout the abdomen and pelvis.  At the time, patient and her  declined further evaluation and  agreed to home hospice services, but did not enroll.          INTERIM HISTORY: Ángela is here for follow-up with her  and her daughter.  She says that she has been doing fine the last few months.  She denies any new symptoms.  She denies fever/chills, nausea/vomiting.  She has chronic constipation.            REVIEW OF SYSTEMS:   14 point ROS was reviewed and is negative other than as noted above in HPI.       HOME MEDICATIONS:  Current Outpatient Prescriptions   Medication Sig Dispense Refill     levothyroxine (SYNTHROID/LEVOTHROID) 50 MCG tablet TAKE ONE TABLET BY MOUTH EVERY DAY AT 7:30AM. DUE FOR LABS FOR FURTHER REFILLS. 90 tablet 1     furosemide (LASIX) 40 MG tablet TAKE ONE TABLET BY MOUTH DAILY AT 10AM 90 tablet 1     polyethylene glycol (MIRALAX/GLYCOLAX) Packet Take 17 g by mouth daily as needed for constipation 7 packet 0     senna-docusate (SENOKOT-S;PERICOLACE) 8.6-50 MG per tablet Take 1 tablet by mouth 2 times daily 100 tablet 0     Warfarin Sodium (JANTOVEN PO) Take 2mg by mouth on Sunday, Wednesday, Friday and take 4mg rest of week.       tamsulosin (FLOMAX) 0.4 MG capsule TAKE ONE CAPSULE BY MOUTH DAILY AT 10AM 90 capsule 3     acetaminophen (TYLENOL) 325 MG tablet Take 2 tablets (650 mg) by mouth every 4 hours as needed for other (surgical pain) 100 tablet 0         ALLERGIES:  Allergies   Allergen Reactions     Shellfish Allergy Anaphylaxis     Aspirin      GI issues     Contrast Dye Hives and Itching     Flushed skin     Penicillins Hives     Sulfa Drugs Hives     Nitrofurantoin Rash         PAST MEDICAL HISTORY:  Past Medical History:   Diagnosis Date     Adjustment disorder with depressed mood      Antiplatelet or antithrombotic long-term use      Colitis, nonspecific 10/31/2017     Constipation      Dementia     memory/anger     Depressive disorder last few months as condition became apparent    Lack of mobility a big problem     DVT (deep venous thrombosis) (H) 4/15    bilateral      Esophageal reflux      Fibromyalgia      Fracture of right femur (H) 1/15     Heart murmur      Herpes zoster without complication 08/16/2017    left buttocks     History of Clostridium difficile      Hyperlipidemia LDL goal < 130      Hypertension goal BP (blood pressure) < 140/90      Hypothyroid      Leukocytosis      Leukocytosis      Leukocytosis      Leukocytosis, unspecified     w/u ?     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)      Lymphoproliferative disorder (H)     Dr Duran     Lymphoproliferative disorder (H)      MGUS (monoclonal gammopathy of unknown significance)     Dr Duran     MGUS (monoclonal gammopathy of unknown significance)      Migraine      MR (mitral regurgitation)     Mild     NPH (normal pressure hydrocephalus) 4/15    shunt placed but removed in 7/2015 due to erosion through the scalp, replaced 5/19/16     Osteoarthritis of both knees      Other atopic dermatitis and related conditions      PE (pulmonary embolism) 4/15    Saddle - IVC     Pelvic lymphadenopathy 10/31/2017     Pulmonary hypertension     EF 70-75%     Rectal mass 10/2017     Rectal mass 10/1/2017     Thrombosis of leg      Urinary tract infection, site not specified     MRSA - Dr Eze Gonzalez          PAST SURGICAL HISTORY:  Past Surgical History:   Procedure Laterality Date     ABDOMEN SURGERY  left side lower abdomen pain from Shunt we do roberth     COLONOSCOPY  2008     GYN SURGERY       H LABOR AND DELIVERY VAGINAL  1960, 1967     HERNIA REPAIR       HYSTERECTOMY  1976    hysterectomy     IMPLANT SHUNT VENTRICULOPERITONEAL Left 5/19/2016    IMPLANT SHUNT VENTRICULOPERITONEAL - Dr Alba     OPTICAL TRACKING SYSTEM IMPLANT SHUNT VENTRICULOPERITONEAL Right 4/10/2015     Shunt - Dr Sierra     ORTHOPEDIC SURGERY  Knee pain that prohibits walking     REMOVE SHUNT VENTRICULOPERITONEAL N/A 7/3/2015    REMOVE SHUNT VENTRICULOPERITONEAL;  Surgeon: Emmanuel Sierra MD;  Location:   "OR     SURGICAL HISTORY OF -   1/15    right femur/hip surgery - 1/18/2015     SURGICAL HISTORY OF -   7/04    rectocele repair     SURGICAL HISTORY OF -   1997    abdominal adhesion lysis     SURGICAL HISTORY OF -   4/15    IVC filter placement     SURGICAL HISTORY OF -   6/15    IVC filter removal         SOCIAL HISTORY:  Social History     Social History     Marital status:      Spouse name: Vernon     Number of children: 2     Years of education: N/A     Occupational History     Not on file.     Social History Main Topics     Smoking status: Former Smoker     Packs/day: 0.50     Years: 10.00     Types: Cigarettes     Start date: 7/17/1958     Quit date: 1/1/1982     Smokeless tobacco: Never Used      Comment: quit 1967     Alcohol use 0.0 - 0.6 oz/week      Comment: Very light     Drug use: No     Sexual activity: Not Currently     Partners: Male     Birth control/ protection: None      Comment: not a problem     Other Topics Concern     Parent/Sibling W/ Cabg, Mi Or Angioplasty Before 65f 55m? No     Social History Narrative   Ángela is from [place] and her  is from [place].  They used to live in [place], [place], and Ángela wants to move back there.        FAMILY HISTORY:  Family History   Problem Relation Age of Onset     Colon Cancer Father      Coronary Artery Disease Father      DIABETES Maternal Grandmother          PHYSICAL EXAM:  Vital signs:  /80  Pulse 85  Temp 97.9  F (36.6  C) (Tympanic)  Resp 16  Ht 1.626 m (5' 4\")  SpO2 94%   GENERAL/CONSTITUTIONAL: No acute distress.  Accompanied by  and daughter.  EYES: No scleral icterus.  INTEGUMENTARY: No jaundice.  GAIT: In wheelchair.    LABS:  CBC RESULTS:   Recent Labs   Lab Test  04/12/18   1257   WBC  15.1*   RBC  4.70   HGB  13.1   HCT  41.9   MCV  89   MCH  27.9   MCHC  31.3*   RDW  13.7   PLT  197     Recent Labs   Lab Test  04/12/18   1257  10/19/17   0841   NA  139  139   POTASSIUM  4.1  3.7   CHLORIDE  107  108   CO2  26  " 25   ANIONGAP  6  6   GLC  98  83   BUN  19  17   CR  0.78  0.84   TAVIA  9.0  8.4*     Lab Results   Component Value Date    AST 17 04/12/2018     Lab Results   Component Value Date    ALT 25 04/12/2018     No results found for: BILICONJ   Lab Results   Component Value Date    BILITOTAL 0.6 04/12/2018     Lab Results   Component Value Date    ALBUMIN 3.4 04/12/2018     Lab Results   Component Value Date    PROTTOTAL 7.6 04/12/2018      Lab Results   Component Value Date    ALKPHOS 100 04/12/2018           SPEP: M-spike of 0.9 on 9/28/17 4/12/18: pending    Peripheral smear 9/28/17:  FINAL DIAGNOSIS:   Peripheral blood.   - Leukocytosis with absolute atypical lymphocytosis.     COMMENT:   There is leukocytosis with absolute lymphocytosis.  Some of the   lymphocytes appear morphologically atypical.  Further evaluation to   evaluate for a circulating lymphoproliferative disorder with flow   cytometry is recommended.  Clinical correlation is required.     Peripheral flow cytometry 9/28/17:  INTERPRETATION:   Blood:        Kappa-monotypic B cells negative for CD5 and CD10 (40%)        See comment     COMMENT:   By flow cytometry, the monotypic B cells lack CD5 and CD10. This   immunophenotype can be seen in marginal zone lymphoma, lymphoplasmacytic   lymphoma, and hairy cell leukemia, as well as rarely in follicular   lymphoma, CLL/SLL, or mantle cell lymphoma. Additional stains to exclude   Hairy cell leukemia are pending and will be reported in an addendum.   Final interpretation requires correlation with results of other   ancillary studies, morphologic and clinical features. If clinically   indicated a biopsy of involved lymph node or another tissue could be   required for final diagnosis.     This addendum is issued to reflect additional testing performed on this   case.   The abnormal B cells are positive for CD79b and  negative for CD23.   Other immunophenotypic markers (see details below) do not support  the   diagnosis of hairy cell leukemia. The differential includes marginal   zone lymphoma, splenic B cell leukemia/lymphoma NOS, mantle cell   lymphoma, lymphoplasmacytic lymphoma, while other small B cell lymphoma   entities are considered less likely. In the differential diagnosis   please note that rare cases of mantle cell lymphoma can be CD5 negative.   Recommend biopsy of an involved lymph node /  tissue for diagnosis with   cytogenetics and clinical correlation. See Comment.     COMMENTS:   Additional eight-color analyses are performed for the following markers:   CD11c, CD22, CD23, CD25, CD79b, ,      The abnormal B cells are positive for CD22 (normal level), CD79b ,   predominantly negative for CD11c (84% of them are negative) and negative   for CD23, CD25,  and .         ASSESSMENT/PLAN:  Ángela Dumont is a 77 year old female with:    1) Leukocytosis: appears chronic since at least 2015, and on further chart review from Care Everywhere, likely chronic since at least 2007.  Peripheral smear in July 2017 showed mild atypical lymphocytosis and rouleaux.  Hemoglobin and platelet count are normal.  She does not have frequent infections.      Peripheral flow cytometry shows concern for lymphoproliferative disorder.  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      We discussed that is usually a slow growing process, and treatment is usually not necessary.  We could do further evaluation, such as with PET scan and bone marrow biopsy.  Considering her age, dementia, immobility, and poor functional status, I favored watching and waiting and sparing her of invasive procedures that may not change her overall course.  She and her  agreed and opted to wait on getting additional scans or bone marrow biopsy at this time.        CBC today reviewed.  WBC remains mildly  elevated but trended down some to 15.1K.    2) History of PE and DVT: diagnosed in 2015, and she has been on warfarin ever since.  It was likely provoked at the time by surgery and immobility.  She remains wheelchair bound now, and remains at high risk for blood clots.  She has not had frequent falls, and INR has been under relatively good control.  I discussed and risks and benefits of continuing the warfarin.  She will continue the warfarin for now, considering her high risk for clotting remains.  If it becomes that the risk of bleeding starts to outweigh her risk of clots, then it would be time to stop the warfarin.     3) MGUS: M-spike of 0.9 g/dL.  -as above, will hold off on further imaging or bone marrow biopsy  -SPEP checked today    4) Suspected rectal cancer: CT scan on 10/18/17 showed wall thickening at the posterior aspect of the low rectum near the anorectal junction, concerning for rectal cancer, with perirectal/mesorectal adenopathy.  She has symptoms of constipation and abdominal pain.  In the hospital, they declined further evaluation.  Her  says that they met with hospice, but has not enrolled yet.  I discussed that this does look like a rectal cancer on imaging, but only way to confirm it is to obtain pathology with a colonoscopy and biopsy.  Considering Ángela's dementia, immobility, and overall quite poor functional status, she would be a poor candidate for treatments such as surgery, chemotherapy or radiation.  I think it is reasonable to not further work this up and focus on patient's comfort and quality of life.  We discussed this again, and patient, , and daughter all agree.       I spent a total of 25 minutes with the patient, with over >50% of the time in counseling and/or coordination of care.       Susanna Duran MD  Hematology/Oncology  [facility]      Again, thank you for allowing me to participate in the care of your patient.         Sincerely,        Susanna Duran MD

## 2018-04-12 NOTE — PATIENT INSTRUCTIONS
Return to clinic in 1 year with labs a few days prior-Scheduled. Evette ALVA printed and mailed to pt home address. Evette REAGAN

## 2018-04-12 NOTE — NURSING NOTE
"Oncology Rooming Note    April 12, 2018 1:10 PM   Ángela Dumont is a 77 year old female who presents for:    Chief Complaint   Patient presents with     Oncology Clinic Visit     MGUS (monoclonal gammopathy of unknown significance)     Initial Vitals: /80  Pulse 85  Temp 97.9  F (36.6  C) (Tympanic)  Resp 16  Ht 1.626 m (5' 4\")  SpO2 94% Estimated body mass index is 36.56 kg/(m^2) as calculated from the following:    Height as of 2/20/18: 1.626 m (5' 4\").    Weight as of 2/20/18: 96.6 kg (213 lb). There is no height or weight on file to calculate BSA.  No Pain (0) Comment: Data Unavailable   No LMP recorded. Patient has had a hysterectomy.  Allergies reviewed: Yes  Medications reviewed: Yes    Medications: Medication refills not needed today.  Pharmacy name entered into Riv[employer]:    [facility] [address] [place] [facility] [address] [place] [address] [facility] [place] - [address] [facility] [address] [place] [address] [address] [place] [address] AT [place] [address] & [facility]    Clinical concerns: follow up     8 minutes for nursing intake (face to face time)     Silvia Lugo CMA     DISCHARGE PLAN:  Next appointments: See patient instruction section  Departure Mode: Ambulatory  Accompanied by:  and daughter  0 minutes for nursing discharge (face to face time)   Silvia Lugo CMA                  "

## 2018-04-12 NOTE — MR AVS SNAPSHOT
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 Evette REAGAN  AVS printed and mailed to pt home address. Evette REAGAN          Follow-ups after your visit        Your next 10 appointments already scheduled     Apr 09, 2019  1:00 PM CDT   Return Visit with  ONCOLOGY NURSE   [facility]    [facility]  [id number] [place] [address] [address]  [facility] [address] [phone]            Apr 11, 2019  2:15 PM CDT   Return Visit with Susanna Duran MD   [facility]    [facility]  [address]  [facility] [address] [phone]              Future tests that were ordered for you today     Open Future Orders        Priority Expected Expires Ordered    CBC with platelets differential Routine 4/12/2019 4/12/2019 4/12/2018    Comprehensive metabolic panel Routine 4/12/2019 4/12/2019 4/12/2018    Lactate Dehydrogenase Routine 4/12/2019 4/12/2019 4/12/2018    Protein electrophoresis Routine 4/12/2019 4/12/2019 4/12/2018            Who to contact     If you have questions or need follow up information about today's clinic visit or your schedule please contact [facility] directly at [phone].  Normal or non-critical lab and imaging results will be communicated to you by MyChart, letter or phone within 4 business days after the clinic has received the results. If you do not  "hear from us within 7 days, please contact the clinic through [employer] or phone. If you have a critical or abnormal lab result, we will notify you by phone as soon as possible.  Submit refill requests through [employer] or call your pharmacy and they will forward the refill request to us. Please allow 3 business days for your refill to be completed.          Additional Information About Your Visit        "[employer] Information     [employer] gives you secure access to your electronic health record. If you see a primary care provider, you can also send messages to your care team and make appointments. If you have questions, please call your primary care clinic.  If you do not have a primary care provider, please call [phone] and they will assist you.        Care EveryWhere ID     This is your Care EveryWhere ID. This could be used by other organizations to access your [facility] medical records  [id number]        Your Vitals Were     Pulse Temperature Respirations Height Pulse Oximetry       85 97.9  F (36.6  C) (Tympanic) 16 1.626 m (5' 4\") 94%        Blood Pressure from Last 3 Encounters:   04/12/18 124/80   11/16/17 134/58   10/31/17 131/81    Weight from Last 3 Encounters:   02/20/18 96.6 kg (213 lb)   11/16/17 96.6 kg (213 lb)   10/31/17 96.6 kg (213 lb)              We Performed the Following     Beta 2 microglobulin     CBC with platelets differential     Comprehensive metabolic panel     Kappa and lambda light chain     Lactate Dehydrogenase     Protein electrophoresis     Protein Immunofixation Serum          Today's Medication Changes          These changes are accurate as of 4/12/18  1:57 PM.  If you have any questions, ask your nurse or doctor.               Stop taking these medicines if you haven't already. Please contact your care team if you have questions.     azithromycin 250 MG tablet   Commonly known as:  ZITHROMAX           clotrimazole-betamethasone cream   Commonly known as:  LOTRISONE           " VITAMIN D (CHOLECALCIFEROL) PO                    Primary Care Provider Office Phone # Fax #    Joaquin Rockwell [phone]       [address] [place] [address]        Equal Access to Services     QASIM CARRINGTON : Terry robb patienceo Soariadna, waaxda luqadaha, qaybta kaalmada adeegyada, francisco javier khan enrique awan. So [facility] [place] [phone].    ATENCIÓN: Si habla español, tiene a vega disposición servicios gratuitos de asistencia lingüística. Llame al [phone].    We comply with applicable federal civil rights laws and [place] laws. We do not discriminate on the basis of race, color, national origin, age, disability, sex, sexual orientation, or gender identity.            Thank you!     Thank you for choosing [facility]  for your care. Our goal is always to provide you with excellent care. Hearing back from our patients is one way we can continue to improve our services. Please take a few minutes to complete the written survey that you may receive in the mail after your visit with us. Thank you!             Your Updated Medication List - Protect others around you: Learn how to safely use, store and throw away your medicines at www.disposemymeds.org.          This list is accurate as of 4/12/18  1:57 PM.  Always use your most recent med list.                   Brand Name Dispense Instructions for use Diagnosis    acetaminophen 325 MG tablet    TYLENOL    100 tablet    Take 2 tablets (650 mg) by mouth every 4 hours as needed for other (surgical pain)    Normal pressure hydrocephalus       furosemide 40 MG tablet    LASIX    90 tablet    TAKE ONE TABLET BY MOUTH DAILY AT 10AM    Hypertension goal BP (blood pressure) < 140/90       JANTOVEN PO      Take 2mg by mouth on Sunday, Wednesday, Friday and take 4mg rest of week.        levothyroxine 50 MCG tablet    SYNTHROID/LEVOTHROID    90 tablet    TAKE ONE TABLET BY MOUTH EVERY DAY AT 7:30AM. DUE FOR LABS FOR  FURTHER REFILLS.    Hypothyroidism, unspecified type       polyethylene glycol Packet    MIRALAX/GLYCOLAX    7 packet    Take 17 g by mouth daily as needed for constipation    Colonic mass       senna-docusate 8.6-50 MG per tablet    SENOKOT-S;PERICOLACE    100 tablet    Take 1 tablet by mouth 2 times daily    Colonic mass       tamsulosin 0.4 MG capsule    FLOMAX    90 capsule    TAKE ONE CAPSULE BY MOUTH DAILY AT 10AM    Urinary retention

## 2018-04-13 LAB
ALBUMIN SERPL ELPH-MCNC: 3.8 G/DL (ref 3.7–5.1)
ALPHA1 GLOB SERPL ELPH-MCNC: 0.3 G/DL (ref 0.2–0.4)
ALPHA2 GLOB SERPL ELPH-MCNC: 0.8 G/DL (ref 0.5–0.9)
B-GLOBULIN SERPL ELPH-MCNC: 1.8 G/DL (ref 0.6–1)
B2 MICROGLOB SERPL-MCNC: 3.1 MG/L
GAMMA GLOB SERPL ELPH-MCNC: 0.4 G/DL (ref 0.7–1.6)
IGA SERPL-MCNC: 52 MG/DL (ref 70–380)
IGG SERPL-MCNC: 1510 MG/DL (ref 695–1620)
IGM SERPL-MCNC: 18 MG/DL (ref 60–265)
KAPPA LC UR-MCNC: 16.8 MG/DL (ref 0.33–1.94)
KAPPA LC/LAMBDA SER: 13.02 {RATIO} (ref 0.26–1.65)
LAMBDA LC SERPL-MCNC: 1.29 MG/DL (ref 0.57–2.63)
M PROTEIN SERPL ELPH-MCNC: 1.1 G/DL
PROT PATTERN SERPL ELPH-IMP: ABNORMAL
PROT PATTERN SERPL IFE-IMP: ABNORMAL

## 2018-05-03 ENCOUNTER — TRANSFERRED RECORDS (OUTPATIENT)
Dept: HEALTH INFORMATION MANAGEMENT | Facility: CLINIC | Age: 78
End: 2018-05-03

## 2018-05-03 LAB — INR PPP: 2.5

## 2018-05-04 ENCOUNTER — ANTICOAGULATION THERAPY VISIT (OUTPATIENT)
Dept: NURSING | Facility: CLINIC | Age: 78
End: 2018-05-04
Payer: COMMERCIAL

## 2018-05-04 DIAGNOSIS — I26.99 PULMONARY EMBOLISM (H): ICD-10-CM

## 2018-05-04 DIAGNOSIS — I82.409 DVT (DEEP VENOUS THROMBOSIS) (H): ICD-10-CM

## 2018-05-04 DIAGNOSIS — Z79.01 LONG-TERM (CURRENT) USE OF ANTICOAGULANTS: ICD-10-CM

## 2018-05-04 PROCEDURE — 99207 ZZC NO CHARGE NURSE ONLY: CPT | Performed by: FAMILY MEDICINE

## 2018-05-04 NOTE — PROGRESS NOTES
ANTICOAGULATION FOLLOW-UP CLINIC VISIT    Patient Name:  Ángela Dumont  Date:  5/4/2018  Contact Type:  Telephone    SUBJECTIVE:     Patient Findings     Positives No Problem Findings           OBJECTIVE    INR   Date Value Ref Range Status   05/03/2018 2.5  Final       ASSESSMENT / PLAN  INR assessment THER    Recheck INR In: 4 WEEKS    INR Location Home INR      Anticoagulation Summary as of 5/4/2018     INR goal 2.0-3.0   Today's INR 2.5 (5/3/2018)   Maintenance plan 4 mg (1 mg x 4) on Sun, Wed, Fri; 2 mg (1 mg x 2) all other days   Full instructions 4 mg on Sun, Wed, Fri; 2 mg all other days   Weekly total 20 mg   No change documented Dorita Panda RN   Plan last modified Nereyda Padilla RN (1/4/2018)   Next INR check 6/1/2018   Target end date     Indications   Long-term (current) use of anticoagulants [Z79.01] [Z79.01]  Pulmonary embolism (H) [I26.99]  DVT (deep venous thrombosis) (H) [I82.409]         Anticoagulation Episode Summary     INR check location     Preferred lab     Send INR reminders to RV NURSE    Comments       Anticoagulation Care Providers     Provider Role Specialty Phone number    Joaquin Rockwell MD Responsible Family Practice [phone]            See the Encounter Report to view Anticoagulation Flowsheet and Dosing Calendar (Go to Encounters tab in chart review, and find the Anticoagulation Therapy Visit)    Dosage adjustment made based on physician directed care plan.    Dorita Panda, RN

## 2018-05-04 NOTE — MR AVS SNAPSHOT
Ángela Dumont   5/4/2018   Anticoagulation Therapy Visit    Description:  77 year old female   Provider:  Joaquin Rockwell MD   Department:  [facility]           INR as of 5/4/2018     Today's INR 2.5 (5/3/2018)      Anticoagulation Summary as of 5/4/2018     INR goal 2.0-3.0   Today's INR 2.5 (5/3/2018)   Full instructions 4 mg on Sun, Wed, Fri; 2 mg all other days   Next INR check 6/1/2018    Indications   Long-term (current) use of anticoagulants [Z79.01] [Z79.01]  Pulmonary embolism (H) [I26.99]  DVT (deep venous thrombosis) (H) [I82.409]         Description     Pt was billed for last home INR.  This would be the next cycle, 1/4.       Contact Numbers     Clinic Number:         May 2018 Details    Sun Mon Tue Wed Thu Fri Sat       1               2               3               4      4 mg   See details      5      2 mg           6      4 mg         7      2 mg         8      2 mg         9      4 mg         10      2 mg         11      4 mg         12      2 mg           13      4 mg         14      2 mg         15      2 mg         16      4 mg         17      2 mg         18      4 mg         19      2 mg           20      4 mg         21      2 mg         22      2 mg         23      4 mg         24      2 mg         25      4 mg         26      2 mg           27      4 mg         28      2 mg         29      2 mg         30      4 mg         31      2 mg            Date Details   05/04 This INR check               How to take your warfarin dose     To take:  2 mg Take 2 of the 1 mg tablets.    To take:  4 mg Take 4 of the 1 mg tablets.           June 2018 Details    Sun Mon Tue Wed Thu Fri Sat          1            2                 3               4               5               6               7               8               9                 10               11               12               13               14               15               16                 17               18               19                20               21               22               23                 24               25               26               27               28               29               30                Date Details   No additional details    Date of next INR:  6/1/2018         How to take your warfarin dose     To take:  4 mg Take 4 of the 1 mg tablets.

## 2018-06-01 ENCOUNTER — TRANSFERRED RECORDS (OUTPATIENT)
Dept: HEALTH INFORMATION MANAGEMENT | Facility: CLINIC | Age: 78
End: 2018-06-01

## 2018-06-01 ENCOUNTER — ANTICOAGULATION THERAPY VISIT (OUTPATIENT)
Dept: NURSING | Facility: CLINIC | Age: 78
End: 2018-06-01
Payer: COMMERCIAL

## 2018-06-01 DIAGNOSIS — Z79.01 LONG-TERM (CURRENT) USE OF ANTICOAGULANTS: ICD-10-CM

## 2018-06-01 DIAGNOSIS — I82.409 DVT (DEEP VENOUS THROMBOSIS) (H): ICD-10-CM

## 2018-06-01 DIAGNOSIS — I26.99 PULMONARY EMBOLISM (H): ICD-10-CM

## 2018-06-01 LAB — INR PPP: 2.5

## 2018-06-01 PROCEDURE — 99207 ZZC NO CHARGE NURSE ONLY: CPT | Performed by: FAMILY MEDICINE

## 2018-06-01 NOTE — MR AVS SNAPSHOT
Ángela VITALY Dumont   6/1/2018   Anticoagulation Therapy Visit    Description:  77 year old female   Provider:  Joaquin Rockwell MD   Department:  [facility]           INR as of 6/1/2018     Today's INR 2.5      Anticoagulation Summary as of 6/1/2018     INR goal 2.0-3.0   Today's INR 2.5   Full warfarin instructions 4 mg on Sun, Wed, Fri; 2 mg all other days   Next INR check 6/29/2018    Indications   Long-term (current) use of anticoagulants [Z79.01] [Z79.01]  Pulmonary embolism (H) [I26.99]  DVT (deep venous thrombosis) (H) [I82.409]         Description     Left detailed vm with INR in      Contact Numbers     Clinic Number:         June 2018 Details    Sun Mon Tue Wed Thu Fri Sat          1      4 mg   See details      2      2 mg           3      4 mg         4      2 mg         5      2 mg         6      4 mg         7      2 mg         8      4 mg         9      2 mg           10      4 mg         11      2 mg         12      2 mg         13      4 mg         14      2 mg         15      4 mg         16      2 mg           17      4 mg         18      2 mg         19      2 mg         20      4 mg         21      2 mg         22      4 mg         23      2 mg           24      4 mg         25      2 mg         26      2 mg         27      4 mg         28      2 mg         29            30                Date Details   06/01 This INR check       Date of next INR:  6/29/2018         How to take your warfarin dose     To take:  2 mg Take 2 of the 1 mg tablets.    To take:  4 mg Take 4 of the 1 mg tablets.

## 2018-06-01 NOTE — PROGRESS NOTES
ANTICOAGULATION FOLLOW-UP CLINIC VISIT    Patient Name:  Ángela Dumont  Date:  6/1/2018  Contact Type:  Telephone    SUBJECTIVE:     Patient Findings     Positives No Problem Findings           OBJECTIVE    INR   Date Value Ref Range Status   06/01/2018 2.5  Final       ASSESSMENT / PLAN  INR assessment THER    Recheck INR In: 4 WEEKS    INR Location Home INR      Anticoagulation Summary as of 6/1/2018     INR goal 2.0-3.0   Today's INR 2.5   Warfarin maintenance plan 4 mg (1 mg x 4) on Sun, Wed, Fri; 2 mg (1 mg x 2) all other days   Full warfarin instructions 4 mg on Sun, Wed, Fri; 2 mg all other days   Weekly warfarin total 20 mg   No change documented Dorita Panda RN   Plan last modified Nereyda Padilla RN (1/4/2018)   Next INR check 6/29/2018   Target end date     Indications   Long-term (current) use of anticoagulants [Z79.01] [Z79.01]  Pulmonary embolism (H) [I26.99]  DVT (deep venous thrombosis) (H) [I82.409]         Anticoagulation Episode Summary     INR check location     Preferred lab     Send INR reminders to RV NURSE    Comments       Anticoagulation Care Providers     Provider Role Specialty Phone number    Joaquin Rockwell MD [facility] [phone]            See the Encounter Report to view Anticoagulation Flowsheet and Dosing Calendar (Go to Encounters tab in chart review, and find the Anticoagulation Therapy Visit)    Dosage adjustment made based on physician directed care plan.    Dorita Panda, RN

## 2018-07-02 ENCOUNTER — ANTICOAGULATION THERAPY VISIT (OUTPATIENT)
Dept: FAMILY MEDICINE | Facility: CLINIC | Age: 78
End: 2018-07-02
Payer: COMMERCIAL

## 2018-07-02 ENCOUNTER — TRANSFERRED RECORDS (OUTPATIENT)
Dept: HEALTH INFORMATION MANAGEMENT | Facility: CLINIC | Age: 78
End: 2018-07-02

## 2018-07-02 DIAGNOSIS — I82.409 DVT (DEEP VENOUS THROMBOSIS) (H): ICD-10-CM

## 2018-07-02 DIAGNOSIS — I26.99 PULMONARY EMBOLISM (H): ICD-10-CM

## 2018-07-02 DIAGNOSIS — R33.9 URINARY RETENTION: ICD-10-CM

## 2018-07-02 DIAGNOSIS — I10 HYPERTENSION GOAL BP (BLOOD PRESSURE) < 140/90: ICD-10-CM

## 2018-07-02 DIAGNOSIS — Z79.01 LONG-TERM (CURRENT) USE OF ANTICOAGULANTS: ICD-10-CM

## 2018-07-02 LAB — INR POINT OF CARE: 2.4 (ref 0.86–1.14)

## 2018-07-02 PROCEDURE — 85610 PROTHROMBIN TIME: CPT | Mod: QW | Performed by: FAMILY MEDICINE

## 2018-07-02 PROCEDURE — 36416 COLLJ CAPILLARY BLOOD SPEC: CPT | Performed by: FAMILY MEDICINE

## 2018-07-02 RX ORDER — TAMSULOSIN HYDROCHLORIDE 0.4 MG/1
CAPSULE ORAL
Qty: 90 CAPSULE | Refills: 0 | Status: SHIPPED | OUTPATIENT
Start: 2018-07-02 | End: 2018-09-30

## 2018-07-02 RX ORDER — FUROSEMIDE 40 MG
TABLET ORAL
Qty: 90 TABLET | Refills: 0 | Status: SHIPPED | OUTPATIENT
Start: 2018-07-02 | End: 2018-09-30

## 2018-07-02 NOTE — MR AVS SNAPSHOT
Ángela Dumont   7/2/2018   Anticoagulation Therapy Visit    Description:  77 year old female   Provider:  Joaquin Rockwell MD   Department:   Family Practice           INR as of 7/2/2018     Today's INR 2.4      Anticoagulation Summary as of 7/2/2018     INR goal 2.0-3.0   Today's INR 2.4   Full warfarin instructions 4 mg on Sun, Wed, Fri; 2 mg all other days   Next INR check 7/30/2018    Indications   Long-term (current) use of anticoagulants [Z79.01] [Z79.01]  Pulmonary embolism (H) [I26.99]  DVT (deep venous thrombosis) (H) [I82.409]         July 2018 Details    Sun Mon Tue Wed Thu Fri Sat     1               2      2 mg   See details      3      2 mg         4      4 mg         5      2 mg         6      4 mg         7      2 mg           8      4 mg         9      2 mg         10      2 mg         11      4 mg         12      2 mg         13      4 mg         14      2 mg           15      4 mg         16      2 mg         17      2 mg         18      4 mg         19      2 mg         20      4 mg         21      2 mg           22      4 mg         23      2 mg         24      2 mg         25      4 mg         26      2 mg         27      4 mg         28      2 mg           29      4 mg         30            31                    Date Details   07/02 This INR check       Date of next INR:  7/30/2018         How to take your warfarin dose     To take:  2 mg Take 2 of the 1 mg tablets.    To take:  4 mg Take 4 of the 1 mg tablets.

## 2018-07-02 NOTE — TELEPHONE ENCOUNTER
"Requested Prescriptions   Pending Prescriptions Disp Refills     furosemide (LASIX) 40 MG tablet [Pharmacy Med Name: FUROSEMIDE 40 MG TABLET] 90 tablet 0        Last Written Prescription Date:  1.5.18  Last Fill Quantity: 90,  # refills: 1   Last Office Visit: 2/20/2018   Future Office Visit:      Sig: TAKE 1 TABLET BY MOUTH EVERY DAY AT 10 AM    Diuretics (Including Combos) Protocol Passed    7/2/2018  5:17 PM       Passed - Blood pressure under 140/90 in past 12 months    BP Readings from Last 3 Encounters:   04/12/18 124/80   11/16/17 134/58   10/31/17 131/81                Passed - Recent (12 mo) or future (30 days) visit within the authorizing provider's specialty    Patient had office visit in the last 12 months or has a visit in the next 30 days with authorizing provider or within the authorizing provider's specialty.  See \"Patient Info\" tab in inbasket, or \"Choose Columns\" in Meds & Orders section of the refill encounter.           Passed - Patient is age 18 or older       Passed - No active pregancy on record       Passed - Normal serum creatinine on file in past 12 months    Recent Labs   Lab Test  04/12/18   1257   CR  0.78             Passed - Normal serum potassium on file in past 12 months    Recent Labs   Lab Test  04/12/18   1257   POTASSIUM  4.1                   Passed - Normal serum sodium on file in past 12 months    Recent Labs   Lab Test  04/12/18   1257   NA  139             Passed - No positive pregnancy test in past 12 months        tamsulosin (FLOMAX) 0.4 MG capsule [Pharmacy Med Name: TAMSULOSIN HCL 0.4 MG CAPSULE] 90 capsule 0    Last Written Prescription Date:  7.4.17  Last Fill Quantity: 90,  # refills: 3   Last Office Visit: 2/20/2018   Future Office Visit:      Sig: TAKE 1 CAPSULE BY MOUTH EVERY DAY AT 10 AM    Alpha Blockers Passed    7/2/2018  5:17 PM       Passed - Blood pressure under 140/90 in past 12 months    BP Readings from Last 3 Encounters:   04/12/18 124/80   11/16/17 " "134/58   10/31/17 131/81                Passed - Recent (12 mo) or future (30 days) visit within the authorizing provider's specialty    Patient had office visit in the last 12 months or has a visit in the next 30 days with authorizing provider or within the authorizing provider's specialty.  See \"Patient Info\" tab in inbasket, or \"Choose Columns\" in Meds & Orders section of the refill encounter.           Passed - Patient does not have Tadalafil, Vardenafil, or Sildenafil on their medication list       Passed - Patient is 18 years of age or older       Passed - No active pregnancy on record       Passed - No positive pregnancy test in past 12 months          "

## 2018-07-02 NOTE — TELEPHONE ENCOUNTER
Prescription approved per [facility] Refill Protocol.  Radha Panda RN  [facility]

## 2018-07-03 DIAGNOSIS — I26.99 PULMONARY EMBOLISM (H): ICD-10-CM

## 2018-07-03 DIAGNOSIS — I82.409 DVT (DEEP VENOUS THROMBOSIS) (H): Primary | ICD-10-CM

## 2018-07-03 RX ORDER — WARFARIN SODIUM 4 MG/1
TABLET ORAL
Qty: 45 TABLET | Refills: 1 | Status: SHIPPED | OUTPATIENT
Start: 2018-07-03 | End: 2018-12-17

## 2018-07-03 NOTE — TELEPHONE ENCOUNTER
warfarin (COUMADIN) tablet 4 mg    Last Written Prescription Date:  na  Last Fill Quantity: na,  # refills: na   Last Office Visit: 2/20/2018   Future Office Visit:

## 2018-07-13 DIAGNOSIS — E03.9 HYPOTHYROIDISM, UNSPECIFIED TYPE: ICD-10-CM

## 2018-07-13 RX ORDER — LEVOTHYROXINE SODIUM 50 UG/1
TABLET ORAL
Qty: 90 TABLET | Refills: 0 | Status: SHIPPED | OUTPATIENT
Start: 2018-07-13 | End: 2018-10-20

## 2018-07-13 NOTE — TELEPHONE ENCOUNTER
Pt did not follow up per last refill notes.   Pt due for labs and phx. Please advise.     Radha Panda RN  [place] [facility]

## 2018-07-13 NOTE — TELEPHONE ENCOUNTER
"Requested Prescriptions   Pending Prescriptions Disp Refills     levothyroxine (SYNTHROID/LEVOTHROID) 50 MCG tablet [Pharmacy Med Name: LEVOTHYROXINE 50 MCG TABLET] 90 tablet 0        Last Written Prescription Date:  01/16/2018  Last Fill Quantity: 90,  # refills: 1   Last office visit: 2/20/2018  Sig: TAKE 1 TABLET BY MOUTH EVERY MORNING AT 7:30 AM. DUE FOR LAB FOR FURTHER REFILLS    Thyroid Protocol Passed    7/13/2018  1:19 AM       Passed - Patient is 12 years or older       Passed - Recent (12 mo) or future (30 days) visit within the authorizing provider's specialty    Patient had office visit in the last 12 months or has a visit in the next 30 days with authorizing provider or within the authorizing provider's specialty.  See \"Patient Info\" tab in inbasket, or \"Choose Columns\" in Meds & Orders section of the refill encounter.           Passed - Normal TSH on file in past 12 months    Recent Labs   Lab Test  07/26/17   1118   TSH  2.51             Passed - No active pregnancy on record    If patient is pregnant or has had a positive pregnancy test, please check TSH.         Passed - No positive pregnancy test in past 12 months    If patient is pregnant or has had a positive pregnancy test, please check TSH.            "

## 2018-07-18 NOTE — TELEPHONE ENCOUNTER
Left non-detailed message for patient to call back.  Please schedule follow up when patient calls back.  (see previous notes for details)    Kathryn Carson

## 2018-08-02 ENCOUNTER — ANTICOAGULATION THERAPY VISIT (OUTPATIENT)
Dept: NURSING | Facility: CLINIC | Age: 78
End: 2018-08-02
Payer: COMMERCIAL

## 2018-08-02 ENCOUNTER — TRANSFERRED RECORDS (OUTPATIENT)
Dept: HEALTH INFORMATION MANAGEMENT | Facility: CLINIC | Age: 78
End: 2018-08-02

## 2018-08-02 DIAGNOSIS — I26.99 PULMONARY EMBOLISM (H): ICD-10-CM

## 2018-08-02 DIAGNOSIS — Z79.01 LONG-TERM (CURRENT) USE OF ANTICOAGULANTS: ICD-10-CM

## 2018-08-02 DIAGNOSIS — I82.409 DVT (DEEP VENOUS THROMBOSIS) (H): ICD-10-CM

## 2018-08-02 LAB — INR PPP: 2.9

## 2018-08-02 PROCEDURE — 99207 ZZC NO CHARGE NURSE ONLY: CPT | Performed by: FAMILY MEDICINE

## 2018-08-02 NOTE — MR AVS SNAPSHOT
Ángela Dumont   8/2/2018   Anticoagulation Therapy Visit    Description:  78 year old female   Provider:  Joaquin Rockwell MD   Department:  [facility]           INR as of 8/2/2018     Today's INR 2.9      Anticoagulation Summary as of 8/2/2018     INR goal 2.0-3.0   Today's INR 2.9   Full warfarin instructions 4 mg on Sun, Wed, Fri; 2 mg all other days   Next INR check 9/3/2018    Indications   Long-term (current) use of anticoagulants [Z79.01] [Z79.01]  Pulmonary embolism (H) [I26.99]  DVT (deep venous thrombosis) (H) [I82.409]         Contact Numbers     Clinic Number:         August 2018 Details    Sun Mon Tue Wed Thu Fri Sat        1               2      2 mg   See details      3      4 mg         4      2 mg           5      4 mg         6      2 mg         7      2 mg         8      4 mg         9      2 mg         10      4 mg         11      2 mg           12      4 mg         13      2 mg         14      2 mg         15      4 mg         16      2 mg         17      4 mg         18      2 mg           19      4 mg         20      2 mg         21      2 mg         22      4 mg         23      2 mg         24      4 mg         25      2 mg           26      4 mg         27      2 mg         28      2 mg         29      4 mg         30      2 mg         31      4 mg           Date Details   08/02 This INR check               How to take your warfarin dose     To take:  2 mg Take 2 of the 1 mg tablets.    To take:  4 mg Take 4 of the 1 mg tablets.           September 2018 Details    Sun Mon Tue Wed Thu Fri Sat           1      2 mg           2      4 mg         3            4               5               6               7               8                 9               10               11               12               13               14               15                 16               17               18               19               20               21               22                 23               24                25               26               27               28               29                 30                      Date Details   No additional details    Date of next INR:  9/3/2018         How to take your warfarin dose     To take:  2 mg Take 2 of the 1 mg tablets.    To take:  4 mg Take 4 of the 1 mg tablets.

## 2018-08-02 NOTE — PROGRESS NOTES
ANTICOAGULATION FOLLOW-UP CLINIC VISIT    Patient Name:  Ángela Dumont  Date:  8/2/2018  Contact Type:  Telephone    SUBJECTIVE:     Patient Findings     Positives No Problem Findings           OBJECTIVE    INR   Date Value Ref Range Status   08/02/2018 2.9  Final       ASSESSMENT / PLAN  INR assessment THER    Recheck INR In: 4 WEEKS    INR Location Home INR    Billed home INR Yes      Anticoagulation Summary as of 8/2/2018     INR goal 2.0-3.0   Today's INR 2.9   Warfarin maintenance plan 4 mg (1 mg x 4) on Sun, Wed, Fri; 2 mg (1 mg x 2) all other days   Full warfarin instructions 4 mg on Sun, Wed, Fri; 2 mg all other days   Weekly warfarin total 20 mg   No change documented Dorita Panda RN   Plan last modified Nereyda Padilla RN (1/4/2018)   Next INR check 9/3/2018   Target end date     Indications   Long-term (current) use of anticoagulants [Z79.01] [Z79.01]  Pulmonary embolism (H) [I26.99]  DVT (deep venous thrombosis) (H) [I82.409]         Anticoagulation Episode Summary     INR check location     Preferred lab     Send INR reminders to RV NURSE    Comments       Anticoagulation Care Providers     Provider Role Specialty Phone number    Joaquin Rockwell MD [facility] [phone]            See the Encounter Report to view Anticoagulation Flowsheet and Dosing Calendar (Go to Encounters tab in chart review, and find the Anticoagulation Therapy Visit)    Dosage adjustment made based on physician directed care plan.    Dorita Panda, RN

## 2018-08-07 ENCOUNTER — TELEPHONE (OUTPATIENT)
Dept: FAMILY MEDICINE | Facility: CLINIC | Age: 78
End: 2018-08-07

## 2018-08-07 NOTE — TELEPHONE ENCOUNTER
Called # below     Pt and  was told to check every 2 weeks several months ago and  will still check only monthly so RNs have been telling pt to check monthly for a few months due to this.     Left the above in a detailed Vm asking for them to call back to ask for more direction as to what we should be doing to help pt and  be more complaint     Awaiting call back     Cadence Gonzalez RN, BSN  [facility]

## 2018-08-07 NOTE — TELEPHONE ENCOUNTER
Shahid is calling to clarify INR testing    Test frequency is listed as q 2 weeks with ability to retest      Patient said she was advised to test monthly. This is not a test frequency allowed with the program      Nereyda call:   [phone]  Jesusita to discuss orders    Itzel Regalado RN- Triage [employer]

## 2018-08-08 NOTE — TELEPHONE ENCOUNTER
Called # nancy     Reviewed that pt's  is the one checking pts INR  and he likes to do it only monthly     nancy stated that pt can be a risk fr losing her home monitoring if she is not testing every 2 weeks   RN asked if this can be discussed at the next INR which will be in 4 weeks  - Nancy stated that it is okay to discuss this in September     Cadence Gonzalez RN, BSN  [facility]

## 2018-09-04 ENCOUNTER — TRANSFERRED RECORDS (OUTPATIENT)
Dept: HEALTH INFORMATION MANAGEMENT | Facility: CLINIC | Age: 78
End: 2018-09-04

## 2018-09-04 LAB — INR PPP: 2.7 (ref 2–3)

## 2018-09-05 ENCOUNTER — ANTICOAGULATION THERAPY VISIT (OUTPATIENT)
Dept: FAMILY MEDICINE | Facility: CLINIC | Age: 78
End: 2018-09-05
Payer: COMMERCIAL

## 2018-09-05 DIAGNOSIS — Z79.01 LONG-TERM (CURRENT) USE OF ANTICOAGULANTS: ICD-10-CM

## 2018-09-05 DIAGNOSIS — I82.409 DVT (DEEP VENOUS THROMBOSIS) (H): ICD-10-CM

## 2018-09-05 DIAGNOSIS — I26.99 PULMONARY EMBOLISM (H): ICD-10-CM

## 2018-09-05 LAB — INR PPP: 2.7

## 2018-09-05 PROCEDURE — 99207 ZZC NO CHARGE NURSE ONLY: CPT | Performed by: FAMILY MEDICINE

## 2018-09-05 NOTE — MR AVS SNAPSHOT
Ángela Dumont   9/5/2018   Anticoagulation Therapy Visit    Description:  78 year old female   Provider:  Joaquin Rockwell MD   Department:   Family Practice           INR as of 9/5/2018     Today's INR 2.7      Anticoagulation Summary as of 9/5/2018     INR goal 2.0-3.0   Today's INR 2.7   Full warfarin instructions 4 mg on Sun, Wed, Fri; 2 mg all other days   Next INR check 9/19/2018    Indications   Long-term (current) use of anticoagulants [Z79.01] [Z79.01]  Pulmonary embolism (H) [I26.99]  DVT (deep venous thrombosis) (H) [I82.409]         September 2018 Details    Sun Mon Tue Wed Thu Fri Sat           1                 2               3               4               5      4 mg   See details      6      2 mg         7      4 mg         8      2 mg           9      4 mg         10      2 mg         11      2 mg         12      4 mg         13      2 mg         14      4 mg         15      2 mg           16      4 mg         17      2 mg         18      2 mg         19            20               21               22                 23               24               25               26               27               28               29                 30                      Date Details   09/05 This INR check       Date of next INR:  9/19/2018         How to take your warfarin dose     To take:  2 mg Take 2 of the 1 mg tablets.    To take:  4 mg Take 4 of the 1 mg tablets.

## 2018-09-05 NOTE — PROGRESS NOTES
ANTICOAGULATION FOLLOW-UP CLINIC VISIT    Patient Name:  Ángela Dumont  Date:  9/5/2018  Contact Type:  Face to Face    SUBJECTIVE:     Patient Findings     Positives No Problem Findings           OBJECTIVE    INR   Date Value Ref Range Status   09/05/2018 2.7  Final       ASSESSMENT / PLAN  No question data found.  Anticoagulation Summary as of 9/5/2018     INR goal 2.0-3.0   Today's INR 2.7   Warfarin maintenance plan 4 mg (1 mg x 4) on Sun, Wed, Fri; 2 mg (1 mg x 2) all other days   Full warfarin instructions 4 mg on Sun, Wed, Fri; 2 mg all other days   Weekly warfarin total 20 mg   No change documented Cadence Gonzalez, RN   Plan last modified Nereyda Padilla RN (1/4/2018)   Next INR check 9/19/2018   Target end date     Indications   Long-term (current) use of anticoagulants [Z79.01] [Z79.01]  Pulmonary embolism (H) [I26.99]  DVT (deep venous thrombosis) (H) [I82.409]         Anticoagulation Episode Summary     INR check location     Preferred lab     Send INR reminders to RV NURSE    Comments       Anticoagulation Care Providers     Provider Role Specialty Phone number    Joaquin Rockwell MD [facility] [phone]            See the Encounter Report to view Anticoagulation Flowsheet and Dosing Calendar (Go to Encounters tab in chart review, and find the Anticoagulation Therapy Visit)    Per pts - they called Nancy and was told that the pts INR orders come from TS, if TS wants the pt to check monthly, then this is ok as long as they are therapeutic.  Pt will talk to TS about this when seen this Friday.    Dosage adjustment made based on physician directed care plan.    Cadence Gonzalez, RN

## 2018-09-07 ENCOUNTER — TELEPHONE (OUTPATIENT)
Dept: FAMILY MEDICINE | Facility: CLINIC | Age: 78
End: 2018-09-07

## 2018-09-07 ENCOUNTER — OFFICE VISIT (OUTPATIENT)
Dept: FAMILY MEDICINE | Facility: CLINIC | Age: 78
End: 2018-09-07
Payer: COMMERCIAL

## 2018-09-07 VITALS
OXYGEN SATURATION: 95 % | TEMPERATURE: 97.6 F | WEIGHT: 195 LBS | BODY MASS INDEX: 33.29 KG/M2 | HEIGHT: 64 IN | DIASTOLIC BLOOD PRESSURE: 81 MMHG | HEART RATE: 82 BPM | SYSTOLIC BLOOD PRESSURE: 126 MMHG

## 2018-09-07 DIAGNOSIS — Z86.718 HISTORY OF DEEP VENOUS THROMBOSIS: ICD-10-CM

## 2018-09-07 DIAGNOSIS — I27.20 PULMONARY HYPERTENSION (H): ICD-10-CM

## 2018-09-07 DIAGNOSIS — Z51.81 MEDICATION MONITORING ENCOUNTER: ICD-10-CM

## 2018-09-07 DIAGNOSIS — Z98.2 S/P VP SHUNT: ICD-10-CM

## 2018-09-07 DIAGNOSIS — F03.90 DEMENTIA WITHOUT BEHAVIORAL DISTURBANCE, UNSPECIFIED DEMENTIA TYPE: ICD-10-CM

## 2018-09-07 DIAGNOSIS — D47.2 MGUS (MONOCLONAL GAMMOPATHY OF UNKNOWN SIGNIFICANCE): ICD-10-CM

## 2018-09-07 DIAGNOSIS — K62.89 RECTAL MASS: ICD-10-CM

## 2018-09-07 DIAGNOSIS — R53.81 PHYSICAL DECONDITIONING: ICD-10-CM

## 2018-09-07 DIAGNOSIS — Z79.01 LONG-TERM (CURRENT) USE OF ANTICOAGULANTS: ICD-10-CM

## 2018-09-07 DIAGNOSIS — G91.2 NPH (NORMAL PRESSURE HYDROCEPHALUS) (H): Primary | ICD-10-CM

## 2018-09-07 DIAGNOSIS — F43.21 ADJUSTMENT DISORDER WITH DEPRESSED MOOD: ICD-10-CM

## 2018-09-07 DIAGNOSIS — I10 HYPERTENSION GOAL BP (BLOOD PRESSURE) < 140/90: ICD-10-CM

## 2018-09-07 DIAGNOSIS — L30.9 DERMATITIS: ICD-10-CM

## 2018-09-07 DIAGNOSIS — E66.811 CLASS 1 OBESITY WITH SERIOUS COMORBIDITY AND BODY MASS INDEX (BMI) OF 33.0 TO 33.9 IN ADULT, UNSPECIFIED OBESITY TYPE: ICD-10-CM

## 2018-09-07 DIAGNOSIS — Z86.711 HISTORY OF PULMONARY EMBOLUS (PE): ICD-10-CM

## 2018-09-07 DIAGNOSIS — R59.0 PELVIC LYMPHADENOPATHY: ICD-10-CM

## 2018-09-07 DIAGNOSIS — E03.9 HYPOTHYROIDISM, UNSPECIFIED TYPE: ICD-10-CM

## 2018-09-07 DIAGNOSIS — M17.0 PRIMARY OSTEOARTHRITIS OF BOTH KNEES: ICD-10-CM

## 2018-09-07 DIAGNOSIS — M79.7 FIBROMYALGIA: ICD-10-CM

## 2018-09-07 DIAGNOSIS — K21.9 GASTROESOPHAGEAL REFLUX DISEASE WITHOUT ESOPHAGITIS: ICD-10-CM

## 2018-09-07 DIAGNOSIS — D47.Z9 LOW GRADE B CELL LYMPHOPROLIFERATIVE DISORDER (H): ICD-10-CM

## 2018-09-07 DIAGNOSIS — K63.89 COLONIC MASS: ICD-10-CM

## 2018-09-07 LAB
ERYTHROCYTE [DISTWIDTH] IN BLOOD BY AUTOMATED COUNT: 14 % (ref 10–15)
HCT VFR BLD AUTO: 45.6 % (ref 35–47)
HGB BLD-MCNC: 14.1 G/DL (ref 11.7–15.7)
MCH RBC QN AUTO: 28.6 PG (ref 26.5–33)
MCHC RBC AUTO-ENTMCNC: 30.9 G/DL (ref 31.5–36.5)
MCV RBC AUTO: 93 FL (ref 78–100)
PLATELET # BLD AUTO: 165 10E9/L (ref 150–450)
RBC # BLD AUTO: 4.93 10E12/L (ref 3.8–5.2)
WBC # BLD AUTO: 16.5 10E9/L (ref 4–11)

## 2018-09-07 PROCEDURE — 80053 COMPREHEN METABOLIC PANEL: CPT | Performed by: FAMILY MEDICINE

## 2018-09-07 PROCEDURE — 36415 COLL VENOUS BLD VENIPUNCTURE: CPT | Performed by: FAMILY MEDICINE

## 2018-09-07 PROCEDURE — 84439 ASSAY OF FREE THYROXINE: CPT | Performed by: FAMILY MEDICINE

## 2018-09-07 PROCEDURE — 99214 OFFICE O/P EST MOD 30 MIN: CPT | Performed by: FAMILY MEDICINE

## 2018-09-07 PROCEDURE — 84443 ASSAY THYROID STIM HORMONE: CPT | Performed by: FAMILY MEDICINE

## 2018-09-07 PROCEDURE — 85027 COMPLETE CBC AUTOMATED: CPT | Performed by: FAMILY MEDICINE

## 2018-09-07 RX ORDER — VIT C/B6/B5/MAGNESIUM/HERB 173 50-5-6-5MG
CAPSULE ORAL
COMMUNITY
Start: 2018-09-07 | End: 2019-07-30

## 2018-09-07 RX ORDER — NYSTATIN 100000 U/G
CREAM TOPICAL 3 TIMES DAILY
Qty: 15 G | Refills: 1 | COMMUNITY
Start: 2018-09-07 | End: 2018-09-07

## 2018-09-07 RX ORDER — NYSTATIN 100000 U/G
CREAM TOPICAL 3 TIMES DAILY
Qty: 30 G | Refills: 3 | Status: SHIPPED | OUTPATIENT
Start: 2018-09-07 | End: 2019-10-02

## 2018-09-07 RX ORDER — AMOXICILLIN 250 MG
1 CAPSULE ORAL 2 TIMES DAILY PRN
Qty: 100 TABLET | Refills: 0 | COMMUNITY
Start: 2018-09-07 | End: 2019-07-27

## 2018-09-07 NOTE — PATIENT INSTRUCTIONS
Plan:  1- You can transition to checking your INR to once monthly depending on what the INR nurse suggests.   2- Try over the counter Simethicone for excessive belching.   3- Try to use the bicycle and/or stand more frequently to help aid with mobility. You can also try to adjust the speed/resistance of the bicycle to improve strength.   4- Check with your insurance to see if they cover the shingles vaccine. If they do cover the vaccine, ask them if the coverage depends on if you get the vaccine at a pharmacy or a clinic.    [facility] [place]                        To reach your care team during and after hours:   [phone]  To reach our pharmacy:        [phone]    Clinic Hours                        Our clinic hours are:    Monday   7:30 am to 7:00 pm                  Tuesday through Friday 7:30 am to 5:00 pm                             Saturday   8:00 am to 12:00 pm      Sunday   Closed      Pharmacy Hours                        Our pharmacy hours are:    Monday   8:30 am to 7:00 pm       Tuesday to Friday  8:30 am to 6:00 pm                       Saturday    9:00 am to 1:00 pm              Sunday    Closed              There is also information available at our web site:  [email or web address]    If your provider ordered any lab tests and you do not receive the results within 10 business days, please call the clinic.    If you need a medication refill please contact your pharmacy.  Please allow 2-3 business days for your refill to be completed.    Our clinic offers telephone visits and e visits.  Please ask one of your team members to explain more.      Use [employer] (secure email communication and access to your chart) to send your primary care provider a message or make an appointment. Ask someone on your Team how to sign up for [email or web address].  Immunizations                      Immunization History   Administered Date(s) Administered     Influenza (High Dose) 3 valent vaccine 09/19/2014, 09/25/2015,  09/19/2016, 10/05/2017     Influenza (IIV3) PF 10/23/2003, 10/04/2010, 10/06/2011, 09/27/2012, 09/19/2013     Pneumo Conj 13-V (2010&after) 09/25/2015     Pneumococcal 23 valent 10/04/2010     TD (ADULT, 7+) 10/31/2017     Tdap (Adacel,Boostrix) 01/01/2004     Zoster vaccine, live 10/05/2007        Health Maintenance                         Health Maintenance Due   Topic Date Due     Microalbumin Lab - yearly  07/17/1941     Bone Density Screening (Dexa)  07/17/2005     FALL RISK ASSESSMENT  09/01/2017     Depression Assessment 2 - yearly  09/01/2017     Flu Vaccine (1) 09/01/2018

## 2018-09-07 NOTE — MR AVS SNAPSHOT
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)        MGUS (monoclonal gammopathy of unknown significance)        History of pulmonary embolus (PE)        History of deep venous thrombosis        Pulmonary hypertension        Hypertension goal BP (blood pressure) < 140/90        Adjustment disorder with depressed mood        Fibromyalgia        Primary osteoarthritis of both knees        Hypothyroidism, unspecified type        Gastroesophageal reflux disease without esophagitis        Dermatitis        Physical deconditioning        Class 1 obesity with serious comorbidity and body mass index (BMI) of 33.0 to 33.9 in adult, unspecified obesity type        Long-term (current) use of anticoagulants [Z79.01]        Medication monitoring encounter          Care Instructions    Plan:  1- You can transition to checking your INR to once monthly depending on what the INR nurse suggests.   2- Try over the counter Simethicone for excessive belching.   3- Try to use the bicycle and/or stand more frequently to help aid with mobility. You can also try to adjust the speed/resistance of the bicycle to improve strength.   4- Check with your insurance to see if they cover the shingles vaccine. If they do cover the vaccine, ask them if the coverage depends on if you get the vaccine at a pharmacy or a clinic.    [facility] [place]                        To reach your care team during and after hours:   [phone]  To reach  our pharmacy:        [phone]    Clinic Hours                        Our clinic hours are:    Monday   7:30 am to 7:00 pm                  Tuesday through Friday 7:30 am to 5:00 pm                             Saturday   8:00 am to 12:00 pm      Sunday   Closed      Pharmacy Hours                        Our pharmacy hours are:    Monday   8:30 am to 7:00 pm       Tuesday to Friday  8:30 am to 6:00 pm                       Saturday    9:00 am to 1:00 pm              Sunday    Closed              There is also information available at our web site:  [email or web address]    If your provider ordered any lab tests and you do not receive the results within 10 business days, please call the clinic.    If you need a medication refill please contact your pharmacy.  Please allow 2-3 business days for your refill to be completed.    Our clinic offers telephone visits and e visits.  Please ask one of your team members to explain more.      Use [employer] (secure email communication and access to your chart) to send your primary care provider a message or make an appointment. Ask someone on your Team how to sign up for [employer].  Immunizations                      Immunization History   Administered Date(s) Administered     Influenza (High Dose) 3 valent vaccine 09/19/2014, 09/25/2015, 09/19/2016, 10/05/2017     Influenza (IIV3) PF 10/23/2003, 10/04/2010, 10/06/2011, 09/27/2012, 09/19/2013     Pneumo Conj 13-V (2010&after) 09/25/2015     Pneumococcal 23 valent 10/04/2010     TD (ADULT, 7+) 10/31/2017     Tdap (Adacel,Boostrix) 01/01/2004     Zoster vaccine, live 10/05/2007        Health Maintenance                         Health Maintenance Due   Topic Date Due     Microalbumin Lab - yearly  07/17/1941     Bone Density Screening (Dexa)  07/17/2005     FALL RISK ASSESSMENT  09/01/2017     Depression Assessment 2 - yearly  09/01/2017     Flu Vaccine (1) 09/01/2018               Follow-ups after your visit        Follow-up notes from  your care team     Return in about 6 months (around 3/7/2019), or if symptoms worsen or fail to improve, for Medication Recheck Visit.      Your next 10 appointments already scheduled     Apr 09, 2019  1:00 PM CDT   Return Visit with RH ONCOLOGY NURSE   [facility] ([facility])    [facility]  [address]  [facility] [address]   [phone]            Apr 11, 2019  2:15 PM CDT   Return Visit with Susanna Duran MD   [facility] ([facility])    [facility]  [address]  [facility] [id number] [phone]              Who to contact     If you have questions or need follow up information about today's clinic visit or your schedule please contact [facility] directly at [phone].  Normal or non-critical lab and imaging results will be communicated to you by MyChart, letter or phone within 4 business days after the clinic has received the results. If you do not hear from us within 7 days, please contact the clinic through [employer] or phone. If you have a critical or abnormal lab result, we will notify you by phone as soon as possible.  Submit refill requests through [employer] or call your pharmacy and they will forward the refill request to us. Please allow 3 business days for your refill to be completed.          Additional Information About Your Visit        [employer] Information     [employer] gives you secure access to your electronic health record. If you see a primary care provider, you can also send messages to your care team and make appointments. If you have questions, please call your primary care clinic.  If you do not have a primary care provider, please call [phone] and they will assist you.        Care EveryWhere ID     This is your Care EveryWhere ID. This could be used by other organizations to access your [facility]  "records  [id number]        Your Vitals Were     Pulse Temperature Height Pulse Oximetry BMI (Body Mass Index)       82 97.6  F (36.4  C) (Oral) 5' 4\" (1.626 m) 95% 33.47 kg/m2        Blood Pressure from Last 3 Encounters:   09/07/18 126/81   04/12/18 124/80   11/16/17 134/58    Weight from Last 3 Encounters:   09/07/18 195 lb (88.5 kg)   02/20/18 213 lb (96.6 kg)   11/16/17 213 lb (96.6 kg)              We Performed the Following     CBC with platelets     Comprehensive metabolic panel     T4 free     TSH          Today's Medication Changes          These changes are accurate as of 9/7/18 11:15 AM.  If you have any questions, ask your nurse or doctor.               These medicines have changed or have updated prescriptions.        Dose/Directions    senna-docusate 8.6-50 MG per tablet   Commonly known as:  SENOKOT-S;PERICOLACE   This may have changed:    - when to take this  - reasons to take this   Used for:  Colonic mass   Changed by:  Joaquin Rockwell MD        Dose:  1 tablet   Take 1 tablet by mouth 2 times daily as needed for constipation   Quantity:  100 tablet   Refills:  0            Where to get your medicines      These medications were sent to [place] [address] [place] [address]  [address] [address]     Phone:  [phone]     nystatin cream                Primary Care Provider Office Phone # Fax #    Joaquin Rockwell [phone]       [address] [place] [address]        Equal Access to Services     [facility] AH: Hadii heron rivera Soariadna, waaxda luqadaha, qaybta kaalmada adeegyamary, francisco javier awan. So [facility] [phone].    ATENCIÓN: Si habla español, tiene a vega disposición servicios gratuitos de asistencia lingüística. Llame al [phone].    We comply with applicable federal civil rights laws and [place] laws. We do not discriminate on the basis of race, color, national origin, age, disability, sex, sexual " orientation, or gender identity.            Thank you!     Thank you for choosing [facility]  for your care. Our goal is always to provide you with excellent care. Hearing back from our patients is one way we can continue to improve our services. Please take a few minutes to complete the written survey that you may receive in the mail after your visit with us. Thank you!             Your Updated Medication List - Protect others around you: Learn how to safely use, store and throw away your medicines at www.disposemymeds.org.          This list is accurate as of 9/7/18 11:15 AM.  Always use your most recent med list.                   Brand Name Dispense Instructions for use Diagnosis    acetaminophen 325 MG tablet    TYLENOL    100 tablet    Take 2 tablets (650 mg) by mouth every 4 hours as needed for other (surgical pain)    Normal pressure hydrocephalus       furosemide 40 MG tablet    LASIX    90 tablet    TAKE 1 TABLET BY MOUTH EVERY DAY AT 10 AM    Hypertension goal BP (blood pressure) < 140/90       levothyroxine 50 MCG tablet    SYNTHROID/LEVOTHROID    90 tablet    TAKE 1 TABLET BY MOUTH EVERY MORNING AT 7:30 AM. DUE FOR LAB FOR FURTHER REFILLS    Hypothyroidism, unspecified type       nystatin cream    MYCOSTATIN    30 g    Apply topically 3 times daily    Dermatitis       polyethylene glycol Packet    MIRALAX/GLYCOLAX    7 packet    Take 17 g by mouth daily as needed for constipation    Colonic mass       senna-docusate 8.6-50 MG per tablet    SENOKOT-S;PERICOLACE    100 tablet    Take 1 tablet by mouth 2 times daily as needed for constipation    Colonic mass       tamsulosin 0.4 MG capsule    FLOMAX    90 capsule    TAKE 1 CAPSULE BY MOUTH EVERY DAY AT 10 AM    Urinary retention       Turmeric 500 MG Caps           warfarin 4 MG tablet    JANTOVEN    45 tablet    Take 2mg by mouth on Sunday, Wednesday, Friday and take 4mg rest of week.    DVT (deep venous thrombosis) (H), Pulmonary  embolism (H)

## 2018-09-07 NOTE — PROGRESS NOTES
"  SUBJECTIVE:   Ángela Dumont is a 78 year old female who presents to clinic today for the following health issues:    Pulmonary Embolism/DVT:  INR has remained therapeutic. The patient would like INR testing x1/month instead of x2/month, through [facility], since she has remained stable. No dyspnea or cough. No lower extremity pain/swelling/erythema/edema.    INR   Date Value Ref Range Status   09/05/2018 2.7  Final      Shingles:  Shingles skincare ointment helping a little with red pink rash on right gluteus. No blisters. Non-tender rash.     GERD:  Stable, no acute issues but complains of excessive belching.    Palpitations:   Heart palpitations 2 days ago in the evenings - denies chest pain or diaphoresis. She mentions that this has improved today. No other cardiac symptoms.    Normal Pressure Hydrocephalus S/P  Shunt:  Shunt has been helpful but reports a mild occasional headache. She receives home care.     Memory:  No acute concerns.     Sleep:   The patient is sleeping well - approximately 9-10 hours per day. Naps less than 1 hour per day.    Appetite:   Appetite has been good.     Constipation/Diarrhea:  The patient reports loose stools alternating with constipation. She has increased her fiber and water intake.    Mobility:   The patient's mobility has remained unchanged since prior. No mechanical falls. Mechanical lift to stand has been helpful at home. Mainly wheel chair bound    Monoclonal Gammopathy of Unknown Significance:   The patient continues to follow with Dr. Duran of [facility]. No acute issues at this time.    Rectal Mass with Pelvic Lymphadenopathy:   Follows with Dr. Duran of [facility]. Per Dr. Duran's notes \"CT scan on 10/18/17 showed wall thickening at the posterior aspect of the low rectum near the anorectal junction, concerning for rectal cancer, with perirectal/mesorectal adenopathy. Considering Ángela's dementia, immobility, and overall quite poor functional status, she would be a poor " "candidate for treatments such as surgery, chemotherapy or radiation. Further work up has been deferred with a focus on patient's comfort and quality of life.\"     Leukocytosis:   Per Dr. Duran's notes: \"Appears chronic since at least 2015, and on further chart review from [facility], likely chronic since at least 2007.  Peripheral smear in July 2017 showed mild atypical lymphocytosis and rouleaux.  Hemoglobin and platelet count are normal.  She does not have frequent infections.       Peripheral flow cytometry shows concern for lymphoproliferative disorder.  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       We discussed that is usually a slow growing process, and treatment is usually not necessary.  We could do further evaluation, such as with PET scan and bone marrow biopsy.  Considering her age, dementia, immobility, and poor functional status, I favored watching and waiting and sparing her of invasive procedures that may not change her overall course.  She and her  agreed and opted to wait on getting additional scans or bone marrow biopsy at this time.      CBC today reviewed.  WBC remains mildly elevated but trended down some to 15.1K.\"    CBC RESULTS:   Recent Labs   Lab Test  09/07/18   1116   WBC  16.5*   RBC  4.93   HGB  14.1   HCT  45.6   MCV  93   MCH  28.6   MCHC  30.9*   RDW  14.0   PLT  165       Urinary Retention / Recurrent UTI:  Stable, no acute issues.     Problem list and histories reviewed & adjusted, as indicated.  Additional history: as documented    BP Readings from Last 3 Encounters:   09/07/18 126/81   04/12/18 124/80   11/16/17 134/58       Wt Readings from Last 4 Encounters:   09/07/18 195 lb (88.5 kg)   02/20/18 213 lb (96.6 kg)   11/16/17 213 lb (96.6 kg)   10/31/17 213 lb (96.6 kg)       Health Maintenance    Health Maintenance Due   Topic Date Due "     MICROALBUMIN Q1 YEAR  07/17/1941     DEPRESSION ACTION PLAN Q1 YR  07/17/1958     DEXA SCAN SCREENING (SYSTEM ASSIGNED)  07/17/2005     PHQ-9 Q6 MONTHS  01/13/2017     FALL RISK ASSESSMENT  09/01/2017     INFLUENZA VACCINE (1) 09/01/2018       Current Problem List    Patient Active Problem List   Diagnosis     S/P  shunt     NPH (normal pressure hydrocephalus)     Fracture of right femur (H)     DVT (deep venous thrombosis) (H)     Pulmonary embolism (H)     Dementia     Hypothyroid     Hypertension goal BP (blood pressure) < 140/90     Adjustment disorder with depressed mood     Hyperlipidemia with target LDL less than 130     Leukocytosis     Esophageal reflux     UTI (urinary tract infection)     Constipation     Pulmonary hypertension     MR (mitral regurgitation)     Fibromyalgia     Osteoarthritis of both knees     Health Care Home     Long-term (current) use of anticoagulants [Z79.01]     Advance Care Planning     SIRS (systemic inflammatory response syndrome) (H)     History of pulmonary embolism     History of deep venous thrombosis     Urinary retention     BPPV (benign paroxysmal positional vertigo), bilateral     Herpes zoster without complication     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  See doctor year to year     Colitis, nonspecific 10/31/2017     Constipation      Dementia     memory/anger     Depressive disorder last few months as condition became apparent    Lack of mobility a big problem     DVT (deep venous thrombosis) (H) 4/15    bilateral     Esophageal reflux       Fibromyalgia      Fracture of right femur (H) 1/15     Heart murmur      Herpes zoster without complication 08/16/2017    left buttocks     History of Clostridium difficile      Hyperlipidemia LDL goal < 130      Hypertension goal BP (blood pressure) < 140/90      Hypothyroid      Leukocytosis      Leukocytosis      Leukocytosis      Leukocytosis, unspecified     w/u ?     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)      Lymphoproliferative disorder (H)     Dr Duran     Lymphoproliferative disorder (H)      MGUS (monoclonal gammopathy of unknown significance)     Dr Duran     MGUS (monoclonal gammopathy of unknown significance)      Migraine      MR (mitral regurgitation)     Mild     NPH (normal pressure hydrocephalus) 4/15    shunt placed but removed in 7/2015 due to erosion through the scalp, replaced 5/19/16     Osteoarthritis of both knees      Other atopic dermatitis and related conditions      PE (pulmonary embolism) 4/15    Saddle - IVC     Pelvic lymphadenopathy 10/31/2017     Pulmonary hypertension     EF 70-75%     Rectal mass 10/2017     Rectal mass 10/1/2017     Thrombosis of leg      Urinary tract infection, site not specified     MRSA - Dr Loo     [facility]        Past Surgical History    Past Surgical History:   Procedure Laterality Date     ABDOMEN SURGERY  left side lower abdomen pain from Shunt we do [facility]berth     COLONOSCOPY  2008     GYN SURGERY       [facility] LABOR AND DELIVERY VAGINAL  1960, 1967     HERNIA REPAIR       HYSTERECTOMY  1976    hysterectomy     IMPLANT SHUNT VENTRICULOPERITONEAL Left 5/19/2016    IMPLANT SHUNT VENTRICULOPERITONEAL - Dr Alba     OPTICAL TRACKING SYSTEM IMPLANT SHUNT VENTRICULOPERITONEAL Right 4/10/2015     Shunt - Dr Sierra     ORTHOPEDIC SURGERY  Knee pain that prohibits walking     REMOVE SHUNT VENTRICULOPERITONEAL N/A 7/3/2015    REMOVE SHUNT VENTRICULOPERITONEAL;  Surgeon: Emmanuel Sierra MD;  Location: [facility]     SURGICAL HISTORY  OF -   1/15    right femur/hip surgery - 1/18/2015     SURGICAL HISTORY OF -   7/04    rectocele repair     SURGICAL HISTORY OF -   1997    abdominal adhesion lysis     SURGICAL HISTORY OF -   4/15    IVC filter placement     SURGICAL HISTORY OF -   6/15    IVC filter removal       Current Medications    Current Outpatient Prescriptions   Medication Sig Dispense Refill     acetaminophen (TYLENOL) 325 MG tablet Take 2 tablets (650 mg) by mouth every 4 hours as needed for other (surgical pain) 100 tablet 0     furosemide (LASIX) 40 MG tablet TAKE 1 TABLET BY MOUTH EVERY DAY AT 10 AM 90 tablet 0     levothyroxine (SYNTHROID/LEVOTHROID) 50 MCG tablet TAKE 1 TABLET BY MOUTH EVERY MORNING AT 7:30 AM. DUE FOR LAB FOR FURTHER REFILLS 90 tablet 0     nystatin (MYCOSTATIN) cream Apply topically 3 times daily 30 g 3     polyethylene glycol (MIRALAX/GLYCOLAX) Packet Take 17 g by mouth daily as needed for constipation 7 packet 0     senna-docusate (SENOKOT-S;PERICOLACE) 8.6-50 MG per tablet Take 1 tablet by mouth 2 times daily as needed for constipation 100 tablet 0     tamsulosin (FLOMAX) 0.4 MG capsule TAKE 1 CAPSULE BY MOUTH EVERY DAY AT 10 AM 90 capsule 0     Turmeric 500 MG CAPS        warfarin (JANTOVEN) 4 MG tablet Take 2mg by mouth on Sunday, Wednesday, Friday and take 4mg rest of week. 45 tablet 1       Allergies    Allergies   Allergen Reactions     Shellfish Allergy Anaphylaxis     Aspirin      GI issues     Contrast Dye Hives and Itching     Flushed skin     Penicillins Hives     Sulfa Drugs Hives     Nitrofurantoin Rash       Immunizations    Immunization History   Administered Date(s) Administered     Influenza (High Dose) 3 valent vaccine 09/19/2014, 09/25/2015, 09/19/2016, 10/05/2017     Influenza (IIV3) PF 10/23/2003, 10/04/2010, 10/06/2011, 09/27/2012, 09/19/2013     Pneumo Conj 13-V (2010&after) 09/25/2015     Pneumococcal 23 valent 10/04/2010     TD (ADULT, 7+) 10/31/2017     Tdap (Adacel,Boostrix) 01/01/2004  "    Zoster vaccine, live 10/05/2007       Family History    Family History   Problem Relation Age of Onset     Colon Cancer Father      Coronary Artery Disease Father      Diabetes Maternal Grandmother        Social History    Social History     Social History     Marital status:      Spouse name: Vernon     Number of children: 2     Years of education: N/A     Occupational History     Not on file.     Social History Main Topics     Smoking status: Former Smoker     Packs/day: 0.50     Years: 10.00     Types: Cigarettes     Start date: 7/17/1958     Quit date: 1/1/1982     Smokeless tobacco: Never Used      Comment: quit 1967     Alcohol use 0.0 - 0.6 oz/week      Comment: Very light     Drug use: No     Sexual activity: Not Currently     Partners: Male     Birth control/ protection: None      Comment: not a problem     Other Topics Concern     Parent/Sibling W/ Cabg, Mi Or Angioplasty Before 65f 55m? No     Social History Narrative     All above reviewed and updated, all stable unless otherwise noted    Recent labs reviewed    ROS:  Constitutional, HEENT, cardiovascular, pulmonary, GI, , musculoskeletal, neuro, skin, endocrine and psych systems are negative, except as otherwise noted.    OBJECTIVE:                                                    /81  Pulse 82  Temp 97.6  F (36.4  C) (Oral)  Ht 5' 4\" (1.626 m)  Wt 195 lb (88.5 kg)  SpO2 95%  BMI 33.47 kg/m2  Body mass index is 33.47 kg/(m^2).  GENERAL: healthy, alert and no distress  EYES: Eyes grossly normal to inspection, extraocular movements - intact, and PERRL  HENT: ear canals- normal; TMs- normal; Nose- normal; Mouth- no ulcers, no lesions  NECK: no tenderness, no adenopathy, no asymmetry, no masses, no stiffness; thyroid- normal to palpation  RESP: lungs clear to auscultation - no rales, no rhonchi, no wheezes  CV: regular rates and rhythm, normal S1 S2, no S3 or S4 and no murmur, no click or rub -  ABDOMEN: soft, no tenderness, no  " hepatosplenomegaly, no masses, normal bowel sounds  MS: extremities- no gross deformities noted, no edema  SKIN: no suspicious lesions, no rashes  NEURO: Non-focal.  BACK: no CVA tenderness, no paralumbar tenderness  PSYCH: At baseline  LYMPHATICS: ant. cervical- normal    DIAGNOSTICS/PROCEDURES:                                                      No results found for this or any previous visit (from the past 24 hour(s)).     ASSESSMENT/PLAN:                                                        ICD-10-CM    1. NPH (normal pressure hydrocephalus) G91.2    2. S/P  shunt Z98.2    3. Dementia without behavioral disturbance, unspecified dementia type F03.90 Comprehensive metabolic panel     CBC with platelets     TSH     T4 free   4. Colonic mass K63.9 senna-docusate (SENOKOT-S;PERICOLACE) 8.6-50 MG per tablet     Comprehensive metabolic panel     CBC with platelets   5. Rectal mass K62.9 Comprehensive metabolic panel     CBC with platelets   6. Pelvic lymphadenopathy R59.0 Comprehensive metabolic panel     CBC with platelets   7. 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) D47.Z9 Comprehensive metabolic panel     CBC with platelets   8. MGUS (monoclonal gammopathy of unknown significance) D47.2 Comprehensive metabolic panel     CBC with platelets   9. History of pulmonary embolus (PE) Z86.711    10. History of deep venous thrombosis Z86.718    11. Pulmonary hypertension I27.20 Comprehensive metabolic panel   12. Hypertension goal BP (blood pressure) < 140/90 I10 Comprehensive metabolic panel   13. Adjustment disorder with depressed mood F43.21    14. Fibromyalgia M79.7    15. Primary osteoarthritis of both knees M17.0    16. Hypothyroidism, unspecified type E03.9 TSH     T4 free   17. Gastroesophageal reflux disease without esophagitis K21.9 CBC with platelets   18. Dermatitis L30.9 nystatin (MYCOSTATIN) cream   19. Physical deconditioning R53.81    20. Class 1 obesity with serious comorbidity and body mass  index (BMI) of 33.0 to 33.9 in adult, unspecified obesity type E66.9     Z68.33    21. Long-term (current) use of anticoagulants [Z79.01] Z79.01 CBC with platelets   22. Medication monitoring encounter Z51.81 Comprehensive metabolic panel     CBC with platelets     TSH     T4 free     Discussed treatment/modality options, including risk and benefits, she desires, starting Nystatin for dermatitis, starting OTC Simethicone for excessive belching, utilizing bicycle/standing more often to help aid mobility, continue to follow with Dr. Duran of [facility] for MGUS and lymphoproliferative disorder, further lab(s), medication refill(s) and wound care discussed. All diagnosis above reviewed and noted above, otherwise stable. See [employer] orders for further details.    Health Maintenance Due   Topic Date Due     MICROALBUMIN Q1 YEAR  07/17/1941     DEPRESSION ACTION PLAN Q1 YR  07/17/1958     DEXA SCAN SCREENING (SYSTEM ASSIGNED)  07/17/2005     PHQ-9 Q6 MONTHS  01/13/2017     FALL RISK ASSESSMENT  09/01/2017     INFLUENZA VACCINE (1) 09/01/2018     Follow up with Provider - 6 months.            Joaquin Rockwell MD, FAAFP    [address]  [address] [phone] Fax    This document serves as a record of the services and decisions personally performed and made by Joaquin Rockwell MD. It was created on his behalf by Louis Doty, a trained medical scribe. The creation of this document is based the provider's statements to the medical scribe. I have reviewed and approved this document for accuracy prior to leaving the patient care area. Joaquin Rockwell MD September 7, 2018 10:45 AM

## 2018-09-07 NOTE — TELEPHONE ENCOUNTER
Pt in today with their  to see TS.     Would like us to call Nancy today at [phone]. They do not want to check INR twice monthly.     [phone] He reported that in the past, the pt did not have monthly checks and could have possibly been grandfathered in to have this monthly.  They said if the pt wants to do monthly, they probably can come in to the clinic and do labs, and wonders if it is necessary to have the at home machine.      This is why they have been been calling the pt and requesting them to test more often. Advised the manager as well, that the pt is wheelchair bound, unable to visit the clinic monthly, compliance would be more of a concern.       They will make a note about this and put this through the chain of command.      Call back Vernon [phone].  Will change next check to 10/5/18 per this encounter. Called Vernon back and advised.    Radha Panda RN  [facility]

## 2018-09-07 NOTE — LETTER
[facility]  [address] [place] [address], [place] [id number] [phone]   September 10, 2018    Ángela Dumont  [address] [facility] [address]      Dear Ángela,    Here is a summary of your recent test results:    Labs are overall quite good, stable.     We advise:     Continue current cares.     Your test results are enclosed.      Please contact me if you have any questions.             Thank you very much for trusting [facility]..     Healthy regards,        Joaquin Rockwell M.D.        Results for orders placed or performed in visit on 09/07/18   Comprehensive metabolic panel   Result Value Ref Range    Sodium 138 133 - 144 mmol/L    Potassium 4.2 3.4 - 5.3 mmol/L    Chloride 105 94 - 109 mmol/L    Carbon Dioxide 23 20 - 32 mmol/L    Anion Gap 10 3 - 14 mmol/L    Glucose 87 70 - 99 mg/dL    Urea Nitrogen 28 7 - 30 mg/dL    Creatinine 0.83 0.52 - 1.04 mg/dL    GFR Estimate 66 >60 mL/min/1.7m2    GFR Estimate If Black 80 >60 mL/min/1.7m2    Calcium 9.1 8.5 - 10.1 mg/dL    Bilirubin Total 0.4 0.2 - 1.3 mg/dL    Albumin 3.7 3.4 - 5.0 g/dL    Protein Total 7.7 6.8 - 8.8 g/dL    Alkaline Phosphatase 87 40 - 150 U/L    ALT 20 0 - 50 U/L    AST 15 0 - 45 U/L   CBC with platelets   Result Value Ref Range    WBC 16.5 (H) 4.0 - 11.0 10e9/L    RBC Count 4.93 3.8 - 5.2 10e12/L    Hemoglobin 14.1 11.7 - 15.7 g/dL    Hematocrit 45.6 35.0 - 47.0 %    MCV 93 78 - 100 fl    MCH 28.6 26.5 - 33.0 pg    MCHC 30.9 (L) 31.5 - 36.5 g/dL    RDW 14.0 10.0 - 15.0 %    Platelet Count 165 150 - 450 10e9/L   TSH   Result Value Ref Range    TSH 2.42 0.40 - 4.00 mU/L   T4 free   Result Value Ref Range    T4 Free 1.43 0.76 - 1.46 ng/dL

## 2018-09-08 LAB
ALBUMIN SERPL-MCNC: 3.7 G/DL (ref 3.4–5)
ALP SERPL-CCNC: 87 U/L (ref 40–150)
ALT SERPL W P-5'-P-CCNC: 20 U/L (ref 0–50)
ANION GAP SERPL CALCULATED.3IONS-SCNC: 10 MMOL/L (ref 3–14)
AST SERPL W P-5'-P-CCNC: 15 U/L (ref 0–45)
BILIRUB SERPL-MCNC: 0.4 MG/DL (ref 0.2–1.3)
BUN SERPL-MCNC: 28 MG/DL (ref 7–30)
CALCIUM SERPL-MCNC: 9.1 MG/DL (ref 8.5–10.1)
CHLORIDE SERPL-SCNC: 105 MMOL/L (ref 94–109)
CO2 SERPL-SCNC: 23 MMOL/L (ref 20–32)
CREAT SERPL-MCNC: 0.83 MG/DL (ref 0.52–1.04)
GFR SERPL CREATININE-BSD FRML MDRD: 66 ML/MIN/1.7M2
GLUCOSE SERPL-MCNC: 87 MG/DL (ref 70–99)
POTASSIUM SERPL-SCNC: 4.2 MMOL/L (ref 3.4–5.3)
PROT SERPL-MCNC: 7.7 G/DL (ref 6.8–8.8)
SODIUM SERPL-SCNC: 138 MMOL/L (ref 133–144)
T4 FREE SERPL-MCNC: 1.43 NG/DL (ref 0.76–1.46)
TSH SERPL DL<=0.005 MIU/L-ACNC: 2.42 MU/L (ref 0.4–4)

## 2018-09-13 ENCOUNTER — TELEPHONE (OUTPATIENT)
Dept: FAMILY MEDICINE | Facility: CLINIC | Age: 78
End: 2018-09-13

## 2018-09-13 NOTE — TELEPHONE ENCOUNTER
Reason for Call:  Other call back    Detailed comments: please call pt to go over lab  they have questions    Phone Number Patient can be reached at: Cell number on file:    Telephone Information:   [id number] [phone]        Best Time: any    Can we leave a detailed message on this number? YES    Call taken on 9/13/2018 at 10:44 AM by Bonita Diehl

## 2018-09-13 NOTE — TELEPHONE ENCOUNTER
CTC on file for patient's spouse, Juan David (he is also POA)    Called # below  Advised of MD GUSTAVO message below - Patient stated an understanding and agreed with plan.    Stated they are also changing diets (trying tea, Leeks, grapes, and a couple other things)      Tessy Gandara RN  [facility]

## 2018-09-13 NOTE — TELEPHONE ENCOUNTER
is asking about elevated white blood cell count.  He is asking if is related to her current outbreak of Shingles and if there is anything they can do to help lower it.    Patient is due to follow up with oncology in April 2019.    Routing to  to review and advise.    Nereyda Padilla, NAYELI, RN, N  [facility]) [phone]

## 2018-09-13 NOTE — TELEPHONE ENCOUNTER
It's mildly elevated and stable, could be related to recent infection, as well as the low grade B cell lymphoproliferative disorder/MGUS.    If needed or changing, we can review with Dr Duran

## 2018-09-30 DIAGNOSIS — I10 HYPERTENSION GOAL BP (BLOOD PRESSURE) < 140/90: ICD-10-CM

## 2018-09-30 DIAGNOSIS — R33.9 URINARY RETENTION: ICD-10-CM

## 2018-10-01 ENCOUNTER — TELEPHONE (OUTPATIENT)
Dept: FAMILY MEDICINE | Facility: CLINIC | Age: 78
End: 2018-10-01

## 2018-10-01 RX ORDER — FUROSEMIDE 40 MG
TABLET ORAL
Qty: 90 TABLET | Refills: 1 | Status: SHIPPED | OUTPATIENT
Start: 2018-10-01 | End: 2019-03-22

## 2018-10-01 RX ORDER — TAMSULOSIN HYDROCHLORIDE 0.4 MG/1
CAPSULE ORAL
Qty: 90 CAPSULE | Refills: 1 | Status: SHIPPED | OUTPATIENT
Start: 2018-10-01 | End: 2019-03-22

## 2018-10-01 NOTE — TELEPHONE ENCOUNTER
Called patient @ # below - spoke with spouse, Juan David (CTC on file)    Advised spouse that patient should be symptom free and healthy prior to receiving the flu injection. Patient's spouse stated an understanding and agreed with plan.      Spouse would also like patient to get the new shingles shot - would like to know how long after symptoms are resolved to get the injection?    Routing to PCP for further review/recommendations/orders.  OK to leave a detailed VM @ # below      Tessy Gandara RN  [facility]

## 2018-10-01 NOTE — TELEPHONE ENCOUNTER
"Requested Prescriptions   Pending Prescriptions Disp Refills     furosemide (LASIX) 40 MG tablet [Pharmacy Med Name: FUROSEMIDE 40 MG TABLET] 90 tablet 0        Last Written Prescription Date:  7.2.18  Last Fill Quantity: 90,  # refills: 0   Last office visit: 9/7/2018 with prescribing provider:     Future Office Visit:     Sig: TAKE 1 TABLET BY MOUTH EVERY DAY AT 10 AM    Diuretics (Including Combos) Protocol Passed    9/30/2018  2:01 AM       Passed - Blood pressure under 140/90 in past 12 months    BP Readings from Last 3 Encounters:   09/07/18 126/81   04/12/18 124/80   11/16/17 134/58                Passed - Recent (12 mo) or future (30 days) visit within the authorizing provider's specialty    Patient had office visit in the last 12 months or has a visit in the next 30 days with authorizing provider or within the authorizing provider's specialty.  See \"Patient Info\" tab in inbasket, or \"Choose Columns\" in Meds & Orders section of the refill encounter.           Passed - Patient is age 18 or older       Passed - No active pregancy on record       Passed - Normal serum creatinine on file in past 12 months    Recent Labs   Lab Test  09/07/18   1116   CR  0.83             Passed - Normal serum potassium on file in past 12 months    Recent Labs   Lab Test  09/07/18   1116   POTASSIUM  4.2                   Passed - Normal serum sodium on file in past 12 months    Recent Labs   Lab Test  09/07/18   1116   NA  138             Passed - No positive pregnancy test in past 12 months        tamsulosin (FLOMAX) 0.4 MG capsule [Pharmacy Med Name: TAMSULOSIN HCL 0.4 MG CAPSULE] 90 capsule 0      Last Written Prescription Date:  7.2.18  Last Fill Quantity: 90,  # refills: 0   Last office visit: 9/7/2018 with prescribing provider:     Future Office Visit:     Sig: TAKE 1 CAPSULE BY MOUTH EVERY DAY AT 10 AM    Alpha Blockers Passed    9/30/2018  2:01 AM       Passed - Blood pressure under 140/90 in past 12 months    BP Readings " "from Last 3 Encounters:   09/07/18 126/81   04/12/18 124/80   11/16/17 134/58                Passed - Recent (12 mo) or future (30 days) visit within the authorizing provider's specialty    Patient had office visit in the last 12 months or has a visit in the next 30 days with authorizing provider or within the authorizing provider's specialty.  See \"Patient Info\" tab in inbasket, or \"Choose Columns\" in Meds & Orders section of the refill encounter.           Passed - Patient does not have Tadalafil, Vardenafil, or Sildenafil on their medication list       Passed - Patient is 18 years of age or older       Passed - No active pregnancy on record       Passed - No positive pregnancy test in past 12 months          "

## 2018-10-01 NOTE — TELEPHONE ENCOUNTER
Message handled by Nurse Triage with [facility] - provider name: MD GUSTAVO - wait 1 month after being symptom free.    Called patient's spouse @ # below - advised of MD GUSTAVO message above.   Patient's spouse stated an understanding and agreed with plan.    Tessy Gandara RN  [facility]

## 2018-10-01 NOTE — TELEPHONE ENCOUNTER
Reason for Call:  Other call back    Detailed comments: THis patient has shingles and would like to know if it is possible to get the flu shot while having this condition?  Phone Number Patient can be reached at: Home number on file [phone] (home)    Best Time: anytime today please    Can we leave a detailed message on this number? NO    Call taken on 10/1/2018 at 11:01 AM by Mishel Davalos

## 2018-10-01 NOTE — TELEPHONE ENCOUNTER
Prescription approved per [facility] Refill Protocol.    Nereyda Padilla, BS, RN, N  [facility]) [phone]

## 2018-10-05 ENCOUNTER — TRANSFERRED RECORDS (OUTPATIENT)
Dept: HEALTH INFORMATION MANAGEMENT | Facility: CLINIC | Age: 78
End: 2018-10-05

## 2018-10-05 ENCOUNTER — ANTICOAGULATION THERAPY VISIT (OUTPATIENT)
Dept: NURSING | Facility: CLINIC | Age: 78
End: 2018-10-05
Payer: COMMERCIAL

## 2018-10-05 DIAGNOSIS — I26.99 PULMONARY EMBOLISM (H): ICD-10-CM

## 2018-10-05 DIAGNOSIS — I82.409 DVT (DEEP VENOUS THROMBOSIS) (H): ICD-10-CM

## 2018-10-05 LAB — INR PPP: 3.6 (ref 2–3)

## 2018-10-05 PROCEDURE — 99207 ZZC NO CHARGE NURSE ONLY: CPT | Performed by: FAMILY MEDICINE

## 2018-10-05 NOTE — MR AVS SNAPSHOT
Ángela VITALY Dumont   10/5/2018   Anticoagulation Therapy Visit    Description:  78 year old female   Provider:  Joaquin Rockwell MD   Department:  [facility]           INR as of 10/5/2018     Today's INR 3.6!      Anticoagulation Summary as of 10/5/2018     INR goal 2.0-3.0   Today's INR 3.6!   Full warfarin instructions 10/5: 2 mg; Otherwise 4 mg on Sun, Wed, Fri; 2 mg all other days   Next INR check 10/12/2018    Indications   Long-term (current) use of anticoagulants [Z79.01] [Z79.01]  Pulmonary embolism (H) [I26.99]  DVT (deep venous thrombosis) (H) [I82.409]         Contact Numbers     Clinic Number:         October 2018 Details    Sun Mon Tue Wed Thu Fri Sat      1               2               3               4               5      2 mg   See details      6      2 mg           7      4 mg         8      2 mg         9      2 mg         10      4 mg         11      2 mg         12            13                 14               15               16               17               18               19               20                 21               22               23               24               25               26               27                 28               29               30               31                   Date Details   10/05 This INR check       Date of next INR:  10/12/2018         How to take your warfarin dose     To take:  2 mg Take 2 of the 1 mg tablets.    To take:  4 mg Take 4 of the 1 mg tablets.

## 2018-10-05 NOTE — PROGRESS NOTES
ANTICOAGULATION FOLLOW-UP CLINIC VISIT    Patient Name:  Ángela Dumont  Date:  10/5/2018  Contact Type:  Telephone    SUBJECTIVE:     Patient Findings     Positives Unexplained INR or factor level change           OBJECTIVE    INR   Date Value Ref Range Status   10/05/2018 3.6  Final       ASSESSMENT / PLAN  INR assessment SUPRA    Recheck INR In: 1 WEEK    INR Location Home INR      Anticoagulation Summary as of 10/5/2018     INR goal 2.0-3.0   Today's INR 3.6!   Warfarin maintenance plan 4 mg (1 mg x 4) on Sun, Wed, Fri; 2 mg (1 mg x 2) all other days   Full warfarin instructions 10/5: 2 mg; Otherwise 4 mg on Sun, Wed, Fri; 2 mg all other days   Weekly warfarin total 20 mg   Plan last modified Nereyda Padilla RN (1/4/2018)   Next INR check 10/12/2018   Target end date     Indications   Long-term (current) use of anticoagulants [Z79.01] [Z79.01]  Pulmonary embolism (H) [I26.99]  DVT (deep venous thrombosis) (H) [I82.409]         Anticoagulation Episode Summary     INR check location     Preferred lab     Send INR reminders to RV NURSE    Comments       Anticoagulation Care Providers     Provider Role Specialty Phone number    Joaquin Rockwell MD [facility] [phone]            See the Encounter Report to view Anticoagulation Flowsheet and Dosing Calendar (Go to Encounters tab in chart review, and find the Anticoagulation Therapy Visit)    Dosage adjustment made based on physician directed care plan.    Dorita Panda RN

## 2018-10-12 ENCOUNTER — TRANSFERRED RECORDS (OUTPATIENT)
Dept: HEALTH INFORMATION MANAGEMENT | Facility: CLINIC | Age: 78
End: 2018-10-12

## 2018-10-12 ENCOUNTER — ANTICOAGULATION THERAPY VISIT (OUTPATIENT)
Dept: NURSING | Facility: CLINIC | Age: 78
End: 2018-10-12
Payer: COMMERCIAL

## 2018-10-12 DIAGNOSIS — I82.409 DVT (DEEP VENOUS THROMBOSIS) (H): ICD-10-CM

## 2018-10-12 DIAGNOSIS — I26.99 PULMONARY EMBOLISM (H): ICD-10-CM

## 2018-10-12 LAB — INR PPP: 2.3 (ref 2–3)

## 2018-10-12 PROCEDURE — 99207 ZZC NO CHARGE NURSE ONLY: CPT | Performed by: FAMILY MEDICINE

## 2018-10-12 NOTE — PROGRESS NOTES
ANTICOAGULATION FOLLOW-UP CLINIC VISIT    Patient Name:  Ángela Dumont  Date:  10/12/2018  Contact Type:  Telephone    SUBJECTIVE:     Patient Findings     Positives No Problem Findings           OBJECTIVE    INR   Date Value Ref Range Status   10/12/2018 2.3  Final       ASSESSMENT / PLAN  INR assessment THER    Recheck INR In: 2 WEEKS    INR Location Home INR    Billed home INR Yes      Anticoagulation Summary as of 10/12/2018     INR goal 2.0-3.0   Today's INR 2.3   Warfarin maintenance plan 4 mg (1 mg x 4) on Sun, Wed, Fri; 2 mg (1 mg x 2) all other days   Full warfarin instructions 10/12: 2 mg; 10/19: 2 mg; Otherwise 4 mg on Sun, Wed, Fri; 2 mg all other days   Weekly warfarin total 20 mg   Plan last modified Nereyda Padilla RN (1/4/2018)   Next INR check 10/26/2018   Target end date     Indications   Long-term (current) use of anticoagulants [Z79.01] [Z79.01]  Pulmonary embolism (H) [I26.99]  DVT (deep venous thrombosis) (H) [I82.409]         Anticoagulation Episode Summary     INR check location     Preferred lab     Send INR reminders to RV NURSE    Comments       Anticoagulation Care Providers     Provider Role Specialty Phone number    Joaquin Rockwell MD [facility] [phone]            See the Encounter Report to view Anticoagulation Flowsheet and Dosing Calendar (Go to Encounters tab in chart review, and find the Anticoagulation Therapy Visit)    Dosage adjustment made based on physician directed care plan.    Dorita Panda RN

## 2018-10-12 NOTE — MR AVS SNAPSHOT
Ángela VITALY Dumont   10/12/2018   Anticoagulation Therapy Visit    Description:  78 year old female   Provider:  Joaquin Rockwell MD   Department:  [facility]           INR as of 10/12/2018     Today's INR 2.3      Anticoagulation Summary as of 10/12/2018     INR goal 2.0-3.0   Today's INR 2.3   Full warfarin instructions 10/12: 2 mg; 10/19: 2 mg; Otherwise 4 mg on Sun, Wed, Fri; 2 mg all other days   Next INR check 10/26/2018    Indications   Long-term (current) use of anticoagulants [Z79.01] [Z79.01]  Pulmonary embolism (H) [I26.99]  DVT (deep venous thrombosis) (H) [I82.409]         Description     Would advise pt take 4mg Sun and Wed and 2mg AOD= 18mg for the week.       Contact Numbers     Clinic Number:         October 2018 Details    Sun Mon Tue Wed Thu Fri Sat      1               2               3               4               5               6                 7               8               9               10               11               12      2 mg   See details      13      2 mg           14      4 mg         15      2 mg         16      2 mg         17      4 mg         18      2 mg         19      2 mg         20      2 mg           21      4 mg         22      2 mg         23      2 mg         24      4 mg         25      2 mg         26            27                 28               29               30               31                   Date Details   10/12 This INR check       Date of next INR:  10/26/2018         How to take your warfarin dose     To take:  2 mg Take 2 of the 1 mg tablets.    To take:  4 mg Take 4 of the 1 mg tablets.

## 2018-10-20 DIAGNOSIS — E03.9 HYPOTHYROIDISM, UNSPECIFIED TYPE: ICD-10-CM

## 2018-10-22 RX ORDER — LEVOTHYROXINE SODIUM 50 UG/1
50 TABLET ORAL DAILY
Qty: 90 TABLET | Refills: 0 | Status: SHIPPED | OUTPATIENT
Start: 2018-10-22 | End: 2019-01-19

## 2018-10-22 NOTE — TELEPHONE ENCOUNTER
"Requested Prescriptions   Pending Prescriptions Disp Refills     levothyroxine (SYNTHROID/LEVOTHROID) 50 MCG tablet [Pharmacy Med Name: LEVOTHYROXINE 50 MCG TABLET] 90 tablet 0      Last Written Prescription Date:  7.13.18  Last Fill Quantity: 90,  # refills: 0   Last Office Visit: 9/7/2018   Future Office Visit:      Sig: TAKE 1 TABLET BY MOUTH EVERY MORNING AT 7:30 AM. DUE FOR LAB FOR FURTHER REFILLS    Thyroid Protocol Passed    10/20/2018  6:14 PM       Passed - Patient is 12 years or older       Passed - Recent (12 mo) or future (30 days) visit within the authorizing provider's specialty    Patient had office visit in the last 12 months or has a visit in the next 30 days with authorizing provider or within the authorizing provider's specialty.  See \"Patient Info\" tab in inbasket, or \"Choose Columns\" in Meds & Orders section of the refill encounter.             Passed - Normal TSH on file in past 12 months    Recent Labs   Lab Test  09/07/18   1116   TSH  2.42             Passed - No active pregnancy on record    If patient is pregnant or has had a positive pregnancy test, please check TSH.         Passed - No positive pregnancy test in past 12 months    If patient is pregnant or has had a positive pregnancy test, please check TSH.            "

## 2018-10-22 NOTE — TELEPHONE ENCOUNTER
Prescription approved per [facility] [place].  Radha Panda RN  [facility]

## 2018-10-29 ENCOUNTER — TELEPHONE (OUTPATIENT)
Dept: FAMILY MEDICINE | Facility: CLINIC | Age: 78
End: 2018-10-29

## 2018-10-29 DIAGNOSIS — I26.99 PULMONARY EMBOLISM (H): ICD-10-CM

## 2018-10-29 DIAGNOSIS — I82.409 DVT (DEEP VENOUS THROMBOSIS) (H): Primary | ICD-10-CM

## 2018-10-29 RX ORDER — WARFARIN SODIUM 2 MG/1
TABLET ORAL
Qty: 45 TABLET | Refills: 1 | Status: SHIPPED | OUTPATIENT
Start: 2018-10-29 | End: 2019-02-11

## 2018-10-29 NOTE — TELEPHONE ENCOUNTER
Patient would like 2mg Warfarin tablets sent in addition to the 4mg.  States she does not want to cut the tabs she is taking Sun, Wed, Fri.    Kathryn Carson

## 2018-11-01 ENCOUNTER — TELEPHONE (OUTPATIENT)
Dept: FAMILY MEDICINE | Facility: CLINIC | Age: 78
End: 2018-11-01

## 2018-11-01 ENCOUNTER — TRANSFERRED RECORDS (OUTPATIENT)
Dept: HEALTH INFORMATION MANAGEMENT | Facility: CLINIC | Age: 78
End: 2018-11-01

## 2018-11-01 ENCOUNTER — ANTICOAGULATION THERAPY VISIT (OUTPATIENT)
Dept: NURSING | Facility: CLINIC | Age: 78
End: 2018-11-01
Payer: COMMERCIAL

## 2018-11-01 DIAGNOSIS — I26.99 PULMONARY EMBOLISM (H): ICD-10-CM

## 2018-11-01 DIAGNOSIS — I82.409 DVT (DEEP VENOUS THROMBOSIS) (H): ICD-10-CM

## 2018-11-01 LAB — INR PPP: 3.6 (ref 2–2)

## 2018-11-01 PROCEDURE — 99207 ZZC NO CHARGE NURSE ONLY: CPT | Performed by: FAMILY MEDICINE

## 2018-11-01 NOTE — PROGRESS NOTES
ANTICOAGULATION FOLLOW-UP CLINIC VISIT    Patient Name:  Ángela Dumont  Date:  11/1/2018  Contact Type:  Telephone    SUBJECTIVE:     Patient Findings     Positives Change in diet/appetite, Inflammation (on and off rashes)           OBJECTIVE    INR   Date Value Ref Range Status   11/01/2018 3.6  Final       ASSESSMENT / PLAN  INR assessment SUPRA    Recheck INR In: 4 WEEKS    INR Location Home INR      Anticoagulation Summary as of 11/1/2018     INR goal 2.0-3.0   Today's INR 3.6!   Warfarin maintenance plan 4 mg (1 mg x 4) on Sun, Wed, Fri; 2 mg (1 mg x 2) all other days   Full warfarin instructions 11/2: 2 mg; 11/9: 2 mg; 11/16: 2 mg; 11/23: 2 mg; Otherwise 4 mg on Sun, Wed, Fri; 2 mg all other days   Weekly warfarin total 20 mg   Plan last modified Nereyda Padilla RN (1/4/2018)   Next INR check 11/29/2018   Target end date     Indications   Long-term (current) use of anticoagulants [Z79.01] [Z79.01]  Pulmonary embolism (H) [I26.99]  DVT (deep venous thrombosis) (H) [I82.409]         Anticoagulation Episode Summary     INR check location     Preferred lab     Send INR reminders to RV NURSE    Comments       Anticoagulation Care Providers     Provider Role Specialty Phone number    Joaquin Rockwell MD [facility] [phone]            See the Encounter Report to view Anticoagulation Flowsheet and Dosing Calendar (Go to Encounters tab in chart review, and find the Anticoagulation Therapy Visit)    Dosage adjustment made based on physician directed care plan.    Dorita Panda RN

## 2018-11-01 NOTE — MR AVS SNAPSHOT
Ángela VITALY Dumont   11/1/2018   Anticoagulation Therapy Visit    Description:  78 year old female   Provider:  Joaquin Rockwell MD   Department:  [facility]           INR as of 11/1/2018     Today's INR 3.6!      Anticoagulation Summary as of 11/1/2018     INR goal 2.0-3.0   Today's INR 3.6!   Full warfarin instructions 11/2: 2 mg; 11/9: 2 mg; 11/16: 2 mg; 11/23: 2 mg; Otherwise 4 mg on Sun, Wed, Fri; 2 mg all other days   Next INR check 11/29/2018    Indications   Long-term (current) use of anticoagulants [Z79.01] [Z79.01]  Pulmonary embolism (H) [I26.99]  DVT (deep venous thrombosis) (H) [I82.409]         Description     Pt noted to be up and down with foods. Pt will start lower dose tomorrow and have more greens tonight. Per pt , only want to recheck in one month again.  Billed last INR.      Contact Numbers     Clinic Number:         November 2018 Details    Sun Mon Tue Wed Thu Fri Sat         1      2 mg   See details      2      2 mg         3      2 mg           4      4 mg         5      2 mg         6      2 mg         7      4 mg         8      2 mg         9      2 mg         10      2 mg           11      4 mg         12      2 mg         13      2 mg         14      4 mg         15      2 mg         16      2 mg         17      2 mg           18      4 mg         19      2 mg         20      2 mg         21      4 mg         22      2 mg         23      2 mg         24      2 mg           25      4 mg         26      2 mg         27      2 mg         28      4 mg         29            30                 Date Details   11/01 This INR check       Date of next INR:  11/29/2018         How to take your warfarin dose     To take:  2 mg Take 2 of the 1 mg tablets.    To take:  4 mg Take 4 of the 1 mg tablets.

## 2018-11-01 NOTE — TELEPHONE ENCOUNTER
Reason for Call:  Other FYI     Detailed comments: Pt now uses [facility],please change in chart    Phone Number Patient can be reached at: Home number on file [phone] (home)    Best Time: na    Can we leave a detailed message on this number? Not Applicable    Call taken on 11/1/2018 at 2:21 PM by Valarie Valle

## 2018-11-27 ENCOUNTER — MYC MEDICAL ADVICE (OUTPATIENT)
Dept: FAMILY MEDICINE | Facility: CLINIC | Age: 78
End: 2018-11-27

## 2018-11-27 DIAGNOSIS — B02.9 HERPES ZOSTER WITHOUT COMPLICATION: Primary | ICD-10-CM

## 2018-11-27 RX ORDER — VALACYCLOVIR HYDROCHLORIDE 1 G/1
1000 TABLET, FILM COATED ORAL 3 TIMES DAILY
Qty: 30 TABLET | Refills: 0 | Status: SHIPPED | OUTPATIENT
Start: 2018-11-27 | End: 2019-01-07

## 2018-11-27 NOTE — TELEPHONE ENCOUNTER
Forwarded to TS.  Please review patient's Mychart message and advise.  Nereyda Padilla, RN, BS, PHN

## 2018-11-27 NOTE — TELEPHONE ENCOUNTER
One month ago Byron had shingles in one spot (her usual spot) He isn't sure if Dr Rockwell looked at it at the appt.  He has been using terrasil otc cream on it and it worked for awhile- it dried up and almost went away  but now another spot has appeared.  New area is right on top of the other one.  Looks new and raw. Painful to put the terrasil on.    Dr Rockwell,   Can you Rx something different? I pended pharmacy    He is waiting for her flu shot and shingrex until this shingle rash is gone.

## 2018-11-29 ENCOUNTER — TRANSFERRED RECORDS (OUTPATIENT)
Dept: HEALTH INFORMATION MANAGEMENT | Facility: CLINIC | Age: 78
End: 2018-11-29

## 2018-11-29 LAB — INR PPP: 2.6 (ref 2–3)

## 2018-12-03 ENCOUNTER — ANTICOAGULATION THERAPY VISIT (OUTPATIENT)
Dept: NURSING | Facility: CLINIC | Age: 78
End: 2018-12-03
Payer: COMMERCIAL

## 2018-12-03 DIAGNOSIS — I26.99 PULMONARY EMBOLISM (H): ICD-10-CM

## 2018-12-03 DIAGNOSIS — I82.409 DVT (DEEP VENOUS THROMBOSIS) (H): ICD-10-CM

## 2018-12-03 PROCEDURE — 99207 ZZC NO CHARGE NURSE ONLY: CPT | Performed by: FAMILY MEDICINE

## 2018-12-03 NOTE — PROGRESS NOTES
ANTICOAGULATION FOLLOW-UP CLINIC VISIT    Patient Name:  Ángela Dumont  Date:  12/3/2018  Contact Type:  Telephone    SUBJECTIVE:     Patient Findings     Positives No Problem Findings           OBJECTIVE    INR   Date Value Ref Range Status   11/29/2018 2.6  Final       ASSESSMENT / PLAN  INR assessment THER    Recheck INR In: 4 WEEKS    INR Location Home INR      Anticoagulation Summary as of 12/3/2018     INR goal 2.0-3.0   Today's INR 2.6 (11/29/2018)   Warfarin maintenance plan 4 mg (1 mg x 4) on Sun, Wed; 2 mg (1 mg x 2) all other days   Full warfarin instructions 12/7: 2 mg; Otherwise 4 mg on Sun, Wed; 2 mg all other days   Weekly warfarin total 18 mg   Plan last modified Dorita Panda RN (12/3/2018)   Next INR check 12/27/2018   Target end date     Indications   Long-term (current) use of anticoagulants [Z79.01] [Z79.01]  Pulmonary embolism (H) [I26.99]  DVT (deep venous thrombosis) (H) [I82.409]         Anticoagulation Episode Summary     INR check location     Preferred lab     Send INR reminders to RV NURSE    Comments       Anticoagulation Care Providers     Provider Role Specialty Phone number    Joaquin Rockwell MD [facility] [phone]            See the Encounter Report to view Anticoagulation Flowsheet and Dosing Calendar (Go to Encounters tab in chart review, and find the Anticoagulation Therapy Visit)    Dosage adjustment made based on physician directed care plan.    Dorita Panda RN

## 2018-12-03 NOTE — MR AVS SNAPSHOT
Ángela Dumont   12/3/2018   Anticoagulation Therapy Visit    Description:  78 year old female   Provider:  Joaquin Rockwell MD   Department:  R[facility] Nurse           INR as of 12/3/2018     Today's INR 2.6 (11/29/2018)      Anticoagulation Summary as of 12/3/2018     INR goal 2.0-3.0   Today's INR 2.6 (11/29/2018)   Full warfarin instructions 12/7: 2 mg; Otherwise 4 mg on Sun, Wed; 2 mg all other days   Next INR check 12/27/2018    Indications   Long-term (current) use of anticoagulants [Z79.01] [Z79.01]  Pulmonary embolism (H) [I26.99]  DVT (deep venous thrombosis) (H) [I82.409]         Contact Numbers     Clinic Number:         December 2018 Details    Sun Mon Tue Wed Thu Fri Sat           1                 2               3      2 mg   See details      4      2 mg         5      4 mg         6      2 mg         7      2 mg         8      2 mg           9      4 mg         10      2 mg         11      2 mg         12      4 mg         13      2 mg         14      2 mg         15      2 mg           16      4 mg         17      2 mg         18      2 mg         19      4 mg         20      2 mg         21      2 mg         22      2 mg           23      4 mg         24      2 mg         25      2 mg         26      4 mg         27            28               29                 30               31                     Date Details   12/03 This INR check       Date of next INR:  12/27/2018         How to take your warfarin dose     To take:  2 mg Take 2 of the 1 mg tablets.    To take:  4 mg Take 4 of the 1 mg tablets.

## 2018-12-17 DIAGNOSIS — I26.99 PULMONARY EMBOLISM (H): ICD-10-CM

## 2018-12-17 DIAGNOSIS — I82.409 DVT (DEEP VENOUS THROMBOSIS) (H): ICD-10-CM

## 2018-12-17 RX ORDER — WARFARIN SODIUM 4 MG/1
TABLET ORAL
Qty: 45 TABLET | Refills: 0 | Status: SHIPPED | OUTPATIENT
Start: 2018-12-17 | End: 2019-06-22

## 2018-12-17 NOTE — TELEPHONE ENCOUNTER
"Requested Prescriptions   Pending Prescriptions Disp Refills     warfarin (COUMADIN) 4 MG tablet [Pharmacy Med Name: WARFARIN SODIUM 4 MG TABLET] 45 tablet 0        Last Written Prescription Date:  10.29.18  Last Fill Quantity: 45,  # refills: 1   Last Office Visit: 9/7/2018   Future Office Visit:      Sig: TAKE 2MG BY MOUTH ON SUNDAY, WEDNESDAY, FRIDAY AND TAKE 4MG REST OF WEEK.    Vitamin K Antagonists Failed - 12/17/2018  2:15 AM       Failed - INR is within goal in the past 6 weeks    Confirm INR is within goal in the past 6 weeks.     Recent Labs   Lab Test 11/29/18   INR 2.6                      Passed - Recent (12 mo) or future (30 days) visit within the authorizing provider's specialty    Patient had office visit in the last 12 months or has a visit in the next 30 days with authorizing provider or within the authorizing provider's specialty.  See \"Patient Info\" tab in inbasket, or \"Choose Columns\" in Meds & Orders section of the refill encounter.             Passed - Patient is 18 years of age or older       Passed - Patient is not pregnant       Passed - No positive pregnancy on file in past 12 months          "

## 2018-12-17 NOTE — TELEPHONE ENCOUNTER
Prescription approved per [facility] [place].  Radha Panda RN  [facility]

## 2018-12-27 ENCOUNTER — ANTICOAGULATION THERAPY VISIT (OUTPATIENT)
Dept: FAMILY MEDICINE | Facility: CLINIC | Age: 78
End: 2018-12-27
Payer: COMMERCIAL

## 2018-12-27 ENCOUNTER — TRANSFERRED RECORDS (OUTPATIENT)
Dept: HEALTH INFORMATION MANAGEMENT | Facility: CLINIC | Age: 78
End: 2018-12-27

## 2018-12-27 DIAGNOSIS — I26.99 PULMONARY EMBOLISM (H): ICD-10-CM

## 2018-12-27 DIAGNOSIS — I82.409 DVT (DEEP VENOUS THROMBOSIS) (H): ICD-10-CM

## 2018-12-27 LAB — INR PPP: 3.8 (ref 0.9–1.1)

## 2018-12-27 PROCEDURE — 99207 ZZC NO CHARGE NURSE ONLY: CPT | Performed by: FAMILY MEDICINE

## 2018-12-27 NOTE — PROGRESS NOTES
ANTICOAGULATION FOLLOW-UP CLINIC VISIT    Patient Name:  Ángela Dumont  Date:  12/27/2018  Contact Type:  Telephone    SUBJECTIVE:     Patient Findings     Positives:   Unexplained INR or factor level change           OBJECTIVE    INR   Date Value Ref Range Status   12/27/2018 3.8  Final       ASSESSMENT / PLAN  INR assessment SUPRA    Recheck INR In: 2 WEEKS    INR Location Home INR      Anticoagulation Summary  As of 12/27/2018    INR goal:   2.0-3.0   TTR:   68.5 % (2.7 y)   INR used for dosing:   3.8! (12/27/2018)   Warfarin maintenance plan:   4 mg (1 mg x 4) every Sun, Wed; 2 mg (1 mg x 2) all other days   Full warfarin instructions:   12/27: Hold; 1/2: 2 mg; 1/9: 2 mg; Otherwise 4 mg every Sun, Wed; 2 mg all other days   Weekly warfarin total:   18 mg   Plan last modified:   Dorita Panda RN (12/3/2018)   Next INR check:   1/10/2019   Target end date:       Indications    Long-term (current) use of anticoagulants [Z79.01] [Z79.01]  Pulmonary embolism (H) [I26.99]  DVT (deep venous thrombosis) (H) [I82.409]             Anticoagulation Episode Summary     INR check location:       Preferred lab:       Send INR reminders to:    NURSE    Comments:         Anticoagulation Care Providers     Provider Role Specialty Phone number    Joaquin Rockwell MD [facility] [phone]            See the Encounter Report to view Anticoagulation Flowsheet and Dosing Calendar (Go to Encounters tab in chart review, and find the Anticoagulation Therapy Visit)    Left detailed telephone visit with instructions for INR.     Dosage adjustment made based on physician directed care plan.    Dorita Panda RN

## 2019-01-04 ENCOUNTER — NURSE TRIAGE (OUTPATIENT)
Dept: NURSING | Facility: CLINIC | Age: 79
End: 2019-01-04

## 2019-01-05 NOTE — TELEPHONE ENCOUNTER
Siva () calls and says that his wife's upper, left arm, where she got a shingles shot, is bright red, warm, and itches. Redness is the size of an orange. Afebrile.  is not sure if her will have his wife see a DrSangeetha Tomorrow.    Reason for Disposition    [1] Over 3 days (72 hours) since shot AND [2] redness, swelling or pain getting worse    Additional Information    Negative: [1] Difficulty with breathing or swallowing AND [2] starts within 2 hours after injection    Negative: Difficult to awaken or acting confused (disoriented, slurred speech)    Negative: Unresponsive, passed out, or very weak    Negative: Sounds like a life-threatening emergency to the triager    Negative: Fever > 104 F (40 C)    Negative: [1] Fever > 101 F (38.3 C) AND [2] age > 60    Negative: [1] Fever > 101 F (38.3 C) AND [2] bedridden (e.g., nursing home patient, CVA, chronic illness, recovering from surgery)    Negative: [1] Fever > 100.5 F (38.1 C) AND [2] diabetes mellitus or weak immune system (e.g., HIV positive, cancer chemo, splenectomy, organ transplant, chronic steroids)    Negative: [1] Measles vaccine rash (onset day 6-12) AND [2] purple or blood-colored    Negative: Sounds like a severe, unusual reaction to the triager    Negative: [1] Redness or red streak around the injection site AND [2] begins > 48 hours after shot AND [3] fever    Negative: [1] Redness or red streak around the injection site AND [2] begins > 48 hours after shot AND [3] no fever  (Exception: red area < 1 inch or 2.5 cm wide)    Negative: Fever present > 3 days (72 hours)    Protocols used: IMMUNIZATION REACTIONS-ADULT-AH

## 2019-01-07 ENCOUNTER — TELEPHONE (OUTPATIENT)
Dept: FAMILY MEDICINE | Facility: CLINIC | Age: 79
End: 2019-01-07

## 2019-01-07 ENCOUNTER — OFFICE VISIT (OUTPATIENT)
Dept: FAMILY MEDICINE | Facility: CLINIC | Age: 79
End: 2019-01-07
Payer: COMMERCIAL

## 2019-01-07 VITALS
HEART RATE: 84 BPM | BODY MASS INDEX: 33.29 KG/M2 | SYSTOLIC BLOOD PRESSURE: 124 MMHG | WEIGHT: 195 LBS | TEMPERATURE: 97.8 F | OXYGEN SATURATION: 95 % | HEIGHT: 64 IN | DIASTOLIC BLOOD PRESSURE: 70 MMHG

## 2019-01-07 DIAGNOSIS — L03.90 CELLULITIS, UNSPECIFIED CELLULITIS SITE: Primary | ICD-10-CM

## 2019-01-07 PROCEDURE — 99213 OFFICE O/P EST LOW 20 MIN: CPT | Performed by: PHYSICIAN ASSISTANT

## 2019-01-07 RX ORDER — CEFUROXIME AXETIL 250 MG/1
250 TABLET ORAL 2 TIMES DAILY
Qty: 20 TABLET | Refills: 0 | Status: SHIPPED | OUTPATIENT
Start: 2019-01-07 | End: 2019-01-09 | Stop reason: SINTOL

## 2019-01-07 ASSESSMENT — MIFFLIN-ST. JEOR: SCORE: 1349.51

## 2019-01-07 NOTE — TELEPHONE ENCOUNTER
Called . He declined to go to another pharmacy for the other part of the medication.      He will have the pt take one today, two tomorrow and resume 2 per day Wednesday morning.     Advised for him to recheck INR within this antibiotic as well.    Radha Panda RN  [facility]

## 2019-01-07 NOTE — TELEPHONE ENCOUNTER
Reason for Call:  Medication or medication refill:    Do you use a [facility]?  Name of the pharmacy and phone number for the current request:  [facility] - [phone]    Name of the medication requested: cefuroxime (CEFTIN) 250 MG tablet    Other request: Pt only got 3 tablets today from the pharmacy. That's all the pharmacy had on hand. Please call pt back to let them know how to distribute these 3 tablets because pt is supposed to take 2 per day & the pharm won't get more in until Wednesday.    Can we leave a detailed message on this number? YES       Phone number patient can be reached at: Home number on file [phone] (home)    Best Time: any    Call taken on 1/7/2019 at 2:19 PM by Kathleen Hankins

## 2019-01-07 NOTE — PATIENT INSTRUCTIONS
Patient Education     Cellulitis  Cellulitis is an infection of the deep layers of skin. A break in the skin, such as a cut or scratch, can let bacteria under the skin. If the bacteria get to deep layers of the skin, it can be serious. If not treated, cellulitis can get into the bloodstream and lymph nodes. The infection can then spread throughout the body. This causes serious illness.  Cellulitis causes the affected skin to become red, swollen, warm, and sore. The reddened areas have a visible border. An open sore may leak fluid (pus). You may have a fever, chills, and pain.  Cellulitis is treated with antibiotics taken for 7 to 10 days. An open sore may be cleaned and covered with cool wet gauze. Symptoms should get better 1 to 2 days after treatment is started. Make sure to take all the antibiotics for the full number of days until they are gone. Keep taking the medicine even if your symptoms go away.  Home care  Follow these tips:    Limit the use of the part of your body with cellulitis.     If the infection is on your leg, keep your leg raised while sitting. This will help to reduce swelling.    Take all of the antibiotic medicine exactly as directed until it is gone. Do not miss any doses, especially during the first 7 days. Don t stop taking the medicine when your symptoms get better.    Keep the affected area clean and dry.    Wash your hands with soap and warm water before and after touching your skin. Anyone else who touches your skin should also wash his or her hands. Don't share towels.  Follow-up care  Follow up with your healthcare provider, or as advised. If your infection does not go away on the first antibiotic, your healthcare provider will prescribe a different one.  When to seek medical advice  Call your healthcare provider right away if any of these occur:    Red areas that spread    Swelling or pain that gets worse    Fluid leaking from the skin (pus)    Fever higher of 100.4  F (38.0  C) or  higher after 2 days on antibiotics  Date Last Reviewed: 9/1/2016 2000-2018 [employer]. [address] [place] [address], [place] [address]. All rights reserved. This information is not intended as a substitute for professional medical care. Always follow your healthcare professional's instructions.

## 2019-01-07 NOTE — PROGRESS NOTES
SUBJECTIVE:   Ángela Dumont is a 78 year old female who presents to clinic today for the following health issues:    Patient was triaged for Vaccine Site Symptoms    Yasmine Dunaway RN      1/4/19 11:44 PM   Note      Siva () calls and says that his wife's upper, left arm, where she got a shingles shot, is bright red, warm, and itches. Redness is the size of an orange. Afebrile.  is not sure if her will have his wife see a DrSangeetha Tomorrow.     Reason for Disposition    [1] Over 3 days (72 hours) since shot AND [2] redness, swelling or pain getting worse    Additional Information    Negative: [1] Difficulty with breathing or swallowing AND [2] starts within 2 hours after injection    Negative: Difficult to awaken or acting confused (disoriented, slurred speech)    Negative: Unresponsive, passed out, or very weak    Negative: Sounds like a life-threatening emergency to the triager    Negative: Fever > 104 F (40 C)    Negative: [1] Fever > 101 F (38.3 C) AND [2] age > 60    Negative: [1] Fever > 101 F (38.3 C) AND [2] bedridden (e.g., nursing home patient, CVA, chronic illness, recovering from surgery)    Negative: [1] Fever > 100.5 F (38.1 C) AND [2] diabetes mellitus or weak immune system (e.g., HIV positive, cancer chemo, splenectomy, organ transplant, chronic steroids)    Negative: [1] Measles vaccine rash (onset day 6-12) AND [2] purple or blood-colored    Negative: Sounds like a severe, unusual reaction to the triager    Negative: [1] Redness or red streak around the injection site AND [2] begins > 48 hours after shot AND [3] fever    Negative: [1] Redness or red streak around the injection site AND [2] begins > 48 hours after shot AND [3] no fever  (Exception: red area < 1 inch or 2.5 cm wide)    Negative: Fever present > 3 days (72 hours)    Protocols used: IMMUNIZATION REACTIONS-ADULT-AH        She reports she had both flu and shingles vaccine on 12/27/2018 and developed redness/warmth in  area of shingles vaccine (flu shot site is now normal) the day after the injection.  Shingles site (left arm) has worsened and she is complaining of pain in her left forearm/hand as well . She has tried Benadryl cream, ice and cold rag on area. She reports hand was causing her discomfort. Denies fever. Denies antibiotic use within last 60 days.     Problem list and histories reviewed & adjusted, as indicated.  Additional history: as documented    Patient Active Problem List   Diagnosis     S/P  shunt     NPH (normal pressure hydrocephalus)     Fracture of right femur (H)     DVT (deep venous thrombosis) (H)     Pulmonary embolism (H)     Dementia     Hypothyroid     Hypertension goal BP (blood pressure) < 140/90     Adjustment disorder with depressed mood     Hyperlipidemia with target LDL less than 130     Leukocytosis     Esophageal reflux     UTI (urinary tract infection)     Constipation     Pulmonary hypertension (H)     MR (mitral regurgitation)     Fibromyalgia     Osteoarthritis of both knees     Health Care Home     Long-term (current) use of anticoagulants [Z79.01]     Advance Care Planning     SIRS (systemic inflammatory response syndrome) (H)     History of pulmonary embolism     History of deep venous thrombosis     Urinary retention     BPPV (benign paroxysmal positional vertigo), bilateral     Herpes zoster without complication     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)     MGUS (monoclonal gammopathy of unknown significance)     Lymphoproliferative disorder (H)     Nausea & vomiting     Abdominal pain     Rectal mass     Pelvic lymphadenopathy     Colitis, nonspecific     Past Surgical History:   Procedure Laterality Date     ABDOMEN SURGERY  left side lower abdomen pain from Shunt we do roberth     COLONOSCOPY  2008     GYN SURGERY       H LABOR AND DELIVERY VAGINAL  1960, 1967     HERNIA REPAIR       HYSTERECTOMY  1976    hysterectomy     IMPLANT SHUNT VENTRICULOPERITONEAL Left 5/19/2016     IMPLANT SHUNT VENTRICULOPERITONEAL - Dr Alba     OPTICAL TRACKING SYSTEM IMPLANT SHUNT VENTRICULOPERITONEAL Right 4/10/2015     Shunt - Dr Sierra     ORTHOPEDIC SURGERY  Knee pain that prohibits walking     REMOVE SHUNT VENTRICULOPERITONEAL N/A 7/3/2015    REMOVE SHUNT VENTRICULOPERITONEAL;  Surgeon: Emmanuel Sierra MD;  Location: [facility]     SURGICAL HISTORY OF -   1/15    right femur/hip surgery - 2015     SURGICAL HISTORY OF -       rectocele repair     SURGICAL HISTORY OF 1997    abdominal adhesion lysis     SURGICAL HISTORY OF -   4/15    IVC filter placement     SURGICAL HISTORY OF -   6/15    IVC filter removal       Social History     Tobacco Use     Smoking status: Former Smoker     Packs/day: 0.50     Years: 10.00     Pack years: 5.00     Types: Cigarettes     Start date: 1958     Last attempt to quit: 1982     Years since quittin.0     Smokeless tobacco: Never Used     Tobacco comment: quit    Substance Use Topics     Alcohol use: Yes     Alcohol/week: 0.0 - 0.6 oz     Comment: Very light     Family History   Problem Relation Age of Onset     Colon Cancer Father      Coronary Artery Disease Father      Diabetes Maternal Grandmother          Current Outpatient Medications   Medication Sig Dispense Refill     acetaminophen (TYLENOL) 325 MG tablet Take 2 tablets (650 mg) by mouth every 4 hours as needed for other (surgical pain) 100 tablet 0     cefuroxime (CEFTIN) 250 MG tablet Take 1 tablet (250 mg) by mouth 2 times daily for 10 days 20 tablet 0     furosemide (LASIX) 40 MG tablet TAKE 1 TABLET BY MOUTH EVERY DAY AT 10 AM 90 tablet 1     levothyroxine (SYNTHROID/LEVOTHROID) 50 MCG tablet Take 1 tablet (50 mcg) by mouth daily 90 tablet 0     nystatin (MYCOSTATIN) cream Apply topically 3 times daily 30 g 3     polyethylene glycol (MIRALAX/GLYCOLAX) Packet Take 17 g by mouth daily as needed for constipation 7 packet 0     senna-docusate (SENOKOT-S;PERICOLACE)  "8.6-50 MG per tablet Take 1 tablet by mouth 2 times daily as needed for constipation 100 tablet 0     tamsulosin (FLOMAX) 0.4 MG capsule TAKE 1 CAPSULE BY MOUTH EVERY DAY AT 10 AM 90 capsule 1     Turmeric 500 MG CAPS        warfarin (COUMADIN) 2 MG tablet Take 2mg by mouth on Sunday, Wednesday, Friday and take 4mg rest of week 45 tablet 1     warfarin (COUMADIN) 4 MG tablet TAKE 2MG BY MOUTH ON SUNDAY, WEDNESDAY, FRIDAY AND TAKE 4MG REST OF WEEK. 45 tablet 0     Allergies   Allergen Reactions     Shellfish Allergy Anaphylaxis     Aspirin      GI issues     Contrast Dye Hives and Itching     Flushed skin     Penicillins Hives     Sulfa Drugs Hives     Nitrofurantoin Rash     Reviewed and updated as needed this visit by clinical staff  Tobacco  Allergies  Meds  Problems  Med Hx  Surg Hx  Fam Hx  Soc Hx        Reviewed and updated as needed this visit by Provider  Tobacco  Allergies  Meds  Problems  Med Hx  Surg Hx  Fam Hx       ROS:  Constitutional, HEENT, cardiovascular, pulmonary, GI, , musculoskeletal, neuro, skin, endocrine and psych systems are negative, except as otherwise noted.    This document serves as a record of the services and decisions personally performed and made by Silvia Roland PA-C. It was created on her behalf by Jamee Apple, a trained medical scribe. The creation of this document is based on the provider's statements to the medical scribe.  Jamee Apple 11:48 AM January 7, 2019  OBJECTIVE:   /70 (BP Location: Right arm, Cuff Size: Adult Large)   Pulse 84   Temp 97.8  F (36.6  C) (Oral)   Ht 1.626 m (5' 4\")   Wt 88.5 kg (195 lb)   SpO2 95%   BMI 33.47 kg/m   Body mass index is 33.47 kg/m .    GENERAL: healthy, alert and no distress  EYES: Eyes grossly normal to inspection, PERRL and conjunctivae and sclerae normal  MS: Well defined area of warmth over lateral deltoid with redness, size is 13 cm x 15 cm, otherwise no musculoskeletal defects, no edema  SKIN: no suspicious lesions " or rashes  NEURO: Normal strength and tone, mentation intact and speech normal  PSYCH: mentation appears normal, affect normal/bright    Diagnostic Test Results:  none   ASSESSMENT/PLAN:   Diagnoses and all orders for this visit:    Cellulitis, unspecified cellulitis site  Information about cellulitis given in office. Explained it is normal for area of redness to expand within next 24 hours. Start Ceftin per instructions. Advised to apply warm compresses 1-2 times per day. Let me know if area of redness continues to spread after 24 hours.   -     cefuroxime (CEFTIN) 250 MG tablet; Take 1 tablet (250 mg) by mouth 2 times daily for 10 days    The information in this document, created by the medical scribe for me, accurately reflects the services I personally performed and the decisions made by me. I have reviewed and approved this document for accuracy prior to leaving the patient care area.  January 7, 2019 11:58 AM    Silvia Roland PA-C  [facility]

## 2019-01-09 ENCOUNTER — TELEPHONE (OUTPATIENT)
Dept: FAMILY MEDICINE | Facility: CLINIC | Age: 79
End: 2019-01-09

## 2019-01-09 DIAGNOSIS — L03.90 CELLULITIS, UNSPECIFIED CELLULITIS SITE: Primary | ICD-10-CM

## 2019-01-09 RX ORDER — CLINDAMYCIN HCL 300 MG
300 CAPSULE ORAL 3 TIMES DAILY
Qty: 30 CAPSULE | Refills: 0 | Status: SHIPPED | OUTPATIENT
Start: 2019-01-09 | End: 2019-04-11

## 2019-01-09 NOTE — TELEPHONE ENCOUNTER
Attempt #1  Called patient's spouse, Vernon, @ # below (CTC on file) - Left a non-detailed message with female (stated she was the caregiver - no CTC on file)for patient to call back and speak with any triage nurse.    Tessy Gandara RN  [facility]

## 2019-01-09 NOTE — TELEPHONE ENCOUNTER
The patient indicates understanding of these issues and agrees with the plan.  Radha Panda RN  [facility]

## 2019-01-09 NOTE — TELEPHONE ENCOUNTER
Pt could try warm compresses 2-3 x daily to see if the redness begins to improve.  If it does not improve within 2 days, then I recommend starting clindamycin.  Generally well tolerated.  Diarrhea can happen with all antibiotics and probiotics are appropriate to minimize this symptom.      Silvia Roland MS, PA-C

## 2019-01-09 NOTE — TELEPHONE ENCOUNTER
Change to clindamycin.  3x/daily x 10 days.  Rash should improve in 24-48 hours.  Continue benadryl 50 mg 3x/daily PRN itching      Silvia Roland MS, PA-C

## 2019-01-09 NOTE — TELEPHONE ENCOUNTER
itching (no redness or rash)      Duration:  Two days- just prescribed Ceftin on Monday    Description  Location: face, rest of body is itchy and pt is having a hard time sleeping  Itching: moderate    Intensity:  moderate    Accompanying signs and symptoms: denies SOB, anaphylaxis sx.    History (similar episodes/previous evaluation): YES- has had similar reactions to other antibiotics    Precipitating or alleviating factors:  New exposures:  medication Ceftin  Recent travel: no      Therapies tried and outcome: Benadryl/diphenhydramine -  not effective and cool packs    Area around shingles vaccine is not out of the lines that LP helen.     Routing to  who saw the pt Monday for recommendations.    Call back [facility] at [phone]    Radha Panda RN  [facility]

## 2019-01-09 NOTE — TELEPHONE ENCOUNTER
Vernon, pts  calling back. Noted that the pt always has diarrhea and this could be one of the side effects of this medication. Pt does take probiotics as well daily.   Please advise.    Radha Panda RN  [facility]

## 2019-01-09 NOTE — TELEPHONE ENCOUNTER
Vernon, patient's spouse, and patient calling back    Advised of LAURA FRANK message below - spouse stated that antibiotics don't work for patient. She always has reactions.   Patient's spouse would like to wait before starting another antibiotic.   Advised spouse of the risks of not being on an antibiotic for cellulitis and what could happen - patient's spouse stated he is between a rock and a hard place.   Advised spouse that patient has never been prescribed this particular medication previously. She may or may not have a reaction, but advise that she does take for the cellulitis. If patient does have a reaction (advised of s/sx of allergic reactions?) to either call 911, go to ED, or call clinic depending on severity.   Spouse agreed to have patient try the antibiotic - will call back with questions or concerns.       Tessy Gandara RN  [facility]

## 2019-01-10 ENCOUNTER — ANTICOAGULATION THERAPY VISIT (OUTPATIENT)
Dept: FAMILY MEDICINE | Facility: CLINIC | Age: 79
End: 2019-01-10
Payer: COMMERCIAL

## 2019-01-10 ENCOUNTER — TRANSFERRED RECORDS (OUTPATIENT)
Dept: HEALTH INFORMATION MANAGEMENT | Facility: CLINIC | Age: 79
End: 2019-01-10

## 2019-01-10 DIAGNOSIS — I26.99 PULMONARY EMBOLISM (H): ICD-10-CM

## 2019-01-10 DIAGNOSIS — I82.409 DVT (DEEP VENOUS THROMBOSIS) (H): ICD-10-CM

## 2019-01-10 LAB — INR PPP: 4 (ref 0.9–1.1)

## 2019-01-10 PROCEDURE — 99207 ZZC NO CHARGE NURSE ONLY: CPT | Performed by: FAMILY MEDICINE

## 2019-01-10 NOTE — PROGRESS NOTES
ANTICOAGULATION FOLLOW-UP CLINIC VISIT    Patient Name:  Ángela Dumont  Date:  1/10/2019  Contact Type:  Telephone    SUBJECTIVE:     Patient Findings     Positives:   Antibiotic use or infection (recent antibiotics for cellulitis)           OBJECTIVE    INR   Date Value Ref Range Status   01/10/2019 4  Final       ASSESSMENT / PLAN  INR assessment SUPRA    Recheck INR In: 1 WEEK    INR Location Home INR    Billed home INR Yes      Anticoagulation Summary  As of 1/10/2019    INR goal:   2.0-3.0   TTR:   67.5 % (2.8 y)   INR used for dosin! (1/10/2019)   Warfarin maintenance plan:   4 mg (1 mg x 4) every Sun, Wed; 2 mg (1 mg x 2) all other days   Full warfarin instructions:   1/10: Hold; Otherwise 4 mg every Sun, Wed; 2 mg all other days   Weekly warfarin total:   18 mg   Plan last modified:   Dorita Panda RN (12/3/2018)   Next INR check:   2019   Target end date:       Indications    Long-term (current) use of anticoagulants [Z79.01] [Z79.01]  Pulmonary embolism (H) [I26.99]  DVT (deep venous thrombosis) (H) [I82.409]             Anticoagulation Episode Summary     INR check location:       Preferred lab:       Send INR reminders to:   RV NURSE    Comments:         Anticoagulation Care Providers     Provider Role Specialty Phone number    Joaquin Rockwell MD [facility] [phone]            See the Encounter Report to view Anticoagulation Flowsheet and Dosing Calendar (Go to Encounters tab in chart review, and find the Anticoagulation Therapy Visit)    Dosage adjustment made based on physician directed care plan.    Dorita Panda RN

## 2019-01-17 ENCOUNTER — ANTICOAGULATION THERAPY VISIT (OUTPATIENT)
Dept: FAMILY MEDICINE | Facility: CLINIC | Age: 79
End: 2019-01-17
Payer: COMMERCIAL

## 2019-01-17 ENCOUNTER — TRANSFERRED RECORDS (OUTPATIENT)
Dept: HEALTH INFORMATION MANAGEMENT | Facility: CLINIC | Age: 79
End: 2019-01-17

## 2019-01-17 LAB — INR PPP: 1.6 (ref 0.9–1.1)

## 2019-01-17 PROCEDURE — 99207 ZZC NO CHARGE NURSE ONLY: CPT | Performed by: FAMILY MEDICINE

## 2019-01-17 NOTE — PROGRESS NOTES
ANTICOAGULATION FOLLOW-UP CLINIC VISIT    Patient Name:  Ángela Dumont  Date:  2019  Contact Type:  Telephone/ Detailed message left with dosing instructions. Per demographics okay to leave VM    SUBJECTIVE:     Patient Findings     Positives:   Intentional hold of therapy           OBJECTIVE    INR   Date Value Ref Range Status   2019 1.6  Final       ASSESSMENT / PLAN  INR assessment SUB    Recheck INR In: 1 WEEK    INR Location Home INR    Billed home INR No      Anticoagulation Summary  As of 2019    INR goal:   2.0-3.0   TTR:   67.3 % (2.8 y)   INR used for dosin.6! (2019)   Warfarin maintenance plan:   4 mg (1 mg x 4) every Sun, Wed; 2 mg (1 mg x 2) all other days   Full warfarin instructions:   : 4 mg; Otherwise 4 mg every Sun, Wed; 2 mg all other days   Weekly warfarin total:   18 mg   Plan last modified:   Dorita Panda RN (12/3/2018)   Next INR check:   2019   Target end date:       Indications    Long-term (current) use of anticoagulants [Z79.01] [Z79.01]  Pulmonary embolism (H) [I26.99]  DVT (deep venous thrombosis) (H) [I82.409]             Anticoagulation Episode Summary     INR check location:       Preferred lab:       Send INR reminders to:    NURSE    Comments:         Anticoagulation Care Providers     Provider Role Specialty Phone number    Joaquin Rockwell MD [facility] [phone]            See the Encounter Report to view Anticoagulation Flowsheet and Dosing Calendar (Go to Encounters tab in chart review, and find the Anticoagulation Therapy Visit)    Dosage adjustment made based on physician directed care plan.    Nereyda Padilla RN

## 2019-01-19 DIAGNOSIS — E03.9 HYPOTHYROIDISM, UNSPECIFIED TYPE: ICD-10-CM

## 2019-01-19 NOTE — TELEPHONE ENCOUNTER
"Requested Prescriptions   Pending Prescriptions Disp Refills     levothyroxine (SYNTHROID/LEVOTHROID) 50 MCG tablet [Pharmacy Med Name: LEVOTHYROXINE 50 MCG TABLET] 90 tablet 0      Last Written Prescription Date:  10.22.18  Last Fill Quantity: 90,  # refills: 0   Last Office Visit: 1/7/2019   Future Office Visit:    Next 5 appointments (look out 90 days)    Apr 09, 2019  1:00 PM CDT  Return Visit with  ONCOLOGY NURSE  [facility]  [address]  [facility] [address]  [phone]   Apr 11, 2019  2:15 PM CDT  Return Visit with Susanna Duran MD  [facility]  [address]  [facility] [address]  [phone]          Sig: TAKE 1 TABLET BY MOUTH EVERY DAY    Thyroid Protocol Passed - 1/19/2019 12:27 AM       Passed - Patient is 12 years or older       Passed - Recent (12 mo) or future (30 days) visit within the authorizing provider's specialty    Patient had office visit in the last 12 months or has a visit in the next 30 days with authorizing provider or within the authorizing provider's specialty.  See \"Patient Info\" tab in inbasket, or \"Choose Columns\" in Meds & Orders section of the refill encounter.             Passed - Medication is active on med list       Passed - Normal TSH on file in past 12 months    Recent Labs   Lab Test 09/07/18  1116   TSH 2.42             Passed - No active pregnancy on record    If patient is pregnant or has had a positive pregnancy test, please check TSH.         Passed - No positive pregnancy test in past 12 months    If patient is pregnant or has had a positive pregnancy test, please check TSH.            "

## 2019-01-21 RX ORDER — LEVOTHYROXINE SODIUM 50 UG/1
TABLET ORAL
Qty: 90 TABLET | Refills: 0 | Status: SHIPPED | OUTPATIENT
Start: 2019-01-21 | End: 2019-07-29

## 2019-01-21 RX ORDER — LEVOTHYROXINE SODIUM 50 UG/1
TABLET ORAL
Qty: 90 TABLET | Refills: 0 | Status: SHIPPED | OUTPATIENT
Start: 2019-01-21 | End: 2019-04-13

## 2019-01-21 NOTE — TELEPHONE ENCOUNTER
"Requested Prescriptions   Pending Prescriptions Disp Refills     levothyroxine (SYNTHROID/LEVOTHROID) 50 MCG tablet [Pharmacy Med Name: LEVOTHYROXINE 50 MCG TABLET] 90 tablet 0     Sig: TAKE 1 TABLET BY MOUTH EVERY MORNING AT 7:30 AM. DUE FOR LAB FOR FURTHER REFILLS    Thyroid Protocol Passed - 1/19/2019  3:51 PM       Passed - Patient is 12 years or older       Passed - Recent (12 mo) or future (30 days) visit within the authorizing provider's specialty    Patient had office visit in the last 12 months or has a visit in the next 30 days with authorizing provider or within the authorizing provider's specialty.  See \"Patient Info\" tab in inbasket, or \"Choose Columns\" in Meds & Orders section of the refill encounter.             Passed - Medication is active on med list       Passed - Normal TSH on file in past 12 months    Recent Labs   Lab Test 09/07/18  1116   TSH 2.42             Passed - No active pregnancy on record    If patient is pregnant or has had a positive pregnancy test, please check TSH.         Passed - No positive pregnancy test in past 12 months    If patient is pregnant or has had a positive pregnancy test, please check TSH.          Prescription approved per [facility] Refill Protocol.  Radha Panda RN  [facility]    "

## 2019-01-21 NOTE — TELEPHONE ENCOUNTER
Prescription approved per [facility] Refill Protocol.  Radha Panda RN  [facility]

## 2019-02-01 ENCOUNTER — TELEPHONE (OUTPATIENT)
Dept: FAMILY MEDICINE | Facility: CLINIC | Age: 79
End: 2019-02-01

## 2019-02-01 NOTE — TELEPHONE ENCOUNTER
First attempt.    Patient is overdue for INR recheck.  Was supposed to be checked on 1/24/2019. Last INR result was low.      Detailed VM left per demographics to please check INR and call us with result.    Patient has home monitor.      NAYELI Harris, RN, [facility]) [phone]

## 2019-02-03 ENCOUNTER — TRANSFERRED RECORDS (OUTPATIENT)
Dept: HEALTH INFORMATION MANAGEMENT | Facility: CLINIC | Age: 79
End: 2019-02-03

## 2019-02-03 LAB — INR PPP: 2.8 (ref 0.9–1.1)

## 2019-02-05 ENCOUNTER — ANTICOAGULATION THERAPY VISIT (OUTPATIENT)
Dept: FAMILY MEDICINE | Facility: CLINIC | Age: 79
End: 2019-02-05
Payer: COMMERCIAL

## 2019-02-05 LAB — INR PPP: 2.8

## 2019-02-05 NOTE — PROGRESS NOTES
ANTICOAGULATION FOLLOW-UP CLINIC VISIT    Patient Name:  Ángela Dumont  Date:  2019  Contact Type:  Telephone/ with  Vernon    SUBJECTIVE:        OBJECTIVE    INR   Date Value Ref Range Status   2019 2.8  Final       ASSESSMENT / PLAN  INR assessment THER    Recheck INR In: 4 WEEKS    INR Location Home INR    Billed home INR No      Anticoagulation Summary  As of 2019    INR goal:   2.0-3.0   TTR:   67.3 % (2.8 y)   INR used for dosin.8 (2019)   Warfarin maintenance plan:   4 mg (1 mg x 4) every Sun, Wed; 2 mg (1 mg x 2) all other days   Full warfarin instructions:   4 mg every Sun, Wed; 2 mg all other days   Weekly warfarin total:   18 mg   No change documented:   Nereyda Padilla, RN   Plan last modified:   Dorita Panda RN (12/3/2018)   Next INR check:   3/5/2019   Target end date:       Indications    Long-term (current) use of anticoagulants [Z79.01] [Z79.01]  Pulmonary embolism (H) [I26.99]  DVT (deep venous thrombosis) (H) [I82.409]             Anticoagulation Episode Summary     INR check location:       Preferred lab:       Send INR reminders to:    NURSE    Comments:         Anticoagulation Care Providers     Provider Role Specialty Phone number    Joaquin Rockwell MD [facility] [phone]            See the Encounter Report to view Anticoagulation Flowsheet and Dosing Calendar (Go to Encounters tab in chart review, and find the Anticoagulation Therapy Visit)    Dosage adjustment made based on physician directed care plan.    Nereyda Padilla, RN

## 2019-02-11 DIAGNOSIS — I82.409 DVT (DEEP VENOUS THROMBOSIS) (H): ICD-10-CM

## 2019-02-11 DIAGNOSIS — I26.99 PULMONARY EMBOLISM (H): ICD-10-CM

## 2019-02-11 RX ORDER — WARFARIN SODIUM 2 MG/1
TABLET ORAL
Qty: 45 TABLET | Refills: 0 | Status: SHIPPED | OUTPATIENT
Start: 2019-02-11 | End: 2019-04-14

## 2019-02-11 NOTE — TELEPHONE ENCOUNTER
"Requested Prescriptions   Pending Prescriptions Disp Refills     warfarin (COUMADIN) 2 MG tablet [Pharmacy Med Name: WARFARIN SODIUM 2 MG TABLET] 45 tablet 0        Last Written Prescription Date:  12.17.18  Last Fill Quantity: 45,  # refills: 0   Last Office Visit: 1/7/2019   Future Office Visit:    Next 5 appointments (look out 90 days)    Apr 09, 2019  1:00 PM CDT  Return Visit with  ONCOLOGY NURSE  [facility]  [address]  [facility] [address]  [phone]   Apr 11, 2019  2:15 PM CDT  Return Visit with Susanna Duran MD  [facility]  [address]  [facility] [address]  [phone]          Sig: TAKE 2MG BY MOUTH ON SUNDAY, WEDNESDAY, FRIDAY AND TAKE 4MG REST OF WEEK    Vitamin K Antagonists Failed - 2/11/2019 11:16 AM       Failed - INR is within goal in the past 6 weeks    Confirm INR is within goal in the past 6 weeks.     Recent Labs   Lab Test 02/05/19   INR 2.8                      Passed - Recent (12 mo) or future (30 days) visit within the authorizing provider's specialty    Patient had office visit in the last 12 months or has a visit in the next 30 days with authorizing provider or within the authorizing provider's specialty.  See \"Patient Info\" tab in inbasket, or \"Choose Columns\" in Meds & Orders section of the refill encounter.             Passed - Medication is active on med list       Passed - Patient is 18 years of age or older       Passed - Patient is not pregnant       Passed - No positive pregnancy on file in past 12 months          "

## 2019-03-05 ENCOUNTER — ANTICOAGULATION THERAPY VISIT (OUTPATIENT)
Dept: FAMILY MEDICINE | Facility: CLINIC | Age: 79
End: 2019-03-05
Payer: COMMERCIAL

## 2019-03-05 ENCOUNTER — TRANSFERRED RECORDS (OUTPATIENT)
Dept: HEALTH INFORMATION MANAGEMENT | Facility: CLINIC | Age: 79
End: 2019-03-05

## 2019-03-05 LAB — INR PPP: 3.4 (ref 0.9–1.1)

## 2019-03-05 PROCEDURE — 99207 ZZC NO CHARGE NURSE ONLY: CPT | Performed by: FAMILY MEDICINE

## 2019-03-05 NOTE — PROGRESS NOTES
ANTICOAGULATION FOLLOW-UP CLINIC VISIT    Patient Name:  Ángela Dumont  Date:  3/5/2019  Contact Type:  Telephone/ Detailed VM left for Vernon    SUBJECTIVE:     Patient Findings     Positives:   No Problem Findings           OBJECTIVE    INR   Date Value Ref Range Status   03/05/2019 3.4  Final       ASSESSMENT / PLAN  INR assessment SUPRA    Recheck INR In: 1 WEEK    INR Location Clinic      Anticoagulation Summary  As of 3/5/2019    INR goal:   2.0-3.0   TTR:   66.6 % (2.9 y)   INR used for dosing:   3.4! (3/5/2019)   Warfarin maintenance plan:   4 mg (1 mg x 4) every Sun, Wed; 2 mg (1 mg x 2) all other days   Full warfarin instructions:   3/5: 1 mg; Otherwise 4 mg every Sun, Wed; 2 mg all other days   Weekly warfarin total:   18 mg   Plan last modified:   Dorita Panda, RN (12/3/2018)   Next INR check:   3/12/2019   Target end date:       Indications    Long-term (current) use of anticoagulants [Z79.01] [Z79.01]  Pulmonary embolism (H) [I26.99]  DVT (deep venous thrombosis) (H) [I82.409]             Anticoagulation Episode Summary     INR check location:       Preferred lab:       Send INR reminders to:    NURSE    Comments:         Anticoagulation Care Providers     Provider Role Specialty Phone number    Joaquin Rockwell MD [facility] [phone]            See the Encounter Report to view Anticoagulation Flowsheet and Dosing Calendar (Go to Encounters tab in chart review, and find the Anticoagulation Therapy Visit)    Dosage adjustment made based on physician directed care plan.    Nereyda Padilla, RN

## 2019-03-21 ENCOUNTER — TELEPHONE (OUTPATIENT)
Dept: FAMILY MEDICINE | Facility: CLINIC | Age: 79
End: 2019-03-21

## 2019-03-22 DIAGNOSIS — I10 HYPERTENSION GOAL BP (BLOOD PRESSURE) < 140/90: ICD-10-CM

## 2019-03-22 DIAGNOSIS — R33.9 URINARY RETENTION: ICD-10-CM

## 2019-03-22 RX ORDER — TAMSULOSIN HYDROCHLORIDE 0.4 MG/1
CAPSULE ORAL
Qty: 90 CAPSULE | Refills: 1 | Status: SHIPPED | OUTPATIENT
Start: 2019-03-22 | End: 2019-07-27

## 2019-03-22 RX ORDER — FUROSEMIDE 40 MG
TABLET ORAL
Qty: 90 TABLET | Refills: 1 | Status: SHIPPED | OUTPATIENT
Start: 2019-03-22 | End: 2019-07-27

## 2019-03-22 NOTE — TELEPHONE ENCOUNTER
"Requested Prescriptions   Pending Prescriptions Disp Refills     furosemide (LASIX) 40 MG tablet    Last Written Prescription Date:  10.1.18  Last Fill Quantity: 90 tablet,  # refills: 1   Last office visit: 1/7/2019 with prescribing provider:  Joaquin Rockwell MD       Future Office Visit:   Next 5 appointments (look out 90 days)    Apr 09, 2019  1:00 PM CDT  Return Visit with RH ONCOLOGY NURSE  [facility]  [address]  [facility] [address]  [phone]   Apr 11, 2019  2:15 PM CDT  Return Visit with Susanna Duran MD  [facility]  [address]  [facility] [address]  [phone]            90 tablet 1    Diuretics (Including Combos) Protocol Passed - 3/22/2019  9:47 AM       Passed - Blood pressure under 140/90 in past 12 months    BP Readings from Last 3 Encounters:   01/07/19 124/70   09/07/18 126/81   04/12/18 124/80                Passed - Recent (12 mo) or future (30 days) visit within the authorizing provider's specialty    Patient had office visit in the last 12 months or has a visit in the next 30 days with authorizing provider or within the authorizing provider's specialty.  See \"Patient Info\" tab in inbasket, or \"Choose Columns\" in Meds & Orders section of the refill encounter.             Passed - Medication is active on med list       Passed - Patient is age 18 or older       Passed - No active pregancy on record       Passed - Normal serum creatinine on file in past 12 months    Recent Labs   Lab Test 09/07/18  1116   CR 0.83             Passed - Normal serum potassium on file in past 12 months    Recent Labs   Lab Test 09/07/18  1116   POTASSIUM 4.2                   Passed - Normal serum sodium on file in past 12 months    Recent Labs   Lab Test 09/07/18  1116                Passed - No " "positive pregnancy test in past 12 months                  tamsulosin (FLOMAX) 0.4 MG capsule    Last Written Prescription Date:  10.1.18  Last Fill Quantity: 90 capsule,  # refills: 1   Last office visit: 1/7/2019 with prescribing provider:  Joaquin Rockwell MD       Future Office Visit:   Next 5 appointments (look out 90 days)    Apr 09, 2019  1:00 PM CDT  Return Visit with  ONCOLOGY NURSE  [facility]  [address]  [facility] [address]  [phone]   Apr 11, 2019  2:15 PM CDT  Return Visit with Susanna Duran MD  [facility]  [address]  [facility] [address]  [phone]            90 capsule 1    Alpha Blockers Passed - 3/22/2019  9:47 AM       Passed - Blood pressure under 140/90 in past 12 months    BP Readings from Last 3 Encounters:   01/07/19 124/70   09/07/18 126/81   04/12/18 124/80                Passed - Recent (12 mo) or future (30 days) visit within the authorizing provider's specialty    Patient had office visit in the last 12 months or has a visit in the next 30 days with authorizing provider or within the authorizing provider's specialty.  See \"Patient Info\" tab in inbasket, or \"Choose Columns\" in Meds & Orders section of the refill encounter.             Passed - Patient does not have Tadalafil, Vardenafil, or Sildenafil on their medication list       Passed - Medication is active on med list       Passed - Patient is 18 years of age or older       Passed - No active pregnancy on record       Passed - No positive pregnancy test in past 12 months        "

## 2019-03-26 ENCOUNTER — ANTICOAGULATION THERAPY VISIT (OUTPATIENT)
Dept: FAMILY MEDICINE | Facility: CLINIC | Age: 79
End: 2019-03-26
Payer: COMMERCIAL

## 2019-03-26 ENCOUNTER — TRANSFERRED RECORDS (OUTPATIENT)
Dept: HEALTH INFORMATION MANAGEMENT | Facility: CLINIC | Age: 79
End: 2019-03-26

## 2019-03-26 LAB — INR PPP: 2.1

## 2019-03-26 PROCEDURE — G0250 MD INR TEST REVIE INTER MGMT: HCPCS | Performed by: FAMILY MEDICINE

## 2019-03-26 NOTE — PROGRESS NOTES
ANTICOAGULATION FOLLOW-UP CLINIC VISIT    Patient Name:  Ángela Dumont  Date:  3/26/2019  Contact Type:  Telephone/ with  [place]on    SUBJECTIVE:     Patient Findings            OBJECTIVE    INR   Date Value Ref Range Status   2019 2.1  Final       ASSESSMENT / PLAN  INR assessment THER    Recheck INR In: 4 WEEKS    INR Location Home INR    Billed home INR Yes      Anticoagulation Summary  As of 3/26/2019    INR goal:   2.0-3.0   TTR:   66.6 % (3 y)   INR used for dosin.1 (3/26/2019)   Warfarin maintenance plan:   4 mg (1 mg x 4) every Sun, Wed; 2 mg (1 mg x 2) all other days   Full warfarin instructions:   4 mg every Sun, Wed; 2 mg all other days   Weekly warfarin total:   18 mg   No change documented:   Nereyda Padilla RN   Plan last modified:   Dorita Panda, RN (12/3/2018)   Next INR check:   2019   Target end date:       Indications    Long-term (current) use of anticoagulants [Z79.01] [Z79.01]  Pulmonary embolism (H) [I26.99]  DVT (deep venous thrombosis) (H) [I82.409]             Anticoagulation Episode Summary     INR check location:       Preferred lab:       Send INR reminders to:    NURSE    Comments:         Anticoagulation Care Providers     Provider Role Specialty Phone number    Joaquin Rockwell MD [facility] [phone]            See the Encounter Report to view Anticoagulation Flowsheet and Dosing Calendar (Go to Encounters tab in chart review, and find the Anticoagulation Therapy Visit)    Dosage adjustment made based on physician directed care plan.     was advised that for home monitoring INR is supposed to be checked every 2 weeks.  He said he only wants to check it every 4 weeks.    He also noted he wants to give her 4 mg three days per week and 2 mg all other days.  I ask what she has had the past 7 days and it was 4 mg two days per week and 2 mg all other days.  I advised him that she is within range and there is no reason to change dose at this  time.  I advised she continue with the 4 mg on Sunday and Wednesday and 2 mg all other days of the week.      Routing encounter to Dr. Rockwell for review as this is a billable home INR encounter.    Nereyda Padilla RN

## 2019-04-05 ENCOUNTER — HOSPITAL ENCOUNTER (OUTPATIENT)
Facility: CLINIC | Age: 79
Setting detail: SPECIMEN
End: 2019-04-05
Attending: INTERNAL MEDICINE
Payer: COMMERCIAL

## 2019-04-11 ENCOUNTER — ONCOLOGY VISIT (OUTPATIENT)
Dept: ONCOLOGY | Facility: CLINIC | Age: 79
End: 2019-04-11
Attending: INTERNAL MEDICINE
Payer: COMMERCIAL

## 2019-04-11 VITALS
TEMPERATURE: 97.7 F | HEART RATE: 71 BPM | OXYGEN SATURATION: 96 % | SYSTOLIC BLOOD PRESSURE: 125 MMHG | DIASTOLIC BLOOD PRESSURE: 75 MMHG | RESPIRATION RATE: 18 BRPM

## 2019-04-11 DIAGNOSIS — D47.2 MGUS (MONOCLONAL GAMMOPATHY OF UNKNOWN SIGNIFICANCE): ICD-10-CM

## 2019-04-11 DIAGNOSIS — D47.Z9 LOW GRADE B CELL LYMPHOPROLIFERATIVE DISORDER (H): ICD-10-CM

## 2019-04-11 LAB
ALBUMIN SERPL-MCNC: 3.4 G/DL (ref 3.4–5)
ALP SERPL-CCNC: 91 U/L (ref 40–150)
ALT SERPL W P-5'-P-CCNC: 18 U/L (ref 0–50)
ANION GAP SERPL CALCULATED.3IONS-SCNC: 6 MMOL/L (ref 3–14)
AST SERPL W P-5'-P-CCNC: 17 U/L (ref 0–45)
BASOPHILS # BLD AUTO: 0 10E9/L (ref 0–0.2)
BASOPHILS NFR BLD AUTO: 0 %
BILIRUB SERPL-MCNC: 0.3 MG/DL (ref 0.2–1.3)
BUN SERPL-MCNC: 17 MG/DL (ref 7–30)
CALCIUM SERPL-MCNC: 9.4 MG/DL (ref 8.5–10.1)
CHLORIDE SERPL-SCNC: 107 MMOL/L (ref 94–109)
CO2 SERPL-SCNC: 24 MMOL/L (ref 20–32)
CREAT SERPL-MCNC: 0.84 MG/DL (ref 0.52–1.04)
DIFFERENTIAL METHOD BLD: ABNORMAL
EOSINOPHIL # BLD AUTO: 0 10E9/L (ref 0–0.7)
EOSINOPHIL NFR BLD AUTO: 0 %
ERYTHROCYTE [DISTWIDTH] IN BLOOD BY AUTOMATED COUNT: 13.4 % (ref 10–15)
GFR SERPL CREATININE-BSD FRML MDRD: 66 ML/MIN/{1.73_M2}
GLUCOSE SERPL-MCNC: 102 MG/DL (ref 70–99)
HCT VFR BLD AUTO: 44.5 % (ref 35–47)
HGB BLD-MCNC: 14 G/DL (ref 11.7–15.7)
LDH SERPL L TO P-CCNC: 244 U/L (ref 81–234)
LYMPHOCYTES # BLD AUTO: 12.8 10E9/L (ref 0.8–5.3)
LYMPHOCYTES NFR BLD AUTO: 72 %
MCH RBC QN AUTO: 28.7 PG (ref 26.5–33)
MCHC RBC AUTO-ENTMCNC: 31.5 G/DL (ref 31.5–36.5)
MCV RBC AUTO: 91 FL (ref 78–100)
MONOCYTES # BLD AUTO: 0.5 10E9/L (ref 0–1.3)
MONOCYTES NFR BLD AUTO: 3 %
NEUTROPHILS # BLD AUTO: 4.5 10E9/L (ref 1.6–8.3)
NEUTROPHILS NFR BLD AUTO: 25 %
PLATELET # BLD AUTO: 166 10E9/L (ref 150–450)
PLATELET # BLD EST: ABNORMAL 10*3/UL
POTASSIUM SERPL-SCNC: 4.4 MMOL/L (ref 3.4–5.3)
PROT SERPL-MCNC: 7.7 G/DL (ref 6.8–8.8)
RBC # BLD AUTO: 4.87 10E12/L (ref 3.8–5.2)
RBC MORPH BLD: ABNORMAL
SODIUM SERPL-SCNC: 137 MMOL/L (ref 133–144)
WBC # BLD AUTO: 17.8 10E9/L (ref 4–11)

## 2019-04-11 PROCEDURE — 85025 COMPLETE CBC W/AUTO DIFF WBC: CPT | Performed by: INTERNAL MEDICINE

## 2019-04-11 PROCEDURE — G0463 HOSPITAL OUTPT CLINIC VISIT: HCPCS

## 2019-04-11 PROCEDURE — 80053 COMPREHEN METABOLIC PANEL: CPT | Performed by: INTERNAL MEDICINE

## 2019-04-11 PROCEDURE — 99213 OFFICE O/P EST LOW 20 MIN: CPT | Performed by: INTERNAL MEDICINE

## 2019-04-11 PROCEDURE — 83615 LACTATE (LD) (LDH) ENZYME: CPT | Performed by: INTERNAL MEDICINE

## 2019-04-11 PROCEDURE — 84165 PROTEIN E-PHORESIS SERUM: CPT | Performed by: INTERNAL MEDICINE

## 2019-04-11 PROCEDURE — 36415 COLL VENOUS BLD VENIPUNCTURE: CPT

## 2019-04-11 PROCEDURE — 00000402 ZZHCL STATISTIC TOTAL PROTEIN: Performed by: INTERNAL MEDICINE

## 2019-04-11 ASSESSMENT — PAIN SCALES - GENERAL: PAINLEVEL: NO PAIN (0)

## 2019-04-11 NOTE — LETTER
4/11/2019         RE: Ángela Dumont  [address]  [place] [place] [facility] [address]        Dear Colleague,    Thank you for referring your patient, [facility]ngela Dumont[facility]. Please see a copy of my visit note below.    [facility]    Hematology/Oncology Established Patient Note      Today's Date: 4/11/19    Reason for Follow-up: leukocytosis    HISTORY OF PRESENT ILLNESS: Ángela Dumont is a 78 year old female with PMHx of pulmonary embolism/DVT on chronic warfarin, esophageal reflux, fibromyalgia, dementia, hypothyroidism, HTN, who presents with leukocytosis.  On chart review, she has had a mildly elevated WBC count since at least 2015.  In July 2017, WBC was 18.1 K, and peripheral smear showed leukocytosis with mild atypical absolute lymphocytosis and increased rouleaux.  Hemoglobin and platelets were normal.      In 2015, she was diagnosed with pulmonary embolism and DVT.  Three months prior, she had fractured her femur and had undergone surgery.  She was immobile for 3 months.  She has been on warfarin since then.      Ángela has dementia, and her  answers the majority of the questions.  They live in a one level [facility].  She is wheelchair-bound  He helps with lifting and transferring.  She does not get frequent infections.  She started taking vitamin D recently.      She was admitted to the hospital on 10/18/17-10/19/17 with abdominal pain and constipation.  CT scan on 10/18/17 showed asymmetric wall thickening involving the posterior aspect of the low rectum near the anorectal junction.  She also had extensive perirectal/mesorectal adenopathy, concerning for colorectal neoplasm.  She also has additional enlarged bilateral iliac chain lymph nodes, borderline enlarged perirectal lymph nodes and diffusely mildly prominent lymph nodes elsewhere throughout the abdomen and pelvis.  At the time, patient and her  declined further evaluation and  agreed to home hospice services, but did not enroll.          INTERIM HISTORY: Ángela is here for follow-up with her .  She says that she feels the same.  No changes over the past year.          REVIEW OF SYSTEMS:   14 point ROS was reviewed and is negative other than as noted above in HPI.       HOME MEDICATIONS:  Current Outpatient Medications   Medication Sig Dispense Refill     acetaminophen (TYLENOL) 325 MG tablet Take 2 tablets (650 mg) by mouth every 4 hours as needed for other (surgical pain) 100 tablet 0     furosemide (LASIX) 40 MG tablet TAKE 1 TABLET BY MOUTH EVERY DAY AT 10 AM 90 tablet 1     levothyroxine (SYNTHROID/LEVOTHROID) 50 MCG tablet TAKE 1 TABLET BY MOUTH EVERY DAY 90 tablet 0     levothyroxine (SYNTHROID/LEVOTHROID) 50 MCG tablet TAKE 1 TABLET BY MOUTH EVERY MORNING AT 7:30 AM. DUE FOR LAB FOR FURTHER REFILLS 90 tablet 0     nystatin (MYCOSTATIN) cream Apply topically 3 times daily 30 g 3     polyethylene glycol (MIRALAX/GLYCOLAX) Packet Take 17 g by mouth daily as needed for constipation 7 packet 0     senna-docusate (SENOKOT-S;PERICOLACE) 8.6-50 MG per tablet Take 1 tablet by mouth 2 times daily as needed for constipation 100 tablet 0     tamsulosin (FLOMAX) 0.4 MG capsule TAKE 1 CAPSULE BY MOUTH EVERY DAY AT 10 AM 90 capsule 1     Turmeric 500 MG CAPS        warfarin (COUMADIN) 2 MG tablet TAKE 2MG BY MOUTH ON SUNDAY, WEDNESDAY, FRIDAY AND TAKE 4MG REST OF WEEK 45 tablet 0     warfarin (COUMADIN) 4 MG tablet TAKE 2MG BY MOUTH ON SUNDAY, WEDNESDAY, FRIDAY AND TAKE 4MG REST OF WEEK. 45 tablet 0         ALLERGIES:  Allergies   Allergen Reactions     Shellfish Allergy Anaphylaxis     Aspirin      GI issues     Contrast Dye Hives and Itching     Flushed skin     Penicillins Hives     Sulfa Drugs Hives     Ceftin [Cefuroxime] Rash     Nitrofurantoin Rash         PAST MEDICAL HISTORY:  Past Medical History:   Diagnosis Date     Adjustment disorder with depressed mood      Antiplatelet or  antithrombotic long-term use      Cancer (H) 4/2016    Seems to beunder control.  See doctor year to year     Colitis, nonspecific 10/31/2017     Constipation      Dementia     memory/anger     Depressive disorder last few months as condition became apparent    Lack of mobility a big problem     DVT (deep venous thrombosis) (H) 4/15    bilateral     Esophageal reflux      Fibromyalgia      Fracture of right femur (H) 1/15     Heart murmur      Herpes zoster without complication 08/16/2017    left buttocks     History of Clostridium difficile      Hyperlipidemia LDL goal < 130      Hypertension goal BP (blood pressure) < 140/90      Hypothyroid      Leukocytosis      Leukocytosis      Leukocytosis      Leukocytosis, unspecified     w/u ?     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)      Lymphoproliferative disorder (H)     Dr Duran     Lymphoproliferative disorder (H)      MGUS (monoclonal gammopathy of unknown significance)     Dr Duran     MGUS (monoclonal gammopathy of unknown significance)      Migraine      MR (mitral regurgitation)     Mild     NPH (normal pressure hydrocephalus) 4/15    shunt placed but removed in 7/2015 due to erosion through the scalp, replaced 5/19/16     Osteoarthritis of both knees      Other atopic dermatitis and related conditions      PE (pulmonary embolism) 4/15    Saddle - IVC     Pelvic lymphadenopathy 10/31/2017     Pulmonary hypertension (H)     EF 70-75%     Rectal mass 10/2017     Rectal mass 10/1/2017     Thrombosis of leg      Urinary tract infection, site not specified     MRSA - Dr Loo     Vertigo          PAST SURGICAL HISTORY:  Past Surgical History:   Procedure Laterality Date     ABDOMEN SURGERY  left side lower abdomen pain from Shunt we do roberth     COLONOSCOPY  2008     GYN SURGERY       H LABOR AND DELIVERY VAGINAL  1960, 1967     HERNIA REPAIR       HYSTERECTOMY  1976    hysterectomy     IMPLANT SHUNT VENTRICULOPERITONEAL Left  2016    IMPLANT SHUNT VENTRICULOPERITONEAL - Dr Alba     OPTICAL TRACKING SYSTEM IMPLANT SHUNT VENTRICULOPERITONEAL Right 4/10/2015     Shunt - Dr Sierra     ORTHOPEDIC SURGERY  Knee pain that prohibits walking     REMOVE SHUNT VENTRICULOPERITONEAL N/A 7/3/2015    REMOVE SHUNT VENTRICULOPERITONEAL;  Surgeon: Emmanuel Sierra MD;  Location: [facility] OR     SURGICAL HISTORY OF -   1/15    right femur/hip surgery - 2015     SURGICAL HISTORY OF -       rectocele repair     SURGICAL HISTORY OF 1997    abdominal adhesion lysis     SURGICAL HISTORY OF -   4/15    IVC filter placement     SURGICAL HISTORY OF -   6/15    IVC filter removal         SOCIAL HISTORY:  Social History     Socioeconomic History     Marital status:      Spouse name: Vernon     Number of children: 2     Years of education: Not on file     Highest education level: Not on file   Occupational History     Not on file   Social Needs     Financial resource strain: Not on file     Food insecurity:     Worry: Not on file     Inability: Not on file     Transportation needs:     Medical: Not on file     Non-medical: Not on file   Tobacco Use     Smoking status: Former Smoker     Packs/day: 0.50     Years: 10.00     Pack years: 5.00     Types: Cigarettes     Start date: 1958     Last attempt to quit: 1982     Years since quittin.2     Smokeless tobacco: Never Used     Tobacco comment: quit    Substance and Sexual Activity     Alcohol use: Yes     Alcohol/week: 0.0 - 0.6 oz     Comment: Very light     Drug use: No     Sexual activity: Not Currently     Partners: Male     Birth control/protection: None     Comment: not a problem   Lifestyle     Physical activity:     Days per week: Not on file     Minutes per session: Not on file     Stress: Not on file   Relationships     Social connections:     Talks on phone: Not on file     Gets together: Not on file     Attends Rastafarian service: Not on file     Active member  of club or organization: Not on file     Attends meetings of clubs or organizations: Not on file     Relationship status: Not on file     Intimate partner violence:     Fear of current or ex partner: Not on file     Emotionally abused: Not on file     Physically abused: Not on file     Forced sexual activity: Not on file   Other Topics Concern     Parent/sibling w/ CABG, MI or angioplasty before 65F 55M? No   Social History Narrative     Not on file   Ángela is from [place] and her  is from [place].  They used to live in [place], [place], and Ángela wants to move back there.        FAMILY HISTORY:  Family History   Problem Relation Age of Onset     Colon Cancer Father      Coronary Artery Disease Father      Diabetes Maternal Grandmother          PHYSICAL EXAM:  Vital signs:  /75   Pulse 71   Temp 97.7  F (36.5  C) (Tympanic)   Resp 18   SpO2 96%    GENERAL/CONSTITUTIONAL: No acute distress.  Accompanied by .  EYES: No scleral icterus.  INTEGUMENTARY: No jaundice.  GAIT: In wheelchair.    LABS:  CBC RESULTS:   Recent Labs   Lab Test 04/11/19  1451   WBC 17.8*   RBC 4.87   HGB 14.0   HCT 44.5   MCV 91   MCH 28.7   MCHC 31.5   RDW 13.4              SPEP: M-spike of 0.9 on 9/28/17 4/12/18: 1.1 g/dL    Peripheral smear 9/28/17:  FINAL DIAGNOSIS:   Peripheral blood.   - Leukocytosis with absolute atypical lymphocytosis.     COMMENT:   There is leukocytosis with absolute lymphocytosis.  Some of the   lymphocytes appear morphologically atypical.  Further evaluation to   evaluate for a circulating lymphoproliferative disorder with flow   cytometry is recommended.  Clinical correlation is required.     Peripheral flow cytometry 9/28/17:  INTERPRETATION:   Blood:        Kappa-monotypic B cells negative for CD5 and CD10 (40%)        See comment     COMMENT:   By flow cytometry, the monotypic B cells lack CD5 and CD10. This   immunophenotype can be seen in marginal zone lymphoma, lymphoplasmacytic    lymphoma, and hairy cell leukemia, as well as rarely in follicular   lymphoma, CLL/SLL, or mantle cell lymphoma. Additional stains to exclude   Hairy cell leukemia are pending and will be reported in an addendum.   Final interpretation requires correlation with results of other   ancillary studies, morphologic and clinical features. If clinically   indicated a biopsy of involved lymph node or another tissue could be   required for final diagnosis.     This addendum is issued to reflect additional testing performed on this   case.   The abnormal B cells are positive for CD79b and  negative for CD23.   Other immunophenotypic markers (see details below) do not support the   diagnosis of hairy cell leukemia. The differential includes marginal   zone lymphoma, splenic B cell leukemia/lymphoma NOS, mantle cell   lymphoma, lymphoplasmacytic lymphoma, while other small B cell lymphoma   entities are considered less likely. In the differential diagnosis   please note that rare cases of mantle cell lymphoma can be CD5 negative.   Recommend biopsy of an involved lymph node /  tissue for diagnosis with   cytogenetics and clinical correlation. See Comment.     COMMENTS:   Additional eight-color analyses are performed for the following markers:   CD11c, CD22, CD23, CD25, CD79b, ,      The abnormal B cells are positive for CD22 (normal level), CD79b ,   predominantly negative for CD11c (84% of them are negative) and negative   for CD23, CD25,  and .         ASSESSMENT/PLAN:  Ángela Dumont is a 78 year old female with:    1) Leukocytosis: appears chronic since at least 2015, and on further chart review from Care Everywhere, likely chronic since at least 2007.  Peripheral smear in July 2017 showed mild atypical lymphocytosis and rouleaux.  Hemoglobin and platelet count are normal.  She does not have frequent infections.      Peripheral flow cytometry shows concern for lymphoproliferative disorder.  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      We discussed that is usually a slow growing process, and treatment is usually not necessary.  We could do further evaluation, such as with PET scan and bone marrow biopsy.  Considering her age, dementia, immobility, and poor functional status, I favored watching and waiting and sparing her of invasive procedures that may not change her overall course.  She and her  agreed and opted to wait on getting additional scans or bone marrow biopsy at this time.        CBC today reviewed.  WBC remains elevated but stable at around her baseline at 17.8K.    2) History of PE and DVT: diagnosed in 2015, and she has been on warfarin ever since.  It was likely provoked at the time by surgery and immobility.  She remains wheelchair bound now, and remains at high risk for blood clots.  She has not had frequent falls, and INR has been under relatively good control.  I discussed and risks and benefits of continuing the warfarin.  She will continue the warfarin for now, considering her high risk for clotting remains.  If it becomes that the risk of bleeding starts to outweigh her risk of clots, then it would be time to stop the warfarin.     3) MGUS: M-spike of 0.9-1.1 g/dL.  -as above, will hold off on further imaging or bone marrow biopsy  -SPEP checked today    4) Suspected rectal cancer: CT scan on 10/18/17 showed wall thickening at the posterior aspect of the low rectum near the anorectal junction, concerning for rectal cancer, with perirectal/mesorectal adenopathy.  She has symptoms of constipation and abdominal pain.  In the hospital, they declined further evaluation.  Her  says that they met with hospice, but has not enrolled yet.  I discussed that this does look like a rectal cancer on imaging, but only way to confirm it is to obtain pathology with a  colonoscopy and biopsy.  Considering Ángela's dementia, immobility, and overall quite poor functional status, she would be a poor candidate for treatments such as surgery, chemotherapy or radiation.  I think it is reasonable to not further work this up and focus on patient's comfort and quality of life.  We discussed this again, and patient, , and daughter all agree.       I spent a total of 15 minutes with the patient, with over >50% of the time in counseling and/or coordination of care.       Susanna Duran MD  Hematology/Oncology  [facility]      Again, thank you for allowing me to participate in the care of your patient.        Sincerely,        Susanna Duran MD

## 2019-04-11 NOTE — PROGRESS NOTES
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  On chart review, she has had a mildly elevated WBC count since at least 2015.  In July 2017, WBC was 18.1 K, and peripheral smear showed leukocytosis with mild atypical absolute lymphocytosis and increased rouleaux.  Hemoglobin and platelets were normal.      In 2015, she was diagnosed with pulmonary embolism and DVT.  Three months prior, she had fractured her femur and had undergone surgery.  She was immobile for 3 months.  She has been on warfarin since then.      Ángela has dementia, and her  answers the majority of the questions.  They live in a one level multiplex.  She is wheelchair-bound  He helps with lifting and transferring.  She does not get frequent infections.  She started taking vitamin D recently.      She was admitted to the hospital on 10/18/17-10/19/17 with abdominal pain and constipation.  CT scan on 10/18/17 showed asymmetric wall thickening involving the posterior aspect of the low rectum near the anorectal junction.  She also had extensive perirectal/mesorectal adenopathy, concerning for colorectal neoplasm.  She also has additional enlarged bilateral iliac chain lymph nodes, borderline enlarged perirectal lymph nodes and diffusely mildly prominent lymph nodes elsewhere throughout the abdomen and pelvis.  At the time, patient and her  declined further evaluation and agreed to home hospice services, but did not enroll.          INTERIM HISTORY: Ángela is here for follow-up with her .  She says that she feels the same.  No changes over the past year.          REVIEW OF SYSTEMS:   14 point ROS was reviewed and is  negative other than as noted above in HPI.       HOME MEDICATIONS:  Current Outpatient Medications   Medication Sig Dispense Refill     acetaminophen (TYLENOL) 325 MG tablet Take 2 tablets (650 mg) by mouth every 4 hours as needed for other (surgical pain) 100 tablet 0     furosemide (LASIX) 40 MG tablet TAKE 1 TABLET BY MOUTH EVERY DAY AT 10 AM 90 tablet 1     levothyroxine (SYNTHROID/LEVOTHROID) 50 MCG tablet TAKE 1 TABLET BY MOUTH EVERY DAY 90 tablet 0     levothyroxine (SYNTHROID/LEVOTHROID) 50 MCG tablet TAKE 1 TABLET BY MOUTH EVERY MORNING AT 7:30 AM. DUE FOR LAB FOR FURTHER REFILLS 90 tablet 0     nystatin (MYCOSTATIN) cream Apply topically 3 times daily 30 g 3     polyethylene glycol (MIRALAX/GLYCOLAX) Packet Take 17 g by mouth daily as needed for constipation 7 packet 0     senna-docusate (SENOKOT-S;PERICOLACE) 8.6-50 MG per tablet Take 1 tablet by mouth 2 times daily as needed for constipation 100 tablet 0     tamsulosin (FLOMAX) 0.4 MG capsule TAKE 1 CAPSULE BY MOUTH EVERY DAY AT 10 AM 90 capsule 1     Turmeric 500 MG CAPS        warfarin (COUMADIN) 2 MG tablet TAKE 2MG BY MOUTH ON SUNDAY, WEDNESDAY, FRIDAY AND TAKE 4MG REST OF WEEK 45 tablet 0     warfarin (COUMADIN) 4 MG tablet TAKE 2MG BY MOUTH ON SUNDAY, WEDNESDAY, FRIDAY AND TAKE 4MG REST OF WEEK. 45 tablet 0         ALLERGIES:  Allergies   Allergen Reactions     Shellfish Allergy Anaphylaxis     Aspirin      GI issues     Contrast Dye Hives and Itching     Flushed skin     Penicillins Hives     Sulfa Drugs Hives     Ceftin [Cefuroxime] Rash     Nitrofurantoin Rash         PAST MEDICAL HISTORY:  Past Medical History:   Diagnosis Date     Adjustment disorder with depressed mood      Antiplatelet or antithrombotic long-term use      Cancer (H) 4/2016    Seems to beunder control.  See doctor year to year     Colitis, nonspecific 10/31/2017     Constipation      Dementia     memory/anger     Depressive disorder last few months as condition became  apparent    Lack of mobility a big problem     DVT (deep venous thrombosis) (H) 4/15    bilateral     Esophageal reflux      Fibromyalgia      Fracture of right femur (H) 1/15     Heart murmur      Herpes zoster without complication 08/16/2017    left buttocks     History of Clostridium difficile      Hyperlipidemia LDL goal < 130      Hypertension goal BP (blood pressure) < 140/90      Hypothyroid      Leukocytosis      Leukocytosis      Leukocytosis      Leukocytosis, unspecified     w/u ?     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)      Lymphoproliferative disorder (H)     Dr Duran     Lymphoproliferative disorder (H)      MGUS (monoclonal gammopathy of unknown significance)     Dr Duran     MGUS (monoclonal gammopathy of unknown significance)      Migraine      MR (mitral regurgitation)     Mild     NPH (normal pressure hydrocephalus) 4/15    shunt placed but removed in 7/2015 due to erosion through the scalp, replaced 5/19/16     Osteoarthritis of both knees      Other atopic dermatitis and related conditions      PE (pulmonary embolism) 4/15    Saddle - IVC     Pelvic lymphadenopathy 10/31/2017     Pulmonary hypertension (H)     EF 70-75%     Rectal mass 10/2017     Rectal mass 10/1/2017     Thrombosis of leg      Urinary tract infection, site not specified     MRSA - Dr Loo     Vertigo          PAST SURGICAL HISTORY:  Past Surgical History:   Procedure Laterality Date     ABDOMEN SURGERY  left side lower abdomen pain from Shunt we do roberth     COLONOSCOPY  2008     GYN SURGERY       H LABOR AND DELIVERY VAGINAL  1960, 1967     HERNIA REPAIR       HYSTERECTOMY  1976    hysterectomy     IMPLANT SHUNT VENTRICULOPERITONEAL Left 5/19/2016    IMPLANT SHUNT VENTRICULOPERITONEAL - Dr Alba     OPTICAL TRACKING SYSTEM IMPLANT SHUNT VENTRICULOPERITONEAL Right 4/10/2015     Shunt - Dr Sierra     ORTHOPEDIC SURGERY  Knee pain that prohibits walking     REMOVE SHUNT VENTRICULOPERITONEAL  N/A 7/3/2015    REMOVE SHUNT VENTRICULOPERITONEAL;  Surgeon: Emmanuel Sierra MD;  Location: [facility] OR     SURGICAL HISTORY OF -   1/15    right femur/hip surgery - 2015     SURGICAL HISTORY OF -       rectocele repair     SURGICAL HISTORY OF 1997    abdominal adhesion lysis     SURGICAL HISTORY OF -   4/15    IVC filter placement     SURGICAL HISTORY OF -   6/15    IVC filter removal         SOCIAL HISTORY:  Social History     Socioeconomic History     Marital status:      Spouse name: Vernon     Number of children: 2     Years of education: Not on file     Highest education level: Not on file   Occupational History     Not on file   Social Needs     Financial resource strain: Not on file     Food insecurity:     Worry: Not on file     Inability: Not on file     Transportation needs:     Medical: Not on file     Non-medical: Not on file   Tobacco Use     Smoking status: Former Smoker     Packs/day: 0.50     Years: 10.00     Pack years: 5.00     Types: Cigarettes     Start date: 1958     Last attempt to quit: 1982     Years since quittin.2     Smokeless tobacco: Never Used     Tobacco comment: quit    Substance and Sexual Activity     Alcohol use: Yes     Alcohol/week: 0.0 - 0.6 oz     Comment: Very light     Drug use: No     Sexual activity: Not Currently     Partners: Male     Birth control/protection: None     Comment: not a problem   Lifestyle     Physical activity:     Days per week: Not on file     Minutes per session: Not on file     Stress: Not on file   Relationships     Social connections:     Talks on phone: Not on file     Gets together: Not on file     Attends Latter-day service: Not on file     Active member of club or organization: Not on file     Attends meetings of clubs or organizations: Not on file     Relationship status: Not on file     Intimate partner violence:     Fear of current or ex partner: Not on file     Emotionally abused: Not on file      Physically abused: Not on file     Forced sexual activity: Not on file   Other Topics Concern     Parent/sibling w/ CABG, MI or angioplasty before 65F 55M? No   Social History Narrative     Not on file   Ángela is from [place] and her  is from [place].  They used to live in [place], [place], and Ángela wants to move back there.        FAMILY HISTORY:  Family History   Problem Relation Age of Onset     Colon Cancer Father      Coronary Artery Disease Father      Diabetes Maternal Grandmother          PHYSICAL EXAM:  Vital signs:  /75   Pulse 71   Temp 97.7  F (36.5  C) (Tympanic)   Resp 18   SpO2 96%    GENERAL/CONSTITUTIONAL: No acute distress.  Accompanied by .  EYES: No scleral icterus.  INTEGUMENTARY: No jaundice.  GAIT: In wheelchair.    LABS:  CBC RESULTS:   Recent Labs   Lab Test 04/11/19  1451   WBC 17.8*   RBC 4.87   HGB 14.0   HCT 44.5   MCV 91   MCH 28.7   MCHC 31.5   RDW 13.4              SPEP: M-spike of 0.9 on 9/28/17 4/12/18: 1.1 g/dL    Peripheral smear 9/28/17:  FINAL DIAGNOSIS:   Peripheral blood.   - Leukocytosis with absolute atypical lymphocytosis.     COMMENT:   There is leukocytosis with absolute lymphocytosis.  Some of the   lymphocytes appear morphologically atypical.  Further evaluation to   evaluate for a circulating lymphoproliferative disorder with flow   cytometry is recommended.  Clinical correlation is required.     Peripheral flow cytometry 9/28/17:  INTERPRETATION:   Blood:        Kappa-monotypic B cells negative for CD5 and CD10 (40%)        See comment     COMMENT:   By flow cytometry, the monotypic B cells lack CD5 and CD10. This   immunophenotype can be seen in marginal zone lymphoma, lymphoplasmacytic   lymphoma, and hairy cell leukemia, as well as rarely in follicular   lymphoma, CLL/SLL, or mantle cell lymphoma. Additional stains to exclude   Hairy cell leukemia are pending and will be reported in an addendum.   Final interpretation requires  correlation with results of other   ancillary studies, morphologic and clinical features. If clinically   indicated a biopsy of involved lymph node or another tissue could be   required for final diagnosis.     This addendum is issued to reflect additional testing performed on this   case.   The abnormal B cells are positive for CD79b and  negative for CD23.   Other immunophenotypic markers (see details below) do not support the   diagnosis of hairy cell leukemia. The differential includes marginal   zone lymphoma, splenic B cell leukemia/lymphoma NOS, mantle cell   lymphoma, lymphoplasmacytic lymphoma, while other small B cell lymphoma   entities are considered less likely. In the differential diagnosis   please note that rare cases of mantle cell lymphoma can be CD5 negative.   Recommend biopsy of an involved lymph node /  tissue for diagnosis with   cytogenetics and clinical correlation. See Comment.     COMMENTS:   Additional eight-color analyses are performed for the following markers:   CD11c, CD22, CD23, CD25, CD79b, ,      The abnormal B cells are positive for CD22 (normal level), CD79b ,   predominantly negative for CD11c (84% of them are negative) and negative   for CD23, CD25,  and .         ASSESSMENT/PLAN:  Ángela Dumont is a 78 year old female with:    1) Leukocytosis: appears chronic since at least 2015, and on further chart review from [facility], likely chronic since at least 2007.  Peripheral smear in July 2017 showed mild atypical lymphocytosis and rouleaux.  Hemoglobin and platelet count are normal.  She does not have frequent infections.      Peripheral flow cytometry shows concern for lymphoproliferative disorder.  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      We discussed that is usually a slow growing process, and treatment is usually not  necessary.  We could do further evaluation, such as with PET scan and bone marrow biopsy.  Considering her age, dementia, immobility, and poor functional status, I favored watching and waiting and sparing her of invasive procedures that may not change her overall course.  She and her  agreed and opted to wait on getting additional scans or bone marrow biopsy at this time.        CBC today reviewed.  WBC remains elevated but stable at around her baseline at 17.8K.    2) History of PE and DVT: diagnosed in 2015, and she has been on warfarin ever since.  It was likely provoked at the time by surgery and immobility.  She remains wheelchair bound now, and remains at high risk for blood clots.  She has not had frequent falls, and INR has been under relatively good control.  I discussed and risks and benefits of continuing the warfarin.  She will continue the warfarin for now, considering her high risk for clotting remains.  If it becomes that the risk of bleeding starts to outweigh her risk of clots, then it would be time to stop the warfarin.     3) MGUS: M-spike of 0.9-1.1 g/dL.  -as above, will hold off on further imaging or bone marrow biopsy  -SPEP checked today    4) Suspected rectal cancer: CT scan on 10/18/17 showed wall thickening at the posterior aspect of the low rectum near the anorectal junction, concerning for rectal cancer, with perirectal/mesorectal adenopathy.  She has symptoms of constipation and abdominal pain.  In the hospital, they declined further evaluation.  Her  says that they met with hospice, but has not enrolled yet.  I discussed that this does look like a rectal cancer on imaging, but only way to confirm it is to obtain pathology with a colonoscopy and biopsy.  Considering Ángela's dementia, immobility, and overall quite poor functional status, she would be a poor candidate for treatments such as surgery, chemotherapy or radiation.  I think it is reasonable to not further work  this up and focus on patient's comfort and quality of life.  We discussed this again, and patient, , and daughter all agree.       I spent a total of 15 minutes with the patient, with over >50% of the time in counseling and/or coordination of care.       Susanna Duran MD  Hematology/Oncology  [facility]

## 2019-04-11 NOTE — NURSING NOTE
"Oncology Rooming Note    April 11, 2019 2:58 PM   Ángela Dumont is a 78 year old female who presents for:    Chief Complaint   Patient presents with     Oncology Clinic Visit     MGUS (monoclonal gammopathy of unknown significance)     Initial Vitals: /75   Pulse 71   Temp 97.7  F (36.5  C) (Tympanic)   Resp 18   SpO2 96%  Estimated body mass index is 33.47 kg/m  as calculated from the following:    Height as of 1/7/19: 1.626 m (5' 4\").    Weight as of 1/7/19: 88.5 kg (195 lb). There is no height or weight on file to calculate BSA.  No Pain (0) Comment: Data Unavailable   No LMP recorded. Patient has had a hysterectomy.  Allergies reviewed: Yes  Medications reviewed: Yes    Medications: Medication refills not needed today.  Pharmacy name entered into [employer]: [id number] [place] [facility] [id number] [facility] [address]    Clinical concerns: f/u       Payton Cruz CMA              "

## 2019-04-12 LAB
ALBUMIN SERPL ELPH-MCNC: 3.8 G/DL (ref 3.7–5.1)
ALPHA1 GLOB SERPL ELPH-MCNC: 0.3 G/DL (ref 0.2–0.4)
ALPHA2 GLOB SERPL ELPH-MCNC: 0.8 G/DL (ref 0.5–0.9)
B-GLOBULIN SERPL ELPH-MCNC: 2 G/DL (ref 0.6–1)
GAMMA GLOB SERPL ELPH-MCNC: 0.3 G/DL (ref 0.7–1.6)
M PROTEIN SERPL ELPH-MCNC: 1.4 G/DL
PROT PATTERN SERPL ELPH-IMP: ABNORMAL

## 2019-04-13 DIAGNOSIS — E03.9 HYPOTHYROIDISM, UNSPECIFIED TYPE: ICD-10-CM

## 2019-04-14 DIAGNOSIS — I26.99 PULMONARY EMBOLISM (H): ICD-10-CM

## 2019-04-14 DIAGNOSIS — I82.409 DVT (DEEP VENOUS THROMBOSIS) (H): ICD-10-CM

## 2019-04-15 RX ORDER — WARFARIN SODIUM 2 MG/1
TABLET ORAL
Qty: 45 TABLET | Refills: 0 | Status: SHIPPED | OUTPATIENT
Start: 2019-04-15 | End: 2019-07-27

## 2019-04-15 RX ORDER — LEVOTHYROXINE SODIUM 50 UG/1
TABLET ORAL
Qty: 90 TABLET | Refills: 0 | Status: SHIPPED | OUTPATIENT
Start: 2019-04-15 | End: 2019-07-13

## 2019-04-15 NOTE — TELEPHONE ENCOUNTER
"Requested Prescriptions   Pending Prescriptions Disp Refills     levothyroxine (SYNTHROID/LEVOTHROID) 50 MCG tablet [Pharmacy Med Name: LEVOTHYROXINE 50 MCG TABLET] 90 tablet 0     Sig: TAKE 1 TABLET BY MOUTH EVERY DAY       Thyroid Protocol Passed - 4/13/2019  8:31 AM        Passed - Patient is 12 years or older        Passed - Recent (12 mo) or future (30 days) visit within the authorizing provider's specialty     Patient had office visit in the last 12 months or has a visit in the next 30 days with authorizing provider or within the authorizing provider's specialty.  See \"Patient Info\" tab in inbasket, or \"Choose Columns\" in Meds & Orders section of the refill encounter.              Passed - Medication is active on med list        Passed - Normal TSH on file in past 12 months     Recent Labs   Lab Test 09/07/18  1116   TSH 2.42              Passed - No active pregnancy on record     If patient is pregnant or has had a positive pregnancy test, please check TSH.          Passed - No positive pregnancy test in past 12 months     If patient is pregnant or has had a positive pregnancy test, please check TSH.          Prescription approved per [facility] Refill Protocol.  Radha Panda RN  [place] [facility]    "

## 2019-05-03 ENCOUNTER — TRANSFERRED RECORDS (OUTPATIENT)
Dept: HEALTH INFORMATION MANAGEMENT | Facility: CLINIC | Age: 79
End: 2019-05-03

## 2019-05-03 ENCOUNTER — ANTICOAGULATION THERAPY VISIT (OUTPATIENT)
Dept: FAMILY MEDICINE | Facility: CLINIC | Age: 79
End: 2019-05-03
Payer: COMMERCIAL

## 2019-05-03 DIAGNOSIS — I82.409 DVT (DEEP VENOUS THROMBOSIS) (H): ICD-10-CM

## 2019-05-03 LAB — INR PPP: 2.9 (ref 0.9–1.1)

## 2019-05-03 PROCEDURE — 99207 ZZC NO CHARGE NURSE ONLY: CPT | Performed by: FAMILY MEDICINE

## 2019-05-03 NOTE — PROGRESS NOTES
ANTICOAGULATION FOLLOW-UP CLINIC VISIT    Patient Name:  Ángela Dumont  Date:  5/3/2019  Contact Type:  Telephone/ with Vernon    SUBJECTIVE:     Patient Findings     Positives:   Extra doses ( has been giving her an extra day of 4 mg)         The patient was assessed for diet, medication, and activity level changes, missed or extra doses, bruising or bleeding, with no problem findings.    OBJECTIVE    INR   Date Value Ref Range Status   2019 2.9  Final       ASSESSMENT / PLAN  INR assessment THER    Recheck INR In: 4 WEEKS    INR Location Clinic      Anticoagulation Summary  As of 5/3/2019    INR goal:   2.0-3.0   TTR:   67.7 % (3.1 y)   INR used for dosin.9 (5/3/2019)   Warfarin maintenance plan:   4 mg (1 mg x 4) every Sun, Wed; 2 mg (1 mg x 2) all other days   Full warfarin instructions:   4 mg every Sun, Wed; 2 mg all other days   Weekly warfarin total:   18 mg   No change documented:   Nereyda Padilla RN   Plan last modified:   Dorita Panda RN (12/3/2018)   Next INR check:   2019   Target end date:       Indications    Long-term (current) use of anticoagulants [Z79.01] [Z79.01]  Pulmonary embolism (H) [I26.99]  DVT (deep venous thrombosis) (H) [I82.409]             Anticoagulation Episode Summary     INR check location:       Preferred lab:       Send INR reminders to:    NURSE    Comments:         Anticoagulation Care Providers     Provider Role Specialty Phone number    Joaquin Rockwell MD [facility] [phone]            See the Encounter Report to view Anticoagulation Flowsheet and Dosing Calendar (Go to Encounters tab in chart review, and find the Anticoagulation Therapy Visit)    Dosage adjustment made based on physician directed care plan.    , Vernon, noted that he had continued to give patient 4 mg 3 days per week and 2 mg all other days even though recommendation was to continue with the 4 mg on  and Wednesday and 2 mg all other day sof  the week.   Patient is now at high end of therapeutic range so advise today is to take 4 mg on Sunday and Wednesday and 2 mg all the other days and recheck in 2 weeks.   wants to recheck in June and noted he only wants to check this every 4 weeks.  I advised him to recheck it on 5/31/2019 and advised him again that home monitoring patients are supposed to be checking INR every 2 weeks.      Nereyda Padilla RN

## 2019-05-16 ENCOUNTER — TELEPHONE (OUTPATIENT)
Dept: FAMILY MEDICINE | Facility: CLINIC | Age: 79
End: 2019-05-16

## 2019-05-16 NOTE — TELEPHONE ENCOUNTER
I would recommend waiting until it completely clears      Silvia Roland MS, PA-C     Pt does not have any other questions. She is aware as she spoke to [facility] yesterday.

## 2019-05-16 NOTE — TELEPHONE ENCOUNTER
calling for advise regarding the second Shingrix vaccine.  She had the first Shingrix dose was 12/27/2018.      He said that she has a shingles outbreak right now on her butt.  He said it is a double spot with two round circles.  He said the spot is fading.    He is wondering if she is okay to get the 2nd Shingrix now or does he have to wait until the spots totally clear up? Or should she even get the Shingrix if she has this recurrent shingles infection.          Routing to /[facility] to review and advise.      Nereyda Padilla, BS, RN, N  [facility] Triage  ) [phone]

## 2019-05-16 NOTE — TELEPHONE ENCOUNTER
Attempt # [phone] to [phone]    Left non-detailed VM for , Vernon, to call back and speak with any triage nurse.    Nereyda Padilla, NAYELI, RN, PHN  [facility]

## 2019-06-03 ENCOUNTER — ANTICOAGULATION THERAPY VISIT (OUTPATIENT)
Dept: FAMILY MEDICINE | Facility: CLINIC | Age: 79
End: 2019-06-03
Payer: COMMERCIAL

## 2019-06-03 ENCOUNTER — TRANSFERRED RECORDS (OUTPATIENT)
Dept: HEALTH INFORMATION MANAGEMENT | Facility: CLINIC | Age: 79
End: 2019-06-03

## 2019-06-03 DIAGNOSIS — I82.409 DVT (DEEP VENOUS THROMBOSIS) (H): ICD-10-CM

## 2019-06-03 DIAGNOSIS — I26.99 PULMONARY EMBOLISM (H): ICD-10-CM

## 2019-06-03 LAB — INR PPP: 2.1 (ref 2–3)

## 2019-06-03 PROCEDURE — 99207 ZZC NO CHARGE NURSE ONLY: CPT | Performed by: FAMILY MEDICINE

## 2019-06-03 NOTE — PROGRESS NOTES
ANTICOAGULATION FOLLOW-UP CLINIC VISIT    Patient Name:  Ángela Dumont  Date:  6/3/2019  Contact Type:  Telephone    SUBJECTIVE:         OBJECTIVE    INR   Date Value Ref Range Status   2019 2.1 2 - 3 Final       ASSESSMENT / PLAN  INR assessment THER    Recheck INR In: 4 WEEKS    INR Location Home INR      Anticoagulation Summary  As of 6/3/2019    INR goal:   2.0-3.0   TTR:   68.6 % (3.2 y)   INR used for dosin.1 (6/3/2019)   Warfarin maintenance plan:   4 mg (1 mg x 4) every Sun, Wed; 2 mg (1 mg x 2) all other days   Full warfarin instructions:   4 mg every Sun, Wed; 2 mg all other days   Weekly warfarin total:   18 mg   No change documented:   Dorita Panda RN   Plan last modified:   Dorita Panda RN (12/3/2018)   Next INR check:   7/3/2019   Target end date:       Indications    Long-term (current) use of anticoagulants [Z79.01] [Z79.01]  Pulmonary embolism (H) [I26.99]  DVT (deep venous thrombosis) (H) [I82.409]             Anticoagulation Episode Summary     INR check location:       Preferred lab:       Send INR reminders to:    NURSE    Comments:         Anticoagulation Care Providers     Provider Role Specialty Phone number    Joaquin Rockwell MD [facility] [phone]            See the Encounter Report to view Anticoagulation Flowsheet and Dosing Calendar (Go to Encounters tab in chart review, and find the Anticoagulation Therapy Visit)    Dosage adjustment made based on physician directed care plan.    Dorita Panda RN

## 2019-06-12 NOTE — PROGRESS NOTES
ANTICOAGULATION FOLLOW-UP CLINIC VISIT    Patient Name:  Ángela Dumont  Date:  10/30/2017  Contact Type:  Telephone    SUBJECTIVE:     Patient Findings     Positives No Problem Findings           OBJECTIVE    INR   Date Value Ref Range Status   10/30/2017 2.4  Final       ASSESSMENT / PLAN  INR assessment THER    Recheck INR In: 4 WEEKS    INR Location Home INR      Anticoagulation Summary as of 10/30/2017     INR goal 2.0-3.0   Today's INR 2.4   Maintenance plan 2 mg (1 mg x 2) on Sun, Wed, Fri; 4 mg (1 mg x 4) all other days   Full instructions 2 mg on Sun, Wed, Fri; 4 mg all other days   Weekly total 22 mg   No change documented Dorita Panda RN   Plan last modified Dorita Panda RN (8/4/2017)   Next INR check 11/27/2017   Target end date     Indications   Long-term (current) use of anticoagulants [Z79.01] [Z79.01]  Pulmonary embolism (H) [I26.99]  DVT (deep venous thrombosis) (H) [I82.409]         Anticoagulation Episode Summary     INR check location     Preferred lab     Send INR reminders to RV NURSE    Comments       Anticoagulation Care Providers     Provider Role Specialty Phone number    Joaquin Rockwell MD [facility] [phone]            See the Encounter Report to view Anticoagulation Flowsheet and Dosing Calendar (Go to Encounters tab in chart review, and find the Anticoagulation Therapy Visit)    Dosage adjustment made based on physician directed care plan.    Dorita Panda RN                on high flow on high flow

## 2019-06-22 DIAGNOSIS — I26.99 PULMONARY EMBOLISM (H): ICD-10-CM

## 2019-06-22 DIAGNOSIS — I82.409 DVT (DEEP VENOUS THROMBOSIS) (H): ICD-10-CM

## 2019-06-24 NOTE — TELEPHONE ENCOUNTER
"Requested Prescriptions   Pending Prescriptions Disp Refills     warfarin (COUMADIN) 4 MG tablet [Pharmacy Med Name: WARFARIN SODIUM 4 MG TABLET]          Last Written Prescription Date:  4.15.19  Last Fill Quantity: 45 tablet,  # refills: 0   Last office visit: 1/7/2019 with prescribing provider:  Joaquin Rockwell MD           Future Office Visit:   Next 5 appointments (look out 90 days)    Jul 30, 2019 10:40 AM CDT  PHYSICAL with Jo[facility]quin [facility]well MD  [facility] [address] [place] [address]  [phone]            45 tablet 0     Sig: TAKE 2MG BY MOUTH ON SUNDAY, WEDNESDAY, FRIDAY AND TAKE 4MG REST OF WEEK.       Vitamin K Antagonists Failed - 6/22/2019  4:38 PM        Failed - INR is within goal in the past 6 weeks     Confirm INR is within goal in the past 6 weeks.     Recent Labs   Lab Test 06/03/19   INR 2.1                       Passed - Recent (12 mo) or future (30 days) visit within the authorizing provider's specialty     Patient had office visit in the last 12 months or has a visit in the next 30 days with authorizing provider or within the authorizing provider's specialty.  See \"Patient Info\" tab in inbasket, or \"Choose Columns\" in Meds & Orders section of the refill encounter.              Passed - Medication is active on med list        Passed - Patient is 18 years of age or older        Passed - Patient is not pregnant        Passed - No positive pregnancy on file in past 12 months        "

## 2019-06-25 RX ORDER — WARFARIN SODIUM 4 MG/1
TABLET ORAL
Qty: 45 TABLET | Refills: 0 | Status: SHIPPED | OUTPATIENT
Start: 2019-06-25 | End: 2019-07-27

## 2019-07-03 ENCOUNTER — ANTICOAGULATION THERAPY VISIT (OUTPATIENT)
Dept: FAMILY MEDICINE | Facility: CLINIC | Age: 79
End: 2019-07-03
Payer: COMMERCIAL

## 2019-07-03 DIAGNOSIS — I82.409 DVT (DEEP VENOUS THROMBOSIS) (H): ICD-10-CM

## 2019-07-03 DIAGNOSIS — I26.99 PULMONARY EMBOLISM (H): ICD-10-CM

## 2019-07-03 DIAGNOSIS — Z79.01 LONG TERM CURRENT USE OF ANTICOAGULANT THERAPY: ICD-10-CM

## 2019-07-03 LAB — INR PPP: 3

## 2019-07-03 PROCEDURE — 99207 ZZC NO CHARGE NURSE ONLY: CPT | Performed by: FAMILY MEDICINE

## 2019-07-03 NOTE — PROGRESS NOTES
ANTICOAGULATION FOLLOW-UP CLINIC VISIT    Patient Name:  Ángela Dumont  Date:  7/3/2019  [phone] (home)     Contact Type:  Telephone/ reviewed with spouse dosing  he said he will do it in a month and no sooner     Rn advised that he needs to be doing every 2 weeks per the protocol for home INRS     pts  said he will be doing it in a month.     SUBJECTIVE:  Patient Findings         Clinical Outcomes     Negatives:   Major bleeding event, Thromboembolic event, Anticoagulation-related hospital admission, Anticoagulation-related ED visit, Anticoagulation-related fatality           OBJECTIVE    INR   Date Value Ref Range Status   07/03/2019 3.0  Final       ASSESSMENT / PLAN  No question data found.  Anticoagulation Summary  As of 7/3/2019    INR goal:   2.0-3.0   TTR:   69.4 % (3.2 y)   INR used for dosing:   3.0 (7/3/2019)   Warfarin maintenance plan:   4 mg (1 mg x 4) every Sun, Wed; 2 mg (1 mg x 2) all other days   Full warfarin instructions:   4 mg every Sun, Wed; 2 mg all other days   Weekly warfarin total:   18 mg   No change documented:   Cadence Gonzalez RN   Plan last modified:   Dorita Panda RN (12/3/2018)   Next INR check:   7/17/2019   Target end date:       Indications    Long-term (current) use of anticoagulants [Z79.01] [Z79.01]  Pulmonary embolism (H) [I26.99]  DVT (deep venous thrombosis) (H) [I82.409]             Anticoagulation Episode Summary     INR check location:       Preferred lab:       Send INR reminders to:    NURSE    Comments:         Anticoagulation Care Providers     Provider Role Specialty Phone number    Joaquin Rockwell MD [facility] [phone]            See the Encounter Report to view Anticoagulation Flowsheet and Dosing Calendar (Go to Encounters tab in chart review, and find the Anticoagulation Therapy Visit)    Dosage adjustment made based on physician directed care plan.    Cadence Gonzalez, RN

## 2019-07-13 DIAGNOSIS — E03.9 HYPOTHYROIDISM, UNSPECIFIED TYPE: ICD-10-CM

## 2019-07-13 NOTE — TELEPHONE ENCOUNTER
"Requested Prescriptions   Pending Prescriptions Disp Refills     levothyroxine (SYNTHROID/LEVOTHROID) 50 MCG tablet [Pharmacy Med Name: LEVOTHYROXINE 50 MCG TABLET] 90 tablet 0     Sig: TAKE 1 TABLET BY MOUTH EVERY DAY     Last Written Prescription Date:  04/15/2019  Last Fill Quantity: 90 tablet,  # refills: 0   Last office visit: 1/7/2019 with prescribing provider:  01/07/2019   Future Office Visit:   Next 5 appointments (look out 90 days)    Jul 30, 2019 10:40 AM CDT  PHYSICAL with Joaquin Rockwell MD  [facility] [address] [place] [id number]  [phone]               Thyroid Protocol Passed - 7/13/2019  7:36 AM        Passed - Patient is 12 years or older        Passed - Recent (12 mo) or future (30 days) visit within the authorizing provider's specialty     Patient had office visit in the last 12 months or has a visit in the next 30 days with authorizing provider or within the authorizing provider's specialty.  See \"Patient Info\" tab in inbasket, or \"Choose Columns\" in Meds & Orders section of the refill encounter.              Passed - Medication is active on med list        Passed - Normal TSH on file in past 12 months     Recent Labs   Lab Test 09/07/18  1116   TSH 2.42              Passed - No active pregnancy on record     If patient is pregnant or has had a positive pregnancy test, please check TSH.          Passed - No positive pregnancy test in past 12 months     If patient is pregnant or has had a positive pregnancy test, please check TSH.            Mingo Smith XRT  "

## 2019-07-15 RX ORDER — LEVOTHYROXINE SODIUM 50 UG/1
TABLET ORAL
Qty: 90 TABLET | Refills: 0 | Status: SHIPPED | OUTPATIENT
Start: 2019-07-15 | End: 2019-10-12

## 2019-07-15 NOTE — TELEPHONE ENCOUNTER
Prescription approved per [facility] Refill Protocol.    Nereyda Padilla, BS, RN, N  [facility]) [phone]

## 2019-07-26 ENCOUNTER — TELEPHONE (OUTPATIENT)
Dept: FAMILY MEDICINE | Facility: CLINIC | Age: 79
End: 2019-07-26

## 2019-07-26 DIAGNOSIS — F03.90 DEMENTIA WITHOUT BEHAVIORAL DISTURBANCE, UNSPECIFIED DEMENTIA TYPE: Primary | ICD-10-CM

## 2019-07-26 NOTE — TELEPHONE ENCOUNTER
Visiting [facility] calling.   is getting private pay home car for patient.  They will be helping with showers, mobility and getting in/out of car.      Future Office Visit:   Next 5 appointments (look out 90 days)    Jul 30, 2019 10:40 AM CDT  PHYSICAL with Joaquin Rockwell MD  [facility]) [address] [place] [id number] [phone]           They need last office notes with TS and medication list faxed to them at [phone].  They also need order to use EZ Stand that patient already has at home.  They have had this for quite some time and are unsure of who ordered it.      DME order pending for EZ stand. Once complete please fax along with 9/7/2019 office notes and medication list.    Routing to TS to review and sign order.      NAYELI Harris, RN, PHN  [facility] Triage  ) [phone]

## 2019-07-27 ENCOUNTER — MYC MEDICAL ADVICE (OUTPATIENT)
Dept: FAMILY MEDICINE | Facility: CLINIC | Age: 79
End: 2019-07-27

## 2019-07-27 ENCOUNTER — MYC REFILL (OUTPATIENT)
Dept: FAMILY MEDICINE | Facility: CLINIC | Age: 79
End: 2019-07-27

## 2019-07-27 DIAGNOSIS — K63.89 COLONIC MASS: ICD-10-CM

## 2019-07-27 DIAGNOSIS — R33.9 URINARY RETENTION: ICD-10-CM

## 2019-07-27 DIAGNOSIS — L98.9 SKIN LESION: Primary | ICD-10-CM

## 2019-07-27 DIAGNOSIS — I26.99 PULMONARY EMBOLISM (H): ICD-10-CM

## 2019-07-27 DIAGNOSIS — I82.409 DVT (DEEP VENOUS THROMBOSIS) (H): ICD-10-CM

## 2019-07-27 DIAGNOSIS — I10 HYPERTENSION GOAL BP (BLOOD PRESSURE) < 140/90: ICD-10-CM

## 2019-07-27 DIAGNOSIS — E03.9 HYPOTHYROIDISM, UNSPECIFIED TYPE: ICD-10-CM

## 2019-07-27 RX ORDER — LEVOTHYROXINE SODIUM 50 UG/1
TABLET ORAL
Qty: 90 TABLET | Refills: 0 | Status: CANCELLED | OUTPATIENT
Start: 2019-07-27

## 2019-07-28 ASSESSMENT — ACTIVITIES OF DAILY LIVING (ADL)
CURRENT_FUNCTION: MEDICATION ADMINISTRATION REQUIRES ASSISTANCE
CURRENT_FUNCTION: LAUNDRY REQUIRES ASSISTANCE
CURRENT_FUNCTION: PREPARING MEALS REQUIRES ASSISTANCE
CURRENT_FUNCTION: HOUSEWORK REQUIRES ASSISTANCE
CURRENT_FUNCTION: TELEPHONE REQUIRES ASSISTANCE
CURRENT_FUNCTION: TRANSPORTATION REQUIRES ASSISTANCE
CURRENT_FUNCTION: BATHING REQUIRES ASSISTANCE
CURRENT_FUNCTION: SHOPPING REQUIRES ASSISTANCE
CURRENT_FUNCTION: MONEY MANAGEMENT REQUIRES ASSISTANCE

## 2019-07-28 ASSESSMENT — PATIENT HEALTH QUESTIONNAIRE - PHQ9
SUM OF ALL RESPONSES TO PHQ QUESTIONS 1-9: 6
10. IF YOU CHECKED OFF ANY PROBLEMS, HOW DIFFICULT HAVE THESE PROBLEMS MADE IT FOR YOU TO DO YOUR WORK, TAKE CARE OF THINGS AT HOME, OR GET ALONG WITH OTHER PEOPLE: VERY DIFFICULT
SUM OF ALL RESPONSES TO PHQ QUESTIONS 1-9: 6

## 2019-07-29 ENCOUNTER — MYC MEDICAL ADVICE (OUTPATIENT)
Dept: FAMILY MEDICINE | Facility: CLINIC | Age: 79
End: 2019-07-29

## 2019-07-29 RX ORDER — LEVOTHYROXINE SODIUM 50 UG/1
50 TABLET ORAL DAILY
Qty: 90 TABLET | Refills: 0 | OUTPATIENT
Start: 2019-07-29

## 2019-07-29 RX ORDER — TAMSULOSIN HYDROCHLORIDE 0.4 MG/1
CAPSULE ORAL
Qty: 90 CAPSULE | Refills: 1 | Status: SHIPPED | OUTPATIENT
Start: 2019-07-29 | End: 2020-03-23

## 2019-07-29 RX ORDER — WARFARIN SODIUM 2 MG/1
TABLET ORAL
Qty: 45 TABLET | Refills: 0 | Status: SHIPPED | OUTPATIENT
Start: 2019-07-29 | End: 2019-10-02

## 2019-07-29 RX ORDER — WARFARIN SODIUM 4 MG/1
TABLET ORAL
Qty: 45 TABLET | Refills: 0 | Status: SHIPPED | OUTPATIENT
Start: 2019-07-29 | End: 2020-01-06

## 2019-07-29 RX ORDER — AMOXICILLIN 250 MG
1 CAPSULE ORAL 2 TIMES DAILY PRN
Qty: 100 TABLET | Refills: 0 | Status: SHIPPED | OUTPATIENT
Start: 2019-07-29

## 2019-07-29 RX ORDER — FUROSEMIDE 40 MG
TABLET ORAL
Qty: 90 TABLET | Refills: 1 | Status: SHIPPED | OUTPATIENT
Start: 2019-07-29 | End: 2020-03-23

## 2019-07-29 ASSESSMENT — PATIENT HEALTH QUESTIONNAIRE - PHQ9: SUM OF ALL RESPONSES TO PHQ QUESTIONS 1-9: 6

## 2019-07-29 NOTE — TELEPHONE ENCOUNTER
"Requested Prescriptions   Pending Prescriptions Disp Refills     senna-docusate (SENOKOT-S/PERICOLACE) 8.6-50 MG tablet 100 tablet 0     Sig: Take 1 tablet by mouth 2 times daily as needed for constipation       Last Refill:    Disp Refills Start End CARYL   senna-docusate (SENOKOT-S;PERICOLACE) 8.6-50 MG per tablet 100 tablet 0 9/7/2018  No   Sig - Route: Take 1 tablet by mouth 2 times daily as needed for constipation - Oral     Laxatives Protocol Passed - 7/27/2019  7:48 AM        Passed - Patient is age 6 or older        Passed - Recent (12 mo) or future (30 days) visit within the authorizing provider's specialty     Patient had office visit in the last 12 months or has a visit in the next 30 days with authorizing provider or within the authorizing provider's specialty.  See \"Patient Info\" tab in inbasket, or \"Choose Columns\" in Meds & Orders section of the refill encounter.      LOV: 01/07/2019          Passed - Medication is active on med list        furosemide (LASIX) 40 MG tablet 90 tablet 1     Sig: TAKE 1 TABLET BY MOUTH EVERY DAY AT 10 AM       Last Refill:    Disp Refills Start End CARYL   furosemide (LASIX) 40 MG tablet 90 tablet 1 3/22/2019  No   Sig: TAKE 1 TABLET BY MOUTH EVERY DAY AT 10 AM     Diuretics (Including Combos) Protocol Passed - 7/27/2019  7:48 AM        Passed - Blood pressure under 140/90 in past 12 months     BP Readings from Last 3 Encounters:   04/11/19 125/75   01/07/19 124/70   09/07/18 126/81           Passed - Recent (12 mo) or future (30 days) visit within the authorizing provider's specialty     Patient had office visit in the last 12 months or has a visit in the next 30 days with authorizing provider or within the authorizing provider's specialty.  See \"Patient Info\" tab in inbasket, or \"Choose Columns\" in Meds & Orders section of the refill encounter.      LOV: 01/07/2019          Passed - Medication is active on med list        Passed - Patient is age 18 or older        Passed - " "No active pregancy on record        Passed - Normal serum creatinine on file in past 12 months     Recent Labs   Lab Test 04/11/19  1451   CR 0.84            Passed - Normal serum potassium on file in past 12 months     Recent Labs   Lab Test 04/11/19  1451   POTASSIUM 4.4            Passed - Normal serum sodium on file in past 12 months     Recent Labs   Lab Test 04/11/19  1451               Passed - No positive pregnancy test in past 12 months        tamsulosin (FLOMAX) 0.4 MG capsule 90 capsule 1     Sig: TAKE 1 CAPSULE BY MOUTH EVERY DAY AT 10 AM       Last Refill:   Disp Refills Start End CARYL    tamsulosin (FLOMAX) 0.4 MG capsule 90 capsule 1 3/22/2019  No   Sig: TAKE 1 CAPSULE BY MOUTH EVERY DAY AT 10 AM     Alpha Blockers Passed - 7/27/2019  7:48 AM        Passed - Blood pressure under 140/90 in past 12 months     BP Readings from Last 3 Encounters:   04/11/19 125/75   01/07/19 124/70   09/07/18 126/81           Passed - Recent (12 mo) or future (30 days) visit within the authorizing provider's specialty     Patient had office visit in the last 12 months or has a visit in the next 30 days with authorizing provider or within the authorizing provider's specialty.  See \"Patient Info\" tab in inbasket, or \"Choose Columns\" in Meds & Orders section of the refill encounter.      LOV: 01/07/2019          Passed - Patient does not have Tadalafil, Vardenafil, or Sildenafil on their medication list        Passed - Medication is active on med list        Passed - Patient is 18 years of age or older        Passed - No active pregnancy on record        Passed - No positive pregnancy test in past 12 months        warfarin (COUMADIN) 2 MG tablet 45 tablet 0       Last Refill:    Disp Refills Start End CARYL   warfarin (COUMADIN) 2 MG tablet 45 tablet 0 4/15/2019  No   Sig: TAKE 2MG BY MOUTH ON SUNDAY, WEDNESDAY, FRIDAY AND TAKE 4MG REST OF WEEK     Vitamin K Antagonists Failed - 7/27/2019  7:48 AM        Failed - INR is " "within goal in the past 6 weeks     Confirm INR is within goal in the past 6 weeks.     Recent Labs   Lab Test 07/03/19   INR 3.0           Passed - Recent (12 mo) or future (30 days) visit within the authorizing provider's specialty     Patient had office visit in the last 12 months or has a visit in the next 30 days with authorizing provider or within the authorizing provider's specialty.  See \"Patient Info\" tab in inbasket, or \"Choose Columns\" in Meds & Orders section of the refill encounter.      LOV: 01/07/2019          Passed - Medication is active on med list        Passed - Patient is 18 years of age or older        Passed - Patient is not pregnant        Passed - No positive pregnancy on file in past 12 months        warfarin (COUMADIN) 4 MG tablet 45 tablet 0       Last Refill:    Disp Refills Start End CARYL   warfarin (COUMADIN) 4 MG tablet 45 tablet 0 6/25/2019  No   Sig: TAKE 2MG BY MOUTH ON SUNDAY, WEDNESDAY, FRIDAY AND TAKE 4MG REST OF WEEK.     Vitamin K Antagonists Failed - 7/27/2019  7:48 AM        Failed - INR is within goal in the past 6 weeks     Confirm INR is within goal in the past 6 weeks.     Recent Labs   Lab Test 07/03/19   INR 3.0           Passed - Recent (12 mo) or future (30 days) visit within the authorizing provider's specialty     Patient had office visit in the last 12 months or has a visit in the next 30 days with authorizing provider or within the authorizing provider's specialty.  See \"Patient Info\" tab in inbasket, or \"Choose Columns\" in Meds & Orders section of the refill encounter.      LOV: 01/07/2019          Passed - Medication is active on med list        Passed - Patient is 18 years of age or older        Passed - Patient is not pregnant        Passed - No positive pregnancy on file in past 12 months        levothyroxine (SYNTHROID/LEVOTHROID) 50 MCG tablet 90 tablet 0     Sig: Take 1 tablet (50 mcg) by mouth daily       Last Refill:    Disp Refills Start End CARYL " "  levothyroxine (SYNTHROID/LEVOTHROID) 50 MCG tablet 90 tablet 0 7/15/2019  No   Sig: TAKE 1 TABLET BY MOUTH EVERY DAY     Thyroid Protocol Passed - 7/27/2019  7:48 AM        Passed - Patient is 12 years or older        Passed - Recent (12 mo) or future (30 days) visit within the authorizing provider's specialty     Patient had office visit in the last 12 months or has a visit in the next 30 days with authorizing provider or within the authorizing provider's specialty.  See \"Patient Info\" tab in inbasket, or \"Choose Columns\" in Meds & Orders section of the refill encounter.      LOV: 01/07/2019          Passed - Medication is active on med list        Passed - Normal TSH on file in past 12 months     Recent Labs   Lab Test 09/07/18  1116   TSH 2.42            Passed - No active pregnancy on record     If patient is pregnant or has had a positive pregnancy test, please check TSH.        Passed - No positive pregnancy test in past 12 months     If patient is pregnant or has had a positive pregnancy test, please check TSH.        Levothyroxine - #90 sent on 07/15/2019 - no pharmacy change  The rest - Refilled per RN Protocol.     Tessy Gandara, SMITA  [place] [facility]    "

## 2019-07-29 NOTE — TELEPHONE ENCOUNTER
Noted.  See 7/27/2019 mychart note.    Nereyda Padilla, BS, RN, PHN  [facility]) [phone]

## 2019-07-29 NOTE — TELEPHONE ENCOUNTER
Mychart reply sent to patient.      Nereyda Padilla, BS, RN, N  [facility]) [phone]

## 2019-07-29 NOTE — TELEPHONE ENCOUNTER
Referral information sent to [facility] via [employer].      Nereyda Padilla, BS, RN, N  [facility]) [phone]

## 2019-07-29 NOTE — TELEPHONE ENCOUNTER
TERESA.    Forwarded to TS.  Please review patient's Mychart message for tomorrow's visit.    Nereyda Padilla, RN, BS, PHN

## 2019-07-29 NOTE — TELEPHONE ENCOUNTER
See other mychart message.   agreeable to wound care consult.  Do you want to refer to [facility] or [facility]?  Referral pending.      NAYELI Harris, RN, PHN  [facility]) [phone]

## 2019-07-30 ENCOUNTER — ANTICOAGULATION THERAPY VISIT (OUTPATIENT)
Dept: NURSING | Facility: CLINIC | Age: 79
End: 2019-07-30
Payer: COMMERCIAL

## 2019-07-30 ENCOUNTER — PATIENT OUTREACH (OUTPATIENT)
Dept: CARE COORDINATION | Facility: CLINIC | Age: 79
End: 2019-07-30

## 2019-07-30 ENCOUNTER — OFFICE VISIT (OUTPATIENT)
Dept: FAMILY MEDICINE | Facility: CLINIC | Age: 79
End: 2019-07-30
Payer: COMMERCIAL

## 2019-07-30 ENCOUNTER — TRANSFERRED RECORDS (OUTPATIENT)
Dept: HEALTH INFORMATION MANAGEMENT | Facility: CLINIC | Age: 79
End: 2019-07-30

## 2019-07-30 VITALS
HEIGHT: 64 IN | WEIGHT: 176 LBS | OXYGEN SATURATION: 97 % | DIASTOLIC BLOOD PRESSURE: 62 MMHG | HEART RATE: 77 BPM | TEMPERATURE: 97.6 F | SYSTOLIC BLOOD PRESSURE: 114 MMHG | BODY MASS INDEX: 30.05 KG/M2

## 2019-07-30 DIAGNOSIS — Z86.718 HISTORY OF DEEP VENOUS THROMBOSIS: ICD-10-CM

## 2019-07-30 DIAGNOSIS — I82.409 DVT (DEEP VENOUS THROMBOSIS) (H): ICD-10-CM

## 2019-07-30 DIAGNOSIS — B02.9 HERPES ZOSTER WITHOUT COMPLICATION: ICD-10-CM

## 2019-07-30 DIAGNOSIS — R59.0 PELVIC LYMPHADENOPATHY: ICD-10-CM

## 2019-07-30 DIAGNOSIS — I26.99 PULMONARY EMBOLISM (H): ICD-10-CM

## 2019-07-30 DIAGNOSIS — D47.9 LYMPHOPROLIFERATIVE DISORDER (H): ICD-10-CM

## 2019-07-30 DIAGNOSIS — I10 HYPERTENSION GOAL BP (BLOOD PRESSURE) < 140/90: ICD-10-CM

## 2019-07-30 DIAGNOSIS — D47.Z9 LOW GRADE B CELL LYMPHOPROLIFERATIVE DISORDER (H): ICD-10-CM

## 2019-07-30 DIAGNOSIS — Z86.711 HISTORY OF PULMONARY EMBOLISM: ICD-10-CM

## 2019-07-30 DIAGNOSIS — Z51.81 MEDICATION MONITORING ENCOUNTER: ICD-10-CM

## 2019-07-30 DIAGNOSIS — F03.90 DEMENTIA WITHOUT BEHAVIORAL DISTURBANCE, UNSPECIFIED DEMENTIA TYPE: ICD-10-CM

## 2019-07-30 DIAGNOSIS — Z79.01 LONG TERM CURRENT USE OF ANTICOAGULANT THERAPY: ICD-10-CM

## 2019-07-30 DIAGNOSIS — K62.89 RECTAL MASS: ICD-10-CM

## 2019-07-30 DIAGNOSIS — E03.9 HYPOTHYROIDISM, UNSPECIFIED TYPE: ICD-10-CM

## 2019-07-30 DIAGNOSIS — R21 RASH: ICD-10-CM

## 2019-07-30 DIAGNOSIS — Z98.2 S/P VP SHUNT: ICD-10-CM

## 2019-07-30 DIAGNOSIS — D47.2 MGUS (MONOCLONAL GAMMOPATHY OF UNKNOWN SIGNIFICANCE): ICD-10-CM

## 2019-07-30 DIAGNOSIS — G91.2 NPH (NORMAL PRESSURE HYDROCEPHALUS) (H): Primary | ICD-10-CM

## 2019-07-30 LAB
ERYTHROCYTE [DISTWIDTH] IN BLOOD BY AUTOMATED COUNT: 14.2 % (ref 10–15)
HCT VFR BLD AUTO: 45.1 % (ref 35–47)
HGB BLD-MCNC: 13.9 G/DL (ref 11.7–15.7)
INR POINT OF CARE: 2.8 (ref 0.86–1.14)
MCH RBC QN AUTO: 28.6 PG (ref 26.5–33)
MCHC RBC AUTO-ENTMCNC: 30.8 G/DL (ref 31.5–36.5)
MCV RBC AUTO: 93 FL (ref 78–100)
PLATELET # BLD AUTO: 146 10E9/L (ref 150–450)
RBC # BLD AUTO: 4.86 10E12/L (ref 3.8–5.2)
WBC # BLD AUTO: 23.9 10E9/L (ref 4–11)

## 2019-07-30 PROCEDURE — 84443 ASSAY THYROID STIM HORMONE: CPT | Performed by: FAMILY MEDICINE

## 2019-07-30 PROCEDURE — 82550 ASSAY OF CK (CPK): CPT | Performed by: FAMILY MEDICINE

## 2019-07-30 PROCEDURE — 85027 COMPLETE CBC AUTOMATED: CPT | Performed by: FAMILY MEDICINE

## 2019-07-30 PROCEDURE — G0439 PPPS, SUBSEQ VISIT: HCPCS | Performed by: FAMILY MEDICINE

## 2019-07-30 PROCEDURE — 82274 ASSAY TEST FOR BLOOD FECAL: CPT | Performed by: FAMILY MEDICINE

## 2019-07-30 PROCEDURE — 80061 LIPID PANEL: CPT | Performed by: FAMILY MEDICINE

## 2019-07-30 PROCEDURE — 80053 COMPREHEN METABOLIC PANEL: CPT | Performed by: FAMILY MEDICINE

## 2019-07-30 PROCEDURE — 84439 ASSAY OF FREE THYROXINE: CPT | Performed by: FAMILY MEDICINE

## 2019-07-30 PROCEDURE — 85610 PROTHROMBIN TIME: CPT | Mod: QW

## 2019-07-30 PROCEDURE — 36416 COLLJ CAPILLARY BLOOD SPEC: CPT

## 2019-07-30 RX ORDER — VALACYCLOVIR HYDROCHLORIDE 1 G/1
1000 TABLET, FILM COATED ORAL 3 TIMES DAILY
Qty: 30 TABLET | Refills: 0 | Status: SHIPPED | OUTPATIENT
Start: 2019-07-30 | End: 2019-10-02 | Stop reason: SINTOL

## 2019-07-30 ASSESSMENT — ACTIVITIES OF DAILY LIVING (ADL)
CURRENT_FUNCTION: SHOPPING REQUIRES ASSISTANCE
CURRENT_FUNCTION: PREPARING MEALS REQUIRES ASSISTANCE
CURRENT_FUNCTION: MEDICATION ADMINISTRATION REQUIRES ASSISTANCE
CURRENT_FUNCTION: LAUNDRY REQUIRES ASSISTANCE
CURRENT_FUNCTION: HOUSEWORK REQUIRES ASSISTANCE
CURRENT_FUNCTION: TELEPHONE REQUIRES ASSISTANCE
CURRENT_FUNCTION: TRANSPORTATION REQUIRES ASSISTANCE
CURRENT_FUNCTION: MONEY MANAGEMENT REQUIRES ASSISTANCE
CURRENT_FUNCTION: BATHING REQUIRES ASSISTANCE

## 2019-07-30 ASSESSMENT — MIFFLIN-ST. JEOR: SCORE: 1258.33

## 2019-07-30 NOTE — PROGRESS NOTES
ANTICOAGULATION FOLLOW-UP CLINIC VISIT    Patient Name:  Ángela Dumont  Date:  2019  Contact Type:  Face to Face    SUBJECTIVE:  Patient Findings         Clinical Outcomes     Negatives:   Major bleeding event, Thromboembolic event, Anticoagulation-related hospital admission, Anticoagulation-related ED visit, Anticoagulation-related fatality           OBJECTIVE    INR Protime   Date Value Ref Range Status   2019 2.8 (A) 0.86 - 1.14 Final       ASSESSMENT / PLAN  No question data found.  Anticoagulation Summary  As of 2019    INR goal:   2.0-3.0   TTR:   70.1 % (3.3 y)   INR used for dosin.8 (2019)   Warfarin maintenance plan:   4 mg (1 mg x 4) every Sun, Wed; 2 mg (1 mg x 2) all other days   Full warfarin instructions:   4 mg every Sun, Wed; 2 mg all other days   Weekly warfarin total:   18 mg   No change documented:   Cadence Gonzalez RN   Plan last modified:   Dorita Panda RN (12/3/2018)   Next INR check:   2019   Target end date:       Indications    Long-term (current) use of anticoagulants [Z79.01] [Z79.01]  Pulmonary embolism (H) [I26.99]  DVT (deep venous thrombosis) (H) [I82.409]             Anticoagulation Episode Summary     INR check location:       Preferred lab:       Send INR reminders to:    NURSE    Comments:         Anticoagulation Care Providers     Provider Role Specialty Phone number    Joaquin Rockwell MD [facility] [phone]            See the Encounter Report to view Anticoagulation Flowsheet and Dosing Calendar (Go to Encounters tab in chart review, and find the Anticoagulation Therapy Visit)    Dosage adjustment made based on physician directed care plan.    Cadence Gonzalez, RN

## 2019-07-30 NOTE — PROGRESS NOTES
CCC Referral: Attempt 1  Community Health Worker called and spoke with , Juan David, to discuss enrollment with Clinic Care Coordination.  If he is returning my call, please transfer him to Mohan at [phone]    Juan David expressed interest in Clinic Care Coordination but stated he wasn't to sure of what his schedule looks like.   He may be interested in an appointment on Thursday.  I offered to schedule an appointment with Cece Comer, [facility] RN, on Thursday at the [facility], however, Juan David wasn't too sure.  He did say that morning would work best.    CHW offered to call him back Thursday morning, to see if this is something he would still be interested in scheduling.  Juan David agreed.    Order Date:7/30/2019   Ordering User:JOAQUIN ROCKWELL [[id number]]   Encounter Provider:Joaquin Rockwell MD [[id number]]   Authorizing Provider: Joaquin Rockwell MD [[id number]]   Department: FAMILY PRACTICE[[id number]]      Ordering Provider NPI: [id number]  Joaquni Rockwell   [address]    [address]   Phone: [phone]            Procedure Requested     9050.1   10 CARE COORDINATION REFERRAL            [#[id number]]       Priority: Routine  Class: Local Print       Comment:Services are provided by a Care Coordinator for people with complex                needs such as: medical, social, or financial troubles.  The Care                Coordinator works with the patient and their Primary Care Provider                to determine health goals, obtain resources, achieve outcomes, and                   develop care plans that help coordinate the patient's care.                                 Reason for Referral: full assessment                                                Additional pertinent details:  See notes                                Clinical Staff have discussed the Care Coordination Referral with                 the patient and/or caregiver: yes     ________________________  Next  Outreach: 8/1/19

## 2019-07-30 NOTE — PROGRESS NOTES
The patient's PHQ-9 score is consistent with mild depression. She was provided with information regarding depression and was advised to schedule a follow up appointment in near future to further address this issue.

## 2019-07-30 NOTE — PATIENT INSTRUCTIONS
[facility]                        To reach your care team during and after hours:   [phone]  To reach our pharmacy:        [phone]    Clinic Hours                        Our clinic hours are:    Monday   7:30 am to 7:00 pm                  Tuesday through Friday 7:30 am to 5:00 pm                             Saturday   8:00 am to 12:00 pm      Sunday   Closed      Pharmacy Hours                        Our pharmacy hours are:    Monday   8:30 am to 7:00 pm       Tuesday to Friday  8:30 am to 6:00 pm                       Saturday    9:00 am to 1:00 pm              Sunday    Closed              There is also information available at our web site:  [email or web address]    If your provider ordered any lab tests and you do not receive the results within 10 business days, please call the clinic.    If you need a medication refill please contact your pharmacy.  Please allow 2-3 business days for your refill to be completed.    Our clinic offers telephone visits and e visits.  Please ask one of your team members to explain more.      Use [employer] (secure email communication and access to your chart) to send your primary care provider a message or make an appointment. Ask someone on your Team how to sign up for [employer].  Immunizations                      Immunization History   Administered Date(s) Administered     FLU 6-35 months 10/06/2011     Flu, Unspecified 11/03/2008     [id number] Novel Flu 09/25/2015     Influenza (High Dose) 3 valent vaccine 09/19/2014, 09/25/2015, 09/19/2016, 10/05/2017, 12/27/2018     Influenza (IIV3) PF 10/23/2003, 10/04/2010, 10/06/2011, 09/27/2012, 09/19/2013     Influenza Vaccine IM Ages 6-35 Months 4 Valent (PF) 11/03/2008     Pneumo Conj 13-V (2010&after) 09/25/2015     Pneumococcal 23 valent 10/04/2010     TD (ADULT, 7+) 10/31/2017     TDAP Vaccine (Adacel) 01/01/2004     Td (Adult), Adsorbed 10/31/2017     Tdap (Adacel,Boostrix) 01/01/2004     Zoster vaccine  recombinant adjuvanted (SHINGRIX) 12/27/2018, 06/12/2019     Zoster vaccine, live 10/05/2007        Health Maintenance                         Health Maintenance Due   Topic Date Due     Osteoporosis Screening  1940     Kidney Microalbumin Urine Test  1940     Depression Action Plan  1940     Annual Wellness Visit  07/17/2005     FALL RISK ASSESSMENT  09/01/2017     INR CLINIC REFERRAL - yearly  07/02/2019       Patient Education   Personalized Prevention Plan  You are due for the preventive services outlined below.  Your care team is available to assist you in scheduling these services.  If you have already completed any of these items, please share that information with your care team to update in your medical record.  Health Maintenance Due   Topic Date Due     Osteoporosis Screening  1940     Kidney Microalbumin Urine Test  1940     Depression Action Plan  1940     Annual Wellness Visit  07/17/2005     FALL RISK ASSESSMENT  09/01/2017     INR CLINIC REFERRAL - yearly  07/02/2019       Depression and Suicide in Older Adults    Nearly 2 million older Americans have some type of depression. Sadly, some of them even take their own lives. Yet depression among older adults is often ignored. Learn the warning signs. You may help spare a loved one needless pain. You may also save a life.  What is depression?  Depression is a serious illness that affects the way you think and feel. It is not a normal part of aging, nor is it a sign of weakness, a character flaw, or something you can snap out of. Most people with depression need treatment to get better. The most common symptom is a feeling of deep sadness. People who are depressed also may seem tired and listless. And nothing seems to give them pleasure. It s normal to grieve or be sad sometimes. But sadness lessens or passes with time. Depression rarely goes away or improves on its own. Other symptoms of depression are:    Sleeping  more or less than normal    Eating more or less than normal    Having headaches, stomachaches, or other pains that don t go away    Feeling nervous,  empty,  or worthless    Crying a great deal    Thinking or talking about suicide or death    Feeling confused or forgetful  What causes it?  The causes of depression aren t fully known. But, it is thought to result from a complex interaction of biochemistry, genetics, environmental factors, and personality. Certain chemicals in the brain play a role. Depression does run in families. And life stresses can also trigger depression in some people. Older adults often face many stressors, such as death of friends or a spouse, health problems, and financial concerns.  How you can help  Often, depressed people may not want to ask for help. When they do, they may be ignored. Or, they may receive the wrong treatment. You can help by showing parents and older friends love and support. If they seem depressed, help them find the right treatment. Talk to your doctor. Or contact a local mental health center, social service agency, or hospital. With modern treatment, no one has to suffer from depression.  If your older friend or family member agrees, you can be an advocate for him or her in the healthcare setting. Many times, older adults have other chronic illnesses such as diabetes, heart disease, or cancer that can cause symptoms of depression. Medicine side effects can also contribute to certain behaviors and feelings. It is important that the older adult's healthcare provider listens and sorts out the causes of any symptoms of depression and makes referrals to mental health specialists when needed. Untreated depression can result in misdiagnosis, including brain disorders such as dementia and Alzheimer's. If the health professional does not take the issue of depression seriously, ask your family member or friend to consider finding another provider.  Resources    National Suicide  Prevention Lifeline (crisis hotline)190-083-XKVF (8255)    National [place] of Mental Vgwsdc315-429-6923mpc.nimh.nih.gov    National [place] on Mental Qjbpdoq033-380-0052ylp.[place].org    Mental Health Hguucjj499-452-4042fnn.[facility].org    National Suicide Wtavhne420-221-1312 (800-SUICIDE)   Date Last Reviewed: 2/1/2017 2000-2018 [employer]. [address] [place], [place] [id number]. All rights reserved. This information is not intended as a substitute for professional medical care. Always follow your healthcare professional's instructions.

## 2019-07-30 NOTE — PROGRESS NOTES
SUBJECTIVE:   Ángela Dumont is a 79 year old female who presents for Preventive Visit/Medication recheck.    Are you in the first 12 months of your Medicare coverage?  No  ADDENDUM:       Documentation of Face-to-Face and Certification for Home Health Services     Patient: Ángela Dumont   YOB: 1940  MR Number: [id number]  Today's Date: 8/14/2019    I certify that patient: Ángela Dumont is under my care and that I, or a nurse practitioner or physician's assistant working with me, had a face-to-face encounter that meets the physician face-to-face encounter requirements with this patient on: 7/30/2019.    This encounter with the patient was in whole, or in part, for the following medical condition, which is the primary reason for home health care:   Patient Active Problem List   Diagnosis     S/P  shunt     NPH (normal pressure hydrocephalus)     Fracture of right femur (H)     DVT (deep venous thrombosis) (H)     Pulmonary embolism (H)     Dementia     Hypothyroid     Hypertension goal BP (blood pressure) < 140/90     Adjustment disorder with depressed mood     Hyperlipidemia with target LDL less than 130     Leukocytosis     Esophageal reflux     UTI (urinary tract infection)     Constipation     Pulmonary hypertension (H)     MR (mitral regurgitation)     Fibromyalgia     Osteoarthritis of both knees     Health Care Home     Long-term (current) use of anticoagulants [Z79.01]     Advance Care Planning     SIRS (systemic inflammatory response syndrome) (H)     History of pulmonary embolism     History of deep venous thrombosis     Urinary retention     BPPV (benign paroxysmal positional vertigo), bilateral     Herpes zoster without complication     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    My clinical findings support the need for the above services because: melyssa lift with hygiene sling is needed due to S/P  shunt, NPH (normal pressure hydrocephalus).     Further, I certify that my clinical findings support that this patient is homebound (i.e. absences from home require considerable and taxing effort and are for medical reasons or [employer] services or infrequently or of short duration when for other reasons) because: Requires assistance of another person or specialized equipment to access medical services because patient: Is unable to exit home safely on own due to: NPH (normal pressure hydrocephalus). and Is unable to operate assistive equipment on their own...    Based on the above findings. I certify that this patient is confined to the home and needs intermittent skilled nursing care, physical therapy and/or speech therapy.  The patient is under my care, and I have initiated the establishment of the plan of care.  This patient will be followed by a physician who will periodically review the plan of care.  Physician/Provider to provide follow up care: Joaquin Rockwell    Responsible Medicare certified [place] Physician: Dr. Joaquin Rockwell  Physician Signature: See electronic signature associated with these discharge orders.  Date: 8/14/2019    Healthy Habits:     In general, how would you rate your overall health?  Good    Frequency of exercise:  1 day/week    Duration of exercise:  45-60 minutes    Do you usually eat at least 4 servings of fruit and vegetables a day, include whole grains    & fiber and avoid regularly eating high fat or \"junk\" foods?  Yes    Taking medications regularly:  Yes    Medication side effects:  Other    Ability to successfully perform activities of daily living:  Telephone requires assistance, transportation requires assistance, " shopping requires assistance, preparing meals requires assistance, housework requires assistance, bathing requires assistance, laundry requires assistance, medication administration requires assistance and money management requires assistance    Home Safety:  No safety concerns identified    Hearing Impairment:  No hearing concerns    In the past 6 months, have you been bothered by leaking of urine?  No    In general, how would you rate your overall mental or emotional health?  Fair      PHQ-2 Total Score: 4    Additional concerns today:  Yes    Hx of DVT/PE: Stable. Patient is currently prescribed Warfarin daily for Hx of DVT/PE management.     Low Grade B cell lymphoproliferative disorder/MGUS/Rectal mass: Stable. Patient consults with Dr. Duran of hematology/oncology for further Low Grade B cell lymphoproliferative disorder/MGUS management.     GERD: Patient reports GERD has been flaring lately.      Shunt/Dementia: Stable according to patient. Patient's  would like a yearly neurology consult.     Bowels: Patient has been alternating between constipation and diarrhea. Patient has used laxative when constipated and the smallest dose and it leads to diarrhea.     Hypothyroidism: Patient's hypothyroidism is well controlled. Patient is currently prescribed 50 mcg Levothyroxine daily for hypothyroidism management.     TSH   Date Value Ref Range Status   07/30/2019 1.98 0.40 - 4.00 mU/L Final     T4 Free   Date Value Ref Range Status   07/30/2019 1.34 0.76 - 1.46 ng/dL Final     Check wound on left buttock -  thinks it may be shingles.    Patient reports that they are currently looking into hiring another home care worker to aid in patient care. Patient has [facility] [place] starting on Monday.     PHQ-9 SCORE 3/8/2016 6/29/2016 7/28/2019   PHQ-9 Total Score MyChart 12 (Moderate depression) 19 (Moderately severe depression) 6 (Mild depression)   PHQ-9 Total Score - - 6     JACOB-7 SCORE 3/8/2016 6/29/2016    Total Score 8 (mild anxiety) 12 (moderate anxiety)     Do you feel safe in your environment? Yes    Do you have a Health Care Directive? Yes: Advance Directive has been received and scanned.    Fall risk  Fallen 2 or more times in the past year?: No  Any fall with injury in the past year?: No    Cognitive Screening Not appropriate due to mental handicap    Reviewed and updated as needed this visit by clinical staff  Tobacco  Allergies  Meds  Med Hx  Surg Hx  Fam Hx  Soc Hx      Reviewed and updated as needed this visit by Provider        Social History     Tobacco Use     Smoking status: Former Smoker     Packs/day: 0.50     Years: 10.00     Pack years: 5.00     Types: Cigarettes     Start date: 1958     Last attempt to quit: 1982     Years since quittin.6     Smokeless tobacco: Never Used     Tobacco comment: quit    Substance Use Topics     Alcohol use: Yes     Alcohol/week: 0.0 - 0.6 oz     Comment: Very light     If you drink alcohol do you typically have >3 drinks per day or >7 drinks per week? No    Alcohol Use 2019   Prescreen: >3 drinks/day or >7 drinks/week? -   Prescreen: >3 drinks/day or >7 drinks/week? No     Current providers sharing in care for this patient include:   Patient Care Team:  Joaquin Rockwell MD as PCP - General (Family Practice)  Joaquin Rockwell MD as Assigned PCP  [facility] ([facility] ([facility] ([place]    The following health maintenance items are reviewed in Epic and correct as of today:  Health Maintenance   Topic Date Due     DEXA  1940     MICROALBUMIN  1940     DEPRESSION ACTION PLAN  1940     MEDICARE ANNUAL WELLNESS VISIT  2005     FALL RISK ASSESSMENT  2017     OP ANNUAL INR REFERRAL  2019     INFLUENZA VACCINE (1) 2019     PHQ-9  2020     BMP  2020     ADVANCE CARE PLANNING  2021     LIPID  2024     DTAP/TDAP/TD IMMUNIZATION (3 - Td) 10/31/2027     ZOSTER IMMUNIZATION   Completed     IPV IMMUNIZATION  Aged Out     MENINGITIS IMMUNIZATION  Aged Out     Health Maintenance     Colonoscopy:  N/A   FIT:  N/A              Mammogram:  Not on file              PAP:  N/A   DEXA:  Not completed    Health Maintenance Due   Topic Date Due     DEXA  1940     MICROALBUMIN  1940     DEPRESSION ACTION PLAN  1940     MEDICARE ANNUAL WELLNESS VISIT  07/17/2005     FALL RISK ASSESSMENT  09/01/2017     OP ANNUAL INR REFERRAL  07/02/2019       Current Problem List    Patient Active Problem List   Diagnosis     S/P  shunt     NPH (normal pressure hydrocephalus)     Fracture of right femur (H)     DVT (deep venous thrombosis) (H)     Pulmonary embolism (H)     Dementia     Hypothyroid     Hypertension goal BP (blood pressure) < 140/90     Adjustment disorder with depressed mood     Hyperlipidemia with target LDL less than 130     Leukocytosis     Esophageal reflux     UTI (urinary tract infection)     Constipation     Pulmonary hypertension (H)     MR (mitral regurgitation)     Fibromyalgia     Osteoarthritis of both knees     Health Care Home     Long-term (current) use of anticoagulants [Z79.01]     Advance Care Planning     SIRS (systemic inflammatory response syndrome) (H)     History of pulmonary embolism     History of deep venous thrombosis     Urinary retention     BPPV (benign paroxysmal positional vertigo), bilateral     Herpes zoster without complication     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  See doctor year to year     Colitis, nonspecific  10/31/2017     Constipation      Dementia     memory/anger     Depressive disorder last few months as condition became apparent    Lack of mobility a big problem     DVT (deep venous thrombosis) (H) 4/15    bilateral     Esophageal reflux      Fibromyalgia      Fracture of right femur (H) 1/15     Heart murmur      Herpes zoster without complication 08/16/2017    left buttocks     History of Clostridium difficile      Hyperlipidemia LDL goal < 130      Hypertension goal BP (blood pressure) < 140/90      Hypothyroid      Leukocytosis      Leukocytosis      Leukocytosis      Leukocytosis, unspecified     w/u ?     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)      Lymphoproliferative disorder (H)     Dr Duran     Lymphoproliferative disorder (H)      MGUS (monoclonal gammopathy of unknown significance)     Dr Duran     MGUS (monoclonal gammopathy of unknown significance)      Migraine      MR (mitral regurgitation)     Mild     NPH (normal pressure hydrocephalus) 4/15    shunt placed but removed in 7/2015 due to erosion through the scalp, replaced 5/19/16     Osteoarthritis of both knees      Other atopic dermatitis and related conditions      PE (pulmonary embolism) 4/15    Saddle - IVC     Pelvic lymphadenopathy 10/31/2017     Pulmonary hypertension (H)     EF 70-75%     Rectal mass 10/2017     Rectal mass 10/1/2017     Thrombosis of leg      Urinary tract infection, site not specified     MRSA - Dr Loo     Vertigo        Past Surgical History    Past Surgical History:   Procedure Laterality Date     ABDOMEN SURGERY  left side lower abdomen pain from Shunt we do roberth     COLONOSCOPY  2008     GYN SURGERY       H LABOR AND DELIVERY VAGINAL  1960, 1967     HERNIA REPAIR       HYSTERECTOMY  1976    hysterectomy     IMPLANT SHUNT VENTRICULOPERITONEAL Left 5/19/2016    IMPLANT SHUNT VENTRICULOPERITONEAL - Dr Alba     OPTICAL TRACKING SYSTEM IMPLANT SHUNT VENTRICULOPERITONEAL Right 4/10/2015      Shunt - Dr Sierra     ORTHOPEDIC SURGERY  Knee pain that prohibits walking     REMOVE SHUNT VENTRICULOPERITONEAL N/A 7/3/2015    REMOVE SHUNT VENTRICULOPERITONEAL;  Surgeon: Emmanuel Sierra MD;  Location: [facility] OR     SURGICAL HISTORY OF -   1/15    right femur/hip surgery - 1/18/2015     SURGICAL HISTORY OF -   7/04    rectocele repair     SURGICAL HISTORY OF -   1997    abdominal adhesion lysis     SURGICAL HISTORY OF -   4/15    IVC filter placement     SURGICAL HISTORY OF -   6/15    IVC filter removal       Current Medications    Current Outpatient Medications   Medication Sig Dispense Refill     acetaminophen (TYLENOL) 325 MG tablet Take 2 tablets (650 mg) by mouth every 4 hours as needed for other (surgical pain) 100 tablet 0     furosemide (LASIX) 40 MG tablet TAKE 1 TABLET BY MOUTH EVERY DAY AT 10 AM 90 tablet 1     levothyroxine (SYNTHROID/LEVOTHROID) 50 MCG tablet TAKE 1 TABLET BY MOUTH EVERY DAY 90 tablet 0     nystatin (MYCOSTATIN) cream Apply topically 3 times daily 30 g 3     order for DME Use EZ Stand as needed       polyethylene glycol (MIRALAX/GLYCOLAX) Packet Take 17 g by mouth daily as needed for constipation 7 packet 0     senna-docusate (SENOKOT-S/PERICOLACE) 8.6-50 MG tablet Take 1 tablet by mouth 2 times daily as needed for constipation 100 tablet 0     tamsulosin (FLOMAX) 0.4 MG capsule TAKE 1 CAPSULE BY MOUTH EVERY DAY AT 10 AM 90 capsule 1     valACYclovir (VALTREX) 1000 mg tablet Take 1 tablet (1,000 mg) by mouth 3 times daily for 10 days 30 tablet 0     warfarin (COUMADIN) 2 MG tablet TAKE 2MG BY MOUTH ON SUNDAY, WEDNESDAY, FRIDAY AND TAKE 4MG REST OF WEEK 45 tablet 0     warfarin (COUMADIN) 4 MG tablet TAKE 2MG BY MOUTH ON SUNDAY, WEDNESDAY, FRIDAY AND TAKE 4MG REST OF WEEK. 45 tablet 0       Allergies    Allergies   Allergen Reactions     Shellfish Allergy Anaphylaxis     Aspirin      GI issues     Contrast Dye Hives and Itching     Flushed skin     Penicillins Hives      Sulfa Drugs Hives     Ceftin [Cefuroxime] Rash     Nitrofurantoin Rash       Immunizations    Immunization History   Administered Date(s) Administered     FLU 6-35 months 10/06/2011     Flu, Unspecified 2008     [id number] Novel Flu 2015     Influenza (High Dose) 3 valent vaccine 2014, 2015, 2016, 10/05/2017, 2018     Influenza (IIV3) PF 10/23/2003, 10/04/2010, 10/06/2011, 2012, 2013     Influenza Vaccine IM Ages 6-35 Months 4 Valent (PF) 2008     Pneumo Conj 13-V (2010&after) 2015     Pneumococcal 23 valent 10/04/2010     TD (ADULT, 7+) 10/31/2017     TDAP Vaccine (Adacel) 2004     Td (Adult), Adsorbed 10/31/2017     Tdap (Adacel,Boostrix) 2004     Zoster vaccine recombinant adjuvanted (SHINGRIX) 2018, 2019     Zoster vaccine, live 10/05/2007       Family History    Family History   Problem Relation Age of Onset     Colon Cancer Father      Coronary Artery Disease Father      Diabetes Maternal Grandmother        Social History    Social History     Socioeconomic History     Marital status:      Spouse name: Vernon     Number of children: 2     Years of education: Not on file     Highest education level: Not on file   Occupational History     Not on file   Social Needs     Financial resource strain: Not on file     Food insecurity:     Worry: Not on file     Inability: Not on file     Transportation needs:     Medical: Not on file     Non-medical: Not on file   Tobacco Use     Smoking status: Former Smoker     Packs/day: 0.50     Years: 10.00     Pack years: 5.00     Types: Cigarettes     Start date: 1958     Last attempt to quit: 1982     Years since quittin.6     Smokeless tobacco: Never Used     Tobacco comment: quit    Substance and Sexual Activity     Alcohol use: Yes     Alcohol/week: 0.0 - 0.6 oz     Comment: Very light     Drug use: No     Sexual activity: Not Currently     Partners: Male     Birth  "control/protection: None     Comment: not a problem   Lifestyle     Physical activity:     Days per week: Not on file     Minutes per session: Not on file     Stress: Not on file   Relationships     Social connections:     Talks on phone: Not on file     Gets together: Not on file     Attends Cheondoism service: Not on file     Active member of club or organization: Not on file     Attends meetings of clubs or organizations: Not on file     Relationship status: Not on file     Intimate partner violence:     Fear of current or ex partner: Not on file     Emotionally abused: Not on file     Physically abused: Not on file     Forced sexual activity: Not on file   Other Topics Concern     Parent/sibling w/ CABG, MI or angioplasty before 65F 55M? No   Social History Narrative     Not on file       Review of Systems  Constitutional, HEENT, cardiovascular, pulmonary, GI, , musculoskeletal, neuro, skin, endocrine and psych systems are negative, except as otherwise noted.    OBJECTIVE:   /62   Pulse 77   Temp 97.6  F (36.4  C) (Oral)   Ht 1.626 m (5' 4\")   Wt 79.8 kg (176 lb)   SpO2 97%   BMI 30.21 kg/m   Estimated body mass index is 30.21 kg/m  as calculated from the following:    Height as of this encounter: 1.626 m (5' 4\").    Weight as of this encounter: 79.8 kg (176 lb).  Physical Exam  GENERAL APPEARANCE: healthy, alert and no distress  EYES: Eyes grossly normal to inspection  HENT:ear canals and TM's normal upon viewing with otoscope, nose and mouth without ulcers or lesions upon viewing with otoscope  NECK: no adenopathy, no asymmetry, masses, or scars and thyroid normal to palpation  RESP: lungs clear to auscultation - no rales, rhonchi or wheezes  CV: regular rate and rhythm, normal S1 S2, no S3 or S4, no murmur, click or rub, no peripheral edema and peripheral pulses strong  ABDOMEN: soft, nontender, no hepatosplenomegaly, no masses and bowel sounds normal  MS: no musculoskeletal defects are noted and " gait is age appropriate without ataxia, LE pain  SKIN: no suspicious lesions or rashes, minimally blistery healing clustered rash on left buttocks  NEURO: Normal strength and tone, sensory exam grossly normal, mentation intact and speech normal  PSYCH: mentation appears normal and affect normal/bright  BACK: no CVA tenderness, no paralumbar tenderness    Diagnostic Test Results:  No results found for this or any previous visit (from the past 24 hour(s)).     Labs Pending    ASSESSMENT / PLAN:       ICD-10-CM    1. NPH (normal pressure hydrocephalus) G91.2 Comprehensive metabolic panel     CBC with platelets     TSH with free T4 reflex     T4 free     HOME CARE NURSING REFERRAL     CARE COORDINATION REFERRAL     NEUROLOGY ADULT REFERRAL   2. S/P  shunt Z98.2 Comprehensive metabolic panel     HOME CARE NURSING REFERRAL     CARE COORDINATION REFERRAL   3. Dementia without behavioral disturbance, unspecified dementia type F03.90 Comprehensive metabolic panel     CBC with platelets     TSH with free T4 reflex     T4 free     HOME CARE NURSING REFERRAL     CARE COORDINATION REFERRAL     *UA reflex to Microscopic and Culture ([facility] (except [place] and [place])   4. Lymphoproliferative disorder (H) D47.9 Comprehensive metabolic panel     CBC with platelets     HOME CARE NURSING REFERRAL     CARE COORDINATION REFERRAL   5. MGUS (monoclonal gammopathy of unknown significance) D47.2 Comprehensive metabolic panel     CBC with platelets     HOME CARE NURSING REFERRAL     CARE COORDINATION REFERRAL   6. 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 Rectal mass K62.9 HOME CARE NURSING REFERRAL     CARE COORDINATION REFERRAL   8. Pelvic lymphadenopathy R59.0 HOME CARE NURSING REFERRAL     CARE COORDINATION REFERRAL   9. History of pulmonary embolism Z86.711 Comprehensive  metabolic panel     CBC with platelets     HOME CARE NURSING REFERRAL     CARE COORDINATION REFERRAL   10. History of deep venous thrombosis Z86.718 Comprehensive metabolic panel     CBC with platelets     HOME CARE NURSING REFERRAL     CARE COORDINATION REFERRAL   11. Hypertension goal BP (blood pressure) < 140/90 I10 Comprehensive metabolic panel     Lipid panel reflex to direct LDL Fasting     HOME CARE NURSING REFERRAL     CARE COORDINATION REFERRAL     CANCELED: Albumin Random Urine Quantitative with Creat Ratio     CANCELED: UA reflex to Microscopic and Culture     CANCELED: Albumin Random Urine Quantitative with Creat Ratio   12. Hypothyroidism, unspecified type E03.9 TSH with free T4 reflex     T4 free     HOME CARE NURSING REFERRAL     CARE COORDINATION REFERRAL   13. Rash R21 HOME CARE NURSING REFERRAL     CARE COORDINATION REFERRAL     DERMATOLOGY REFERRAL   14. Herpes zoster without complication B02.9 HOME CARE NURSING REFERRAL     CARE COORDINATION REFERRAL     DERMATOLOGY REFERRAL     valACYclovir (VALTREX) 1000 mg tablet   15. Medication monitoring encounter Z51.81 Comprehensive metabolic panel     Lipid panel reflex to direct LDL Fasting     CBC with platelets     CK total     TSH with free T4 reflex     Fecal colorectal cancer screen FIT     T4 free     HOME CARE NURSING REFERRAL     CARE COORDINATION REFERRAL     NEUROLOGY ADULT REFERRAL     DERMATOLOGY REFERRAL     CANCELED: Albumin Random Urine Quantitative with Creat Ratio     CANCELED: UA reflex to Microscopic and Culture     CANCELED: Albumin Random Urine Quantitative with Creat Ratio     Discussed treatment/modality options, including risk and benefits, she desires advised 1 multivitamin per day, advised calcium 1581-9964 mg/d and Vitamin D 800-1200 IU/d, advised dentist every 6 months, advised diet and exercise and advised opthalmologist every 1-2 years. All diagnosis above reviewed and noted above, otherwise stable.  See [facility] orders for  "further details.      1) Patient is currently prescribed Warfarin daily for Hx of DVT/PE management. Advised continued use.     2) Patient consults with Dr. Duran of hematology/oncology for further Low Grade B cell lymphoproliferative disorder/MGUS management. Recommend continued follow up.     3) Patient referred to Neurology today for consultation about  shunt and Dementia.     4) Patient's hypothyroidism is well controlled. Patient is currently prescribed 50 mcg Levothyroxine daily for hypothyroidism management. Advised continued use.     5) Recommend daily Miralax use to achieve regular bowels.     6) Prescriptions refilled today.     7) Patient prescribed Valtrex today for treatment of possible shingles. Patient referred to Dermatology today for further evaluation of possible shingles site on left buttocks.     8) Home Care and Care Coordination ordered today.    9) Follow up in 3 months for medication recheck visit.     Health Maintenance Due   Topic Date Due     DEXA  1940     MICROALBUMIN  1940     DEPRESSION ACTION PLAN  1940     MEDICARE ANNUAL WELLNESS VISIT  07/17/2005     FALL RISK ASSESSMENT  09/01/2017     OP ANNUAL INR REFERRAL  07/02/2019       End of Life Planning:  Patient currently has an advanced directive: Yes.  Practitioner is supportive of decision.    COUNSELING:  Reviewed preventive health counseling, as reflected in patient instructions    Estimated body mass index is 30.21 kg/m  as calculated from the following:    Height as of this encounter: 1.626 m (5' 4\").    Weight as of this encounter: 79.8 kg (176 lb).     reports that she quit smoking about 37 years ago. Her smoking use included cigarettes. She started smoking about 61 years ago. She has a 5.00 pack-year smoking history. She has never used smokeless tobacco.    Appropriate preventive services were discussed with this patient, including applicable screening as appropriate for cardiovascular disease, diabetes, " osteopenia/osteoporosis, and glaucoma.  As appropriate for age/gender, discussed screening for colorectal cancer, prostate cancer, breast cancer, and cervical cancer. Checklist reviewing preventive services available has been given to the patient.    Reviewed patients plan of care and provided an AVS. The Basic Care Plan (routine screening as documented in Health Maintenance) for Ángela meets the Care Plan requirement. This Care Plan has been established and reviewed with the Patient and partner, son and daughter.    Counseling Resources:  ATP IV Guidelines  Pooled Cohorts Equation Calculator  Breast Cancer Risk Calculator  FRAX Risk Assessment  ICSI Preventive Guidelines  Dietary Guidelines for Americans, 2010  USDA's MyPlate  ASA Prophylaxis  Lung CA Screening    This document serves as a record of the services and decisions personally performed and made by Joaquin Rockwell MD. It was created on his behalf by Mykel Mayberry, a trained medical scribe. The creation of this document is based on the provider's statements to the medical scribe.  Mykel Mayberry July 30, 2019 11:01 AM     The information in this document, created by the medical scribe for me, accurately reflects the services I personally performed and the decisions made by me. I have reviewed and approved this document for accuracy prior to leaving the patient care area.  July 30, 2019          Joaquin Rockwell MD, FAAFP    [address]  [id number] [phone] Fax

## 2019-07-31 ENCOUNTER — TELEPHONE (OUTPATIENT)
Dept: FAMILY MEDICINE | Facility: CLINIC | Age: 79
End: 2019-07-31

## 2019-07-31 ENCOUNTER — NURSE TRIAGE (OUTPATIENT)
Dept: NURSING | Facility: CLINIC | Age: 79
End: 2019-07-31

## 2019-07-31 LAB
ALBUMIN SERPL-MCNC: 3.6 G/DL (ref 3.4–5)
ALP SERPL-CCNC: 92 U/L (ref 40–150)
ALT SERPL W P-5'-P-CCNC: 22 U/L (ref 0–50)
ANION GAP SERPL CALCULATED.3IONS-SCNC: 8 MMOL/L (ref 3–14)
AST SERPL W P-5'-P-CCNC: 22 U/L (ref 0–45)
BILIRUB SERPL-MCNC: 0.4 MG/DL (ref 0.2–1.3)
BUN SERPL-MCNC: 22 MG/DL (ref 7–30)
CALCIUM SERPL-MCNC: 9.1 MG/DL (ref 8.5–10.1)
CHLORIDE SERPL-SCNC: 110 MMOL/L (ref 94–109)
CHOLEST SERPL-MCNC: 200 MG/DL
CK SERPL-CCNC: 19 U/L (ref 30–225)
CO2 SERPL-SCNC: 24 MMOL/L (ref 20–32)
CREAT SERPL-MCNC: 0.8 MG/DL (ref 0.52–1.04)
GFR SERPL CREATININE-BSD FRML MDRD: 71 ML/MIN/{1.73_M2}
GLUCOSE SERPL-MCNC: 95 MG/DL (ref 70–99)
HDLC SERPL-MCNC: 43 MG/DL
LDLC SERPL CALC-MCNC: 131 MG/DL
NONHDLC SERPL-MCNC: 157 MG/DL
POTASSIUM SERPL-SCNC: 4.2 MMOL/L (ref 3.4–5.3)
PROT SERPL-MCNC: 7.6 G/DL (ref 6.8–8.8)
SODIUM SERPL-SCNC: 142 MMOL/L (ref 133–144)
T4 FREE SERPL-MCNC: 1.34 NG/DL (ref 0.76–1.46)
TRIGL SERPL-MCNC: 128 MG/DL
TSH SERPL DL<=0.005 MIU/L-ACNC: 1.98 MU/L (ref 0.4–4)

## 2019-07-31 NOTE — TELEPHONE ENCOUNTER
Home care nurse Sondra, [employer], calling requesting history and most recent notes faxed to them. Fax # [phone]  # [phone]  Thank you  Dawn Glez

## 2019-07-31 NOTE — TELEPHONE ENCOUNTER
" is caller reports patient is not voiding more than a small amount frequently today; complains of pain when she does   is more \"discombobulated\" today   inquiring if new med Valtrex could be cause;   Advised that patient  probably has a UTI and needs to be seen tonight in ED   sstates that she has had UTI in past      will call  911 as patient is  wheelchair bound       Reason for Disposition    Patient sounds very sick or weak to the triager    Additional Information    Negative: Shock suspected (e.g., cold/pale/clammy skin, too weak to stand, low BP, rapid pulse)    Negative: Sounds like a life-threatening emergency to the triager    [1] Discomfort (pain, burning or stinging) when passing urine AND [2] female    Protocols used: URINATION PAIN - FEMALE-A-AH, URINARY SYMPTOMS-A-AH      "

## 2019-08-01 ENCOUNTER — PATIENT OUTREACH (OUTPATIENT)
Dept: CARE COORDINATION | Facility: CLINIC | Age: 79
End: 2019-08-01

## 2019-08-01 ENCOUNTER — TELEPHONE (OUTPATIENT)
Dept: FAMILY MEDICINE | Facility: CLINIC | Age: 79
End: 2019-08-01

## 2019-08-01 DIAGNOSIS — F03.90 DEMENTIA WITHOUT BEHAVIORAL DISTURBANCE, UNSPECIFIED DEMENTIA TYPE: ICD-10-CM

## 2019-08-01 LAB
ALBUMIN UR-MCNC: NEGATIVE MG/DL
APPEARANCE UR: CLEAR
BACTERIA #/AREA URNS HPF: ABNORMAL /HPF
BILIRUB UR QL STRIP: NEGATIVE
COLOR UR AUTO: YELLOW
GLUCOSE UR STRIP-MCNC: NEGATIVE MG/DL
HGB UR QL STRIP: NEGATIVE
KETONES UR STRIP-MCNC: NEGATIVE MG/DL
LEUKOCYTE ESTERASE UR QL STRIP: ABNORMAL
NITRATE UR QL: NEGATIVE
NON-SQ EPI CELLS #/AREA URNS LPF: ABNORMAL /LPF
PH UR STRIP: 7 PH (ref 5–7)
RBC #/AREA URNS AUTO: ABNORMAL /HPF
SOURCE: ABNORMAL
SP GR UR STRIP: 1.01 (ref 1–1.03)
UROBILINOGEN UR STRIP-ACNC: 0.2 EU/DL (ref 0.2–1)
WBC #/AREA URNS AUTO: ABNORMAL /HPF

## 2019-08-01 PROCEDURE — 81001 URINALYSIS AUTO W/SCOPE: CPT | Performed by: FAMILY MEDICINE

## 2019-08-01 NOTE — PROGRESS NOTES
Community Health Worker called and left a message for the patient.  If the patient is returning my call, please transfer the patient to [facility] at [phone].       Patient has been mailed a unreachable letter and was provided with CHW contact information if they are interested in accessing Clinic Care Coordination.      Order for Care Management has been closed, no further outreach will be done at this time and patient can be re-referred.   PCP has been notified.

## 2019-08-01 NOTE — PROGRESS NOTES
Attempt # 1    Called #   Telephone Information:   Mobile [phone]     Left a non detailed VM to call back at [phone] and ask for any available Triage Nurse.    Josias Lynn RN   [facility]

## 2019-08-01 NOTE — LETTER
[facility]  [address] [place] [phone]    August 5, 2019    Ángela VITALY Dumont  [address] [address]      To Whom it May Concern:    My staff have been attempting to reach you.  Please contact my office at [phone] and ask to speak with a triage nurse to discuss further.     Thank you for your time.        Sincerely,        Joaquin Rockwell M.D./ROSANNE, RN

## 2019-08-01 NOTE — LETTER
Ángela VITALY Dumont  [address]  [place] [address]      Dear Juan David,                                                                         Your wife, Byron, was recently referred to the [facility] Clinic Care Coordination service.  This is a service offered through your Primary Care Clinic which can help you access resources, services in regard to your health and well-being goals. The clinic Community Health Worker has placed two calls to you to discuss the nature of this service and to offer enrollment.  If you are interested in learning more about Clinic Care Coordination, please call your Primary Care Clinic's Community Health Worker, Lorene, at [phone].                                                    Sincerely,                                                                              EROS Hsu                                                                                          Clinic Care Coordination                                                  [facility]                                Phone: [phone]

## 2019-08-01 NOTE — TELEPHONE ENCOUNTER
Attempt # 1  Called #   Telephone Information:   Mobile [phone]     Left a non detailed VM to call back at [phone] and ask for any available Triage Nurse.    Josias Lynn RN   [facility]

## 2019-08-01 NOTE — TELEPHONE ENCOUNTER
Reason for call:  Patient reporting a symptom    Symptom or request: Byron's  Siva is calling saying he gave Byron the Valacyclovir and she had a sensitivity to it. He says she couldn't go to the bathroom. He said they almost went to the ER. He says they will be discontinuing it. He is wondering if there is an alternative and if so, would it cause this reaction again.    Phone Number patient can be reached at:  Home number on file [phone] (home)    Best Time:  Anytime    Can we leave a detailed message on this number:  YES    Call taken on 8/1/2019 at 9:15 AM by Zakia Reis

## 2019-08-01 NOTE — TELEPHONE ENCOUNTER
Routing to  to review and advise.    Nereyda Padilla, BS, RN, N  [facility]) [phone]

## 2019-08-02 ENCOUNTER — TELEPHONE (OUTPATIENT)
Dept: FAMILY MEDICINE | Facility: CLINIC | Age: 79
End: 2019-08-02

## 2019-08-02 ENCOUNTER — DOCUMENTATION ONLY (OUTPATIENT)
Dept: FAMILY MEDICINE | Facility: CLINIC | Age: 79
End: 2019-08-02

## 2019-08-02 DIAGNOSIS — Z51.81 MEDICATION MONITORING ENCOUNTER: ICD-10-CM

## 2019-08-02 LAB — HEMOCCULT STL QL IA: POSITIVE

## 2019-08-02 NOTE — TELEPHONE ENCOUNTER
Attempt # 2    Called #   Telephone Information:   Mobile [phone]     Left a non detailed VM to call back at [phone] and ask for any available Triage Nurse.    Josias Lynn RN   [facility]

## 2019-08-02 NOTE — PROGRESS NOTES
Attempt # 2    Called #   Telephone Information:   Mobile [phone]       Left a non detailed VM to call back at [phone] and ask for any available Triage Nurse.    Josias Lynn RN   [facility]

## 2019-08-02 NOTE — TELEPHONE ENCOUNTER
Reason for Call:  Form, our goal is to have forms completed with 72 hours, however, some forms may require a visit or additional information.    Type of letter, form or note:  physician orders/ ez stand    Who is the form from?: [employer] (if other please explain)    Where did the form come from: form was faxed in    What clinic location was the form placed at?: [place]    Where the form was placed: Given to physician    What number is listed as a contact on the form?: [phone], fax [phone]       Additional comments:     Call taken on 8/2/2019 at 3:54 PM by Kathryn Carson

## 2019-08-03 NOTE — TELEPHONE ENCOUNTER
OK for requested orders:    SN 2w2, 1w1, 3 as needed to evalaute and educate patient safety in home, skin integrity, pressure relief, diet/nutrition, and pain management.  PT 1 every 10 day 1 to evaluate and treat to include strength, mobility, range of motion, balance, and transfer safety.  OT 1 every 10 day to evaluate and treat to include home safety, adaptive techniques, adaptive equipmetn needs, and pressure relief.  SW 1 every 14 day 1 to assist with community resources.

## 2019-08-03 NOTE — PROGRESS NOTES
[facility] now requests orders and shares plan of care/discharge summaries for some patients through [employer].  Please REPLY TO THIS MESSAGE OR ROUTE BACK TO THE AUTHOR in order to give authorization for orders when needed.  This is considered a verbal order, you will still receive a faxed copy of orders for signature.  Thank you for your assistance in improving collaboration for our patients.    ORDER SN 2w2, 1w1, 3 as needed to evalaute and educate patient safety in home, skin integrity, pressure relief, diet/nutrition, and pain management.  PT 1 every 10 day 1 to evaluate and treat to include strength, mobility, range of motion, balance, and transfer safety.  OT 1 every 10 day to evaluate and treat to include home safety, adaptive techniques, adaptive equipmetn needs, and pressure relief.  SW 1 every 14 day 1 to assist with community resources.     MD SUMMARY/PLAN OF CARE  Byron is a 79 year old [facility] clinic visit referral for home care assessment. Patient lives with her  who is the primary caregiver. He provides all cares for client related to her dependence on others to meet all of her care needs. She has several deep tissue injury nonblanchable areas noted on her buttock near the iscial points. She has open sores that appear to be incontinence associated dermatitis on scattered parts of her buttocks, referred to Dermatology for consult. Concern was she had Shingles, was prescribed Valtrex however  discontinued use  Because client had a sever reaction, increased pain and discomfort, which resolved after medication stopped after 1 dose.  Wound 1 is 1 cm x 1cm x 0.1 cm left buttock no drainage noted. Wound 2 is right buttucks 0.5cm x 0.4cm x 0.1 cm no drainage noted. Wound 3 Deep tissue Injury right buttocks extended to left buttocks 4 cm x  6 cm. 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 She had a positive occult fecal test reported as positive, follow up with her  primary for cares needed. For transfers client has to use a EZ stander to assist with use of a sling.  does not use or position properly which causes pain to client under her arms during the transfers. She is very sensitive to touch and pain, not taking any pain medications currently. She spends most of her day sitting in a chair or lying in bed. She is totally dependent on others for cares. Her daughter and son are available to assist with cares on occasion. [employer] are pending for assistance with private pay cares in home. She will benefit from home care services Nursing for home safety, pressure relief, medication evaluation/reconcilliation, diet/nutrition, caregiver/patient education on skin integrity, and pain management. PT to evaluate and treat to include strength, mobility, range of motion, balance, and transfer safety. OT to evaluate and treat to include home safety, adaptive techniques, adaptive equipmetn needs, and pressure relief. SW to assist with community resources.

## 2019-08-04 NOTE — TELEPHONE ENCOUNTER
This has been completed many times, please call company and find out what we need to do to has this completed

## 2019-08-05 ENCOUNTER — DOCUMENTATION ONLY (OUTPATIENT)
Dept: FAMILY MEDICINE | Facility: CLINIC | Age: 79
End: 2019-08-05

## 2019-08-05 NOTE — TELEPHONE ENCOUNTER
I spoke with [employer] and they said they did not send a form.  They advised me that they just opened patient to home care.  They will check with home care  to see what else is needed and call us back.    NAYELI Harris, RN, N  [facility]) [phone]

## 2019-08-05 NOTE — PROGRESS NOTES
Attempt # 3 - see result notes     Called #   Telephone Information:   Mobile [phone]         Left a non detailed VM     Cadence Gonzalez RN, BSN  [facility]

## 2019-08-05 NOTE — TELEPHONE ENCOUNTER
See result notes     Attempt # 3    Called #   Telephone Information:   Mobile [phone]         Left a non detailed VM     Cadence Gonzalez RN, BSN  [facility]

## 2019-08-05 NOTE — PROGRESS NOTES
[facility] now requests orders and shares plan of care/discharge summaries for some patients through "[employer].  Please REPLY TO THIS MESSAGE OR ROUTE BACK TO THE AUTHOR in order to give authorization for orders when needed.  This is considered a verbal order, you will still receive a faxed copy of orders for signature.  Thank you for your assistance in improving collaboration for our patients.    ORDER    Is is okay to use a Tegaderm with pad/foam to cover the impaired skin on her buttocks?

## 2019-08-06 ENCOUNTER — TELEPHONE (OUTPATIENT)
Dept: FAMILY MEDICINE | Facility: CLINIC | Age: 79
End: 2019-08-06

## 2019-08-06 ENCOUNTER — DOCUMENTATION ONLY (OUTPATIENT)
Dept: FAMILY MEDICINE | Facility: CLINIC | Age: 79
End: 2019-08-06

## 2019-08-06 DIAGNOSIS — R33.9 URINARY RETENTION: Primary | ICD-10-CM

## 2019-08-06 NOTE — TELEPHONE ENCOUNTER
OK for requested orders:    okay to use a Tegaderm with pad/foam to cover the impaired skin on her buttocks

## 2019-08-06 NOTE — TELEPHONE ENCOUNTER
OK for requested orders:    OT POC for 4x/month for DME assess and educate for increased pt/cg safety with ADLs.

## 2019-08-06 NOTE — PROGRESS NOTES
[facility] now requests orders and shares plan of care/discharge summaries for some patients through [employer].  Please REPLY TO THIS MESSAGE OR ROUTE BACK TO THE AUTHOR in order to give authorization for orders when needed.  This is considered a verbal order, you will still receive a faxed copy of orders for signature.  Thank you for your assistance in improving collaboration for our patients.    ORDER  OT POC for 4x/month for DME assess and educate for increased pt/cg safety with ADLs.

## 2019-08-06 NOTE — TELEPHONE ENCOUNTER
Siva, spouse calling for patient re: letter they received. They stated they have been home and doesn't know why it has been hard to reach them. Please contact them at home.  [phone]  Thank you  Dawn Glez

## 2019-08-06 NOTE — TELEPHONE ENCOUNTER
Siva, , was contacted and this writer reviewed lab results with him. Updated the Demographics to reflect a new cell phone number. Ordered new UA/UC to be performed per result note. Advised of colonoscopy to be performed d/t FIT test positive.     Patient  stated an understanding and agreed with plan.    Josias Lynn RN   [facility]

## 2019-08-07 DIAGNOSIS — R33.9 URINARY RETENTION: ICD-10-CM

## 2019-08-07 LAB
ALBUMIN UR-MCNC: NEGATIVE MG/DL
APPEARANCE UR: CLEAR
BACTERIA #/AREA URNS HPF: ABNORMAL /HPF
BILIRUB UR QL STRIP: NEGATIVE
COLOR UR AUTO: YELLOW
GLUCOSE UR STRIP-MCNC: NEGATIVE MG/DL
HGB UR QL STRIP: NEGATIVE
KETONES UR STRIP-MCNC: NEGATIVE MG/DL
LEUKOCYTE ESTERASE UR QL STRIP: ABNORMAL
MUCOUS THREADS #/AREA URNS LPF: PRESENT /LPF
NITRATE UR QL: NEGATIVE
NON-SQ EPI CELLS #/AREA URNS LPF: ABNORMAL /LPF
PH UR STRIP: 5.5 PH (ref 5–7)
RBC #/AREA URNS AUTO: ABNORMAL /HPF
SOURCE: ABNORMAL
SP GR UR STRIP: 1.02 (ref 1–1.03)
UROBILINOGEN UR STRIP-ACNC: 0.2 EU/DL (ref 0.2–1)
WBC #/AREA URNS AUTO: ABNORMAL /HPF

## 2019-08-07 PROCEDURE — 87086 URINE CULTURE/COLONY COUNT: CPT | Performed by: FAMILY MEDICINE

## 2019-08-07 PROCEDURE — 81001 URINALYSIS AUTO W/SCOPE: CPT | Performed by: FAMILY MEDICINE

## 2019-08-08 LAB
BACTERIA SPEC CULT: NO GROWTH
SPECIMEN SOURCE: NORMAL

## 2019-08-12 ENCOUNTER — DOCUMENTATION ONLY (OUTPATIENT)
Dept: FAMILY MEDICINE | Facility: CLINIC | Age: 79
End: 2019-08-12

## 2019-08-12 ENCOUNTER — DOCUMENTATION ONLY (OUTPATIENT)
Dept: CARE COORDINATION | Facility: CLINIC | Age: 79
End: 2019-08-12

## 2019-08-12 DIAGNOSIS — G91.2 NPH (NORMAL PRESSURE HYDROCEPHALUS) (H): Primary | ICD-10-CM

## 2019-08-12 NOTE — PROGRESS NOTES
Requesting DME prescription for: melyssa lift with hygiene sling.     For insurance to cover this melyssa lift, patient requires F2F chart notes and a script to be faxed to [facility] fax: [phone]     [facility] indicates that her most recent appointment notes (from 7/30) could be amended to address her need for lift equipment for transfers if this was not stated in the chart notes and if MD is okay to amend the note, otherwise she will need to return for the F2F appointment.     If notes are amended and script is written, then all will be faxed to [employer]. If patient will need to be seen, please have staff reach out to me to advise patient family on need for appointment.     Please contact Gilda Baer OTR/L with any questions at [phone]    Thank you for your assistance with providing care for our home care patients.

## 2019-08-12 NOTE — PROGRESS NOTES
[facility] now requests orders and shares plan of care/discharge summaries for some patients through [employer].  Please REPLY TO THIS MESSAGE OR ROUTE BACK TO THE AUTHOR in order to give authorization for orders when needed.  This is considered a verbal order, you will still receive a faxed copy of orders for signature.  Thank you for your assistance in improving collaboration for our patients.    ORDER   HHA for 1w2 for assist with bathing needs/safety.

## 2019-08-12 NOTE — PROGRESS NOTES
[facility] now requests orders and shares plan of care/discharge summaries for some patients through [employer].  Please REPLY TO THIS MESSAGE OR ROUTE BACK TO THE AUTHOR in order to give authorization for orders when needed.  This is considered a verbal order, you will still receive a faxed copy of orders for signature.  Thank you for your assistance in improving collaboration for our patients.    ORDER  PT for transfers, balance and strengthening 1w3    MD SUMMARY/PLAN OF CARE  Pt presents with impaired ROM and functional weakness limiting safe ability to complete transfers.  Pt will benefit from skilled PT to progress towards goals as stated below.  Plan to see 1w3.     Goals to be met by 9/7/19  1.  Pt will be independent with A from caregiver to complete HEP for strength and ROM.  2.  Pt will be able to participate in safe pivot transfer with mod A for ability to enter/exit car.  3.  Pt will be able to stand at counter for 30 seconds with mod A for weight bearing through [employer] for continued use of EZ stand.    Kourtney Hobbs, PT  [facility]  [phone]

## 2019-08-13 ENCOUNTER — TELEPHONE (OUTPATIENT)
Dept: FAMILY MEDICINE | Facility: CLINIC | Age: 79
End: 2019-08-13

## 2019-08-13 DIAGNOSIS — G91.2 NPH (NORMAL PRESSURE HYDROCEPHALUS) (H): Primary | ICD-10-CM

## 2019-08-13 NOTE — TELEPHONE ENCOUNTER
----- Message from Andra Ny sent at 8/13/2019  1:22 PM CDT -----  Regarding: Neurology referral  Geneva Rockwell!  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  Unfortunately, none of our providers see for this diagnosis at the [facility] or [facility].    We will be reaching out to the patient to inform them of this.    Thank you!  Andra POWER    Please DO NOT send this message and/or reply back to sender. Call Center Representatives DO NOT respond to messages.

## 2019-08-13 NOTE — PROGRESS NOTES
See note below from care coordination.      Nereyda Padilla, NAYELI, RN, PHN  [facility]) [phone]

## 2019-08-13 NOTE — TELEPHONE ENCOUNTER
Please review further, I believe they placed the shnt for NPH, please see if the UM can follow up with her, neurology or neurosurgery?    Please review with care coordination

## 2019-08-13 NOTE — TELEPHONE ENCOUNTER
Detailed VM left for  Vernon, with TS note below.    Nereyda Padilla, BS, RN, N  [facility]) [phone]      
Reason for Call:  Other call back    Detailed comments: Pt  stopped in to get a hat and urine cup and would like to know why the pt needs to check the urine again in 2-3 weeks    Phone Number Patient can be reached at: Home number on file [phone] (home)    Best Time:     Can we leave a detailed message on this number? YES    Call taken on 8/13/2019 at 11:30 AM by Radha Umaña    
Routing to provider for review and advise as appropriate.    NAYELI BarronN, RN  [employer] [facility]    
Urine (UA/UC) to confirm no infection, patient's choice based on symptoms  
Declined

## 2019-08-13 NOTE — PROGRESS NOTES
Review completed. Looks OK to addend 7/30 note with face to face statement along with the order for the medical equipment. Once this is faxed to the below DME company they should be able to fill the order.     Please let me know if additional assistance from care coordination is needed. Per chart review, our Community Health Worker outreached to this Patient X3 to offer care coordination with no reply.     Thank you,     David Butler, [facility]   Clinic Care Coordinator   Ph. [phone]   [email or web address]    Routing comment

## 2019-08-13 NOTE — PROGRESS NOTES
Routing back to Maricarmen and to care coordination.      Nereyda Padilla, BS, RN, N  [facility]) [phone]

## 2019-08-14 NOTE — TELEPHONE ENCOUNTER
Message handled by Nurse Triage with [employer] - provider name: MD GUSTAVO - this is for routine FU. Advise Dr. Rudy Rockwell @ [facility].  Also, please connect with  to Follow-up with patient. Home Care, Care Coordination - what paperwork is needed, what does patient need at home?      Tessy Gandara RN  [place]

## 2019-08-14 NOTE — TELEPHONE ENCOUNTER
See 08/12/2019 Documentation Only Encounter - paperwork completed.   Referral placed      Attempt #1  Called patient @ [phone] - Jsxb a non-detailed message to call back and speak with any triage nurse.  NEED TO INFORM OF REFERRAL INFORMATION      Will also route encounter to SW and CC - is there anything else you need for this patient?  Please route back to P  TRIAGE.      Tessy Gandara RN  [facility]

## 2019-08-14 NOTE — TELEPHONE ENCOUNTER
Attempt #1  Called [facility] @ [phone] - office is currently closed. Will attempt again later.     Called [facility] @ [phone] - stated they do see patient's for NPH. If there are any problems with the shunt they advise patient go back to surgeon, otherwise if it just for a Follow-up, they can see patient there.     Routing to PCP for further review/recommendations/orders.  Referral Pended    Tessy Gandara RN  [employer] [facility]

## 2019-08-14 NOTE — PROGRESS NOTES
07/30/2019 OV notes addended to add F2F  Order for DME placed, signed by MD GUSTAVO  Faxed to [facility] @ [phone]       Tessy Gandara RN  [place] [facility]

## 2019-08-15 ENCOUNTER — TELEPHONE (OUTPATIENT)
Dept: FAMILY MEDICINE | Facility: CLINIC | Age: 79
End: 2019-08-15

## 2019-08-15 DIAGNOSIS — Z76.89 ENCOUNTER FOR SUPPORT AND COORDINATION OF TRANSITION OF CARE: Primary | ICD-10-CM

## 2019-08-15 NOTE — TELEPHONE ENCOUNTER
Akila calling from [employer].  Patient is not eligible for the DME for the Yoan Lift with Hygiene Sling, because the patient can only request one every 5 years.  Last bill date was 5/30/2015, however the Yoan lift was not filled by [employer] but a different supply company.    Akila from [employer] can be reached at: [phone]    Routing to CC per notes from 8/12/19 and PCP, does a letter for medical necessity need to be written as to why another yoan is being requested.    NAYELI BarronN, RN  [employer] [facility]

## 2019-08-16 ENCOUNTER — PATIENT OUTREACH (OUTPATIENT)
Dept: CARE COORDINATION | Facility: CLINIC | Age: 79
End: 2019-08-16

## 2019-08-16 NOTE — PROGRESS NOTES
CCC Referral: Attempt 1  Community Health Worker called and left a message for the patient in order to discuss enrollment with Clinic Care Coordination.    If the patient is returning my call, please transfer her to me, Lorene, at [phone].    Provider Comments 08/16/2019  9:19 AM Josias Lynn, RN Provider Comments -   Note    Services are provided by a Care Coordinator for people with complex needs such as: medical, social, or financial troubles.  The Care Coordinator works with the patient and their Primary Care Provider to determine health goals, obtain resources, achieve outcomes, and develop care plans that help coordinate the patient's care.      Reason for Referral: Care Transition: Home care discharge     Additional pertinent details:  Need for DME, melyssa with hygiene sling     Clinical Staff have discussed the Care Coordination Referral with the patient and/or caregiver: yes              ___________________________  Next Outreach: 08/17/19

## 2019-08-16 NOTE — TELEPHONE ENCOUNTER
This writer has attached another order for Care coordination to help with DME.   can be reached at [phone]. Recently updated phone number.    Care Coordination please try to reach Pt again.  Thank you    Josias Lynn RN   [facility]

## 2019-08-17 DIAGNOSIS — I26.99 PULMONARY EMBOLISM (H): ICD-10-CM

## 2019-08-17 DIAGNOSIS — I82.409 DVT (DEEP VENOUS THROMBOSIS) (H): ICD-10-CM

## 2019-08-19 ENCOUNTER — PATIENT OUTREACH (OUTPATIENT)
Dept: CARE COORDINATION | Facility: CLINIC | Age: 79
End: 2019-08-19

## 2019-08-19 RX ORDER — WARFARIN SODIUM 2 MG/1
TABLET ORAL
Qty: 45 TABLET | Refills: 0 | Status: SHIPPED | OUTPATIENT
Start: 2019-08-19 | End: 2019-11-10

## 2019-08-19 NOTE — TELEPHONE ENCOUNTER
"Requested Prescriptions   Pending Prescriptions Disp Refills     warfarin (COUMADIN) 2 MG tablet [Pharmacy Med Name: WARFARIN SODIUM 2 MG TABLET] 45 tablet 0     Sig: TAKE 2MG BY MOUTH ON SUNDAY, WEDNESDAY, FRIDAY AND TAKE 4MG REST OF WEEK       Last Written Prescription Date:  7/29/2019  Last Fill Quantity: 45,  # refills: 0   Last office visit: 7/30/2019 with prescribing provider:     Future Office Visit:          Vitamin K Antagonists Failed - 8/17/2019 12:20 PM        Failed - INR is within goal in the past 6 weeks     Confirm INR is within goal in the past 6 weeks.     Recent Labs   Lab Test 07/30/19   INR 2.8*                       Passed - Recent (12 mo) or future (30 days) visit within the authorizing provider's specialty     Patient had office visit in the last 12 months or has a visit in the next 30 days with authorizing provider or within the authorizing provider's specialty.  See \"Patient Info\" tab in inbasket, or \"Choose Columns\" in Meds & Orders section of the refill encounter.              Passed - Medication is active on med list        Passed - Patient is 18 years of age or older        Passed - Patient is not pregnant        Passed - No positive pregnancy on file in past 12 months        "

## 2019-08-19 NOTE — PROGRESS NOTES
Community Health Worker called and left a message for the patient.  If the patient is returning my call, please transfer the patient to [facility] at [phone].   Patient has been mailed a unreachable letter and was provided with CHW contact information if they are interested in accessing Clinic Care Coordination.  Order for Care Management has been closed, no further outreach will be done at this time and patient can be re-referred.

## 2019-08-19 NOTE — PROGRESS NOTES
Siva returned my phone call to discuss enrollment into CC    The Clinic Community Health Worker spoke with the patient's , Siva, today at the request of the PCP to discuss possible Clinic Care Coordination enrollment.  The service was described to the patient and immediate needs were discussed.  The patient agreed to enrollment and an assessment was scheduled.  The patient was provided with contact information for the clinic CHW.             Assessment date: 08/23/19    Patient Concerns  Assistance with obtaining medical equipment

## 2019-08-19 NOTE — LETTER
August 19, 2019      Ángela Dumont  [address]  [place] [address]      Dear Ángela,                                                                         You were recently referred to the [facility]'s Clinic Care Coordination service.  This is a service offered through your Primary Care Clinic which can help you access resources, services in regard to your health and well-being goals. The clinic Community Health Worker has placed two calls to you to discuss the nature of this service and to offer enrollment.  If you are interested in learning more about Clinic Care Coordination, please call your Primary Care Clinic's Community Health Worker, Lorene, at [phone].                                                    Sincerely,                                                                              EROS Hsu                                                                                          Clinic Care Coordination                                                  [facility] : [address] [address] [address] [place]                               Phone: [phone]

## 2019-08-21 DIAGNOSIS — I82.409 DVT (DEEP VENOUS THROMBOSIS) (H): ICD-10-CM

## 2019-08-21 DIAGNOSIS — I26.99 PULMONARY EMBOLISM (H): ICD-10-CM

## 2019-08-21 RX ORDER — WARFARIN SODIUM 4 MG/1
TABLET ORAL
Qty: 45 TABLET | Refills: 0 | OUTPATIENT
Start: 2019-08-21

## 2019-08-21 NOTE — TELEPHONE ENCOUNTER
"warfarin (COUMADIN) 2 MG tablet 45 tablet 0 8/19/2019  No   Sig: TAKE 2MG BY MOUTH ON SUNDAY, WEDNESDAY, FRIDAY AND TAKE 4MG REST OF WEEK   Sent to pharmacy as: warfarin (COUMADIN) 2 MG tablet   Class: E-Prescribe   Order: [id number]   E-Prescribing Status: Receipt confirmed by pharmacy (8/19/2019  7:00 PM CDT)       Last office visit: 7/30/2019   Future Office Visit:        Requested Prescriptions   Pending Prescriptions Disp Refills     warfarin (COUMADIN) 4 MG tablet [Pharmacy Med Name: WARFARIN SODIUM 4 MG TABLET] 45 tablet 0     Sig: TAKE 2MG BY MOUTH ON SUNDAY, WEDNESDAY, FRIDAY AND TAKE 4MG REST OF WEEK.       Vitamin K Antagonists Failed - 8/21/2019  1:12 AM        Failed - INR is within goal in the past 6 weeks     Confirm INR is within goal in the past 6 weeks.     Recent Labs   Lab Test 07/30/19   INR 2.8*                       Passed - Recent (12 mo) or future (30 days) visit within the authorizing provider's specialty     Patient had office visit in the last 12 months or has a visit in the next 30 days with authorizing provider or within the authorizing provider's specialty.  See \"Patient Info\" tab in inbasket, or \"Choose Columns\" in Meds & Orders section of the refill encounter.              Passed - Medication is active on med list        Passed - Patient is 18 years of age or older        Passed - Patient is not pregnant        Passed - No positive pregnancy on file in past 12 months        This was just filled     Cadence Gonzalez RN, BSN  [place] Triage       "

## 2019-08-23 ENCOUNTER — DOCUMENTATION ONLY (OUTPATIENT)
Dept: CARE COORDINATION | Facility: CLINIC | Age: 79
End: 2019-08-23

## 2019-08-23 ENCOUNTER — ALLIED HEALTH/NURSE VISIT (OUTPATIENT)
Dept: NURSING | Facility: CLINIC | Age: 79
End: 2019-08-23
Attending: FAMILY MEDICINE
Payer: COMMERCIAL

## 2019-08-23 DIAGNOSIS — S72.91XA FRACTURE OF RIGHT FEMUR (H): Primary | ICD-10-CM

## 2019-08-23 DIAGNOSIS — M17.0 PRIMARY OSTEOARTHRITIS OF BOTH KNEES: ICD-10-CM

## 2019-08-23 DIAGNOSIS — D47.Z9 LOW GRADE B CELL LYMPHOPROLIFERATIVE DISORDER (H): ICD-10-CM

## 2019-08-23 DIAGNOSIS — Z79.01 LONG TERM CURRENT USE OF ANTICOAGULANT THERAPY: ICD-10-CM

## 2019-08-23 PROCEDURE — 99207 ZZC NO CHARGE LOS: CPT

## 2019-08-23 ASSESSMENT — ACTIVITIES OF DAILY LIVING (ADL)
DEPENDENT_IADLS:: CLEANING;COOKING;LAUNDRY;SHOPPING;MEAL PREPARATION;MEDICATION MANAGEMENT;MONEY MANAGEMENT;TRANSPORTATION;INCONTINENCE

## 2019-08-23 NOTE — PROGRESS NOTES
[facility] now requests orders and shares plan of care/discharge summaries for some patients through [employer].  Please REPLY TO THIS MESSAGE OR ROUTE BACK TO THE AUTHOR in order to give authorization for orders when needed.  This is considered a verbal order, you will still receive a faxed copy of orders for signature.  Thank you for your assistance in improving collaboration for our patients.    ORDER  SN 1 week 2, 2 MARQUITA Alas RN

## 2019-08-23 NOTE — PROGRESS NOTES
Clinic Care Coordination Contact    Face to Face office visit.    Referral Information:  Referral Source: PCP    Primary Diagnosis: Other (include Comment box)(DME/ community resources)    Chief Complaint   Patient presents with     Clinic Care Coordination - Face To Face     RN CC assessment        Universal Utilization: No concerns.    Clinical Concerns:  Patient with significant medical history of  shunt, NPH (normal pressure hydrocephalus), and dementia.    RN CC met with patient today to discuss needs within home and community.  Patient is well supported in community.  Spouse has arranged for PCA services 2 hrs/day 6 days per week by [facility] (new service, started this week).  The PCA did join us in our visit today.  She assists with getting patient up in the morning and does showers every other day.  Spouse private pays for PCA.  Spouse provides all cares for patient when PCA is not available.    It is important to note, patient previously had [facility] doing home visits and provided PCA support.  They were let go last Friday, 8/16 due to conflict with patient spouse.  [facility] did submit a VA report to [facility] on 8/19.  It is noted in home care charting the VA report was due to safety concerns for patient.   RN voiced concern with bathing, as  will now be responsible for completing, concern that every time she has seen pt she has had BM on bottom and pts spouse has stated he doesnt really like cleaning her up. Concern for car transfer safety as pts  brings her on a car ride every day adn will be transfering him by himself.     Patient is open to [facility]- RN/PT/OT  -Jorge Landin [phone]    Spouse states he is able to care for at home, however he would like additional help.  He is not able to pay for more PCA services.  He has never had the [facility] assess to see if patient qualifies for waivered services.  We discussed what [facility] assistance could  "provide.  He does not think they would qualify.  RN CC provided phone number to call and schedule assessment.    DME:  Spouse states they have all the equipment they need, but would like a new wheelchair, \"something with suspension\".  He states patient can feel every bump they go over.  He would like Medicare to pay for it. When asked about the melyssa lift, spouse stated he felt they \"don't really need one\" right now.  They are doing well with the sit to stand.  They are able to use this to get patient on shower chair then they can push shower chair into shower.     Spouse does not have much support for care giving.  He has 2 children that are not very helpful.  He has no one to provide him \"a break\".  He is at risk for caregiver fatigue.  We discussed options for respite care.  He did show some interest and he was provided some resources.      Discussed recent referrals made for neurology.  Spouse states they have made an appointment for sometime in November.    Education Provided to patient: Care coordination, respite, [facility] services    Pain  Pain (GOAL):: Yes  Type: Chronic (>3mo)  Radiating: No  Progression: Unchanged    Medication Management:  Reviewed. They are wondering if patient will ever be able to get off of coumadin.  They have never gotten any direction on this.  Spouse also stated he is not giving patient medication for Shingles due to side effects.  Patient still has shingles on her buttocks.  Will update PCP.    Functional Status:  Dependent ADLs:: Wheelchair-with assist, Bathing, Dressing, Eating, Grooming, Incontinence, Positioning, Transfers, Toileting  Dependent IADLs:: Cleaning, Cooking, Laundry, Shopping, Meal Preparation, Medication Management, Money Management, Transportation, Incontinence  Bed or wheelchair confined:: Yes  Mobility Status: Dependent/Assisted by Another  Fallen 2 or more times in the past year?: No  Any fall with injury in the past year?: No    Living Situation:  Current " living arrangement:: I live in a private home with spouse  Type of residence:: Private home - stairs    Diet/Exercise/Sleep:  Diet:: Regular  Inadequate nutrition (GOAL):: No  Food Insecurity: No  Tube Feeding: No  Exercise:: Unable to exercise  Inadequate activity/exercise (GOAL):: No  Significant changes in sleep pattern (GOAL): No    Transportation:  Transportation concerns (GOAL):: No - spouse provides all transport.  He is able to get patient out of car on his own, but needs assistance getting her in the car.    Psychosocial:  Concern for caregiver fatigue and home safety.     Financial/Insurance: [facility]/MEDICARE  Financial/Insurance concerns (GOAL):: No     Resources and Interventions:  Current Resources:   List of home care services:: Skilled Nursing, Physicial Therapy, Occupational Therapy;   Community Resources: PCA(Private Pay PCA 2 hrs per day - [facility])  Supplies used at home:: Incontinence Supplies, Chux, Gloves  Equipment Currently Used at Home: shower chair, lift device, other (see comments), wheelchair, manual(sit to stand lift)    Advance Care Plan/Directive  Advanced Care Plans/Directives on file:: Yes  Type Advanced Care Plans/Directives: POLST  Advanced Care Plan/Directive Status: Not Applicable    Referrals Placed: [facility] Resources, [facility] Linkage Line, Community Resources, Other (Respite/stay by the day)     Goals:   Goals        General    Functional (pt-stated)     Notes - Note created  8/23/2019  1:44 PM by Cece Comer RN    Goal Statement: I need a new WC, and would like medicare to pay for it.  Measure of Success: Patient will get new WC that is more comfortable and has better suspension.  Supportive Steps to Achieve: RN CC support.  PCP to write Rx and provide necessary documentation to Medicare.  CHW support.  Barriers: unsure if ins will cover.  Strengths: Patient is well supported in community.  Patient spouse very involved.  Date to Achieve By: 1 Oct 2019   Patient expressed  understanding of goal: yes.              Pt reports understanding and denies any additional questions or concerns at this times. RN CC engaged in [employer] communication during encounter.      Plan: RN CC provided resources : [facility] guide for respite care, stay by the day, [facility] linkage line, [facility].    RN CC will ask PCP to write Rx for WC that can be billed to medicare. Will ask about need to stay on coumadin and inform PCP patient is not taking med to treat shingles.      Patient to f/u with neurology in November.    Will delegate to CHW to follow up on WC and follow up resources provided during office visit.  CHW to outreach in 2 weeks.    Rn CC will continue to follow.    Cece Comer RN  Care Coordination  Phone:  [phone]  Email: [email or web address]  [facility]

## 2019-08-26 ENCOUNTER — TELEPHONE (OUTPATIENT)
Dept: FAMILY MEDICINE | Facility: CLINIC | Age: 79
End: 2019-08-26

## 2019-08-26 DIAGNOSIS — R33.9 URINARY RETENTION: Primary | ICD-10-CM

## 2019-08-26 DIAGNOSIS — R33.9 URINARY RETENTION: ICD-10-CM

## 2019-08-26 LAB
ALBUMIN UR-MCNC: NEGATIVE MG/DL
APPEARANCE UR: CLEAR
BILIRUB UR QL STRIP: NEGATIVE
COLOR UR AUTO: YELLOW
GLUCOSE UR STRIP-MCNC: NEGATIVE MG/DL
HGB UR QL STRIP: NEGATIVE
KETONES UR STRIP-MCNC: NEGATIVE MG/DL
LEUKOCYTE ESTERASE UR QL STRIP: NEGATIVE
NITRATE UR QL: NEGATIVE
NON-SQ EPI CELLS #/AREA URNS LPF: NORMAL /LPF
PH UR STRIP: 6 PH (ref 5–7)
RBC #/AREA URNS AUTO: NORMAL /HPF
SOURCE: NORMAL
SP GR UR STRIP: 1.01 (ref 1–1.03)
UROBILINOGEN UR STRIP-ACNC: 0.2 EU/DL (ref 0.2–1)
WBC #/AREA URNS AUTO: NORMAL /HPF

## 2019-08-26 PROCEDURE — 87086 URINE CULTURE/COLONY COUNT: CPT | Performed by: FAMILY MEDICINE

## 2019-08-26 PROCEDURE — 81001 URINALYSIS AUTO W/SCOPE: CPT | Performed by: FAMILY MEDICINE

## 2019-08-26 NOTE — PROGRESS NOTES
"Gudelia  \"GILDA\"  Keyur is the OT working with her, she will be more than happy to help you with this.  Her number is  [phone], and if she is not available, then check with OT supervisor Margaret Valencia at [phone].    Gilda is on vacation.    Detailed VM left for OT supervisor Margaret asking if OT can complete wheelchair assessment at home.      Nereyda Padilla, NAYELI, RN, PHN  [facility]) [phone]            "

## 2019-08-26 NOTE — PROGRESS NOTES
Message handled by Nurse Triage with [employer] - provider name: MD GUSTAVO - advise OT eval for wheelchair/seating eval.    Referral pended    Attempt #1  Called patient's spouse, Vernon, @ [phone] - Left a non-detailed message to call back and speak with any triage nurse.    Tessy Gandara RN  [place] Triage

## 2019-08-26 NOTE — PROGRESS NOTES
Vernon notified by phone.  Patient is currently getting home OT through [facility].    I left  for [facility] [phone] ) to see if wheelchair assessment can be done through home care. She has OT visit tomorrow with home care and message left to see if they can do the wheelchair assessment tomorrow.    She will not be able to get outpatient OT while receiving home care services.    I had to leave message with answering service.  I advised that it is not urgent and they can call us tomorrow morning. I am not sure when her OT visit is tomorrow.      Nereyda Padilla, NAYELI, RN, PHN  [facility] Triage  ) [phone]

## 2019-08-26 NOTE — TELEPHONE ENCOUNTER
Adriane [facility] [phone] Says she had message from SMITA Dawn about getting OT and wheelchair. She says she does not have any further information but is returning the call. She says call should be returned tomorrow between 8 and 4:30 and talk to the . She was not able to give me a direct number but said to call [facility].     Unfortunately I do not see notes regarding OT or a wheelchair to help Adriane. She says she is covering and is not familiar with the patient situation so apologizes for not having more information. I see note from today regarding UA/UC but Adriane assures me the message she got was not about this. I am unable to find notes in Epic in past few encounters regarding OT or a wheelchair. Will have RV triage review tomorrow during business hours. Isabelle Nielsen R.N.

## 2019-08-26 NOTE — TELEPHONE ENCOUNTER
UA/UC ordered per last result note, however, does pt have symptoms?  Last UC was normal.   Please get update of symptoms and ask lab to run UA/UC with futured orders placed.      Silvia Roland MS, PA-C

## 2019-08-26 NOTE — TELEPHONE ENCOUNTER
Lab advised to run UA/UC.      Vernon advised that as far as he knows she is not having any UTI symptoms at this time.      Nereyda Padilla, NAYELI, RN, N  [facility]) [phone]

## 2019-08-27 NOTE — TELEPHONE ENCOUNTER
"Gudelia  \"GILDA\"  Keyur is the OT working with her, she will be more than happy to help you with this.  Her number is  [phone], and if she is not available, then check with OT supervisor Margaret Valencia at [phone].     Gilda is on vacation.     Detailed VM left for OT supervisor Margaret asking if OT can complete wheelchair assessment at home.    See 8/23/2019 allied health encounter.        Nereyda Padilla, NAYELI, RN, PHN  [facility]) [phone]  "

## 2019-08-27 NOTE — PROGRESS NOTES
I spoke with Margaret Valencia and she said she spoke with patient's home OT and they will complete wheelchair assessment on 8/29/2019 and she will help facilitate getting new wheelchair.     FYI to .    NAYELI Harris, RN, N  [facility]) [phone]

## 2019-08-28 ENCOUNTER — ANTICOAGULATION THERAPY VISIT (OUTPATIENT)
Dept: FAMILY MEDICINE | Facility: CLINIC | Age: 79
End: 2019-08-28
Payer: COMMERCIAL

## 2019-08-28 ENCOUNTER — TRANSFERRED RECORDS (OUTPATIENT)
Dept: HEALTH INFORMATION MANAGEMENT | Facility: CLINIC | Age: 79
End: 2019-08-28

## 2019-08-28 DIAGNOSIS — I26.99 PULMONARY EMBOLISM (H): ICD-10-CM

## 2019-08-28 DIAGNOSIS — Z79.01 LONG TERM CURRENT USE OF ANTICOAGULANT THERAPY: ICD-10-CM

## 2019-08-28 DIAGNOSIS — I82.409 DVT (DEEP VENOUS THROMBOSIS) (H): ICD-10-CM

## 2019-08-28 LAB
BACTERIA SPEC CULT: NORMAL
INR PPP: 2.2
SPECIMEN SOURCE: NORMAL

## 2019-08-28 PROCEDURE — 99207 ZZC NO CHARGE NURSE ONLY: CPT | Performed by: FAMILY MEDICINE

## 2019-08-28 NOTE — PROGRESS NOTES
ANTICOAGULATION FOLLOW-UP CLINIC VISIT    Patient Name:  Ángela Dumont  Date:  2019  Contact Type:  Telephone/ called # [phone] (home) reviewed with  -  stated he will re check pt in October, Rn reviewed that he should be checking every 2 weeks due to the policy of the company. pt's  stated he will do what he needs to do    SUBJECTIVE:  Patient Findings         Clinical Outcomes     Negatives:   Major bleeding event, Thromboembolic event, Anticoagulation-related hospital admission, Anticoagulation-related ED visit, Anticoagulation-related fatality           OBJECTIVE    INR   Date Value Ref Range Status   2019 2.2  Final       ASSESSMENT / PLAN  No question data found.  Anticoagulation Summary  As of 2019    INR goal:   2.0-3.0   TTR:   70.8 % (3.4 y)   INR used for dosin.2 (2019)   Warfarin maintenance plan:   4 mg (1 mg x 4) every Sun, Wed; 2 mg (1 mg x 2) all other days   Full warfarin instructions:   4 mg every Sun, Wed; 2 mg all other days   Weekly warfarin total:   18 mg   No change documented:   Cadence Gonzalez RN   Plan last modified:   Dorita Panda RN (12/3/2018)   Next INR check:   2019   Target end date:       Indications    Long-term (current) use of anticoagulants [Z79.01] [Z79.01]  Pulmonary embolism (H) [I26.99]  DVT (deep venous thrombosis) (H) [I82.409]             Anticoagulation Episode Summary     INR check location:       Preferred lab:       Send INR reminders to:    NURSE    Comments:         Anticoagulation Care Providers     Provider Role Specialty Phone number    Joaquin Rockwell MD [facility] [phone]            See the Encounter Report to view Anticoagulation Flowsheet and Dosing Calendar (Go to Encounters tab in chart review, and find the Anticoagulation Therapy Visit)    Dosage adjustment made based on physician directed care plan.    Cadence Gonzalez RN

## 2019-09-06 ENCOUNTER — PATIENT OUTREACH (OUTPATIENT)
Dept: CARE COORDINATION | Facility: CLINIC | Age: 79
End: 2019-09-06

## 2019-09-06 NOTE — PROGRESS NOTES
Community Health Worker called the patient for Clinic Care Coordination outreach follow up on goals and action steps.  Spoke to Juan David    Discussed the following  Getting a new wheelchair    Juan David states that there was an OT Assessment on 08/29/19.    OT will be coming out again for another appointment, however, he wasn't sure when the appointment was.    ______________________  CHW delegation from DES Rodriguez RN, on 08/23/19:  Will delegate to CHW to follow up on WC and follow up resources provided during office visit.  CHW to outreach in 2 weeks.    09/06/19: OT assessment was done, having another appointment coming up.  ______________________  Next Outreach: 10/07/19  Planned Outreach Frequency: Monthly  Preferred Phone Number: Juan David(spouse) [phone]    Last Assessment Date: 08/23/19  Care Plan Completion Date: 08/23/19  ______________________  Goals       Functional (pt-stated)      Goal Statement: I need a new WC, and would like medicare to pay for it.  Measure of Success: Patient will get new WC that is more comfortable and has better suspension.  Supportive Steps to Achieve: RN CC support.  PCP to write Rx and provide necessary documentation to Medicare.  CHW support.  Barriers: unsure if ins will cover.  Strengths: Patient is well supported in community.  Patient spouse very involved.  Date to Achieve By: 1 Oct 2019   Patient expressed understanding of goal: yes.    As of today's date 9/6/2019 goal is met at 0 - 25%.   Goal Status:  Active  Patient had an OT Assessment on 08/29/19, has another appointment with OT, but was unsure when that was.

## 2019-09-27 ENCOUNTER — DOCUMENTATION ONLY (OUTPATIENT)
Dept: FAMILY MEDICINE | Facility: CLINIC | Age: 79
End: 2019-09-27

## 2019-09-27 NOTE — PROGRESS NOTES
[facility] now requests orders and shares plan of care/discharge summaries for some patients through [facility]. Thank you for your assistance in improving collaboration for our patients.    DISCHARGE SUMMARY    Ángela Dumont has been discharged from [facility] services. Pt received treatment, assessment and education on skin integrity, wounds, wheelchairs, and safety with transfers, with RN, PT, SW and OT.  Pt's spouse had ordered a new manual WC privately.   Pt's spouse has been educated on recommendation for use of the Yoan lift for all transfers, as the Pt had at least 3 times had her feet come off the platform, but he is currently refusing, believes the EZ stand is still working fine.   Pt's spouse stating he wants her to walk again and believes she is capable of learning and understanding new instructions. OT has concerns about spouse's judgement, and cognition, as he frequently did not remember appointments and showed poor judgement during education on transfers.   OT and [facility] are available for any future care or education, however no remaining skilled need at present.     Sondra Payne OTR/L  [facility]  [phone]  [email or web address]

## 2019-09-30 ENCOUNTER — PATIENT OUTREACH (OUTPATIENT)
Dept: CARE COORDINATION | Facility: CLINIC | Age: 79
End: 2019-09-30

## 2019-09-30 NOTE — PROGRESS NOTES
"Clinic Care Coordination Contact    Follow Up Progress Note      Assessment: Spoke with spouse to follow up.  Juan David stated patient seems \"a little sensitive\" and not sure why.  He has given her tylenol to see if this helps, but has not seen any benefit to it.  They plan to come to clinic on Wednesday for INR and Flu shot.  He would like to meet with Dr. Rockwell if possible.  Dr. Rockwell has a full scheduled that day.  AYALA Hernandez will be in clinic and offered to set up meting with her to see if she can be helpful in any way.  Juan David agreed to meet with David.    Juan David stated he has ordered a new WC.  We discussed need for melyssa lift.  Juan David is very resistant to melyssa because he feels there is no need for it at this time.    Goals addressed this encounter:   Goals Addressed                 This Visit's Progress      Functional (pt-stated)   On track     Goal Statement: I need a new WC, and would like medicare to pay for it.  Measure of Success: Patient will get new WC that is more comfortable and has better suspension.  Supportive Steps to Achieve: RN CC support.  PCP to write Rx and provide necessary documentation to Medicare.  CHW support.  Barriers: unsure if ins will cover.  Strengths: Patient is well supported in community.  Patient spouse very involved.  Date to Achieve By: 1 Oct 2019   Patient expressed understanding of goal: yes.    As of today's date 9/30/2019 goal is met at 76 - 100%.   Goal Status:  Complete  Per spouse, WC has been ordered.  Just waiting on delivery now.                   Intervention/Education provided during outreach:  Benefits of melyssa lift.     Outreach Frequency: monthly    Plan:   Goal has been met.  Patient has been moved to maintenance status of care coordination    CHW will follow up with patient/spouse in 2 months to check on status and any needs they may have.    Will message AYALA NUNES to see if she would be willing to meet with patient on Wednesday.    Cece Comer RN  Care " Coordination  Phone:  [phone]  Email: [email or web address]  [facility] [place] [facility]

## 2019-09-30 NOTE — PROGRESS NOTES
"Cece Comer RN Scott, Troy, MD 2 hours ago (3:57 PM)      Spoke with spouse today.  He declined need for melyssa or any other assistance at home right now.   He stated patient is \"a little sensitive\" today and not sure why.  He has given her some tylenol with no improvement.  He is taking her to clinic on wednesday at 1100 for INR.  He wanted to see if you had room in your schedule for visit and you are very booked.  I will be at another clinic Wednesday but AYALA Hernandez will be at  and offered her assistance.  He was agreeable to this.  At least she can rely to us what is going on with patient.     Cece ORDOÑEZ CC    Routing comment        Message handled by Nurse Triage with [facility] - provider name: MD GUSTAVO - OK for 1140am on Wednesday.      Tessy Gandara RN  [place] Triage  "

## 2019-10-01 ENCOUNTER — HEALTH MAINTENANCE LETTER (OUTPATIENT)
Age: 79
End: 2019-10-01

## 2019-10-01 NOTE — PROGRESS NOTES
Pt  called back and advised of appt to see TS at 1140. Patient stated an understanding and agreed with plan.    Writer questioned about symptoms and  noted Sensitivity to touch and everything bothers Pt.  states its better today but still continuing.    Josias Lynn RN   [facility]

## 2019-10-01 NOTE — PROGRESS NOTES
Attempt # 1  Called #   Telephone Information:   Mobile [phone]     Advise ok for appt at 1140 on Wednesday    Left a non detailed VM to call back at [phone] and ask for any available Triage Nurse.    Josias Lynn RN   [facility]

## 2019-10-02 ENCOUNTER — PATIENT OUTREACH (OUTPATIENT)
Dept: CARE COORDINATION | Facility: CLINIC | Age: 79
End: 2019-10-02

## 2019-10-02 ENCOUNTER — OFFICE VISIT (OUTPATIENT)
Dept: FAMILY MEDICINE | Facility: CLINIC | Age: 79
End: 2019-10-02
Payer: COMMERCIAL

## 2019-10-02 ENCOUNTER — ANTICOAGULATION THERAPY VISIT (OUTPATIENT)
Dept: NURSING | Facility: CLINIC | Age: 79
End: 2019-10-02
Payer: COMMERCIAL

## 2019-10-02 VITALS — HEART RATE: 85 BPM | OXYGEN SATURATION: 96 % | SYSTOLIC BLOOD PRESSURE: 124 MMHG | DIASTOLIC BLOOD PRESSURE: 70 MMHG

## 2019-10-02 DIAGNOSIS — K62.89 RECTAL MASS: ICD-10-CM

## 2019-10-02 DIAGNOSIS — Z86.711 HISTORY OF PULMONARY EMBOLISM: ICD-10-CM

## 2019-10-02 DIAGNOSIS — R59.0 PELVIC LYMPHADENOPATHY: ICD-10-CM

## 2019-10-02 DIAGNOSIS — I26.99 PULMONARY EMBOLISM (H): ICD-10-CM

## 2019-10-02 DIAGNOSIS — F03.91 DEMENTIA WITH BEHAVIORAL DISTURBANCE, UNSPECIFIED DEMENTIA TYPE: ICD-10-CM

## 2019-10-02 DIAGNOSIS — D47.9 LYMPHOPROLIFERATIVE DISORDER (H): ICD-10-CM

## 2019-10-02 DIAGNOSIS — D47.Z9 LOW GRADE B CELL LYMPHOPROLIFERATIVE DISORDER (H): ICD-10-CM

## 2019-10-02 DIAGNOSIS — I10 HYPERTENSION GOAL BP (BLOOD PRESSURE) < 140/90: ICD-10-CM

## 2019-10-02 DIAGNOSIS — Z86.718 HISTORY OF DEEP VENOUS THROMBOSIS: ICD-10-CM

## 2019-10-02 DIAGNOSIS — G91.2 NPH (NORMAL PRESSURE HYDROCEPHALUS) (H): Primary | ICD-10-CM

## 2019-10-02 DIAGNOSIS — I82.409 DVT (DEEP VENOUS THROMBOSIS) (H): ICD-10-CM

## 2019-10-02 DIAGNOSIS — Z98.2 S/P VP SHUNT: ICD-10-CM

## 2019-10-02 DIAGNOSIS — R30.0 DYSURIA: ICD-10-CM

## 2019-10-02 DIAGNOSIS — R45.4 IRRITABILITY: ICD-10-CM

## 2019-10-02 DIAGNOSIS — E03.9 HYPOTHYROIDISM, UNSPECIFIED TYPE: ICD-10-CM

## 2019-10-02 DIAGNOSIS — E66.811 CLASS 1 OBESITY WITH SERIOUS COMORBIDITY AND BODY MASS INDEX (BMI) OF 30.0 TO 30.9 IN ADULT, UNSPECIFIED OBESITY TYPE: ICD-10-CM

## 2019-10-02 DIAGNOSIS — Z79.01 LONG TERM CURRENT USE OF ANTICOAGULANT THERAPY: ICD-10-CM

## 2019-10-02 DIAGNOSIS — D47.2 MGUS (MONOCLONAL GAMMOPATHY OF UNKNOWN SIGNIFICANCE): ICD-10-CM

## 2019-10-02 DIAGNOSIS — Z51.81 MEDICATION MONITORING ENCOUNTER: ICD-10-CM

## 2019-10-02 LAB
ALBUMIN UR-MCNC: NEGATIVE MG/DL
APPEARANCE UR: CLEAR
BILIRUB UR QL STRIP: NEGATIVE
COLOR UR AUTO: YELLOW
GLUCOSE UR STRIP-MCNC: NEGATIVE MG/DL
HGB UR QL STRIP: NEGATIVE
INR POINT OF CARE: 1.9 (ref 0.86–1.14)
KETONES UR STRIP-MCNC: NEGATIVE MG/DL
LEUKOCYTE ESTERASE UR QL STRIP: NEGATIVE
NITRATE UR QL: NEGATIVE
PH UR STRIP: 7 PH (ref 5–7)
SOURCE: NORMAL
SP GR UR STRIP: 1.01 (ref 1–1.03)
UROBILINOGEN UR STRIP-ACNC: 0.2 EU/DL (ref 0.2–1)

## 2019-10-02 PROCEDURE — 99214 OFFICE O/P EST MOD 30 MIN: CPT | Mod: 25 | Performed by: FAMILY MEDICINE

## 2019-10-02 PROCEDURE — G0008 ADMIN INFLUENZA VIRUS VAC: HCPCS | Performed by: FAMILY MEDICINE

## 2019-10-02 PROCEDURE — 90662 IIV NO PRSV INCREASED AG IM: CPT | Performed by: FAMILY MEDICINE

## 2019-10-02 PROCEDURE — 85610 PROTHROMBIN TIME: CPT | Mod: QW

## 2019-10-02 PROCEDURE — 36416 COLLJ CAPILLARY BLOOD SPEC: CPT

## 2019-10-02 PROCEDURE — 81003 URINALYSIS AUTO W/O SCOPE: CPT | Performed by: FAMILY MEDICINE

## 2019-10-02 PROCEDURE — 87086 URINE CULTURE/COLONY COUNT: CPT | Performed by: FAMILY MEDICINE

## 2019-10-02 NOTE — LETTER
[facility]  [address] [place] [id number]                  [phone]   October 3, 2019    Ángela Dumont  [address] [address]      Dear Ángela,    Here is a summary of your recent test results:    Urine and urine culture are negative.     We advise:     Continue cares as discussed.     For additional lab test information, [email or web address] is an excellent reference.     Let us know if you have any questions or concerns.     Thank you for choosing us for your health care needs!     Your test results are enclosed.      Please contact me if you have any questions.    In addition, here is a list of due or overdue Health Maintenance reminders.    Health Maintenance Due   Topic Date Due     Osteoporosis Screening  1940     Kidney Microalbumin Urine Test  1940     Depression Action Plan  1940     INR CLINIC REFERRAL - yearly  07/02/2019       Please call us at [phone] (or use [facility]) to address the above recommendations.            Thank you very much for trusting [facility]..     Healthy regards,        Joaquin Rockwell M.D.        Results for orders placed or performed in visit on 10/02/19   *UA reflex to Microscopic and Culture ([place] [facility] (except [place] and [place])   Result Value Ref Range    Color Urine Yellow     Appearance Urine Clear     Glucose Urine Negative NEG^Negative mg/dL    Bilirubin Urine Negative NEG^Negative    Ketones Urine Negative NEG^Negative mg/dL    Specific Gravity Urine 1.015 1.003 - 1.035    Blood Urine Negative NEG^Negative    pH Urine 7.0 5.0 - 7.0 pH    Protein Albumin Urine Negative NEG^Negative mg/dL    Urobilinogen Urine 0.2 0.2 - 1.0 EU/dL    Nitrite Urine Negative NEG^Negative    Leukocyte Esterase Urine Negative NEG^Negative    Source Midstream Urine    Urine Culture Aerobic Bacterial   Result Value Ref Range    Specimen Description Midstream Urine      Culture Micro No growth

## 2019-10-02 NOTE — PROGRESS NOTES
Clinic Care Coordination Contact    Follow Up Progress Note      Assessment: AYALA NUNES met with Patient and spouse during Pt's appointment with PCP. Patient and spouse continue to receive home care services through "[employer] 2 hours/day. Pt's spouse reports that PT comes on the 7th day. Pt's spouse declining melyssa at this time. Pt has been using an EZ stand.    Goals addressed this encounter:   Goals Addressed                 This Visit's Progress       Patient Stated      Functional (pt-stated)   On track     Goal Statement: I need a new WC, and would like medicare to pay for it.  Measure of Success: Patient will get new WC that is more comfortable and has better suspension.  Supportive Steps to Achieve: RN CC support.  PCP to write Rx and provide necessary documentation to Medicare.  CHW support.  Barriers: unsure if ins will cover.  Strengths: Patient is well supported in community.  Patient spouse very involved.  Date to Achieve By: 1 Oct 2019   Patient expressed understanding of goal: yes.    As of today's date 9/30/2019 goal is met at 76 - 100%.   Goal Status:  Complete  Per spouse, WC has been ordered.  Just waiting on delivery now.                   Intervention/Education provided during outreach: Business card provide to Pt's spouse. AYALA CC encouraged him to reach out if either he or spouse are interested in additional support, DME, or community resources.     Outreach Frequency: monthly    Plan:   RN CC will continue to follow. In-basket message sent to SMITA NUNES.        David Butler, [facility]  Clinic Care Coordinator  Ph. [phone]  [email or web address]

## 2019-10-02 NOTE — PROGRESS NOTES
SUBJECTIVE:                                                    Ángela Dumont is a 79 year old female who presents to clinic today for the following health issues:    Patient's  reports that patient has been very sensitive to the touch lately and more irritable lately. Patient states she feels sick. Patient states her abdomen feels tight. Patient has been experiencing some leaky bladder and some constipation lately. Patient states her appetite has been decreased.     Low grade B Cell lymphoproliferative disorder/MGUS/Pelvic Lymphadenopathy/Rectal Mass: Stable. Patient consults with Dr. Duran of oncology yearly for further Pelvic Lymphadenopathy/rectal mass management.     Patient will be getting a new wheel chair with a more cushioned seat. Patient has PT,OT, and [facility] coming out to the house 6 days a week. Patient has had no falls in 3 years.     Extensive chart review with extensive review with Byron and her  Vernon. Extensive review of home needs, home care, and care coordination.    NPH/Shunt - stable    Hx PE/PVT - stable    Hypothyroid    TSH   Date Value Ref Range Status   07/30/2019 1.98 0.40 - 4.00 mU/L Final     Hypertension    BP Readings from Last 3 Encounters:   10/02/19 124/70   07/30/19 114/62   04/11/19 125/75     Creatinine   Date Value Ref Range Status   07/30/2019 0.80 0.52 - 1.04 mg/dL Final     Reviewed and updated as needed this visit by Provider       body mass index is unknown because there is no height or weight on file.    Wt Readings from Last 4 Encounters:   07/30/19 79.8 kg (176 lb)   01/07/19 88.5 kg (195 lb)   09/07/18 88.5 kg (195 lb)   02/20/18 96.6 kg (213 lb)       Health Maintenance    Health Maintenance Due   Topic Date Due     DEXA  1940     MICROALBUMIN  1940     DEPRESSION ACTION PLAN  1940     OP ANNUAL INR REFERRAL  07/02/2019       Current Problem List    Patient Active Problem List   Diagnosis     S/P  shunt     NPH (normal pressure  hydrocephalus) (H)     Fracture of right femur (H)     DVT (deep venous thrombosis) (H)     Pulmonary embolism (H)     Dementia (H)     Hypothyroid     Hypertension goal BP (blood pressure) < 140/90     Adjustment disorder with depressed mood     Hyperlipidemia with target LDL less than 130     Leukocytosis     Esophageal reflux     UTI (urinary tract infection)     Constipation     Pulmonary hypertension (H)     MR (mitral regurgitation)     Fibromyalgia     Osteoarthritis of both knees     Health Care Home     Long-term (current) use of anticoagulants [Z79.01]     Advance Care Planning     SIRS (systemic inflammatory response syndrome) (H)     History of pulmonary embolism     History of deep venous thrombosis     Urinary retention     BPPV (benign paroxysmal positional vertigo), bilateral     Herpes zoster without complication     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  Dr Duran     Colitis, nonspecific 10/31/2017     Constipation      Dementia (H)     memory/anger     Depressive disorder last few months as condition became apparent    Lack of mobility a big problem     DVT (deep venous thrombosis) (H) 4/15    bilateral     Esophageal reflux      Fibromyalgia      Fracture of right femur (H) 1/15     Heart murmur      Herpes zoster without complication 08/16/2017    left buttocks     History of Clostridium difficile      Hyperlipidemia LDL goal < 130      Hypertension  goal BP (blood pressure) < 140/90      Hypothyroid      Leukocytosis, unspecified     w/u ?     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)      Lymphoproliferative disorder (H)     Dr Duran     MGUS (monoclonal gammopathy of unknown significance)     Dr Duran     Migraine      MR (mitral regurgitation)     Mild     NPH (normal pressure hydrocephalus) (H) 4/15    shunt placed but removed in 7/2015 due to erosion through the scalp, replaced 5/19/16     Osteoarthritis of both knees      Other atopic dermatitis and related conditions      PE (pulmonary embolism) 4/15    Saddle - IVC     Pelvic lymphadenopathy 10/31/2017     Pulmonary hypertension (H)     EF 70-75%     Rectal mass 10/2017     Rectal mass 10/1/2017     Thrombosis of leg      Urinary tract infection, site not specified     MRSA - Dr Loo     Vertigo        Past Surgical History    Past Surgical History:   Procedure Laterality Date     ABDOMEN SURGERY  left side lower abdomen pain from Shunt we do roberth     COLONOSCOPY  2008     GYN SURGERY       H LABOR AND DELIVERY VAGINAL  1960, 1967     HERNIA REPAIR       HYSTERECTOMY  1976    hysterectomy     IMPLANT SHUNT VENTRICULOPERITONEAL Left 5/19/2016    IMPLANT SHUNT VENTRICULOPERITONEAL - Dr Alba     OPTICAL TRACKING SYSTEM IMPLANT SHUNT VENTRICULOPERITONEAL Right 4/10/2015     Shunt - Dr Sierra     ORTHOPEDIC SURGERY  Knee pain that prohibits walking     REMOVE SHUNT VENTRICULOPERITONEAL N/A 7/3/2015    REMOVE SHUNT VENTRICULOPERITONEAL;  Surgeon: Emmanuel Sierra MD;  Location:  OR     SURGICAL HISTORY OF -   1/15    right femur/hip surgery - 1/18/2015     SURGICAL HISTORY OF -   7/04    rectocele repair     SURGICAL HISTORY OF -   1997    abdominal adhesion lysis     SURGICAL HISTORY OF -   4/15    IVC filter placement     SURGICAL HISTORY OF -   6/15    IVC filter removal       Current Medications    Current Outpatient Medications   Medication Sig Dispense  Refill     acetaminophen (TYLENOL) 325 MG tablet Take 2 tablets (650 mg) by mouth every 4 hours as needed for other (surgical pain) 100 tablet 0     furosemide (LASIX) 40 MG tablet TAKE 1 TABLET BY MOUTH EVERY DAY AT 10 AM 90 tablet 1     levothyroxine (SYNTHROID/LEVOTHROID) 50 MCG tablet TAKE 1 TABLET BY MOUTH EVERY DAY 90 tablet 0     order for DME Equipment being ordered: Yoan Lift with Hygiene Sling 1 Device 0     order for DME Use EZ Stand as needed       polyethylene glycol (MIRALAX/GLYCOLAX) Packet Take 17 g by mouth daily as needed for constipation 7 packet 0     senna-docusate (SENOKOT-S/PERICOLACE) 8.6-50 MG tablet Take 1 tablet by mouth 2 times daily as needed for constipation 100 tablet 0     tamsulosin (FLOMAX) 0.4 MG capsule TAKE 1 CAPSULE BY MOUTH EVERY DAY AT 10 AM 90 capsule 1     warfarin (COUMADIN) 2 MG tablet TAKE 2MG BY MOUTH ON SUNDAY, WEDNESDAY, FRIDAY AND TAKE 4MG REST OF WEEK 45 tablet 0     warfarin (COUMADIN) 4 MG tablet TAKE 2MG BY MOUTH ON SUNDAY, WEDNESDAY, FRIDAY AND TAKE 4MG REST OF WEEK. 45 tablet 0       Allergies    Allergies   Allergen Reactions     Shellfish Allergy Anaphylaxis     Aspirin      GI issues     Contrast Dye Hives and Itching     Flushed skin     Penicillins Hives     Sulfa Drugs Hives     Valtrex [Valacyclovir] Nausea     Ceftin [Cefuroxime] Rash     Nitrofurantoin Rash       Immunizations    Immunization History   Administered Date(s) Administered     FLU 6-35 months 10/06/2011     Flu, Unspecified 11/03/2008     F4a2-29 Novel Flu 09/25/2015     Influenza (High Dose) 3 valent vaccine 09/19/2014, 09/25/2015, 09/19/2016, 10/05/2017, 12/27/2018, 10/02/2019     Influenza (IIV3) PF 10/23/2003, 10/04/2010, 10/06/2011, 09/27/2012, 09/19/2013     Influenza Vaccine IM Ages 6-35 Months 4 Valent (PF) 11/03/2008     Pneumo Conj 13-V (2010&after) 09/25/2015     Pneumococcal 23 valent 10/04/2010     TD (ADULT, 7+) 10/31/2017     TDAP Vaccine (Adacel) 01/01/2004     Td (Adult),  Adsorbed 10/31/2017     Tdap (Adacel,Boostrix) 2004     Zoster vaccine recombinant adjuvanted (SHINGRIX) 2018, 2019     Zoster vaccine, live 10/05/2007       Family History    Family History   Problem Relation Age of Onset     Colon Cancer Father      Coronary Artery Disease Father      Diabetes Maternal Grandmother        Social History    Social History     Socioeconomic History     Marital status:      Spouse name: Vernon     Number of children: 2     Years of education: Not on file     Highest education level: Not on file   Occupational History     Not on file   Social Needs     Financial resource strain: Not on file     Food insecurity:     Worry: Not on file     Inability: Not on file     Transportation needs:     Medical: Not on file     Non-medical: Not on file   Tobacco Use     Smoking status: Former Smoker     Packs/day: 0.50     Years: 10.00     Pack years: 5.00     Types: Cigarettes     Start date: 1958     Last attempt to quit: 1982     Years since quittin.7     Smokeless tobacco: Never Used     Tobacco comment: quit    Substance and Sexual Activity     Alcohol use: Yes     Alcohol/week: 0.0 - 1.0 standard drinks     Comment: Very light     Drug use: No     Sexual activity: Not Currently     Partners: Male     Birth control/protection: None     Comment: not a problem   Lifestyle     Physical activity:     Days per week: Not on file     Minutes per session: Not on file     Stress: Not on file   Relationships     Social connections:     Talks on phone: Not on file     Gets together: Not on file     Attends Mandaeism service: Not on file     Active member of club or organization: Not on file     Attends meetings of clubs or organizations: Not on file     Relationship status: Not on file     Intimate partner violence:     Fear of current or ex partner: Not on file     Emotionally abused: Not on file     Physically abused: Not on file     Forced sexual activity: Not  on file   Other Topics Concern     Parent/sibling w/ CABG, MI or angioplasty before 65F 55M? No   Social History Narrative     Not on file       All above reviewed and updated, all stable unless otherwise noted    Recent labs reviewed    ROS:  Constitutional, HEENT, cardiovascular, pulmonary, GI, , musculoskeletal, neuro, skin, endocrine and psych systems are negative, except as otherwise noted.    OBJECTIVE:                                                    /70   Pulse 85   SpO2 96%   There is no height or weight on file to calculate BMI.  GENERAL: healthy, alert and no distress  EYES: Eyes grossly normal to inspection  HENT:ear canals and TM's normal upon viewing with otoscope, nose and mouth without ulcers or lesions upon viewing with otoscope  NECK: no tenderness, no adenopathy, no asymmetry, no masses, no stiffness; thyroid- normal to palpation  RESP: lungs clear to auscultation - no rales, no rhonchi, no wheezes  CV: regular rates and rhythm, normal S1 S2, no S3 or S4 and no murmur, no click or rub -  ABDOMEN: soft, no tenderness, no  hepatosplenomegaly, no masses, normal bowel sounds  MS: extremities- no gross deformities noted, no edema  SKIN: no suspicious lesions, no rashes  NEURO: strength and tone- normal, sensory exam- grossly normal, mentation- intact, speech- normal, reflexes- symmetric  BACK: no CVA tenderness, no paralumbar tenderness  PSYCH: Alert and oriented times 3; speech- coherent , normal rate and volume; able to articulate logical thoughts, able to abstract reason, no tangential thoughts, no hallucinations or delusions, affect- normal    DIAGNOSTICS/PROCEDURES:                                                      No results found for this or any previous visit (from the past 24 hour(s)).     ASSESSMENT:                                                        ICD-10-CM    1. NPH (normal pressure hydrocephalus) (H) G91.2 **Comprehensive metabolic panel FUTURE 1yr     **CBC with  platelets FUTURE 1yr     CANCELED: Comprehensive metabolic panel     CANCELED: CBC with platelets   2. S/P  shunt Z98.2 **Comprehensive metabolic panel FUTURE 1yr     **CBC with platelets FUTURE 1yr     CANCELED: Comprehensive metabolic panel     CANCELED: CBC with platelets   3. 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 MGUS (monoclonal gammopathy of unknown significance) D47.2 **Comprehensive metabolic panel FUTURE 1yr     **CBC with platelets FUTURE 1yr     CANCELED: Comprehensive metabolic panel     CANCELED: CBC with platelets   5. Lymphoproliferative disorder (H) D47.9 **Comprehensive metabolic panel FUTURE 1yr     **CBC with platelets FUTURE 1yr     CANCELED: Comprehensive metabolic panel     CANCELED: CBC with platelets   6. Rectal mass K62.89 **Comprehensive metabolic panel FUTURE 1yr     **CBC with platelets FUTURE 1yr     CANCELED: Comprehensive metabolic panel     CANCELED: CBC with platelets   7. Pelvic lymphadenopathy R59.0 **Comprehensive metabolic panel FUTURE 1yr     **CBC with platelets FUTURE 1yr     CANCELED: Comprehensive metabolic panel     CANCELED: CBC with platelets   8. Dementia with behavioral disturbance, unspecified dementia type (H) F03.91    9. Irritability R45.4 *UA reflex to Microscopic and Culture ([place] [facility] (except [place] and [place])     Urine Culture Aerobic Bacterial     **Comprehensive metabolic panel FUTURE 1yr     **CBC with platelets FUTURE 1yr     **Vitamin B12 FUTURE 2mo     **TSH with free T4 reflex FUTURE 1yr     **Vitamin D Deficiency FUTURE anytime     CANCELED: Comprehensive metabolic panel     CANCELED: CBC with platelets     CANCELED: Vitamin B12     CANCELED: TSH with free T4 reflex     CANCELED: Vitamin D Deficiency   10. Dysuria  R30.0 Urine Culture Aerobic Bacterial    11. Hypertension goal BP (blood pressure) < 140/90 I10    12. History of pulmonary embolism Z86.711    13. History of deep venous thrombosis Z86.718    14. Hypothyroidism, unspecified type E03.9    15. Class 1 obesity with serious comorbidity and body mass index (BMI) of 30.0 to 30.9 in adult, unspecified obesity type E66.9     Z68.30    16. Medication monitoring encounter Z51.81 *UA reflex to Microscopic and Culture ([place] [facility] (except [place] and [place])     Urine Culture Aerobic Bacterial     **Comprehensive metabolic panel FUTURE 1yr     **CBC with platelets FUTURE 1yr     **Vitamin B12 FUTURE 2mo     **TSH with free T4 reflex FUTURE 1yr     **Vitamin D Deficiency FUTURE anytime     CANCELED: Comprehensive metabolic panel     CANCELED: CBC with platelets     CANCELED: Vitamin B12     CANCELED: TSH with free T4 reflex     CANCELED: Vitamin D Deficiency       PLAN:                                                    Discussed treatment/modality options, including risk and benefits she desires:    advised 1 multivitamin per day, advised calcium 8701-8387 mg/d and Vitamin D 800-1200 IU/d, advised dentist every 6 months, advised diet and exercise and advised opthalmologist every 1-2 years.     1) Patient presented today with irritability, leaky bladder, and constipation. Labs and Urine ordered today for further evaluation of symptoms. Patient advised to take Miralax daily, adjust dose as needed. Depression/Anxiety medications declined at this time for treatment of irritability. UA performed to check for possible UTI, patient advised that she will be informed of results.     2) Patient consults with Dr. Duran of oncology yearly for further Pelvic Lymphadenopathy/rectal mass management. Recommend continued follow up.     3) Received flu shot today.     4) Recommend continued consultation with PT, OT, and [facility] for help at home. Patient advised to contact Care Coordination if ever in need of  help with anything.     All diagnosis above reviewed and noted above, otherwise stable.  See EpicCare orders for further details.     Return in about 3 months (around 1/2/2020), or if symptoms worsen or fail to improve, for Medication Recheck Visit.    Health Maintenance Due   Topic Date Due     DEXA  1940     MICROALBUMIN  1940     DEPRESSION ACTION PLAN  1940     OP ANNUAL INR REFERRAL  07/02/2019     This document serves as a record of the services and decisions personally performed and made by Joaquin Rockewll MD. It was created on his behalf by Mykel Mayberry, a trained medical scribe. The creation of this document is based on the provider's statements to the medical scribe.  Mykel Mayberry October 2, 2019 11:33 AM     The information in this document, created by the medical scribe for me, accurately reflects the services I personally performed and the decisions made by me. I have reviewed and approved this document for accuracy prior to leaving the patient care area.  October 2, 2019           Joaquin Rockwell MD [address]  [id number] [phone] Fax

## 2019-10-03 LAB
BACTERIA SPEC CULT: NO GROWTH
SPECIMEN SOURCE: NORMAL

## 2019-10-08 ENCOUNTER — TELEPHONE (OUTPATIENT)
Dept: FAMILY MEDICINE | Facility: CLINIC | Age: 79
End: 2019-10-08

## 2019-10-08 PROBLEM — E66.811 CLASS 1 OBESITY WITH SERIOUS COMORBIDITY AND BODY MASS INDEX (BMI) OF 30.0 TO 30.9 IN ADULT, UNSPECIFIED OBESITY TYPE: Status: ACTIVE | Noted: 2019-10-08

## 2019-10-08 NOTE — TELEPHONE ENCOUNTER
Reason for Call:  Other appointment    Detailed comments: Juan David is calling wanting to know if Byron does need to be seen on 10/17/19. Byron was just seen on 10/2/19.     Phone Number Patient can be reached at: Home number on file [phone] (home)    Best Time: anytime     Can we leave a detailed message on this number? YES    Call taken on 10/8/2019 at 8:42 AM by Domonique Robin

## 2019-10-08 NOTE — TELEPHONE ENCOUNTER
Routing to  to review and advise.    Nereyda Padilla, BS, RN, N  [facility]) [phone]

## 2019-10-11 ENCOUNTER — PATIENT OUTREACH (OUTPATIENT)
Dept: CARE COORDINATION | Facility: CLINIC | Age: 79
End: 2019-10-11

## 2019-10-11 NOTE — PROGRESS NOTES
CHW had planned to contact patient today for CC outreach.  However, upon chart review, David Butler, CC SW, had just met with patient last week.  I will plan to follow up with patient in 2 weeks.    ______________________  CHW delegation from Cece Comer CC RN, on 08/23/19:  Will delegate to CHW to follow up on WC and follow up resources provided during office visit.  CHW to outreach in 2 weeks.    09/06/19: OT assessment was done, having another appointment coming up.  ______________________  Next Outreach: 10/07/19  Planned Outreach Frequency: Monthly  Preferred Phone Number: Juan David(spouse) [phone]    Last Assessment Date: 08/23/19  Care Plan Completion Date: 08/23/19  ______________________  Goals       Functional (pt-stated)      Goal Statement: I need a new WC, and would like medicare to pay for it.  Measure of Success: Patient will get new WC that is more comfortable and has better suspension.  Supportive Steps to Achieve: RN CC support.  PCP to write Rx and provide necessary documentation to Medicare.  CHW support.  Barriers: unsure if ins will cover.  Strengths: Patient is well supported in community.  Patient spouse very involved.  Date to Achieve By: 1 Oct 2019   Patient expressed understanding of goal: yes.    As of today's date 9/6/2019 goal is met at 0 - 25%.   Goal Status:  Active  Patient had an OT Assessment on 08/29/19, has another appointment with OT, but was unsure when that was.

## 2019-10-12 DIAGNOSIS — E03.9 HYPOTHYROIDISM, UNSPECIFIED TYPE: ICD-10-CM

## 2019-10-12 NOTE — TELEPHONE ENCOUNTER
"Requested Prescriptions   Pending Prescriptions Disp Refills     levothyroxine (SYNTHROID/LEVOTHROID) 50 MCG tablet [Pharmacy Med Name:  Last Written Prescription Date:  7/15/19  Last Fill Quantity: 90,  # refills: 0   Last Office Visit: 10/2/2019   Future Office Visit:      LEVOTHYROXINE 50 MCG TABLET] 90 tablet 0     Sig: TAKE 1 TABLET BY MOUTH EVERY DAY       Thyroid Protocol Passed - 10/12/2019 12:24 AM        Passed - Patient is 12 years or older        Passed - Recent (12 mo) or future (30 days) visit within the authorizing provider's specialty     Patient has had an office visit with the authorizing provider or a provider within the authorizing providers department within the previous 12 mos or has a future within next 30 days. See \"Patient Info\" tab in inbasket, or \"Choose Columns\" in Meds & Orders section of the refill encounter.            Passed - Medication is active on med list        Passed - Normal TSH on file in past 12 months     Recent Labs   Lab Test 07/30/19  1141   TSH 1.98            Passed - No active pregnancy on record     If patient is pregnant or has had a positive pregnancy test, please check TSH.        Passed - No positive pregnancy test in past 12 months     If patient is pregnant or has had a positive pregnancy test, please check TSH.          "

## 2019-10-14 RX ORDER — LEVOTHYROXINE SODIUM 50 UG/1
TABLET ORAL
Qty: 90 TABLET | Refills: 0 | Status: SHIPPED | OUTPATIENT
Start: 2019-10-14 | End: 2020-01-09

## 2019-10-14 NOTE — TELEPHONE ENCOUNTER
Prescription approved per [facility] [facility].    Josias Lynn RN   [facility]

## 2019-10-30 ENCOUNTER — PATIENT OUTREACH (OUTPATIENT)
Dept: CARE COORDINATION | Facility: CLINIC | Age: 79
End: 2019-10-30

## 2019-10-30 NOTE — PROGRESS NOTES
Scheduled Follow Up Call: Attempt 1   Community Health Worker called and left a message for the patient. If the patient is returning my call, please transfer the patient to [facility] at [phone].    ______________________  CHW delegation from DES Rodriguez RN, on 08/23/19:  Will delegate to CHW to follow up on WC and follow up resources provided during office visit.  CHW to outreach in 2 weeks.    09/06/19: OT assessment was done, having another appointment coming up.  ______________________  Next Outreach: 11/07/19  Planned Outreach Frequency: Monthly  Preferred Phone Number: Juan David(spouse) [phone]    Last Assessment Date: 08/23/19  Care Plan Completion Date: 08/23/19  ______________________  Goals       Functional (pt-stated)      Goal Statement: I need a new WC, and would like medicare to pay for it.  Measure of Success: Patient will get new WC that is more comfortable and has better suspension.  Supportive Steps to Achieve: RN CC support.  PCP to write Rx and provide necessary documentation to Medicare.  CHW support.  Barriers: unsure if ins will cover.  Strengths: Patient is well supported in community.  Patient spouse very involved.  Date to Achieve By: 1 Oct 2019   Patient expressed understanding of goal: yes.    As of today's date 9/6/2019 goal is met at 0 - 25%.   Goal Status:  Active  Patient had an OT Assessment on 08/29/19, has another appointment with OT, but was unsure when that was.

## 2019-11-04 ENCOUNTER — TRANSFERRED RECORDS (OUTPATIENT)
Dept: HEALTH INFORMATION MANAGEMENT | Facility: CLINIC | Age: 79
End: 2019-11-04

## 2019-11-04 LAB — INR PPP: 2.7 (ref 0.9–1.1)

## 2019-11-05 ENCOUNTER — ANTICOAGULATION THERAPY VISIT (OUTPATIENT)
Dept: FAMILY MEDICINE | Facility: CLINIC | Age: 79
End: 2019-11-05
Payer: COMMERCIAL

## 2019-11-05 DIAGNOSIS — I26.99 PULMONARY EMBOLISM (H): ICD-10-CM

## 2019-11-05 DIAGNOSIS — Z79.01 LONG TERM CURRENT USE OF ANTICOAGULANT THERAPY: ICD-10-CM

## 2019-11-05 DIAGNOSIS — I82.409 DVT (DEEP VENOUS THROMBOSIS) (H): ICD-10-CM

## 2019-11-05 PROCEDURE — 99207 ZZC NO CHARGE NURSE ONLY: CPT | Performed by: FAMILY MEDICINE

## 2019-11-05 NOTE — PROGRESS NOTES
ANTICOAGULATION FOLLOW-UP CLINIC VISIT    Patient Name:  Ángela Dumont  Date:  2019  Contact Type:  Telephone/ with , Vernon    SUBJECTIVE:  Patient Findings     Comments:   The patient was assessed for diet, medication, and activity level changes, missed or extra doses, bruising or bleeding, with no problem findings.          Clinical Outcomes     Negatives:   Major bleeding event, Thromboembolic event, Anticoagulation-related hospital admission, Anticoagulation-related ED visit, Anticoagulation-related fatality    Comments:   The patient was assessed for diet, medication, and activity level changes, missed or extra doses, bruising or bleeding, with no problem findings.             OBJECTIVE    INR   Date Value Ref Range Status   2019 2.7  Final       ASSESSMENT / PLAN  INR assessment THER    Recheck INR In: 2 WEEKS    INR Location Clinic      Anticoagulation Summary  As of 2019    INR goal:   2.0-3.0   TTR:   71.1 % (3.6 y)   INR used for dosin.7 (2019)   Warfarin maintenance plan:   4 mg (1 mg x 4) every Sun, Wed, Fri; 2 mg (1 mg x 2) all other days   Full warfarin instructions:   4 mg every Sun, Wed, Fri; 2 mg all other days   Weekly warfarin total:   20 mg   No change documented:   Nereyda Padilla RN   Plan last modified:   Cadence Gonzalez RN (10/2/2019)   Next INR check:   2019   Target end date:       Indications    Long-term (current) use of anticoagulants [Z79.01] [Z79.01]  Pulmonary embolism (H) [I26.99]  DVT (deep venous thrombosis) (H) [I82.409]             Anticoagulation Episode Summary     INR check location:       Preferred lab:       Send INR reminders to:    NURSE    Comments:         Anticoagulation Care Providers     Provider Role Specialty Phone number    Joaquin Rockwell MD [facility] [phone]            See the Encounter Report to view Anticoagulation Flowsheet and Dosing Calendar (Go to Encounters tab in chart review, and find  the Anticoagulation Therapy Visit)    Dosage adjustment made based on physician directed care plan.     advised that when participating in home INR program INR must be checked every 2 weeks.  He verbalized understanding but does not agree and feels it should only be checked every 4 weeks.    Nereyda Padilla RN

## 2019-11-07 ENCOUNTER — PATIENT OUTREACH (OUTPATIENT)
Dept: CARE COORDINATION | Facility: CLINIC | Age: 79
End: 2019-11-07

## 2019-11-07 NOTE — PROGRESS NOTES
Community Health Worker called the patient for Clinic Care Coordination outreach follow up on goals and action steps.  Spoke to Vernon    Discussed the following  Getting new wheel chair    Vernon states that they did receive the new wheel chair.  Goal has been completed    Vernon reports  He's been looking for handicap options for traveling.  Informed him that I wasn't aware of anything, but we could check to see if David Butler, CC SW, might have some insights on anything.    Discussed moving to maintenance before he mentioned handicap traveling options.  He was agreeable to move to Maintenance.  However, scheduled with David for Phone visit on 11/14 as he was requesting the handicap traveling options.  Discussed holding off on Maintenance til after re-assessment with David to see if any other resources are needed.  Vernon acknowledged and agreed to this.    ______________________  Next Outreach: 12/09/19  Planned Outreach Frequency: Monthly  Preferred Phone Number: Juan David(spouse) [phone]    Last Assessment Date: 08/23/19  Care Plan Completion Date: 08/23/19  ______________________

## 2019-11-10 DIAGNOSIS — I82.409 DVT (DEEP VENOUS THROMBOSIS) (H): ICD-10-CM

## 2019-11-10 DIAGNOSIS — I26.99 PULMONARY EMBOLISM (H): ICD-10-CM

## 2019-11-11 NOTE — TELEPHONE ENCOUNTER
Routing refill request to provider for review/approval because:  Drug not on the [facility] refill protocol     Cadence Gonzalez RN, BSN  [place] Triage

## 2019-11-11 NOTE — TELEPHONE ENCOUNTER
"Requested Prescriptions   Pending Prescriptions Disp Refills     warfarin ANTICOAGULANT (COUMADIN) 2 MG tablet [Pharmacy Med Name: WARFARIN SODIUM 2 MG TABLET]        Last Written Prescription Date:  7.29.19  Last Fill Quantity: 45 tablet,  # refills: 0   Last office visit: 10/2/2019 with prescribing provider:  Joaquin Rockwell MD             Future Office Visit:       39 tablet 1     Sig: TAKE 2MG BY MOUTH ON SUNDAY, WEDNESDAY, FRIDAY AND TAKE 4MG REST OF WEEK       Vitamin K Antagonists Failed - 11/10/2019 12:57 AM        Failed - INR is within goal in the past 6 weeks     Confirm INR is within goal in the past 6 weeks.     Recent Labs   Lab Test 11/04/19   INR 2.7                       Failed - Medication is active on med list        Passed - Recent (12 mo) or future (30 days) visit within the authorizing provider's specialty     Patient has had an office visit with the authorizing provider or a provider within the authorizing providers department within the previous 12 mos or has a future within next 30 days. See \"Patient Info\" tab in inbasket, or \"Choose Columns\" in Meds & Orders section of the refill encounter.              Passed - Patient is 18 years of age or older        Passed - Patient is not pregnant        Passed - No positive pregnancy on file in past 12 months        "

## 2019-11-12 ENCOUNTER — PATIENT OUTREACH (OUTPATIENT)
Dept: CARE COORDINATION | Facility: CLINIC | Age: 79
End: 2019-11-12

## 2019-11-12 RX ORDER — WARFARIN SODIUM 2 MG/1
TABLET ORAL
Qty: 39 TABLET | Refills: 1 | Status: SHIPPED | OUTPATIENT
Start: 2019-11-12 | End: 2020-02-24

## 2019-11-12 NOTE — TELEPHONE ENCOUNTER
Received notification that Vernon cancelled appointment on 11/14/19.  Spoke to Vernon  He stated that this week has been really busy for him.  Rescheduled with DES Talavera, for 11/19/19.    ______________________  CHW delegation from DES Talavera, on 11/11/19:  Patient cancelled, please reschedule. Thank you!     11/12/19: Rescheduled.  Delegation completed  ______________________  Next Outreach: 12/09/19  Planned Outreach Frequency: Monthly  Preferred Phone Number: Juan David(spouse) [phone]    Last Assessment Date: 08/23/19  Care Plan Completion Date: 08/23/19  ______________________

## 2019-11-21 ENCOUNTER — PATIENT OUTREACH (OUTPATIENT)
Dept: CARE COORDINATION | Facility: CLINIC | Age: 79
End: 2019-11-21

## 2019-11-21 NOTE — TELEPHONE ENCOUNTER
CHW delegation from DES Talavera , on 11/19/19:  Call placed to Patient's spouse for scheduled phone appointment. Spouse did not answer. Voicemail left, encouraging him to call CHW to reschedule. Update forwarded to CHW, who will attempt to reschedule with Patient.    11/21/19: Rescheduled for phone appointment on 11/22/19  Delegation completed  ______________________  Next Outreach: 12/09/19  Planned Outreach Frequency: Monthly  Preferred Phone Number: Juan David(spouse) [phone]    Last Assessment Date: 08/23/19  Care Plan Completion Date: 08/23/19  ______________________

## 2019-11-22 ENCOUNTER — PATIENT OUTREACH (OUTPATIENT)
Dept: NURSING | Facility: CLINIC | Age: 79
End: 2019-11-22
Payer: COMMERCIAL

## 2019-11-22 NOTE — LETTER
[place] CARE COORDINATION  [address] [facility] [address]  December 2, 2019    Ángela Dumont  [address] [address]      Dear Ángela,    I am a clinic care coordinator who works with Joaquin Rockwell MD at the [facility]. I wanted to thank you for spending the time to talk with me. I've included some resources for accessible transportation and travel resource options.    Please feel free to contact me at [phone], with any questions or concerns. We at [facility] are focused on providing you with the highest-quality healthcare experience possible and that all starts with you.     Sincerely,       David Butler, [facility]  Clinic Care Coordinator  Ph. [phone]  [email or web address]      Enclosed: I have enclosed helpful educational material. Please review and call me with any questions.

## 2019-11-22 NOTE — PROGRESS NOTES
Clinic Care Coordination Contact    Follow Up Progress Note      Assessment: Call placed to Patient's spouse, Juan David, for scheduled Phone appointments. He endorses interest in handicap transportation resources, as well as information on how to schedule a handicap accessible vacation for himself and his spouse.      Intervention/Education provided during outreach: Juan David is agreeable to being sent a letter in the mail with information for handicap transportation companies and vacation programs and/or travel agencies that book handicap accessible transportation.      Outreach Frequency: monthly    Plan:   AYALA NUNES will compile the above resources and mail them in 2-3 business days.  CHW will follow-up as previously planned.       David Butler, [facility]  Clinic Care Coordinator  Ph. [phone]  [email or web address]

## 2019-11-27 ENCOUNTER — TELEPHONE (OUTPATIENT)
Dept: FAMILY MEDICINE | Facility: CLINIC | Age: 79
End: 2019-11-27

## 2019-11-27 NOTE — TELEPHONE ENCOUNTER
"    Date and Result of Last PT/INR:   Lab Results   Component Value Date    INR 2.7 11/04/2019    INR 1.9 10/02/2019    INR 2.2 08/28/2019    PT 36.8 03/19/2016      Pt was suppose to recheck in 2 weeks and she has not,  stated he will only do it 1 time a month \" there is no need to do it more than that\" - per pts      Awaiting results from home INR     Cadence Gonzalez RN, BSN  Mobile Triage           "

## 2019-12-02 ENCOUNTER — TRANSFERRED RECORDS (OUTPATIENT)
Dept: HEALTH INFORMATION MANAGEMENT | Facility: CLINIC | Age: 79
End: 2019-12-02

## 2019-12-02 ENCOUNTER — ANTICOAGULATION THERAPY VISIT (OUTPATIENT)
Dept: FAMILY MEDICINE | Facility: CLINIC | Age: 79
End: 2019-12-02
Payer: COMMERCIAL

## 2019-12-02 DIAGNOSIS — I26.99 PULMONARY EMBOLISM (H): ICD-10-CM

## 2019-12-02 DIAGNOSIS — Z79.01 LONG TERM CURRENT USE OF ANTICOAGULANT THERAPY: ICD-10-CM

## 2019-12-02 DIAGNOSIS — I82.409 DVT (DEEP VENOUS THROMBOSIS) (H): ICD-10-CM

## 2019-12-02 LAB — INR PPP: 2.5

## 2019-12-02 PROCEDURE — G0250 MD INR TEST REVIE INTER MGMT: HCPCS | Performed by: FAMILY MEDICINE

## 2019-12-02 NOTE — PROGRESS NOTES
Clinic Care Coordination Contact    Follow Up Progress Note      Assessment: Pt requests assistance with handicap accessible transportation and accessible travel services.    Intervention/Education provided during outreach: AYALA NUNES mailed Pt a letter with [facility] [employer], the Senior Link[employer] Line, [employer] transportation, and a list of local travel agents who specialize in accessible transportation.     Outreach Frequency: monthly    Plan:   CHW will follow-up with Patient as previously planned.       David Butler [facility]  Clinic Care Coordinator  Ph. [phone]  [email or web address]

## 2019-12-02 NOTE — PROGRESS NOTES
ANTICOAGULATION FOLLOW-UP CLINIC VISIT    Patient Name:  Ángela Dumont  Date:  2019  Contact Type:  Telephone/ with , Vernon    SUBJECTIVE:  Patient Findings     Comments:   The patient was assessed for diet, medication, and activity level changes, missed or extra doses, bruising or bleeding, with no problem findings.          Clinical Outcomes     Negatives:   Major bleeding event, Thromboembolic event, Anticoagulation-related hospital admission, Anticoagulation-related ED visit, Anticoagulation-related fatality    Comments:   The patient was assessed for diet, medication, and activity level changes, missed or extra doses, bruising or bleeding, with no problem findings.             OBJECTIVE    INR   Date Value Ref Range Status   2019 2.5  Final       ASSESSMENT / PLAN  INR assessment THER    Recheck INR In: 2 WEEKS    INR Location Home INR    Billed home INR Yes      Anticoagulation Summary  As of 2019    INR goal:   2.0-3.0   TTR:   77.2 % (1 y)   INR used for dosin.5 (2019)   Warfarin maintenance plan:   4 mg (1 mg x 4) every Sun, Wed, Fri; 2 mg (1 mg x 2) all other days   Full warfarin instructions:   4 mg every Sun, Wed, Fri; 2 mg all other days   Weekly warfarin total:   20 mg   No change documented:   Nereyda Padilla RN   Plan last modified:   Cadence Gonzalez, RN (10/2/2019)   Next INR check:   2019   Priority:   Maintenance   Target end date:       Indications    Long-term (current) use of anticoagulants [Z79.01] [Z79.01]  Pulmonary embolism (H) [I26.99]  DVT (deep venous thrombosis) (H) [I82.409]             Anticoagulation Episode Summary     INR check location:       Preferred lab:       Send INR reminders to:   [facility]    Comments:         Anticoagulation Care Providers     Provider Role Specialty Phone number    Joaquin Rockwell MD [facility] [phone]            See the Encounter Report to view Anticoagulation Flowsheet and  Dosing Calendar (Go to Encounters tab in chart review, and find the Anticoagulation Therapy Visit)    Dosage adjustment made based on physician directed care plan.     advised that per policy he should be checking INR every 2 weeks but he does not agreed and thinks INR should only be checked 1 time per month.      This is a billable home INR encounter so routing to Dr. Rockwell for review only.    Nereyda Padilla RN

## 2019-12-09 ENCOUNTER — PATIENT OUTREACH (OUTPATIENT)
Dept: CARE COORDINATION | Facility: CLINIC | Age: 79
End: 2019-12-09

## 2019-12-09 NOTE — PROGRESS NOTES
Community Health Worker called the patient for Clinic Care Coordination outreach follow up on goals and action steps.  Spoke to Vernon    Discussed the following  Vernon states that he did receive the resources for handicap transportation from David Butler.  Reports no concerns at this time.    Discussed transitioning patient over to Maintenance as there were no other concerns and have completed goals.  Vernon agreed to this plan.    Plan  I will send a message to David Butler, DES CAI, to review chart for Maintenance.    ______________________  Next Outreach: 02/10/19  Planned Outreach Frequency: Monthly  Preferred Phone Number: Juan David(spouse) [phone]    Last Assessment Date: 08/23/19  Care Plan Completion Date: 08/23/19  ______________________

## 2019-12-10 ENCOUNTER — PATIENT OUTREACH (OUTPATIENT)
Dept: CARE COORDINATION | Facility: CLINIC | Age: 79
End: 2019-12-10

## 2019-12-10 NOTE — LETTER
December 10, 2019      Ángela Dumont  [address] [address]      My Clinic Care Coordination Wellness Plan  This Maintenance Wellness Plan provides private information in regard to the work I have done with my Care Team at my Primary Care Clinic.  This document provides insight on the goals I have worked hard to achieve.  My Care Team congratulates me on my journey to become well.  With the assistance of the Clinic Care Coordination Team and my Primary Care Provider, I have succeeded in improving areas of my health that I identified as barriers to becoming well.  I will continue to seek wellness and use the skills I have obtained to further my journey.  My Community Health Worker will follow up with me in 2 months.  In the meantime, if I should have any questions or concerns I will contact my Community Health Worker.    [facility] [place] [phone]    My Preferred Method of Contact:  Phone - Vernon(spouse) [phone]    My Primary/Preferred Language:  English    Emergency Contact: Extended Emergency Contact Information  Primary Emergency Contact: Siva Dumont  Address: [address]  Home Phone: [phone]  Work Phone: none  Mobile Phone: [phone]  Relation: Spouse  Secondary Emergency Contact: Cadence Crow [place] [id number] [facility]  Home Phone: none  Work Phone: none  Mobile Phone: [phone]  Relation: Daughter      PCP: Joaquin Rockwell    Specialists:    Patient Care Team       Relationship Specialty Notifications Start End    Joaquin Rockwell MD PCP - General Family Practice  6/16/15     Phone: [phone] Fax: [phone]         [address] [place] [address]    Joaquin Rockwell MD Assigned PCP   5/14/15     Phone: [phone] Fax: [phone]         [address] [place] [address]    [facility] [place] [facility]  ([place])  7/30/19     Phone: [phone]         Lorene Pablo Community Health Worker Primary Care - CC  9/6/19     [place]; [place]. [phone], Fax: [phone]    David Wise, [facility] Lead Care Coordinator   11/20/19           Advanced Directive/Living Will: On file    Clinical Emergency Plan    All [facility] patients have access to a Nurse 24 hours a day, 7 days a week.  If you have questions or want advice from a Nurse, please know [facility] is here for you.  You can call your clinic and they will connect you or you can call Care Connection at [phone] ([phone]).  [facility] also has Walk In Care clinics in multiple locations.  Call the number listed above for more information about our Walk In Care clinics or visit the [facility] website at [email or web address].

## 2019-12-10 NOTE — PROGRESS NOTES
On 12/10/19, DES Talavera completed a chart review and approved transition to Maintenance.    I have completed the Maintenance Wellness Plan and mailed it to the patient.    ______________________  Next Outreach: 02/10/19  Planned Outreach Frequency: Monthly  Preferred Phone Number: Juan David(spouse) [phone]    Last Assessment Date: 08/23/19  Care Plan Completion Date: 08/23/19  ______________________

## 2019-12-13 NOTE — TELEPHONE ENCOUNTER
Completed forms faxed back to [facility] at [phone].   Originals sent to be scanned.     Kathryn Carson         
Date Forms was received: October 25, 2017    Forms received by: Fax    Last office visit: 10/17/2017    Purpose of Form:  Home Health Cert/Plan of Care    When the form is due:  ASAP    How the form needs to be returned for patient:  Fax to [phone]    Form currently placed  [employer]      
[FreeTextEntry1] : 58 year-old male with HTN presents for evaluation of CP. Patient reports that for the last 6 months, he has been experiencing fast HR and CP that would wake him up at night. Patient reports feeling like this usually  after alcohol use lasting 30 minutes. Patient denies SOB. Patient denies h/o syncope. I advised patient to undergo an echocardiogram and a treadmill stress test. I advised patient to wear a Holter monitor.

## 2020-01-01 ENCOUNTER — TELEPHONE (OUTPATIENT)
Dept: FAMILY MEDICINE | Facility: CLINIC | Age: 80
End: 2020-01-01

## 2020-01-01 ENCOUNTER — PATIENT OUTREACH (OUTPATIENT)
Dept: CARE COORDINATION | Facility: CLINIC | Age: 80
End: 2020-01-01

## 2020-01-01 ENCOUNTER — ANTICOAGULATION THERAPY VISIT (OUTPATIENT)
Dept: FAMILY MEDICINE | Facility: CLINIC | Age: 80
End: 2020-01-01

## 2020-01-01 ENCOUNTER — PATIENT OUTREACH (OUTPATIENT)
Dept: NURSING | Facility: CLINIC | Age: 80
End: 2020-01-01
Payer: COMMERCIAL

## 2020-01-01 ENCOUNTER — TRANSFERRED RECORDS (OUTPATIENT)
Dept: HEALTH INFORMATION MANAGEMENT | Facility: CLINIC | Age: 80
End: 2020-01-01

## 2020-01-01 ENCOUNTER — TELEPHONE (OUTPATIENT)
Dept: CARE COORDINATION | Facility: CLINIC | Age: 80
End: 2020-01-01

## 2020-01-01 ENCOUNTER — OFFICE VISIT (OUTPATIENT)
Dept: FAMILY MEDICINE | Facility: CLINIC | Age: 80
End: 2020-01-01
Payer: COMMERCIAL

## 2020-01-01 ENCOUNTER — ALLIED HEALTH/NURSE VISIT (OUTPATIENT)
Dept: NURSING | Facility: CLINIC | Age: 80
End: 2020-01-01
Payer: COMMERCIAL

## 2020-01-01 ENCOUNTER — VIRTUAL VISIT (OUTPATIENT)
Dept: FAMILY MEDICINE | Facility: CLINIC | Age: 80
End: 2020-01-01
Payer: COMMERCIAL

## 2020-01-01 ENCOUNTER — VIRTUAL VISIT (OUTPATIENT)
Dept: ONCOLOGY | Facility: CLINIC | Age: 80
End: 2020-01-01
Attending: INTERNAL MEDICINE
Payer: COMMERCIAL

## 2020-01-01 ENCOUNTER — ANTICOAGULATION THERAPY VISIT (OUTPATIENT)
Dept: FAMILY MEDICINE | Facility: CLINIC | Age: 80
End: 2020-01-01
Payer: COMMERCIAL

## 2020-01-01 ENCOUNTER — ANTICOAGULATION THERAPY VISIT (OUTPATIENT)
Dept: NURSING | Facility: CLINIC | Age: 80
End: 2020-01-01
Payer: COMMERCIAL

## 2020-01-01 VITALS
HEART RATE: 84 BPM | BODY MASS INDEX: 30.21 KG/M2 | DIASTOLIC BLOOD PRESSURE: 58 MMHG | TEMPERATURE: 96.4 F | SYSTOLIC BLOOD PRESSURE: 119 MMHG | HEIGHT: 64 IN | OXYGEN SATURATION: 97 %

## 2020-01-01 DIAGNOSIS — I26.99 PULMONARY EMBOLISM (H): ICD-10-CM

## 2020-01-01 DIAGNOSIS — D47.Z9 LOW GRADE B CELL LYMPHOPROLIFERATIVE DISORDER (H): ICD-10-CM

## 2020-01-01 DIAGNOSIS — Z79.01 LONG TERM (CURRENT) USE OF ANTICOAGULANTS: Primary | ICD-10-CM

## 2020-01-01 DIAGNOSIS — K62.89 RECTAL MASS: ICD-10-CM

## 2020-01-01 DIAGNOSIS — Z79.01 LONG TERM CURRENT USE OF ANTICOAGULANT THERAPY: ICD-10-CM

## 2020-01-01 DIAGNOSIS — Z86.718 HISTORY OF DEEP VENOUS THROMBOSIS: ICD-10-CM

## 2020-01-01 DIAGNOSIS — Z51.89 ENCOUNTER FOR WOUND CARE: Primary | ICD-10-CM

## 2020-01-01 DIAGNOSIS — I82.409 DVT (DEEP VENOUS THROMBOSIS) (H): ICD-10-CM

## 2020-01-01 DIAGNOSIS — Z86.711 HISTORY OF PULMONARY EMBOLISM: ICD-10-CM

## 2020-01-01 DIAGNOSIS — T81.718A IATROGENIC PULMONARY EMBOLISM AND INFARCTION (H): ICD-10-CM

## 2020-01-01 DIAGNOSIS — R59.0 PELVIC LYMPHADENOPATHY: ICD-10-CM

## 2020-01-01 DIAGNOSIS — R53.1 WEAKNESS: Primary | ICD-10-CM

## 2020-01-01 DIAGNOSIS — Z79.01 LONG TERM (CURRENT) USE OF ANTICOAGULANTS: ICD-10-CM

## 2020-01-01 DIAGNOSIS — Z51.81 MEDICATION MONITORING ENCOUNTER: ICD-10-CM

## 2020-01-01 DIAGNOSIS — T14.8XXA OPEN WOUND: ICD-10-CM

## 2020-01-01 DIAGNOSIS — D47.Z9 LOW GRADE B CELL LYMPHOPROLIFERATIVE DISORDER (H): Primary | ICD-10-CM

## 2020-01-01 DIAGNOSIS — L89.90 PRESSURE SORE: ICD-10-CM

## 2020-01-01 DIAGNOSIS — G91.2 NPH (NORMAL PRESSURE HYDROCEPHALUS) (H): ICD-10-CM

## 2020-01-01 DIAGNOSIS — D47.2 MGUS (MONOCLONAL GAMMOPATHY OF UNKNOWN SIGNIFICANCE): ICD-10-CM

## 2020-01-01 DIAGNOSIS — I26.99 IATROGENIC PULMONARY EMBOLISM AND INFARCTION (H): ICD-10-CM

## 2020-01-01 DIAGNOSIS — I82.409 DEEP PHLEBOTHROMBOSIS, ANTEPARTUM, WITH DELIVERY (H): ICD-10-CM

## 2020-01-01 DIAGNOSIS — R33.9 URINARY RETENTION: ICD-10-CM

## 2020-01-01 DIAGNOSIS — E03.9 HYPOTHYROIDISM, UNSPECIFIED TYPE: ICD-10-CM

## 2020-01-01 DIAGNOSIS — I10 HYPERTENSION GOAL BP (BLOOD PRESSURE) < 140/90: ICD-10-CM

## 2020-01-01 DIAGNOSIS — L89.301 PRESSURE INJURY OF BUTTOCK, STAGE 1, UNSPECIFIED LATERALITY: ICD-10-CM

## 2020-01-01 DIAGNOSIS — R23.8 SKIN BREAKDOWN: Primary | ICD-10-CM

## 2020-01-01 DIAGNOSIS — B02.9 HERPES ZOSTER WITHOUT COMPLICATION: Primary | ICD-10-CM

## 2020-01-01 DIAGNOSIS — O22.30 DEEP PHLEBOTHROMBOSIS, ANTEPARTUM, WITH DELIVERY (H): ICD-10-CM

## 2020-01-01 DIAGNOSIS — F03.91 DEMENTIA WITH BEHAVIORAL DISTURBANCE, UNSPECIFIED DEMENTIA TYPE: ICD-10-CM

## 2020-01-01 DIAGNOSIS — Z98.2 S/P VP SHUNT: ICD-10-CM

## 2020-01-01 DIAGNOSIS — M25.559 HIP PAIN: ICD-10-CM

## 2020-01-01 LAB
ALBUMIN SERPL ELPH-MCNC: 3.7 G/DL (ref 3.7–5.1)
ALBUMIN SERPL-MCNC: 3.4 G/DL (ref 3.4–5)
ALP SERPL-CCNC: 77 U/L (ref 40–150)
ALPHA1 GLOB SERPL ELPH-MCNC: 0.3 G/DL (ref 0.2–0.4)
ALPHA2 GLOB SERPL ELPH-MCNC: 0.8 G/DL (ref 0.5–0.9)
ALT SERPL W P-5'-P-CCNC: 16 U/L (ref 0–50)
ANION GAP SERPL CALCULATED.3IONS-SCNC: 7 MMOL/L (ref 3–14)
AST SERPL W P-5'-P-CCNC: 14 U/L (ref 0–45)
B-GLOBULIN SERPL ELPH-MCNC: 2.1 G/DL (ref 0.6–1)
BILIRUB SERPL-MCNC: 0.3 MG/DL (ref 0.2–1.3)
BUN SERPL-MCNC: 24 MG/DL (ref 7–30)
CALCIUM SERPL-MCNC: 9.6 MG/DL (ref 8.5–10.1)
CAPILLARY BLOOD COLLECTION: NORMAL
CHLORIDE SERPL-SCNC: 108 MMOL/L (ref 94–109)
CO2 SERPL-SCNC: 23 MMOL/L (ref 20–32)
COPATH REPORT: NORMAL
COPATH REPORT: NORMAL
CREAT SERPL-MCNC: 0.88 MG/DL (ref 0.52–1.04)
DIFFERENTIAL METHOD BLD: ABNORMAL
ERYTHROCYTE [DISTWIDTH] IN BLOOD BY AUTOMATED COUNT: 14.5 % (ref 10–15)
GAMMA GLOB SERPL ELPH-MCNC: 0.3 G/DL (ref 0.7–1.6)
GFR SERPL CREATININE-BSD FRML MDRD: 62 ML/MIN/{1.73_M2}
GLUCOSE SERPL-MCNC: 117 MG/DL (ref 70–99)
HCT VFR BLD AUTO: 40.4 % (ref 35–47)
HGB BLD-MCNC: 12.8 G/DL (ref 11.7–15.7)
INR BLD: 2.3 (ref 0.86–1.14)
INR BLD: 2.6 (ref 0.86–1.14)
INR BLD: 2.7 (ref 0.86–1.14)
INR BLD: 3.1 (ref 0.86–1.14)
INR BLD: 3.3 (ref 0.86–1.14)
INR BLD: 3.4 (ref 0.86–1.14)
INR PPP: 2.1 (ref 0.9–1.1)
INR PPP: 2.8 (ref 0.9–1.1)
KAPPA LC UR-MCNC: 55.36 MG/DL (ref 0.33–1.94)
KAPPA LC/LAMBDA SER: 197.71 {RATIO} (ref 0.26–1.65)
LAMBDA LC SERPL-MCNC: 0.28 MG/DL (ref 0.57–2.63)
LDH SERPL L TO P-CCNC: 245 U/L (ref 81–234)
LYMPHOCYTES # BLD AUTO: 25.5 10E9/L (ref 0.8–5.3)
LYMPHOCYTES NFR BLD AUTO: 79 %
M PROTEIN SERPL ELPH-MCNC: 1.6 G/DL
MCH RBC QN AUTO: 28.3 PG (ref 26.5–33)
MCHC RBC AUTO-ENTMCNC: 31.7 G/DL (ref 31.5–36.5)
MCV RBC AUTO: 89 FL (ref 78–100)
MONOCYTES # BLD AUTO: 1.3 10E9/L (ref 0–1.3)
MONOCYTES NFR BLD AUTO: 4 %
NEUTROPHILS # BLD AUTO: 5.5 10E9/L (ref 1.6–8.3)
NEUTROPHILS NFR BLD AUTO: 17 %
PLATELET # BLD AUTO: 156 10E9/L (ref 150–450)
PLATELET # BLD EST: ABNORMAL 10*3/UL
POTASSIUM SERPL-SCNC: 4.1 MMOL/L (ref 3.4–5.3)
PROT PATTERN SERPL ELPH-IMP: ABNORMAL
PROT SERPL-MCNC: 7.6 G/DL (ref 6.8–8.8)
RBC # BLD AUTO: 4.53 10E12/L (ref 3.8–5.2)
RBC MORPH BLD: ABNORMAL
RETICS # AUTO: 46.2 10E9/L (ref 25–95)
RETICS/RBC NFR AUTO: 1 % (ref 0.5–2)
SODIUM SERPL-SCNC: 138 MMOL/L (ref 133–144)
WBC # BLD AUTO: 32.3 10E9/L (ref 4–11)

## 2020-01-01 PROCEDURE — 99214 OFFICE O/P EST MOD 30 MIN: CPT | Performed by: FAMILY MEDICINE

## 2020-01-01 PROCEDURE — 85610 PROTHROMBIN TIME: CPT | Mod: QW | Performed by: FAMILY MEDICINE

## 2020-01-01 PROCEDURE — 36416 COLLJ CAPILLARY BLOOD SPEC: CPT | Performed by: FAMILY MEDICINE

## 2020-01-01 PROCEDURE — 40001009 ZZH VIDEO/TELEPHONE VISIT; NO CHARGE

## 2020-01-01 PROCEDURE — 80053 COMPREHEN METABOLIC PANEL: CPT | Performed by: INTERNAL MEDICINE

## 2020-01-01 PROCEDURE — 84165 PROTEIN E-PHORESIS SERUM: CPT | Performed by: INTERNAL MEDICINE

## 2020-01-01 PROCEDURE — 99207 ZZC NO CHARGE NURSE ONLY: CPT | Performed by: FAMILY MEDICINE

## 2020-01-01 PROCEDURE — 99207 PR NO CHARGE LOS: CPT | Mod: 95 | Performed by: NURSE PRACTITIONER

## 2020-01-01 PROCEDURE — 83615 LACTATE (LD) (LDH) ENZYME: CPT | Performed by: INTERNAL MEDICINE

## 2020-01-01 PROCEDURE — 85025 COMPLETE CBC W/AUTO DIFF WBC: CPT | Performed by: INTERNAL MEDICINE

## 2020-01-01 PROCEDURE — 88185 FLOWCYTOMETRY/TC ADD-ON: CPT | Mod: 59 | Performed by: INTERNAL MEDICINE

## 2020-01-01 PROCEDURE — 83883 ASSAY NEPHELOMETRY NOT SPEC: CPT | Performed by: INTERNAL MEDICINE

## 2020-01-01 PROCEDURE — 88184 FLOWCYTOMETRY/ TC 1 MARKER: CPT | Performed by: INTERNAL MEDICINE

## 2020-01-01 PROCEDURE — 85610 PROTHROMBIN TIME: CPT | Performed by: FAMILY MEDICINE

## 2020-01-01 PROCEDURE — 99207 PR NO CHARGE NURSE ONLY: CPT | Performed by: FAMILY MEDICINE

## 2020-01-01 PROCEDURE — 88185 FLOWCYTOMETRY/TC ADD-ON: CPT | Performed by: INTERNAL MEDICINE

## 2020-01-01 PROCEDURE — 85045 AUTOMATED RETICULOCYTE COUNT: CPT | Performed by: INTERNAL MEDICINE

## 2020-01-01 PROCEDURE — 36415 COLL VENOUS BLD VENIPUNCTURE: CPT | Performed by: INTERNAL MEDICINE

## 2020-01-01 PROCEDURE — 83883 ASSAY NEPHELOMETRY NOT SPEC: CPT | Mod: 59 | Performed by: INTERNAL MEDICINE

## 2020-01-01 PROCEDURE — 85060 BLOOD SMEAR INTERPRETATION: CPT | Performed by: FAMILY MEDICINE

## 2020-01-01 PROCEDURE — 00000402 ZZHCL STATISTIC TOTAL PROTEIN: Performed by: INTERNAL MEDICINE

## 2020-01-01 PROCEDURE — 99214 OFFICE O/P EST MOD 30 MIN: CPT | Mod: 95 | Performed by: INTERNAL MEDICINE

## 2020-01-01 RX ORDER — WARFARIN SODIUM 2 MG/1
TABLET ORAL
Qty: 39 TABLET | Refills: 1 | Status: SHIPPED | OUTPATIENT
Start: 2020-01-01 | End: 2020-01-01

## 2020-01-01 RX ORDER — WARFARIN SODIUM 4 MG/1
TABLET ORAL
Qty: 45 TABLET | Refills: 0 | Status: SHIPPED | OUTPATIENT
Start: 2020-01-01 | End: 2020-01-01

## 2020-01-01 RX ORDER — LEVOTHYROXINE SODIUM 50 UG/1
TABLET ORAL
Qty: 90 TABLET | Refills: 2 | Status: SHIPPED | OUTPATIENT
Start: 2020-01-01 | End: 2021-01-01

## 2020-01-01 RX ORDER — WARFARIN SODIUM 4 MG/1
TABLET ORAL
Qty: 30 TABLET | Refills: 1 | Status: SHIPPED | OUTPATIENT
Start: 2020-01-01 | End: 2020-01-01

## 2020-01-01 RX ORDER — WARFARIN SODIUM 2 MG/1
TABLET ORAL
Qty: 60 TABLET | Refills: 1 | Status: SHIPPED | OUTPATIENT
Start: 2020-01-01 | End: 2021-01-01

## 2020-01-01 RX ORDER — WARFARIN SODIUM 4 MG/1
TABLET ORAL
Qty: 24 TABLET | Refills: 2 | Status: SHIPPED | OUTPATIENT
Start: 2020-01-01 | End: 2021-01-01

## 2020-01-01 RX ORDER — FUROSEMIDE 40 MG
TABLET ORAL
Qty: 90 TABLET | Refills: 1 | Status: SHIPPED | OUTPATIENT
Start: 2020-01-01

## 2020-01-01 RX ORDER — FUROSEMIDE 40 MG
TABLET ORAL
Qty: 90 TABLET | Refills: 0 | Status: SHIPPED | OUTPATIENT
Start: 2020-01-01 | End: 2020-01-01

## 2020-01-01 RX ORDER — TAMSULOSIN HYDROCHLORIDE 0.4 MG/1
CAPSULE ORAL
Qty: 90 CAPSULE | Refills: 1 | Status: SHIPPED | OUTPATIENT
Start: 2020-01-01

## 2020-01-01 RX ORDER — WARFARIN SODIUM 4 MG/1
TABLET ORAL
Qty: 45 TABLET | Refills: 0 | OUTPATIENT
Start: 2020-01-01

## 2020-01-01 RX ORDER — TAMSULOSIN HYDROCHLORIDE 0.4 MG/1
CAPSULE ORAL
Qty: 90 CAPSULE | Refills: 0 | Status: SHIPPED | OUTPATIENT
Start: 2020-01-01 | End: 2020-01-01

## 2020-01-01 RX ORDER — HYDROPHILIC CREAM
1 PASTE (GRAM) TOPICAL 3 TIMES DAILY
Qty: 71 G | Refills: 3 | Status: SHIPPED | OUTPATIENT
Start: 2020-01-01

## 2020-01-07 ENCOUNTER — ANTICOAGULATION THERAPY VISIT (OUTPATIENT)
Dept: NURSING | Facility: CLINIC | Age: 80
End: 2020-01-07
Payer: COMMERCIAL

## 2020-01-07 ENCOUNTER — OFFICE VISIT (OUTPATIENT)
Dept: FAMILY MEDICINE | Facility: CLINIC | Age: 80
End: 2020-01-07
Payer: COMMERCIAL

## 2020-01-07 VITALS
SYSTOLIC BLOOD PRESSURE: 129 MMHG | WEIGHT: 176 LBS | BODY MASS INDEX: 30.05 KG/M2 | OXYGEN SATURATION: 99 % | HEIGHT: 64 IN | HEART RATE: 85 BPM | DIASTOLIC BLOOD PRESSURE: 64 MMHG | TEMPERATURE: 97 F

## 2020-01-07 DIAGNOSIS — R59.0 PELVIC LYMPHADENOPATHY: ICD-10-CM

## 2020-01-07 DIAGNOSIS — R20.9 SKIN SENSATION DISTURBANCE: ICD-10-CM

## 2020-01-07 DIAGNOSIS — K62.89 RECTAL MASS: ICD-10-CM

## 2020-01-07 DIAGNOSIS — D47.2 MGUS (MONOCLONAL GAMMOPATHY OF UNKNOWN SIGNIFICANCE): ICD-10-CM

## 2020-01-07 DIAGNOSIS — I82.409 DVT (DEEP VENOUS THROMBOSIS) (H): ICD-10-CM

## 2020-01-07 DIAGNOSIS — R63.8 OTHER SYMPTOMS AND SIGNS CONCERNING FOOD AND FLUID INTAKE: ICD-10-CM

## 2020-01-07 DIAGNOSIS — G91.2 NPH (NORMAL PRESSURE HYDROCEPHALUS) (H): ICD-10-CM

## 2020-01-07 DIAGNOSIS — L98.9 SKIN LESION: Primary | ICD-10-CM

## 2020-01-07 DIAGNOSIS — I26.99 PULMONARY EMBOLISM (H): ICD-10-CM

## 2020-01-07 DIAGNOSIS — R45.4 IRRITABILITY: ICD-10-CM

## 2020-01-07 DIAGNOSIS — Z79.01 LONG TERM CURRENT USE OF ANTICOAGULANT THERAPY: ICD-10-CM

## 2020-01-07 DIAGNOSIS — D47.Z9 LOW GRADE B CELL LYMPHOPROLIFERATIVE DISORDER (H): ICD-10-CM

## 2020-01-07 DIAGNOSIS — Z51.81 MEDICATION MONITORING ENCOUNTER: ICD-10-CM

## 2020-01-07 DIAGNOSIS — D47.9 LYMPHOPROLIFERATIVE DISORDER (H): ICD-10-CM

## 2020-01-07 DIAGNOSIS — Z98.2 S/P VP SHUNT: ICD-10-CM

## 2020-01-07 LAB
ERYTHROCYTE [DISTWIDTH] IN BLOOD BY AUTOMATED COUNT: 13.7 % (ref 10–15)
HCT VFR BLD AUTO: 42.2 % (ref 35–47)
HGB BLD-MCNC: 13.4 G/DL (ref 11.7–15.7)
INR POINT OF CARE: 3.3 (ref 0.86–1.14)
MCH RBC QN AUTO: 28.3 PG (ref 26.5–33)
MCHC RBC AUTO-ENTMCNC: 31.8 G/DL (ref 31.5–36.5)
MCV RBC AUTO: 89 FL (ref 78–100)
PLATELET # BLD AUTO: 167 10E9/L (ref 150–450)
RBC # BLD AUTO: 4.74 10E12/L (ref 3.8–5.2)
VIT B12 SERPL-MCNC: 454 PG/ML (ref 193–986)
WBC # BLD AUTO: 27.4 10E9/L (ref 4–11)

## 2020-01-07 PROCEDURE — 82728 ASSAY OF FERRITIN: CPT | Performed by: NURSE PRACTITIONER

## 2020-01-07 PROCEDURE — 82306 VITAMIN D 25 HYDROXY: CPT | Performed by: NURSE PRACTITIONER

## 2020-01-07 PROCEDURE — 82607 VITAMIN B-12: CPT | Performed by: NURSE PRACTITIONER

## 2020-01-07 PROCEDURE — 80053 COMPREHEN METABOLIC PANEL: CPT | Performed by: NURSE PRACTITIONER

## 2020-01-07 PROCEDURE — 85027 COMPLETE CBC AUTOMATED: CPT | Performed by: NURSE PRACTITIONER

## 2020-01-07 PROCEDURE — 85610 PROTHROMBIN TIME: CPT | Mod: QW

## 2020-01-07 PROCEDURE — 84443 ASSAY THYROID STIM HORMONE: CPT | Performed by: NURSE PRACTITIONER

## 2020-01-07 PROCEDURE — 36416 COLLJ CAPILLARY BLOOD SPEC: CPT

## 2020-01-07 PROCEDURE — 99214 OFFICE O/P EST MOD 30 MIN: CPT | Performed by: NURSE PRACTITIONER

## 2020-01-07 RX ORDER — MUPIROCIN 20 MG/G
OINTMENT TOPICAL 3 TIMES DAILY
Qty: 22 G | Refills: 0 | Status: SHIPPED | OUTPATIENT
Start: 2020-01-07 | End: 2020-02-24

## 2020-01-07 ASSESSMENT — MIFFLIN-ST. JEOR: SCORE: 1258.33

## 2020-01-07 NOTE — PROGRESS NOTES
"Subjective   Ángela Dumont is a 79 year old female who presents to clinic today for the following health issues:    HPI   Derm Concern X2 Weeks   Right Buttock  - circular , bloody, pus discharge-currently looks healed. Byron says does not hurt.  wondering if pressure sore possible. Using OTC shingles ointment on it. No improvement. Waxes and wanes on own.   Left Buttock has similar symptoms sometimes. Nothing present today  No fevers .  Patient  reports skin sensitivity, sensitive to touch. Skin getting busied easily, this is not new-on coumadin.     Reviewed and updated as needed this visit by provider:  Tobacco  Allergies  Meds  Problems  Med Hx  Surg Hx  Fam Hx         Review of Systems   Constitutional, HEENT, cardiovascular, pulmonary, GI, , musculoskeletal, neuro, skin, endocrine and psych systems are negative, except as otherwise noted per HPI.      Objective   /64   Pulse 85   Temp 97  F (36.1  C) (Oral)   Ht 1.626 m (5' 4\")   Wt 79.8 kg (176 lb)   SpO2 99%   BMI 30.21 kg/m   Body mass index is 30.21 kg/m .  Physical Exam   GENERAL: healthy, alert, well nourished, well hydrated, no distress  HENT: ear canals- normal; TMs- normal; Nose- normal; Mouth- no ulcers, no lesions  NECK: no tenderness, no adenopathy, no asymmetry, no masses, no stiffness; thyroid- normal to palpation  RESP: lungs clear to auscultation - no rales, no rhonchi, no wheezes  CV: regular rates and rhythm, normal S1 S2, no S3 or S4 and no murmur, no click or rub -  SKIN: lesion left buttocks, appears to be healed scarred cyst or similar. Looks a bit bruised, no active drainage, bleeding or signs of infection.          Assessment & Plan   Ángela was seen today for derm problem.    Diagnoses and all orders for this visit:    Skin lesion  Treat as below when flares and see if improved. Let us know if not effective.   -     mupirocin (BACTROBAN) 2 % external ointment; Apply topically 3 times daily    Skin " sensation disturbance  Check updated labs today from previous visit Dr. Rockwell-add ferritin for sensitivity. Could consider further workup if persists.  Discussed that could be fibromyalgia flare-not currently treating fibro, tylenol only occasionally.  -     Ferritin    Other symptoms and signs concerning food and fluid intake   -     Ferritin    Medication monitoring encounter  -     **Vitamin D Deficiency FUTURE anytime  -     **TSH with free T4 reflex FUTURE 1yr  -     **Vitamin B12 FUTURE 2mo  -     **CBC with platelets FUTURE 1yr  -     **Comprehensive metabolic panel FUTURE 1yr    Irritability  -     **Vitamin D Deficiency FUTURE anytime  -     **TSH with free T4 reflex FUTURE 1yr  -     **Vitamin B12 FUTURE 2mo  -     **CBC with platelets FUTURE 1yr  -     **Comprehensive metabolic panel FUTURE 1yr    NPH (normal pressure hydrocephalus) (H)  -     **CBC with platelets FUTURE 1yr  -     **Comprehensive metabolic panel FUTURE 1yr    S/P  shunt  -     **CBC with platelets FUTURE 1yr  -     **Comprehensive metabolic panel FUTURE 1yr    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)  -     **CBC with platelets FUTURE 1yr  -     **Comprehensive metabolic panel FUTURE 1yr    MGUS (monoclonal gammopathy of unknown significance)  -     **CBC with platelets FUTURE 1yr  -     **Comprehensive metabolic panel FUTURE 1yr    Lymphoproliferative disorder (H)  -     **CBC with platelets FUTURE 1yr  -     **Comprehensive metabolic panel FUTURE 1yr    Rectal mass  -     **CBC with platelets FUTURE 1yr  -     **Comprehensive metabolic panel FUTURE 1yr    Pelvic lymphadenopathy  -     **CBC with platelets FUTURE 1yr  -     **Comprehensive metabolic panel FUTURE 1yr             See Patient Instructions    No follow-ups on file.            STEFANIE Limon     [address] [address]  [email or web address]   Office: [phone]

## 2020-01-07 NOTE — PROGRESS NOTES
ANTICOAGULATION FOLLOW-UP CLINIC VISIT    Patient Name:  Ángela Dumont  Date:  1/7/2020  Contact Type:  Face to Face    SUBJECTIVE:  Patient Findings     Comments:   The patient was assessed for diet, medication, and activity level changes, missed or extra doses, bruising or bleeding, with no problem findings.          Clinical Outcomes     Negatives:   Major bleeding event, Thromboembolic event, Anticoagulation-related hospital admission, Anticoagulation-related ED visit, Anticoagulation-related fatality    Comments:   The patient was assessed for diet, medication, and activity level changes, missed or extra doses, bruising or bleeding, with no problem findings.             OBJECTIVE    INR Protime   Date Value Ref Range Status   01/07/2020 3.3 (A) 0.86 - 1.14 Final       ASSESSMENT / PLAN  No question data found.  Anticoagulation Summary  As of 1/7/2020    INR goal:   2.0-3.0   TTR:   81.6 % (1 y)   INR used for dosing:   3.3! (1/7/2020)   Warfarin maintenance plan:   4 mg (1 mg x 4) every Sun, Wed, Fri; 2 mg (1 mg x 2) all other days   Full warfarin instructions:   1/7: Hold; 1/8: 2 mg; Otherwise 4 mg every Sun, Wed, Fri; 2 mg all other days   Weekly warfarin total:   20 mg   Plan last modified:   Cadence Gonzalez RN (10/2/2019)   Next INR check:   1/14/2020   Priority:   Maintenance   Target end date:       Indications    Long-term (current) use of anticoagulants [Z79.01] [Z79.01]  Pulmonary embolism (H) [I26.99]  DVT (deep venous thrombosis) (H) [I82.409]             Anticoagulation Episode Summary     INR check location:       Preferred lab:       Send INR reminders to:   [facility]    Comments:         Anticoagulation Care Providers     Provider Role Specialty Phone number    Joaquin Rockwell MD [facility] [phone]            See the Encounter Report to view Anticoagulation Flowsheet and Dosing Calendar (Go to Encounters tab in chart review, and find the Anticoagulation Therapy  Visit)    Dosage adjustment made based on physician directed care plan.    Cadence Gonzalez RN

## 2020-01-08 LAB
ALBUMIN SERPL-MCNC: 3.4 G/DL (ref 3.4–5)
ALP SERPL-CCNC: 87 U/L (ref 40–150)
ALT SERPL W P-5'-P-CCNC: 17 U/L (ref 0–50)
ANION GAP SERPL CALCULATED.3IONS-SCNC: 6 MMOL/L (ref 3–14)
AST SERPL W P-5'-P-CCNC: 16 U/L (ref 0–45)
BILIRUB SERPL-MCNC: 0.2 MG/DL (ref 0.2–1.3)
BUN SERPL-MCNC: 27 MG/DL (ref 7–30)
CALCIUM SERPL-MCNC: 9.4 MG/DL (ref 8.5–10.1)
CHLORIDE SERPL-SCNC: 108 MMOL/L (ref 94–109)
CO2 SERPL-SCNC: 25 MMOL/L (ref 20–32)
CREAT SERPL-MCNC: 0.79 MG/DL (ref 0.52–1.04)
DEPRECATED CALCIDIOL+CALCIFEROL SERPL-MC: 27 UG/L (ref 20–75)
FERRITIN SERPL-MCNC: 88 NG/ML (ref 8–252)
GFR SERPL CREATININE-BSD FRML MDRD: 71 ML/MIN/{1.73_M2}
GLUCOSE SERPL-MCNC: 94 MG/DL (ref 70–99)
POTASSIUM SERPL-SCNC: 4.3 MMOL/L (ref 3.4–5.3)
PROT SERPL-MCNC: 7.6 G/DL (ref 6.8–8.8)
SODIUM SERPL-SCNC: 139 MMOL/L (ref 133–144)
TSH SERPL DL<=0.005 MIU/L-ACNC: 1.95 MU/L (ref 0.4–4)

## 2020-01-09 ENCOUNTER — TELEPHONE (OUTPATIENT)
Dept: FAMILY MEDICINE | Facility: CLINIC | Age: 80
End: 2020-01-09

## 2020-01-09 DIAGNOSIS — E03.9 HYPOTHYROIDISM, UNSPECIFIED TYPE: ICD-10-CM

## 2020-01-09 RX ORDER — LEVOTHYROXINE SODIUM 50 UG/1
TABLET ORAL
Qty: 90 TABLET | Refills: 1 | Status: SHIPPED | OUTPATIENT
Start: 2020-01-09 | End: 2020-01-01

## 2020-01-09 NOTE — TELEPHONE ENCOUNTER
Patient is scheduled to see Dr. Duran on 4/9/2020.    Detailed VM left for , Vernon, with note below and also advising to keep follow up with Dr. Duran as scheduled.        NAYELI Harris, RN, N  [facility]  Office: [phone]  Fax: [phone]

## 2020-01-09 NOTE — TELEPHONE ENCOUNTER
Reason for Call:  Other     Detailed comments: Siva is calling back saying he deleted Byron's results on MyChart and for us to resend them. I told him we can mail him out the results. He says her white blood cell count is up and he is wondering what they can do about this.     Phone Number Patient can be reached at: Home number on file [phone] (home)    Best Time: Anytime    Can we leave a detailed message on this number? YES    Call taken on 1/9/2020 at 8:35 AM by Zakia Reis

## 2020-01-09 NOTE — TELEPHONE ENCOUNTER
"Requested Prescriptions   Pending Prescriptions Disp Refills     levothyroxine (SYNTHROID/LEVOTHROID) 50 MCG tablet [Pharmacy Med Name: LEVOTHYROXINE 50 MCG TABLET] 90 tablet 0     Sig: TAKE 1 TABLET BY MOUTH EVERY DAY       Thyroid Protocol Passed - 1/9/2020  3:06 AM        Passed - Patient is 12 years or older        Passed - Recent (12 mo) or future (30 days) visit within the authorizing provider's specialty     Patient has had an office visit with the authorizing provider or a provider within the authorizing providers department within the previous 12 mos or has a future within next 30 days. See \"Patient Info\" tab in inbasket, or \"Choose Columns\" in Meds & Orders section of the refill encounter.              Passed - Medication is active on med list        Passed - Normal TSH on file in past 12 months     Recent Labs   Lab Test 01/07/20  1112   TSH 1.95              Passed - No active pregnancy on record     If patient is pregnant or has had a positive pregnancy test, please check TSH.          Passed - No positive pregnancy test in past 12 months     If patient is pregnant or has had a positive pregnancy test, please check TSH.          levothyroxine (SYNTHROID/LEVOTHROID) 50 MCG tablet 90 tablet 0 10/14/2019       Last Written Prescription Date:  10/14/2019  Last Fill Quantity: 90,  # refills: 0   Last office visit: 1/7/2020 with prescribing provider:  Dr. Tobias   Future Office Visit:  Unknown     "

## 2020-01-09 NOTE — TELEPHONE ENCOUNTER
Reviewed the lab results today overall appear fairly stable compared with previous.  We had checked an additional iron level given her symptoms which was in normal range.  Thyroid was normal, vitamin D level was slightly low if desired they could take supplement at 2000 international units vitamin D daily.  B12 level was normal.  Metabolic panel which showed electrolytes kidney function and liver function was normal.  CBC showed elevated white blood cell count which has been elevated in the past at similar levels.  Previously Dung had seen Dr. Duran with hematology, on her last lab check Dr. Rockwell had recommended follow-up with Dr. Duran.  Based on chart review I do not see that they have seen hematology recently.  Other than white blood count all other labs and her CBC were normal.

## 2020-01-09 NOTE — RESULT ENCOUNTER NOTE
Labs returned normal. Patient advised to continue current care plan and/or medication regimen as discussed at your recent visit.

## 2020-01-09 NOTE — TELEPHONE ENCOUNTER
calling for lab results.  No result note recorded yet by provider.      Routing to Jamee Tobias to review and advise.      I don't think you can delete results from [employer].          NAYELI Harris, RN, PHN  [facility]  Office: [phone]  Fax: [phone]

## 2020-01-17 ENCOUNTER — ANTICOAGULATION THERAPY VISIT (OUTPATIENT)
Dept: FAMILY MEDICINE | Facility: CLINIC | Age: 80
End: 2020-01-17
Payer: COMMERCIAL

## 2020-01-17 ENCOUNTER — TRANSFERRED RECORDS (OUTPATIENT)
Dept: HEALTH INFORMATION MANAGEMENT | Facility: CLINIC | Age: 80
End: 2020-01-17

## 2020-01-17 DIAGNOSIS — I26.99 PULMONARY EMBOLISM (H): ICD-10-CM

## 2020-01-17 DIAGNOSIS — I82.409 DVT (DEEP VENOUS THROMBOSIS) (H): ICD-10-CM

## 2020-01-17 DIAGNOSIS — Z79.01 LONG TERM CURRENT USE OF ANTICOAGULANT THERAPY: ICD-10-CM

## 2020-01-17 LAB — INR PPP: 2.8 (ref 0.9–1.1)

## 2020-01-17 PROCEDURE — 99207 ZZC NO CHARGE NURSE ONLY: CPT | Performed by: FAMILY MEDICINE

## 2020-01-17 NOTE — PROGRESS NOTES
ANTICOAGULATION FOLLOW-UP CLINIC VISIT    Patient Name:  Ángela Dumont  Date:  2020  Contact Type:  Telephone/ Detailed VM left for  Vernon    SUBJECTIVE:  Patient Findings     Comments:   The patient was assessed for diet, medication, and activity level changes, missed or extra doses, bruising or bleeding, with no problem findings.          Clinical Outcomes     Negatives:   Major bleeding event, Thromboembolic event, Anticoagulation-related hospital admission, Anticoagulation-related ED visit, Anticoagulation-related fatality    Comments:   The patient was assessed for diet, medication, and activity level changes, missed or extra doses, bruising or bleeding, with no problem findings.             OBJECTIVE    INR   Date Value Ref Range Status   2020 2.8 (A) 0.90 - 1.10 Final       ASSESSMENT / PLAN  INR assessment THER    Recheck INR In: 2 WEEKS    INR Location Clinic      Anticoagulation Summary  As of 2020    INR goal:   2.0-3.0   TTR:   81.9 % (1 y)   INR used for dosin.8 (2020)   Warfarin maintenance plan:   4 mg (1 mg x 4) every Sun, Wed, Fri; 2 mg (1 mg x 2) all other days   Full warfarin instructions:   4 mg every Sun, Wed, Fri; 2 mg all other days   Weekly warfarin total:   20 mg   No change documented:   Nereyda Padilla, RN   Plan last modified:   Cadence Gonzalez RN (10/2/2019)   Next INR check:   2020   Priority:   Maintenance   Target end date:       Indications    Long-term (current) use of anticoagulants [Z79.01] [Z79.01]  Pulmonary embolism (H) [I26.99]  DVT (deep venous thrombosis) (H) [I82.409]             Anticoagulation Episode Summary     INR check location:       Preferred lab:       Send INR reminders to:   [facility]    Comments:         Anticoagulation Care Providers     Provider Role Specialty Phone number    Joaquin Rockwell MD [facility] [phone]            See the Encounter Report to view Anticoagulation Flowsheet and  Dosing Calendar (Go to Encounters tab in chart review, and find the Anticoagulation Therapy Visit)    Dosage adjustment made based on physician directed care plan.    Nereyda Padilla RN

## 2020-02-13 ENCOUNTER — PATIENT OUTREACH (OUTPATIENT)
Dept: CARE COORDINATION | Facility: CLINIC | Age: 80
End: 2020-02-13

## 2020-02-13 ENCOUNTER — NURSE TRIAGE (OUTPATIENT)
Dept: CARE COORDINATION | Facility: CLINIC | Age: 80
End: 2020-02-13

## 2020-02-13 NOTE — TELEPHONE ENCOUNTER
Attempt # 1    Called #   Telephone Information:   Mobile [phone]       Left a non detailed VM to call back at [phone] and ask for any available Triage Nurse.    Josias Lynn RN   [facility]

## 2020-02-13 NOTE — PROGRESS NOTES
Community Health Worker called the patient for Clinic Care Coordination outreach follow up on goals and action steps.  Spoke to Juan David Fall states that Byron has a bluish-deutsch discoloration on her buttocks.  Scheduled appointment with Dr Rockwell for 03/10 at 10:30am.  I informed Juan David that I will send a message to Dr Rockwell's team to get in sooner.    Discussed transitioning to Graduation.  However, Juan David didn't feel ready for Graduation yet, asked to be kept on call list.    Plan  I will send a message to  regarding the appointment  ______________________  Next Outreach: 04/17/20  Planned Outreach Frequency: Maintenance - 2 Months  Preferred Phone Number: Juan David(spouse) [phone]    Last Assessment Date: 08/23/19  Care Plan Completion Date: 08/23/19  ______________________           96

## 2020-02-13 NOTE — TELEPHONE ENCOUNTER
CHW contacted Juan David to follow up on CC Outreach.  Juan David was hoping to get Byron scheduled in for an appointment with Dr Rockwell for discoloration on her buttocks. However, the earliest I could find was 3/10/20. Juan David states that 10:30 works better for him for timing. Wondering if there was anyway she can be seen sooner. Please contact Juan David at [phone].    Thank you,    Lorene Noyola, CHW                                                                                          Clinic Care Coordination                                                  [facility] : [place], [place], [place], and [place]                             Phone: [phone]

## 2020-02-13 NOTE — TELEPHONE ENCOUNTER
"Siva called and discussed derm problem. Pt noted to have several reddened areas on bottom. Pt noted several with the size of a half dollar,  With one large one on the right buttock.  Pt advised to be seen, given appt.   Next 5 appointments (look out 90 days)    Feb 24, 2020 10:50 AM CST  [facility] [address] [place] [id number]  [phone]   Apr 09, 2020  2:45 PM CDT  Return Visit with Susanna Duran MD  [facility] [address]  [facility] [id number] [phone]          Reason for Disposition    Taking Coumadin (warfarin) or other strong blood thinner, or known bleeding disorder (e.g., thrombocytopenia)    Additional Information    Negative: [1] Purple or blood-colored WIDESPREAD rash AND [2] fever    Negative: [1] Purple or blood-colored WIDESPREAD rash AND [2] very weak    Negative: Shock suspected (e.g., cold/pale/clammy skin, too weak to stand, low BP, rapid pulse)    Negative: Difficult to awaken or acting confused (e.g., disoriented, slurred speech)    Negative: Sounds like a life-threatening emergency to the triager    Negative: Headache    Negative: [1] Purple or blood-colored LOCALIZED rash AND [2] fever    Negative: Patient sounds very sick or weak to the triager    Negative: [1] Purple or blood-colored WIDESPREAD rash AND [2] no fever AND [3] sounds well to triager    Negative: [1] Purple or blood-colored LOCALIZED rash AND [2] no fever AND [3] sounds well to triager  (Exception: bruise from injury or friction)    Negative: [1] From injury BUT [2] suspicious history for the injury    Negative: [1] Purple or blood-colored LOCALIZED rash AND [2] from injury or friction    Answer Assessment - Initial Assessment Questions  1. APPEARANCE of RASH: \"Describe the rash. What color is it?\" (Note: It is " "difficult to assess rash color in people with darker-colored skin. When this situation occurs, simply ask the caller to describe what they see.)      blochy red    2. SIZE: \"How big are the spots?\"      Several half-dollar size.    3. LOCATION: \"Where is the rash located?\"      Right side of buttock is worst of area.    4. ONSET: \"When did the rash begin?\"      Couple of weeks    5. FEVER: \"Do you have a fever?\" If so, ask: \"What is your temperature, how was it measured, and when did it start?\"      No    6. CAUSE: \"What do you think is causing the rash?\"      Unknown, but is slightly immobile.    7. MEDICAL HISTORY: \"Do you have any medical problems that can cause easy bruising or bleeding?\" (e.g., leukemia, liver disease, recent chemotherapy)          8. MEDICATIONS : \"Do you take any medications which thin the blood such as: aspirin, heparin, ibuprofen (NSAIDS), Plavix, or Coumadin?\"      Yes coumadin    9. OTHER SYMPTOMS: \"Do you have any other symptoms?\" (e.g., headache, dizziness, vomiting, sore throat, joint pain, bleeding)      No    10. PREGNANCY: \"Is there any chance you are pregnant?\" \"When was your last menstrual period?\"        No    Protocols used: RASH - PURPLE SPOTS OR DOTS-NICOLÁS Lynn RN   [facility] [place] Triage    "

## 2020-02-16 ENCOUNTER — TRANSFERRED RECORDS (OUTPATIENT)
Dept: HEALTH INFORMATION MANAGEMENT | Facility: CLINIC | Age: 80
End: 2020-02-16

## 2020-02-16 LAB — INR PPP: 2.8 (ref 0.9–1.1)

## 2020-02-17 ENCOUNTER — ANTICOAGULATION THERAPY VISIT (OUTPATIENT)
Dept: FAMILY MEDICINE | Facility: CLINIC | Age: 80
End: 2020-02-17
Payer: COMMERCIAL

## 2020-02-17 DIAGNOSIS — Z79.01 LONG TERM CURRENT USE OF ANTICOAGULANT THERAPY: ICD-10-CM

## 2020-02-17 DIAGNOSIS — I82.409 DVT (DEEP VENOUS THROMBOSIS) (H): ICD-10-CM

## 2020-02-17 DIAGNOSIS — I26.99 PULMONARY EMBOLISM (H): ICD-10-CM

## 2020-02-17 PROCEDURE — 99207 ZZC NO CHARGE NURSE ONLY: CPT | Performed by: FAMILY MEDICINE

## 2020-02-17 NOTE — PROGRESS NOTES
ANTICOAGULATION FOLLOW-UP CLINIC VISIT    Patient Name:  Ángela Dumont  Date:  2020  Contact Type:  Telephone/ with  Vernon    SUBJECTIVE:  Patient Findings     Comments:   The patient was assessed for diet, medication, and activity level changes, missed or extra doses, bruising or bleeding, with no problem findings.          Clinical Outcomes     Negatives:   Major bleeding event, Thromboembolic event, Anticoagulation-related hospital admission, Anticoagulation-related ED visit, Anticoagulation-related fatality    Comments:   The patient was assessed for diet, medication, and activity level changes, missed or extra doses, bruising or bleeding, with no problem findings.             OBJECTIVE    INR   Date Value Ref Range Status   2020 2.8 (A) 0.90 - 1.10 Final       ASSESSMENT / PLAN  INR assessment THER    Recheck INR In: 2 WEEKS    INR Location Clinic      Anticoagulation Summary  As of 2020    INR goal:   2.0-3.0   TTR:   84.1 % (1 y)   INR used for dosin.8 (2020)   Warfarin maintenance plan:   4 mg (1 mg x 4) every Sun, Wed, Fri; 2 mg (1 mg x 2) all other days   Full warfarin instructions:   4 mg every Sun, Wed, Fri; 2 mg all other days   Weekly warfarin total:   20 mg   Plan last modified:   Cadence Gonzalez RN (10/2/2019)   Next INR check:   3/2/2020   Priority:   Maintenance   Target end date:       Indications    Long-term (current) use of anticoagulants [Z79.01] [Z79.01]  Pulmonary embolism (H) [I26.99]  DVT (deep venous thrombosis) (H) [I82.409]             Anticoagulation Episode Summary     INR check location:       Preferred lab:       Send INR reminders to:   [facility]    Comments:         Anticoagulation Care Providers     Provider Role Specialty Phone number    Joaquin Rockwell MD [facility] [phone]            See the Encounter Report to view Anticoagulation Flowsheet and Dosing Calendar (Go to Encounters tab in chart review, and find  the Anticoagulation Therapy Visit)    Dosage adjustment made based on physician directed care plan.       advised that per home monitoring guidelines INR needs to be checked every 2 weeks.  He said he does not feel she needs to have it checked every 2 weeks and he will check in once per month.    Nereyda Padilla RN

## 2020-02-24 ENCOUNTER — OFFICE VISIT (OUTPATIENT)
Dept: FAMILY MEDICINE | Facility: CLINIC | Age: 80
End: 2020-02-24
Payer: COMMERCIAL

## 2020-02-24 VITALS — HEART RATE: 79 BPM | OXYGEN SATURATION: 97 % | SYSTOLIC BLOOD PRESSURE: 131 MMHG | DIASTOLIC BLOOD PRESSURE: 62 MMHG

## 2020-02-24 DIAGNOSIS — E03.9 HYPOTHYROIDISM, UNSPECIFIED TYPE: ICD-10-CM

## 2020-02-24 DIAGNOSIS — D47.Z9 LOW GRADE B CELL LYMPHOPROLIFERATIVE DISORDER (H): ICD-10-CM

## 2020-02-24 DIAGNOSIS — Z79.01 LONG TERM CURRENT USE OF ANTICOAGULANT THERAPY: ICD-10-CM

## 2020-02-24 DIAGNOSIS — E78.5 HYPERLIPIDEMIA WITH TARGET LDL LESS THAN 130: ICD-10-CM

## 2020-02-24 DIAGNOSIS — L89.301 PRESSURE INJURY OF BUTTOCK, STAGE 1, UNSPECIFIED LATERALITY: Primary | ICD-10-CM

## 2020-02-24 DIAGNOSIS — Z86.718 HISTORY OF DEEP VENOUS THROMBOSIS: ICD-10-CM

## 2020-02-24 DIAGNOSIS — Z86.711 HISTORY OF PULMONARY EMBOLISM: ICD-10-CM

## 2020-02-24 DIAGNOSIS — G91.2 NPH (NORMAL PRESSURE HYDROCEPHALUS) (H): ICD-10-CM

## 2020-02-24 DIAGNOSIS — R59.0 PELVIC LYMPHADENOPATHY: ICD-10-CM

## 2020-02-24 DIAGNOSIS — D47.9 LYMPHOPROLIFERATIVE DISORDER (H): ICD-10-CM

## 2020-02-24 DIAGNOSIS — I10 HYPERTENSION GOAL BP (BLOOD PRESSURE) < 140/90: ICD-10-CM

## 2020-02-24 DIAGNOSIS — Z98.2 S/P VP SHUNT: ICD-10-CM

## 2020-02-24 DIAGNOSIS — F03.91 DEMENTIA WITH BEHAVIORAL DISTURBANCE, UNSPECIFIED DEMENTIA TYPE: ICD-10-CM

## 2020-02-24 DIAGNOSIS — D47.2 MGUS (MONOCLONAL GAMMOPATHY OF UNKNOWN SIGNIFICANCE): ICD-10-CM

## 2020-02-24 DIAGNOSIS — K62.89 RECTAL MASS: ICD-10-CM

## 2020-02-24 DIAGNOSIS — Z51.81 MEDICATION MONITORING ENCOUNTER: ICD-10-CM

## 2020-02-24 PROCEDURE — 99213 OFFICE O/P EST LOW 20 MIN: CPT | Performed by: FAMILY MEDICINE

## 2020-02-24 RX ORDER — WARFARIN SODIUM 2 MG/1
TABLET ORAL
Qty: 60 TABLET | Refills: 1
Start: 2020-02-24 | End: 2020-04-01

## 2020-02-24 RX ORDER — WARFARIN SODIUM 4 MG/1
TABLET ORAL
Qty: 45 TABLET | Refills: 1
Start: 2020-02-24 | End: 2020-03-30

## 2020-02-24 NOTE — PROGRESS NOTES
[facility]  [place]    SUBJECTIVE    Ángela Dumont is a 79 year old female who presents to clinic today for the following health issues:    Triage note: Pt noted to have several reddened areas on bottom. Pt noted several with the size of a half dollar,  With one large one on the right buttock.    Bilateral buttocks, improving now, just on the right - no pain, no irritation, at onset scabbing and discharge, has OTC shingles cream? and recently bactroban - uses depends at night    Overall eating fine, feeling fine, sleeping fine, alternates between diarrhea/constipation, normal brown, occas black, bladder ok, some incontinence, increased gag reflux, no GERD sx, very sensitive to touch skin    Lives at home with , PCA 2 hrs per day, 6 days per week, PT every Saturday, using foot pedals, has EZ Stand, Yoan lift, offered home,  thinks doing ok at this time    Reviewed and updated as needed this visit by Provider    BP Readings from Last 3 Encounters:   02/24/20 131/62   01/07/20 129/64   10/02/19 124/70       body mass index is unknown because there is no height or weight on file.    Wt Readings from Last 4 Encounters:   01/07/20 79.8 kg (176 lb)   07/30/19 79.8 kg (176 lb)   01/07/19 88.5 kg (195 lb)   09/07/18 88.5 kg (195 lb)       Health Maintenance    Health Maintenance Due   Topic Date Due     DEXA  1940     MICROALBUMIN  1940     OP ANNUAL INR REFERRAL  07/02/2019       Current Problem List    Patient Active Problem List   Diagnosis     S/P  shunt     NPH (normal pressure hydrocephalus) (H)     Fracture of right femur (H)     DVT (deep venous thrombosis) (H)     Pulmonary embolism (H)     Dementia (H)     Hypothyroid     Hypertension goal BP (blood pressure) < 140/90     Adjustment disorder with depressed mood     Hyperlipidemia with target LDL less than 130     Leukocytosis     Esophageal reflux     UTI (urinary tract infection)     Constipation     Pulmonary hypertension  (H)     MR (mitral regurgitation)     Fibromyalgia     Osteoarthritis of both knees     Health Care Home     Long-term (current) use of anticoagulants [Z79.01]     Advance Care Planning     SIRS (systemic inflammatory response syndrome) (H)     History of pulmonary embolism     History of deep venous thrombosis     Urinary retention     BPPV (benign paroxysmal positional vertigo), bilateral     Herpes zoster without complication     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  Dr Duran     Colitis, nonspecific 10/31/2017     Constipation      Dementia (H)     memory/anger     Depressive disorder last few months as condition became apparent    Lack of mobility a big problem     DVT (deep venous thrombosis) (H) 4/15    bilateral     Esophageal reflux      Fibromyalgia      Fracture of right femur (H) 1/15     Heart murmur      Herpes zoster without complication 08/16/2017    left buttocks     History of Clostridium difficile      Hyperlipidemia LDL goal < 130      Hypertension goal BP (blood pressure) < 140/90      Hypothyroid      Leukocytosis, unspecified     w/u ?     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)      Lymphoproliferative disorder (H)     Dr Duran     MGUS (monoclonal gammopathy of unknown significance)     Dr Duran     Migraine      MR (mitral regurgitation)     Mild     NPH (normal pressure hydrocephalus) (H) 4/15    shunt placed but  removed in 7/2015 due to erosion through the scalp, replaced 5/19/16     Osteoarthritis of both knees      Other atopic dermatitis and related conditions      PE (pulmonary embolism) 4/15    Saddle - IVC     Pelvic lymphadenopathy 10/31/2017     Pulmonary hypertension (H)     EF 70-75%     Rectal mass 10/2017     Rectal mass 10/1/2017     Thrombosis of leg      Urinary tract infection, site not specified     MRSA - Dr Loo     Vertigo        Past Surgical History    Past Surgical History:   Procedure Laterality Date     ABDOMEN SURGERY  left side lower abdomen pain from Shunt we do roberth     COLONOSCOPY  2008     GYN SURGERY       H LABOR AND DELIVERY VAGINAL  1960, 1967     HERNIA REPAIR       HYSTERECTOMY  1976    hysterectomy     IMPLANT SHUNT VENTRICULOPERITONEAL Left 5/19/2016    IMPLANT SHUNT VENTRICULOPERITONEAL - Dr Alba     OPTICAL TRACKING SYSTEM IMPLANT SHUNT VENTRICULOPERITONEAL Right 4/10/2015     Shunt - Dr Sierra     ORTHOPEDIC SURGERY  Knee pain that prohibits walking     REMOVE SHUNT VENTRICULOPERITONEAL N/A 7/3/2015    REMOVE SHUNT VENTRICULOPERITONEAL;  Surgeon: Emmanuel Sierra MD;  Location: [facility] OR     SURGICAL HISTORY OF -   1/15    right femur/hip surgery - 1/18/2015     SURGICAL HISTORY OF -   7/04    rectocele repair     SURGICAL HISTORY OF -   1997    abdominal adhesion lysis     SURGICAL HISTORY OF -   4/15    IVC filter placement     SURGICAL HISTORY OF -   6/15    IVC filter removal       Current Medications    Current Outpatient Medications   Medication Sig Dispense Refill     acetaminophen (TYLENOL) 325 MG tablet Take 2 tablets (650 mg) by mouth every 4 hours as needed for other (surgical pain) 100 tablet 0     furosemide (LASIX) 40 MG tablet TAKE 1 TABLET BY MOUTH EVERY DAY AT 10 AM 90 tablet 1     levothyroxine (SYNTHROID/LEVOTHROID) 50 MCG tablet TAKE 1 TABLET BY MOUTH EVERY DAY 90 tablet 1     order for DME Equipment being ordered: Yoan Lift with Hygiene Sling 1  Device 0     order for DME Use EZ Stand as needed       polyethylene glycol (MIRALAX/GLYCOLAX) Packet Take 17 g by mouth daily as needed for constipation 7 packet 0     senna-docusate (SENOKOT-S/PERICOLACE) 8.6-50 MG tablet Take 1 tablet by mouth 2 times daily as needed for constipation 100 tablet 0     tamsulosin (FLOMAX) 0.4 MG capsule TAKE 1 CAPSULE BY MOUTH EVERY DAY AT 10 AM 90 capsule 1     warfarin ANTICOAGULANT (COUMADIN) 2 MG tablet 2 mg four days a week (MTuThSa) 60 tablet 1     warfarin ANTICOAGULANT (COUMADIN) 4 MG tablet TAKE 1 TABLET BY MOUTH ON MONDAY, TUESDAY, THURSDAY & SATURDAY,ALTERNATING WITH 2 MG 45 tablet 1       Allergies    Allergies   Allergen Reactions     Shellfish Allergy Anaphylaxis     Aspirin      GI issues     Contrast Dye Hives and Itching     Flushed skin     Penicillins Hives     Sulfa Drugs Hives     Valtrex [Valacyclovir] Nausea     Ceftin [Cefuroxime] Rash     Nitrofurantoin Rash       Immunizations    Immunization History   Administered Date(s) Administered     FLU 6-35 months 10/06/2011     Flu, Unspecified 11/03/2008     [id number] Novel Flu 09/25/2015     Influenza (High Dose) 3 valent vaccine 09/19/2014, 09/25/2015, 09/19/2016, 10/05/2017, 12/27/2018, 10/02/2019     Influenza (IIV3) PF 10/23/2003, 10/04/2010, 10/06/2011, 09/27/2012, 09/19/2013     Influenza Vaccine IM Ages 6-35 Months 4 Valent (PF) 11/03/2008     Pneumo Conj 13-V (2010&after) 09/25/2015     Pneumococcal 23 valent 10/04/2010     TD (ADULT, 7+) 10/31/2017     TDAP Vaccine (Adacel) 01/01/2004     Td (Adult), Adsorbed 10/31/2017     Tdap (Adacel,Boostrix) 01/01/2004     Zoster vaccine recombinant adjuvanted (SHINGRIX) 12/27/2018, 06/12/2019     Zoster vaccine, live 10/05/2007       Family History    Family History   Problem Relation Age of Onset     Colon Cancer Father      Coronary Artery Disease Father      Diabetes Maternal Grandmother        Social History    Social History     Socioeconomic History      Marital status:      Spouse name: Vernon     Number of children: 2     Years of education: Not on file     Highest education level: Not on file   Occupational History     Not on file   Social Needs     Financial resource strain: Not on file     Food insecurity:     Worry: Not on file     Inability: Not on file     Transportation needs:     Medical: Not on file     Non-medical: Not on file   Tobacco Use     Smoking status: Former Smoker     Packs/day: 0.50     Years: 10.00     Pack years: 5.00     Types: Cigarettes     Start date: 1958     Last attempt to quit: 1982     Years since quittin.1     Smokeless tobacco: Never Used     Tobacco comment: quit    Substance and Sexual Activity     Alcohol use: Yes     Alcohol/week: 0.0 - 1.0 standard drinks     Comment: Very light     Drug use: No     Sexual activity: Not Currently     Partners: Male     Birth control/protection: None     Comment: not a problem   Lifestyle     Physical activity:     Days per week: Not on file     Minutes per session: Not on file     Stress: Not on file   Relationships     Social connections:     Talks on phone: Not on file     Gets together: Not on file     Attends Sikhism service: Not on file     Active member of club or organization: Not on file     Attends meetings of clubs or organizations: Not on file     Relationship status: Not on file     Intimate partner violence:     Fear of current or ex partner: Not on file     Emotionally abused: Not on file     Physically abused: Not on file     Forced sexual activity: Not on file   Other Topics Concern     Parent/sibling w/ CABG, MI or angioplasty before 65F 55M? No   Social History Narrative     Not on file       All above reviewed and updated, all stable unless otherwise noted    Recent labs reviewed    ROS    As above, otherwise negative    OBJECTIVE    /62   Pulse 79   SpO2 97%   There is no height or weight on file to calculate BMI.  GENERAL: healthy, alert  and no distress  EYES: Eyes grossly normal to inspection, extraocular movements - intact, and PERRL  HENT: ear canals- normal; TMs- normal; Nose- normal; Mouth- no ulcers, no lesions  NECK: no tenderness, no adenopathy, no asymmetry, no masses, no stiffness; thyroid- normal to palpation  RESP: lungs clear to auscultation - no rales, no rhonchi, no wheezes  CV: regular rates and rhythm, normal S1 S2, no S3 or S4 and no murmur, no click or rub -  ABDOMEN: soft, no tenderness, no  hepatosplenomegaly, no masses, normal bowel sounds  MS: extremities- no gross deformities noted, no edema  SKIN: 8 cm around mid buttocks, skin intact pinkish blue region, minimal central area of further break down  NEURO: strength and tone- normal, sensory exam- grossly normal, mentation- intact, speech- normal, reflexes- symmetric  BACK: no CVA tenderness, no paralumbar tenderness  PSYCH: Alert and oriented times 3; speech- coherent , normal rate and volume; able to articulate logical thoughts, able to abstract reason, no tangential thoughts, no hallucinations or delusions, affect- normal  LYMPHATICS: no cervical adenopathy    DIAGNOSTICS/PROCEDURE    Reviewed recent labs, all quite good, except chronically elevated WBC     ASSESSMENT      ICD-10-CM    1. Pressure injury of buttock, stage 1, unspecified laterality L89.301    2. Dementia with behavioral disturbance, unspecified dementia type (H) F03.91    3. NPH (normal pressure hydrocephalus) (H) G91.2    4. S/P  shunt Z98.2    5. History of pulmonary embolism Z86.711 warfarin ANTICOAGULANT (COUMADIN) 2 MG tablet     warfarin ANTICOAGULANT (COUMADIN) 4 MG tablet   6. History of deep venous thrombosis Z86.718 warfarin ANTICOAGULANT (COUMADIN) 2 MG tablet     warfarin ANTICOAGULANT (COUMADIN) 4 MG tablet   7. 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 MGUS (monoclonal gammopathy of unknown significance) D47.2    9. Lymphoproliferative disorder (H) D47.9    10. Rectal  mass K62.89    11. Pelvic lymphadenopathy R59.0    12. Hypertension goal BP (blood pressure) < 140/90 I10    13. Hyperlipidemia with target LDL less than 130 E78.5    14. Hypothyroidism, unspecified type E03.9    15. Long term current use of anticoagulant therapy Z79.01    16. Medication monitoring encounter Z51.81        PLAN    Discussed treatment/modality options, including risk and benefits she desires:    1) barrier creams, change of positions, keep clean and dry, consider home care/wound care if any issues.    2) labs reviewed    3) Dr Duran in 4/2020, desires no w/u of rectal mass    4) close follow up    5) consider omeprazole for GERD?    6) continue current meds    All diagnosis above reviewed and noted above, otherwise stable.  See [facility] orders for further details.     Return in about 1 month (around 3/24/2020), or if symptoms worsen or fail to improve, for Medication Recheck Visit, Follow Up Chronic, Follow Up Acute.    Health Maintenance Due   Topic Date Due     DEXA  1940     MICROALBUMIN  1940     OP ANNUAL INR REFERRAL  07/02/2019       See Patient Instructions             Joaquin Rockwell MD, FAAFP     [facility]  [address] [place] [id number]  [email or web address]  [email or web address]   Office: [phone]  Fax: [phone]  Pager: [phone]

## 2020-03-15 ENCOUNTER — TRANSFERRED RECORDS (OUTPATIENT)
Dept: HEALTH INFORMATION MANAGEMENT | Facility: CLINIC | Age: 80
End: 2020-03-15

## 2020-03-16 ENCOUNTER — ANTICOAGULATION THERAPY VISIT (OUTPATIENT)
Dept: FAMILY MEDICINE | Facility: CLINIC | Age: 80
End: 2020-03-16
Payer: COMMERCIAL

## 2020-03-16 DIAGNOSIS — I26.99 PULMONARY EMBOLISM (H): ICD-10-CM

## 2020-03-16 DIAGNOSIS — I82.409 DVT (DEEP VENOUS THROMBOSIS) (H): ICD-10-CM

## 2020-03-16 DIAGNOSIS — Z79.01 LONG TERM CURRENT USE OF ANTICOAGULANT THERAPY: ICD-10-CM

## 2020-03-16 LAB — INR PPP: 3 (ref 0.9–1.1)

## 2020-03-16 PROCEDURE — 99207 ZZC NO CHARGE NURSE ONLY: CPT | Performed by: FAMILY MEDICINE

## 2020-03-16 NOTE — PROGRESS NOTES
ANTICOAGULATION FOLLOW-UP CLINIC VISIT    Patient Name:  Ángela Dumont  Date:  3/16/2020  Contact Type:  Telephone/ with      SUBJECTIVE:  Patient Findings     Comments:   The patient was assessed for diet, medication, and activity level changes, missed or extra doses, bruising or bleeding, with no problem findings.          Clinical Outcomes     Negatives:   Major bleeding event, Thromboembolic event, Anticoagulation-related hospital admission, Anticoagulation-related ED visit, Anticoagulation-related fatality    Comments:   The patient was assessed for diet, medication, and activity level changes, missed or extra doses, bruising or bleeding, with no problem findings.             OBJECTIVE    INR   Date Value Ref Range Status   03/16/2020 3.0 (A) 0.90 - 1.10 Final       ASSESSMENT / PLAN  INR assessment THER    Recheck INR In: 2 WEEKS    INR Location Homecare INR    Billed home INR Yes      Anticoagulation Summary  As of 3/16/2020    INR goal:   2.0-3.0   TTR:   90.3 % (1 y)   INR used for dosing:   3.0 (3/16/2020)   Warfarin maintenance plan:   4 mg (1 mg x 4) every Sun, Wed, Fri; 2 mg (1 mg x 2) all other days   Full warfarin instructions:   4 mg every Sun, Wed, Fri; 2 mg all other days   Weekly warfarin total:   20 mg   No change documented:   Cadence Gonzalez RN   Plan last modified:   Cadence Gonzalez RN (10/2/2019)   Next INR check:   3/30/2020   Priority:   Maintenance   Target end date:       Indications    Long-term (current) use of anticoagulants [Z79.01] [Z79.01]  Pulmonary embolism (H) [I26.99]  DVT (deep venous thrombosis) (H) [I82.409]             Anticoagulation Episode Summary     INR check location:       Preferred lab:       Send INR reminders to:   [facility]    Comments:         Anticoagulation Care Providers     Provider Role Specialty Phone number    Joaquin Rockwell MD [facility] [phone]            See the Encounter Report to view  Anticoagulation Flowsheet and Dosing Calendar (Go to Encounters tab in chart review, and find the Anticoagulation Therapy Visit)    Dosage adjustment made based on physician directed care plan.    Cadence Gonzalez RN

## 2020-03-23 DIAGNOSIS — I10 HYPERTENSION GOAL BP (BLOOD PRESSURE) < 140/90: ICD-10-CM

## 2020-03-23 DIAGNOSIS — R33.9 URINARY RETENTION: ICD-10-CM

## 2020-03-23 RX ORDER — FUROSEMIDE 40 MG
TABLET ORAL
Qty: 90 TABLET | Refills: 1 | Status: SHIPPED | OUTPATIENT
Start: 2020-03-23 | End: 2020-01-01

## 2020-03-23 RX ORDER — TAMSULOSIN HYDROCHLORIDE 0.4 MG/1
CAPSULE ORAL
Qty: 90 CAPSULE | Refills: 1 | Status: SHIPPED | OUTPATIENT
Start: 2020-03-23 | End: 2020-01-01

## 2020-03-28 DIAGNOSIS — Z86.711 HISTORY OF PULMONARY EMBOLISM: ICD-10-CM

## 2020-03-28 DIAGNOSIS — Z86.718 HISTORY OF DEEP VENOUS THROMBOSIS: ICD-10-CM

## 2020-03-30 RX ORDER — WARFARIN SODIUM 4 MG/1
TABLET ORAL
Qty: 45 TABLET | Refills: 0 | Status: SHIPPED | OUTPATIENT
Start: 2020-03-30 | End: 2020-01-01 | Stop reason: DRUGHIGH

## 2020-03-30 NOTE — TELEPHONE ENCOUNTER
"WARFARIN SODIUM 4 MG TABLET   Last Written Prescription Date:  2/24/2020  Last Fill Quantity: 45,  # refills: 1   Last office visit: 2/24/2020 with prescribing provider:  Joaquin Rockwell MD   Future Office Visit: NA  Next 5 appointments (look out 90 days)    Apr 09, 2020  2:45 PM CDT  Return Visit with Susanna Duran MD  [facility] ([facility]) [address]  [facility] [id number] [phone]           Requested Prescriptions   Pending Prescriptions Disp Refills     warfarin ANTICOAGULANT (COUMADIN) 4 MG tablet [Pharmacy Med Name: WARFARIN SODIUM 4 MG TABLET] 45 tablet 0     Sig: TAKE 1 TABLET BY MOUTH ON MONDAY, TUESDAY, THURSDAY & SATURDAY,ALTERNATING WITH 2 MG       Vitamin K Antagonists Failed - 3/28/2020 12:27 AM        Failed - INR is within goal in the past 6 weeks     Confirm INR is within goal in the past 6 weeks.     Recent Labs   Lab Test 03/16/20   INR 3.0*                       Passed - Recent (12 mo) or future (30 days) visit within the authorizing provider's specialty     Patient has had an office visit with the authorizing provider or a provider within the authorizing providers department within the previous 12 mos or has a future within next 30 days. See \"Patient Info\" tab in inbasket, or \"Choose Columns\" in Meds & Orders section of the refill encounter.              Passed - Medication is active on med list        Passed - Patient is 18 years of age or older        Passed - Patient is not pregnant        Passed - No positive pregnancy on file in past 12 months             "

## 2020-04-01 DIAGNOSIS — Z86.718 HISTORY OF DEEP VENOUS THROMBOSIS: ICD-10-CM

## 2020-04-01 DIAGNOSIS — Z86.711 HISTORY OF PULMONARY EMBOLISM: ICD-10-CM

## 2020-04-01 RX ORDER — WARFARIN SODIUM 2 MG/1
TABLET ORAL
Qty: 39 TABLET | Refills: 1 | Status: SHIPPED | OUTPATIENT
Start: 2020-04-01 | End: 2020-01-01

## 2020-04-01 NOTE — TELEPHONE ENCOUNTER
Prescription approved per [facility] Refill Protocol.    Josias Lynn RN   [facility]

## 2020-04-01 NOTE — TELEPHONE ENCOUNTER
"Requested Prescriptions   Pending Prescriptions Disp Refills     warfarin ANTICOAGULANT (COUMADIN) 2 MG tablet [Pharmacy Med Name: WARFARIN SODIUM 2 MG TABLET] 39 tablet 1     Sig: TAKE 2MG BY MOUTH ON SUNDAY, WEDNESDAY, FRIDAY AND TAKE 4MG REST OF WEEK       Last Written Prescription Date:  2/24/2020  Last Fill Quantity: 60,  # refills: 1   Last office visit: 2/24/2020 with prescribing provider:     Future Office Visit:   Next 5 appointments (look out 90 days)    Apr 09, 2020  2:45 PM CDT  Return Visit with Susanna Duran MD  [facility] ([facility]) [address]  [address]  [facility] [address]  [phone]               Vitamin K Antagonists Failed - 4/1/2020  1:03 PM        Failed - INR is within goal in the past 6 weeks     Confirm INR is within goal in the past 6 weeks.     Recent Labs   Lab Test 03/16/20   INR 3.0*                       Passed - Recent (12 mo) or future (30 days) visit within the authorizing provider's specialty     Patient has had an office visit with the authorizing provider or a provider within the authorizing providers department within the previous 12 mos or has a future within next 30 days. See \"Patient Info\" tab in inbasket, or \"Choose Columns\" in Meds & Orders section of the refill encounter.              Passed - Medication is active on med list        Passed - Patient is 18 years of age or older        Passed - Patient is not pregnant        Passed - No positive pregnancy on file in past 12 months           "

## 2020-04-09 ENCOUNTER — VIRTUAL VISIT (OUTPATIENT)
Dept: ONCOLOGY | Facility: CLINIC | Age: 80
End: 2020-04-09
Attending: INTERNAL MEDICINE
Payer: COMMERCIAL

## 2020-04-09 DIAGNOSIS — D47.Z9 LOW GRADE B CELL LYMPHOPROLIFERATIVE DISORDER (H): Primary | ICD-10-CM

## 2020-04-09 DIAGNOSIS — D47.2 MGUS (MONOCLONAL GAMMOPATHY OF UNKNOWN SIGNIFICANCE): ICD-10-CM

## 2020-04-09 PROCEDURE — 99215 OFFICE O/P EST HI 40 MIN: CPT | Mod: 95 | Performed by: INTERNAL MEDICINE

## 2020-04-09 NOTE — PROGRESS NOTES
"Ángela Dumont is a 79 year old female who is being evaluated via a billable telephone visit.      The patient has been notified of following:     \"This telephone visit will be conducted via a call between you and your physician/provider. We have found that certain health care needs can be provided without the need for a physical exam.  This service lets us provide the care you need with a short phone conversation.  If a prescription is necessary we can send it directly to your pharmacy.  If lab work is needed we can place an order for that and you can then stop by our lab to have the test done at a later time.    Telephone visits are billed at different rates depending on your insurance coverage. During this emergency period, for some insurers they may be billed the same as an in-person visit.  Please reach out to your insurance provider with any questions.    If during the course of the call the physician/provider feels a telephone visit is not appropriate, you will not be charged for this service.\"    Patient has given verbal consent for Telephone visit?  Yes    How would you like to obtain your AVS? Mail a copy    Ángela Dumont complains of    Chief Complaint   Patient presents with     Oncology Clinic Visit     MGUS (monoclonal gammopathy of unknown significance)        I have reviewed and updated the patient's Past Medical History, Social History, Family History and Medication List.    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  On chart review, she has had a mildly elevated WBC count since at least 2015.  In July 2017, WBC was 18.1 K, and peripheral smear showed leukocytosis with mild atypical absolute lymphocytosis and increased rouleaux.  Hemoglobin and platelets were normal.      In 2015, she was diagnosed with pulmonary embolism and DVT.  Three months prior, she had fractured her femur and had undergone surgery.  She was immobile for 3 months.  She has been on warfarin since then.      Ángela has dementia, and her  answers the majority of the questions.  They live in a one level multiplex.  She is wheelchair-bound  He helps with lifting and transferring.  She does not get frequent infections.  She started taking vitamin D recently.      She was admitted to the hospital on 10/18/17-10/19/17 with abdominal pain and constipation.  CT scan on 10/18/17 showed asymmetric wall thickening involving the posterior aspect of the low rectum near the anorectal junction.  She also had extensive perirectal/mesorectal adenopathy, concerning for colorectal neoplasm.  She also has additional enlarged bilateral iliac chain lymph nodes, borderline enlarged perirectal lymph nodes and diffusely mildly prominent lymph nodes elsewhere throughout the abdomen and pelvis.  At the time, patient and her  declined further evaluation and agreed to home hospice services, but did not enroll.          INTERIM HISTORY: Ángela says that she feels okay.  Her  is on the call too.      REVIEW OF SYSTEMS:   14 point ROS was reviewed and is negative other than as noted above in HPI.       HOME MEDICATIONS:  Current Outpatient Medications   Medication Sig Dispense Refill     acetaminophen (TYLENOL) 325 MG tablet Take 2 tablets (650 mg) by mouth every 4 hours as needed for other (surgical pain) 100 tablet 0     furosemide (LASIX) 40 MG tablet TAKE 1 TABLET BY MOUTH EVERY DAY AT 10 AM 90 tablet 1     levothyroxine (SYNTHROID/LEVOTHROID) 50 MCG  tablet TAKE 1 TABLET BY MOUTH EVERY DAY 90 tablet 1     order for DME Equipment being ordered: Yoan Lift with Hygiene Sling 1 Device 0     order for DME Use EZ Stand as needed       polyethylene glycol (MIRALAX/GLYCOLAX) Packet Take 17 g by mouth daily as needed for constipation 7 packet 0     senna-docusate (SENOKOT-S/PERICOLACE) 8.6-50 MG tablet Take 1 tablet by mouth 2 times daily as needed for constipation 100 tablet 0     tamsulosin (FLOMAX) 0.4 MG capsule TAKE 1 CAPSULE BY MOUTH EVERY DAY AT 10 AM 90 capsule 1     warfarin ANTICOAGULANT (COUMADIN) 2 MG tablet TAKE 2MG BY MOUTH ON SUNDAY, WEDNESDAY, FRIDAY AND TAKE 4MG REST OF WEEK 39 tablet 1     warfarin ANTICOAGULANT (COUMADIN) 4 MG tablet TAKE 1 TABLET BY MOUTH ON MONDAY, TUESDAY, THURSDAY & SATURDAY,ALTERNATING WITH 2 MG 45 tablet 0         ALLERGIES:  Allergies   Allergen Reactions     Shellfish Allergy Anaphylaxis     Aspirin      GI issues     Contrast Dye Hives and Itching     Flushed skin     Penicillins Hives     Sulfa Drugs Hives     Valtrex [Valacyclovir] Nausea     Ceftin [Cefuroxime] Rash     Nitrofurantoin Rash         PAST MEDICAL HISTORY:  Past Medical History:   Diagnosis Date     Adjustment disorder with depressed mood      Antiplatelet or antithrombotic long-term use      Cancer (H) 04/2016    Seems to be under control.  Dr Duran     Colitis, nonspecific 10/31/2017     Constipation      Dementia (H)     memory/anger     Depressive disorder last few months as condition became apparent    Lack of mobility a big problem     DVT (deep venous thrombosis) (H) 4/15    bilateral     Esophageal reflux      Fibromyalgia      Fracture of right femur (H) 1/15     Heart murmur      Herpes zoster without complication 08/16/2017    left buttocks     History of Clostridium difficile      Hyperlipidemia LDL goal < 130      Hypertension goal BP (blood pressure) < 140/90      Hypothyroid      Leukocytosis, unspecified     w/u ?     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)      Lymphoproliferative disorder (H)     Dr Duran     MGUS (monoclonal gammopathy of unknown significance)     Dr Duran     Migraine      MR (mitral regurgitation)     Mild     NPH (normal pressure hydrocephalus) (H) 4/15    shunt placed but removed in 7/2015 due to erosion through the scalp, replaced 5/19/16     Osteoarthritis of both knees      Other atopic dermatitis and related conditions      PE (pulmonary embolism) 4/15    Saddle - IVC     Pelvic lymphadenopathy 10/31/2017     Pulmonary hypertension (H)     EF 70-75%     Rectal mass 10/2017     Rectal mass 10/1/2017     Thrombosis of leg      Urinary tract infection, site not specified     MRSA - Dr Loo     Vertigo          PAST SURGICAL HISTORY:  Past Surgical History:   Procedure Laterality Date     ABDOMEN SURGERY  left side lower abdomen pain from Shunt we do roberth     COLONOSCOPY  2008     GYN SURGERY       H LABOR AND DELIVERY VAGINAL  1960, 1967     HERNIA REPAIR       HYSTERECTOMY  1976    hysterectomy     IMPLANT SHUNT VENTRICULOPERITONEAL Left 5/19/2016    IMPLANT SHUNT VENTRICULOPERITONEAL - Dr Alba     OPTICAL TRACKING SYSTEM IMPLANT SHUNT VENTRICULOPERITONEAL Right 4/10/2015     Shunt - Dr Sierra     ORTHOPEDIC SURGERY  Knee pain that prohibits walking     REMOVE SHUNT VENTRICULOPERITONEAL N/A 7/3/2015    REMOVE SHUNT VENTRICULOPERITONEAL;  Surgeon: Emmanuel Sierra MD;  Location: [facility] OR     SURGICAL HISTORY OF -   1/15    right femur/hip surgery - 1/18/2015     SURGICAL HISTORY OF -   7/04    rectocele repair     SURGICAL HISTORY OF -   1997    abdominal adhesion lysis     SURGICAL HISTORY OF -   4/15    IVC filter placement     SURGICAL HISTORY OF -   6/15    IVC filter removal         SOCIAL HISTORY:  Social History     Socioeconomic History     Marital status:      Spouse name: Vernon     Number of children: 2     Years of education: Not on file     Highest education level:  Not on file   Occupational History     Not on file   Social Needs     Financial resource strain: Not on file     Food insecurity     Worry: Not on file     Inability: Not on file     Transportation needs     Medical: Not on file     Non-medical: Not on file   Tobacco Use     Smoking status: Former Smoker     Packs/day: 0.50     Years: 10.00     Pack years: 5.00     Types: Cigarettes     Start date: 1958     Last attempt to quit: 1982     Years since quittin.2     Smokeless tobacco: Never Used     Tobacco comment: quit    Substance and Sexual Activity     Alcohol use: Yes     Alcohol/week: 0.0 - 1.0 standard drinks     Comment: Very light     Drug use: No     Sexual activity: Not Currently     Partners: Male     Birth control/protection: None     Comment: not a problem   Lifestyle     Physical activity     Days per week: Not on file     Minutes per session: Not on file     Stress: Not on file   Relationships     Social connections     Talks on phone: Not on file     Gets together: Not on file     Attends Taoist service: Not on file     Active member of club or organization: Not on file     Attends meetings of clubs or organizations: Not on file     Relationship status: Not on file     Intimate partner violence     Fear of current or ex partner: Not on file     Emotionally abused: Not on file     Physically abused: Not on file     Forced sexual activity: Not on file   Other Topics Concern     Parent/sibling w/ CABG, MI or angioplasty before 65F 55M? No   Social History Narrative     Not on file   Ángela is from [place] and her  is from [place].  They used to live in [place], [place], and Ángela wants to move back there.        FAMILY HISTORY:  Family History   Problem Relation Age of Onset     Colon Cancer Father      Coronary Artery Disease Father      Diabetes Maternal Grandmother          PHYSICAL EXAM:  Vital signs:  There were no vitals taken for this visit.   Phone visit.  Exam not  done.    LABS:  CBC RESULTS:   Recent Labs   Lab Test 01/07/20  1112   WBC 27.4*   RBC 4.74   HGB 13.4   HCT 42.2   MCV 89   MCH 28.3   MCHC 31.8   RDW 13.7          SPEP: M-spike of 0.9 on 9/28/17 4/12/18: 1.1 g/dL    Peripheral smear 9/28/17:  FINAL DIAGNOSIS:   Peripheral blood.   - Leukocytosis with absolute atypical lymphocytosis.     COMMENT:   There is leukocytosis with absolute lymphocytosis.  Some of the   lymphocytes appear morphologically atypical.  Further evaluation to   evaluate for a circulating lymphoproliferative disorder with flow   cytometry is recommended.  Clinical correlation is required.     Peripheral flow cytometry 9/28/17:  INTERPRETATION:   Blood:        Kappa-monotypic B cells negative for CD5 and CD10 (40%)        See comment     COMMENT:   By flow cytometry, the monotypic B cells lack CD5 and CD10. This   immunophenotype can be seen in marginal zone lymphoma, lymphoplasmacytic   lymphoma, and hairy cell leukemia, as well as rarely in follicular   lymphoma, CLL/SLL, or mantle cell lymphoma. Additional stains to exclude   Hairy cell leukemia are pending and will be reported in an addendum.   Final interpretation requires correlation with results of other   ancillary studies, morphologic and clinical features. If clinically   indicated a biopsy of involved lymph node or another tissue could be   required for final diagnosis.     This addendum is issued to reflect additional testing performed on this   case.   The abnormal B cells are positive for CD79b and  negative for CD23.   Other immunophenotypic markers (see details below) do not support the   diagnosis of hairy cell leukemia. The differential includes marginal   zone lymphoma, splenic B cell leukemia/lymphoma NOS, mantle cell   lymphoma, lymphoplasmacytic lymphoma, while other small B cell lymphoma   entities are considered less likely. In the differential diagnosis   please note that rare cases of mantle cell lymphoma can be  CD5 negative.   Recommend biopsy of an involved lymph node /  tissue for diagnosis with   cytogenetics and clinical correlation. See Comment.     COMMENTS:   Additional eight-color analyses are performed for the following markers:   CD11c, CD22, CD23, CD25, CD79b, ,      The abnormal B cells are positive for CD22 (normal level), CD79b ,   predominantly negative for CD11c (84% of them are negative) and negative   for CD23, CD25,  and .         ASSESSMENT/PLAN:  Ángela Dumont is a 79 year old female with:    1) Leukocytosis: appears chronic since at least 2015, and on further chart review from Care Everywhere, likely chronic since at least 2007.  Peripheral smear in July 2017 showed mild atypical lymphocytosis and rouleaux.  Hemoglobin and platelet count are normal.  She does not have frequent infections.      Peripheral flow cytometry shows concern for lymphoproliferative disorder.  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      We discussed that is usually a slow growing process, and treatment is usually not necessary.  We could do further evaluation, such as with PET scan and bone marrow biopsy.  Considering her age, dementia, immobility, and poor functional status, I favored watching and waiting and sparing her of invasive procedures that may not change her overall course.  She and her  agreed and opted to wait on getting additional scans or bone marrow biopsy at this time.        The last CBC was done in January 2020 with her PCP.  Her WBC had risen to 27.4K.  There was no differential.  The WBC is a bit higher than her baseline in years past.  The other 2 cell lines remain normal.  This can likely be monitored for now.    -will repeat CBC/diff, and repeat peripheral smear and peripheral flow.  In setting of COVID pandemic, the labs can be done in ~2-3 months  or when things have settled down, with another phone visit with me then to review results.    2) History of PE and DVT: diagnosed in 2015, and she has been on warfarin ever since.  It was likely provoked at the time by surgery and immobility.  She remains wheelchair bound now, and remains at high risk for blood clots.  She has not had frequent falls, and INR has been under relatively good control.  I discussed and risks and benefits of continuing the warfarin.  She will continue the warfarin for now, considering her high risk for clotting remains.  If it becomes that the risk of bleeding starts to outweigh her risk of clots, then it would be time to stop the warfarin.     3) MGUS: M-spike of 0.9-1.1 g/dL.  -as above, will hold off on further imaging or bone marrow biopsy  -will check SPEP with her other labs in ~2-3 months    4) Suspected rectal cancer: CT scan on 10/18/17 showed wall thickening at the posterior aspect of the low rectum near the anorectal junction, concerning for rectal cancer, with perirectal/mesorectal adenopathy.  She has symptoms of constipation and abdominal pain.  In the hospital, they declined further evaluation.  Her  says that they met with hospice, but has not enrolled yet.  I discussed that this does look like a rectal cancer on imaging, but only way to confirm it is to obtain pathology with a colonoscopy and biopsy.  Considering Ángela's dementia, immobility, and overall quite poor functional status, she would be a poor candidate for treatments such as surgery, chemotherapy or radiation.  I think it is reasonable to not further work this up and focus on patient's comfort and quality of life.  We discussed this again, and patient, , and daughter all agree.       Susanna Duran MD  Hematology/Oncology  [facility]      Phone call duration: 5 minutes

## 2020-04-10 NOTE — PATIENT INSTRUCTIONS
Labs 7/2/20  Appt with Dr. Duran scheduled 7/9/20  Teresa Domínguez, RN, BSN, [facility]  Patient Care Coordinator  [facility]  [phone]

## 2020-04-16 ENCOUNTER — TRANSFERRED RECORDS (OUTPATIENT)
Dept: HEALTH INFORMATION MANAGEMENT | Facility: CLINIC | Age: 80
End: 2020-04-16

## 2020-04-16 LAB — INR PPP: 3 (ref 0.9–1.1)

## 2020-04-17 ENCOUNTER — ANTICOAGULATION THERAPY VISIT (OUTPATIENT)
Dept: FAMILY MEDICINE | Facility: CLINIC | Age: 80
End: 2020-04-17
Payer: COMMERCIAL

## 2020-04-17 DIAGNOSIS — Z79.01 LONG TERM CURRENT USE OF ANTICOAGULANT THERAPY: ICD-10-CM

## 2020-04-17 DIAGNOSIS — I82.409 DVT (DEEP VENOUS THROMBOSIS) (H): ICD-10-CM

## 2020-04-17 DIAGNOSIS — I26.99 PULMONARY EMBOLISM (H): ICD-10-CM

## 2020-04-17 PROCEDURE — 99207 ZZC NO CHARGE NURSE ONLY: CPT | Performed by: FAMILY MEDICINE

## 2020-04-17 NOTE — PROGRESS NOTES
ANTICOAGULATION FOLLOW-UP CLINIC VISIT    Patient Name:  Ángela Dumont  Date:  4/17/2020  Contact Type:  Telephone/ Detailed VM left for Vernon, with dosing instructions and recheck date    SUBJECTIVE:  Patient Findings     Comments:   The patient was assessed for diet, medication, and activity level changes, missed or extra doses, bruising or bleeding, with no problem findings.          Clinical Outcomes     Negatives:   Major bleeding event, Thromboembolic event, Anticoagulation-related hospital admission, Anticoagulation-related ED visit, Anticoagulation-related fatality    Comments:   The patient was assessed for diet, medication, and activity level changes, missed or extra doses, bruising or bleeding, with no problem findings.             OBJECTIVE    INR   Date Value Ref Range Status   04/17/2020 3.0 (A) 0.90 - 1.10 Final       ASSESSMENT / PLAN  INR assessment THER    Recheck INR In: 2 WEEKS    INR Location Home INR    Billed home INR No      Anticoagulation Summary  As of 4/17/2020    INR goal:   2.0-3.0   TTR:   90.3 % (1 y)   INR used for dosing:   3.0 (4/17/2020)   Warfarin maintenance plan:   4 mg (1 mg x 4) every Sun, Wed, Fri; 2 mg (1 mg x 2) all other days   Full warfarin instructions:   4 mg every Sun, Wed, Fri; 2 mg all other days   Weekly warfarin total:   20 mg   No change documented:   Nereyda Padilla RN   Plan last modified:   Cadence Gonzalez RN (10/2/2019)   Next INR check:   5/1/2020   Priority:   Maintenance   Target end date:       Indications    Long-term (current) use of anticoagulants [Z79.01] [Z79.01]  Pulmonary embolism (H) [I26.99]  DVT (deep venous thrombosis) (H) [I82.409]             Anticoagulation Episode Summary     INR check location:       Preferred lab:       Send INR reminders to:   [facility] [place]    Comments:         Anticoagulation Care Providers     Provider Role Specialty Phone number    Joaquin Rockwell MD [facility] [phone]             See the Encounter Report to view Anticoagulation Flowsheet and Dosing Calendar (Go to Encounters tab in chart review, and find the Anticoagulation Therapy Visit)    Dosage adjustment made based on physician directed care plan.    Nereyda Padilla RN

## 2020-04-24 NOTE — PROGRESS NOTES
Clinic Care Coordination Contact  Community Health Worker Follow Up  Spoke to Juan David    Discussed the Following:  Siva states that Byron still has that coloration on her back side from 2 months ago.  CHW asked if it was a bruise.  Vernon states that it's worse than a bruise, it's more like a scab.  Vernon was wondering if he could get a stronger Rx to treat it.  He stated that some days it'll look better, then all of a sudden it'll start to look like it's not healing at all.    CHW asked Vernon what was prescribed for the bruise/scab.  Vernon states that he doesn't believe Dr Rockwell ever looked at it and nothing was prescribed.  He's just been giving her over the counter medications and some cream.  Writer asked if this luci was painful to patient at all.  Vernon clarified with Byron that it is a little painful and mainly uncomfortable.    Vernon reports that Home Care PT is still coming out, however, they haven't seen an Home Health Aide this month at all.  He believes they should be getting a new Aide on Monday.    CHW suggested a follow up with [facility], to see if there are any other community support or resources were needed at this time.  Vernon agreed to this idea.    CHW Next Follow-up: 2 Months    CHW Plan:   1.  I will send a message to Dr Rockwell to see if he's able to contact patient for follow up of prescribe something for patients bruise/scab.  2.  I will route message to David for care plan and follow up.    EROS Hsu  Clinic Care Coordination  [facility] : [place], and [place]  Phone: [phone]    ______________________  Next Outreach:  06/24/20  Planned Outreach Frequency: Maintenance - 2 Months  Preferred Phone Number: Juan David(spouse) [phone]    Last Assessment Date: 08/23/19  Care Plan Completion Date: 08/23/19  ______________________

## 2020-04-24 NOTE — TELEPHONE ENCOUNTER
Attempt # 1  Called #   Telephone Information:   Mobile [phone]       Left a non detailed VM to call back at [phone] and ask for any available Triage Nurse.    Josias Lynn RN   [facility]

## 2020-04-24 NOTE — TELEPHONE ENCOUNTER
"Pt  calling back and reporting the luci on backside is \"not that bad\" but is still there. Pt  stating the area of concern is on the bottom about the size of a half dollar. Pt  stated he has pictures.  advised to send this in if possible via [employer]. Patient  stated an understanding and agreed with plan.    Pt  was advised of EP skin to become involved to help treat. Pt  advised of telephone number for skin care.  Referral placed.    Pt  advised to continue cares at this time and call if needing more assistance.    Josias Lynn RN   [facility] Triage         "

## 2020-04-24 NOTE — TELEPHONE ENCOUNTER
Dr Rockwell,    Community Health Worker spoke with Juan David(spouse) today for Care Coordination outreach. Siva reports that Byron still has that discoloration on her backside from 2 months ago. It is causing a little pain and mainly discomfort. Siva has been using over the counter medications and cream to treat it, but is now wondering if he could possibly get an Rx for something a little stronger to give to her. Any questions, please contact Siva directly.    Thank you,    Lorene Noyola, [facility]  Clinic Care Coordination  [facility] : [place], [place], and [place]  Phone: [phone]

## 2020-05-07 NOTE — LETTER
[place] CARE COORDINATION  [address] [facility] [address]  May 7, 2020        Ángela Dumont  [address] [facility] [address]          Dear Deny Motta is an updated Complex Care Plan for your continued enrollment in Care Coordination. Please let us know if you have additional questions, concerns, or goals that we can assist with.    Sincerely,    David Butler, [facility]  Clinic Care Coordinator  Ph. [phone]  [email or web address]  Complex Care Plan  About Me:    Patient Name:  Ángela Dumont    YOB: 1940  Age:         79 year old   [place] MRN:    [id number] Telephone Information:  Home Phone [phone]   Mobile [phone]       Address:  [address] [address] Email address:  [email or web address]      Emergency Contact(s)    Name Relationship Lgl Grd Work Phone Home Phone Mobile Phone   1. TONJA DUMONT Spouse  none [phone]   2. LEENA ROSENBERG Daughter  none none [phone]           Primary language:  English     needed? No   [place] [employer] [facility]:  [phone] op. 1  Other communication barriers: Caregiver  Preferred Method of Communication:  Mail  Current living arrangement: I live in a private home with spouse  Mobility Status/ Medical Equipment: Dependent/Assisted by Another    Health Maintenance  Health Maintenance Reviewed: Not assessed    My Access Plan  Medical Emergency 911   Primary Clinic Line [facility] - [phone]   24 Hour Appointment Line [phone] or  [phone]) (toll-free)   24 Hour Nurse Line [phone] (toll-free)   Preferred Urgent Care [facility], [phone]   Preferred Hospital [facility]  [phone]   Preferred Pharmacy [facility] [address] [facility] [employer] - [place] - [address] [address]     Behavioral Health Crisis Line The National Suicide Prevention  [facility] at 1-754.696.1738 or 126             My Care Team Members  Patient Care Team       Relationship Specialty Notifications Start End    Joaquin Rockwell MD PCP - General Family Practice  6/16/15     Phone: [phone] Fax: [phone]         [address] [facility] [address]    Joaquin Rockwell MD Assigned PCP   5/14/15     Phone: [phone] Fax: [phone]         [address] [facility] [address]    [facility] ([facility] ([place])  7/30/19     Phone: [phone]         Lorene Pablo Community Health Worker Primary Care - CC  9/6/19     [place]; . [phone], Fax: [phone]    David Wise LGSW Lead Care Coordinator   11/20/19             My Care Plans  Self Management and Treatment Plan  Goals and (Comments)  Goals        General    2. Medical (pt-stated)     Notes - Note created  5/7/2020  3:55 PM by David Wise LGSW    Goal Statement: I want to follow-up with Ortho in about one month on 6.12.2020.  Date Goal set: 5.7.2020  Barriers: Appointment availability in-person during COVID-19.  Strengths: Recognition of need, strong support system including spouse.   Date to Achieve By: 6.15.2020  Patient expressed understanding of goal: Pt reports understanding and denies any additional questions or concerns at this times. SW CC engaged in AIDET communication during encounter.    Action steps to achieve this goal:  1. I will keep my appointment with Ortho.  2. I will attend appointment.  3. I will get advice during appointment regarding mobility and expectations.               Action Plans on File:                       Advance Care Plans/Directives Type:   Type Advanced Care Plans/Directives: POLST    My Medical and Care Information  Problem List   Patient Active Problem List   Diagnosis     S/P  shunt     NPH (normal pressure hydrocephalus) (H)     Fracture of right femur (H)     DVT (deep venous thrombosis) (H)     Pulmonary embolism (H)      Dementia (H)     Hypothyroid     Hypertension goal BP (blood pressure) < 140/90     Adjustment disorder with depressed mood     Hyperlipidemia with target LDL less than 130     Leukocytosis     Esophageal reflux     UTI (urinary tract infection)     Constipation     Pulmonary hypertension (H)     MR (mitral regurgitation)     Fibromyalgia     Osteoarthritis of both knees     Health Care Home     Long-term (current) use of anticoagulants [Z79.01]     Advance Care Planning     SIRS (systemic inflammatory response syndrome) (H)     History of pulmonary embolism     History of deep venous thrombosis     Urinary retention     BPPV (benign paroxysmal positional vertigo), bilateral     Herpes zoster without complication     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    Care Coordination Start Date: 8/23/2019   Frequency of Care Coordination: monthly   Form Last Updated: 05/07/2020

## 2020-05-07 NOTE — PROGRESS NOTES
Clinic Care Coordination Contact    Clinic Care Coordination Contact  OUTREACH    Referral Information:  Referral Source: PCP  Primary Diagnosis: Frailty/Failure to thrive(DME/ community resources)    Chief Complaint   Patient presents with     Clinic Care Coordination - Follow-up     Care navigation        Universal Utilization: Appropriate per chart review. No noted concerns at this time.   Difficulty keeping appointments: No  Compliance Concerns: No  No-Show Concerns: No  No PCP office visit in Past Year: No  Utilization    Last refreshed: 5/7/2020 10:26 AM:  Hospital Admissions 0           Last refreshed: 5/7/2020 10:26 AM:  ED Visits 0           Last refreshed: 5/7/2020 10:26 AM:  No Show Count (past year) 1              Current as of: 5/7/2020 10:26 AM            Clinical Concerns:  Current Medical Concerns:    Patient Active Problem List   Diagnosis     S/P  shunt     NPH (normal pressure hydrocephalus) (H)     Fracture of right femur (H)     DVT (deep venous thrombosis) (H)     Pulmonary embolism (H)     Dementia (H)     Hypothyroid     Hypertension goal BP (blood pressure) < 140/90     Adjustment disorder with depressed mood     Hyperlipidemia with target LDL less than 130     Leukocytosis     Esophageal reflux     UTI (urinary tract infection)     Constipation     Pulmonary hypertension (H)     MR (mitral regurgitation)     Fibromyalgia     Osteoarthritis of both knees     Health Care Home     Long-term (current) use of anticoagulants [Z79.01]     Advance Care Planning     SIRS (systemic inflammatory response syndrome) (H)     History of pulmonary embolism     History of deep venous thrombosis     Urinary retention     BPPV (benign paroxysmal positional vertigo), bilateral     Herpes zoster without complication     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     Education Provided to patient: Spoke with spouse. Role of  CC and reason for calling.    Health Maintenance Reviewed: Not assessed  Clinical Pathway: None    Medication Management:  Medications managed by spouse.      Functional Status:  Dependent ADLs:: Wheelchair-with assist, Bathing, Dressing, Eating, Grooming, Incontinence, Positioning, Transfers, Toileting  Dependent IADLs:: Cleaning, Cooking, Laundry, Shopping, Meal Preparation, Medication Management, Money Management, Transportation, Incontinence  Bed or wheelchair confined:: Yes  Mobility Status: Dependent/Assisted by Another  Fallen 2 or more times in the past year?: (Not assessed)  Any fall with injury in the past year?: (Not assessed)    Juan David reports that Pt is able to stand, but not to walk. Juan David does not know why this is, but wants to speak with Ortho about their thoughts. Pt previously could stand for 2-3 minutes, and now can only stand for about 30 seconds. Pt does receive in-home PT as well as PCA services. They currently have 2 hrs/day of PCA services, except on weekends.     Living Situation:  Current living arrangement: I live in a private home with spouse  Type of residence: Private home - [facility]    Lifestyle & Psychosocial Needs:  Diet: Regular  Inadequate nutrition: No  Tube Feeding: No  Inadequate activity/exercise: Yes  Significant changes in sleep pattern: No  Transportation means:: Family  Financial/Insurance concerns (GOAL):: No  Judaism or spiritual beliefs that impact treatment:: No  Mental health DX:: Yes(Dementia)  Mental health DX how managed:: None  Mental health management concern : No  Informal Support system:: Spouse   Socioeconomic History     Marital status:       Spouse name: Vernon     Number of children: 2     Years of education: Not on file     Highest education level: Not on file     Tobacco Use     Smoking status: Former Smoker     Packs/day: 0.50     Years: 10.00     Pack years: 5.00     Types: Cigarettes     Start date: 1958     Last attempt to quit: 1982     Years since quittin.3     Smokeless tobacco: Never Used     Tobacco comment: quit    Substance and Sexual Activity     Alcohol use: Yes     Alcohol/week: 0.0 - 1.0 standard drinks     Comment: Very light     Drug use: No     Sexual activity: Not Currently     Partners: Male     Birth control/protection: None     Comment: not a problem       AYALA CC inquired if there are other services or programs that he or Pt would be interested. Juan David reports that he is currently managing Pt's needs the best that he can. AYALA CC inquired if he is hopeful that Pt's mobility will progress and improve. Juan David is not sure, but would like answers from Ortho.     Resources and Interventions:  Current Resources:   List of home care services: Physicial Therapy, Home Health Aid, Skilled Nursing  Community Resources: PCA, Home Care(Private Pay PCA 2 hrs per day - [facility])  Supplies used at home:: Incontinence Supplies, Chux, Gloves  Equipment Currently Used at Home: shower chair, lift device, other (see comments), wheelchair, manual(sit to stand lift)    Advance Care Plan/Directive  Advanced Care Plans/Directives on file: Yes  Type Advanced Care Plans/Directives: POLST  Advanced Care Plan/Directive Status: Not Applicable    Referrals Placed: [facility] Resources, [facility] Line, Community Resources, Other (Respite/stay by the day)     Goals:   Goals        General    2. Medical (pt-stated)     Notes - Note created  2020  3:55 PM by David Wise, SW    Goal Statement: I want to follow-up with Ortho in about one month on 2020.  Date Goal set: 2020  Barriers: Appointment availability in-person  during COVID-19.  Strengths: Recognition of need, strong support system including spouse.   Date to Achieve By: 6.15.2020  Patient expressed understanding of goal: Pt reports understanding and denies any additional questions or concerns at this times. SW CC engaged in AIDET communication during encounter.    Action steps to achieve this goal:  1. I will keep my appointment with Ortho.  2. I will attend appointment.  3. I will get advice during appointment regarding mobility and expectations.              Patient/Caregiver understanding: Pt reports understanding and denies any additional questions or concerns at this times. SW CC engaged in AIDET communication during encounter.    Outreach Frequency: monthly  Future Appointments              In 1 month RV LAB [facility], RV    In 2 months Susanna Duran MD [facility] [place] RID          Plan: Updated Complex Care Plan sent to Patient and spouse via [employer]. SW CC will follow-up with Patient and spouse in approximately 5 weeks to discuss Ortho appointment and the other ways that SW CC can support Pt and spouse.    David Butler [facility]  Clinic Care Coordinator  Ph. [phone]  [email or web address]

## 2020-05-11 NOTE — TELEPHONE ENCOUNTER
Message handled by Nurse Triage and Jonna ARTEAGA. Medication is for Warfarin 2 mg PO Sunday, Wednesday, Friday and 4 mg AOD.  Anticoagulation Summary  As of 4/17/2020                INR goal:   2.0-3.0   TTR:   90.3 % (1 y)   INR used for dosing:   3.0 (4/17/2020)   Warfarin maintenance plan:   4 mg (1 mg x 4) every Sun, Wed, Fri; 2 mg (1 mg x 2) all other days   Full warfarin instructions:   4 mg every Sun, Wed, Fri; 2 mg all other days   Weekly warfarin total:   20 mg   No change documented:   Nereyda Padilla, RN   Plan last modified:   Cadence Gonzalez, RN (10/2/2019)   Next INR check:   5/1/2020            Rx changed to reflect last INR dosing instructions above.    Josias Lynn, SMITA   [facility]

## 2020-05-11 NOTE — TELEPHONE ENCOUNTER
[facility] need [facility] since patient stated they had a change in dose/directions.      Please advise and send new RX

## 2020-05-13 NOTE — TELEPHONE ENCOUNTER
Date Forms was received: May 13, 2020    Forms received by: Patient Drop Off    Purpose of Form:  Handicap form    How the form needs to be returned for patient:  Patient  by     Form currently placed  [facility]

## 2020-05-13 NOTE — TELEPHONE ENCOUNTER
HC parking permit completed - multiple reasons - mainly NPH/Dementia, in wheelchair - through 5/2023 - patient doesn't drive

## 2020-05-15 NOTE — PROGRESS NOTES
ANTICOAGULATION FOLLOW-UP CLINIC VISIT    Patient Name:  Ángela Dumont  Date:  5/15/2020  Contact Type:  Telephone/ Home INR    SUBJECTIVE:  Patient Findings         Clinical Outcomes     Negatives:   Major bleeding event, Thromboembolic event, Anticoagulation-related hospital admission, Anticoagulation-related ED visit, Anticoagulation-related fatality           OBJECTIVE    Recent labs: (last 7 days)     05/15/20   INR 2.8*       ASSESSMENT / PLAN  INR assessment THER    Recheck INR In: 4 WEEKS    INR Location Home INR      Anticoagulation Summary  As of 5/15/2020    INR goal:   2.0-3.0   TTR:   90.3 % (1 y)   INR used for dosin.8 (5/15/2020)   Warfarin maintenance plan:   4 mg (1 mg x 4) every Sun, Wed, Fri; 2 mg (1 mg x 2) all other days   Full warfarin instructions:   4 mg every Sun, Wed, Fri; 2 mg all other days   Weekly warfarin total:   20 mg   No change documented:   Isabelle Nielsen RN   Plan last modified:   Cadence Gonzalez RN (10/2/2019)   Next INR check:   2021   Priority:   Maintenance   Target end date:       Indications    Long-term (current) use of anticoagulants [Z79.01] [Z79.01]  Pulmonary embolism (H) [I26.99]  DVT (deep venous thrombosis) (H) [I82.409]             Anticoagulation Episode Summary     INR check location:       Preferred lab:       Send INR reminders to:   [facility]    Comments:         Anticoagulation Care Providers     Provider Role Specialty Phone number    Joaquin Rockwell MD [facility] [phone]            See the Encounter Report to view Anticoagulation Flowsheet and Dosing Calendar (Go to Encounters tab in chart review, and find the Anticoagulation Therapy Visit)    Dosage adjustment made based on physician directed care plan.    The patient was assessed for diet, medication, and activity level changes, missed or extra doses, bruising or bleeding, with no problem findings.    I spoke to Juan David (CTC on file) advised continue  maintenance dose recheck in 2 weeks per [facility] policy but he says they recheck monthly so I have put this as the date since patient has been and is in range, and Juan David tells me [facility] won't check prior to that date.     He says [facility] may not be in network any longer so he is going to try to figure that out with [facility].      Isabelle Nielsen RN

## 2020-06-11 NOTE — TELEPHONE ENCOUNTER
Spoke with patients spouse, states he had been using [employer] for years at 4 week intervals and they are now forcing patient to check every 2 weeks. He is asking if there is another company that they can use. We spoke about [facility] INR but they wants checks every week. I discussed if they check INR in clinic we can extend to 6 week intervals. He agrees and will send back the meter and switch to clinic INR's.   Lorena Tobar RN   [facility] - Triage

## 2020-06-11 NOTE — TELEPHONE ENCOUNTER
Reason for Call:  Other call back    Detailed comments: Patient has been doing INR at home for the last five years but provider griselda Fisher covered so need to start with RV INR with the idea he can do the INR for once a month at clinic and he does the rest at home please    Phone Number Patient can be reached at: Home number on file [phone] (home)    Best Time: anytime    Can we leave a detailed message on this number? NO    Call taken on 6/11/2020 at 3:20 PM by Mishel Davalos

## 2020-06-11 NOTE — TELEPHONE ENCOUNTER
Routing to EP, SPENSER  No INR RN in clinic today - can you please help?      Tessy Gandara RN  [facility]

## 2020-06-11 NOTE — PROGRESS NOTES
ANTICOAGULATION FOLLOW-UP CLINIC VISIT    Patient Name:  Ángela Dumont  Date:  2020  Contact Type:  Telephone/ Spouse Juan David    SUBJECTIVE:  Patient Findings     Comments:   The patient was assessed for diet, medication, and activity level changes, missed or extra doses, bruising or bleeding, with no problem findings.          Clinical Outcomes     Negatives:   Major bleeding event, Thromboembolic event, Anticoagulation-related hospital admission, Anticoagulation-related ED visit, Anticoagulation-related fatality    Comments:   The patient was assessed for diet, medication, and activity level changes, missed or extra doses, bruising or bleeding, with no problem findings.             OBJECTIVE    Recent labs: (last 7 days)     20   INR 2.1*       ASSESSMENT / PLAN  INR assessment THER    Recheck INR In: 6 WEEKS    INR Location Home INR    Billed home INR Yes      Anticoagulation Summary  As of 2020    INR goal:   2.0-3.0   TTR:   90.3 % (1 y)   INR used for dosin.1 (2020)   Warfarin maintenance plan:   4 mg (1 mg x 4) every Sun, Wed, Fri; 2 mg (1 mg x 2) all other days   Full warfarin instructions:   4 mg every Sun, Wed, Fri; 2 mg all other days   Weekly warfarin total:   20 mg   No change documented:   Tracy Tobar RN   Plan last modified:   Cadence Gonzalez RN (10/2/2019)   Next INR check:   2020   Priority:   Maintenance   Target end date:       Indications    Long-term (current) use of anticoagulants [Z79.01] [Z79.01]  Pulmonary embolism (H) [I26.99]  DVT (deep venous thrombosis) (H) [I82.409]             Anticoagulation Episode Summary     INR check location:       Preferred lab:       Send INR reminders to:   [facility]    Comments:         Anticoagulation Care Providers     Provider Role Specialty Phone number    Joaquin Rockwell MD [facility] [phone]            See the Encounter Report to view Anticoagulation Flowsheet and Dosing  Calendar (Go to Encounters tab in chart review, and find the Anticoagulation Therapy Visit)    Patient INR is therapeutic.  Patient will continue to take 20 mg weekly dosing and follow up in 6 weeks or sooner for any problems or concerns.    Patient switching to clinic visits from home INR due to frequency of checks.     Tracy Tobar RN

## 2020-06-29 NOTE — PROGRESS NOTES
Clinic Care Coordination Contact    Follow Up Progress Note      Assessment: Call placed to Pt's spouse, Siva. He reports that they did not have an Ortho appointment this month. When AYALA CC asked about the June 12th appointment, Siva did not recall having nor attending this appointment.     Goals addressed this encounter:   Goals Addressed                 This Visit's Progress       Patient Stated      2. Medical (pt-stated)   10%     Goal Statement: I want to follow-up with Ortho within 2 months.  Date Goal set: 5.7.2020  Barriers: Appointment availability in-person during COVID-19.  Strengths: Recognition of need, strong support system including spouse.   Date to Achieve By: 8.29.2020  Patient expressed understanding of goal: Pt reports understanding and denies any additional questions or concerns at this times. AYALA CC engaged in AIDET communication during encounter.    Action steps to achieve this goal:  1. I will schedule an appointment with Ortho.  2. I will attend appointment.  3. I will get advice during appointment regarding mobility and expectations.                   Intervention/Education provided during outreach: Goal addended to provide additional time for completion. Per chart review, unable to identify Pt's previous Ortho provider. Siva is still interested in Pt seeing an Ortho provider due to ongoing concerns regarding Pt's mobility. Siva is agreeable to PCP entering a new referral for Ortho. Request sent to PCP.     Outreach Frequency: monthly    Plan:   Request sent to PCP for a new referral for Ortho.  AYALA CC will follow-up in one week to ensure that Pt and spouse receive the order information.      David Butler [facility]  Clinic Care Coordinator  Ph. [phone]  [email or web address]

## 2020-06-30 NOTE — TELEPHONE ENCOUNTER
I am covering for Dr Rockwell and am not sure what diagnosis should be used for an ortho referral.  You mention mobility issue.s  Does she have weakness right pain in a joint etc?

## 2020-06-30 NOTE — PROGRESS NOTES
Clinic Care Coordination Contact     Situation:  Patient chart reviewed by Community Health Worker.    Chart Review:  CHW attempted to contact patient today for CC outreach. However, upon chart review, David Butler, CC SW, had just contacted patient on 06/30/20 and goals were addressed at that time.     Plan/Recommendations: CHW will extend outreach for 1 month to prevent duplicate CC outreaches.     EROS Hsu  Clinic Care Coordination  [facility] : [place], [place], [place], and [place]  Phone: [phone]    ______________________  Next Outreach:  07/27/20  Planned Outreach Frequency: Monthly  Preferred Phone Number: Juan David(spouse) [phone]    Last Assessment Date: 08/23/19  Care Plan Completion Date: 05/07/20  ______________________

## 2020-07-01 NOTE — TELEPHONE ENCOUNTER
Will order referral for weakness and hip pain.  Has physical Therapy been consulted and this is more likely where she needs to go?

## 2020-07-09 NOTE — LETTER
"    7/9/2020         RE: Ángela Dumont  [address]  [facility] [id number]        Dear Colleague,    Thank you for referring your patient, Ángela Dumont, to the [facility]. Please see a copy of my visit note below.    Ángela Dumont is a 79 year old female who is being evaluated via a billable telephone visit.      The patient has been notified of following:     \"This telephone visit will be conducted via a call between you and your physician/provider. We have found that certain health care needs can be provided without the need for a physical exam.  This service lets us provide the care you need with a short phone conversation.  If a prescription is necessary we can send it directly to your pharmacy.  If lab work is needed we can place an order for that and you can then stop by our lab to have the test done at a later time.    Telephone visits are billed at different rates depending on your insurance coverage. During this emergency period, for some insurers they may be billed the same as an in-person visit.  Please reach out to your insurance provider with any questions.    If during the course of the call the physician/provider feels a telephone visit is not appropriate, you will not be charged for this service.\"    Patient has given verbal consent for Telephone visit?  Yes    What phone number would you like to be contacted at? [phone]  Pt has \"communication issues\" per . He asked that you speak to him.     How would you like to obtain your AVS? 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  On chart " review, she has had a mildly elevated WBC count since at least 2015.  In July 2017, WBC was 18.1 K, and peripheral smear showed leukocytosis with mild atypical absolute lymphocytosis and increased rouleaux.  Hemoglobin and platelets were normal.      In 2015, she was diagnosed with pulmonary embolism and DVT.  Three months prior, she had fractured her femur and had undergone surgery.  She was immobile for 3 months.  She has been on warfarin since then.      Ángela has dementia, and her  answers the majority of the questions.  They live in a one level multiplex.  She is wheelchair-bound  He helps with lifting and transferring.  She does not get frequent infections.  She started taking vitamin D recently.      She was admitted to the hospital on 10/18/17-10/19/17 with abdominal pain and constipation.  CT scan on 10/18/17 showed asymmetric wall thickening involving the posterior aspect of the low rectum near the anorectal junction.  She also had extensive perirectal/mesorectal adenopathy, concerning for colorectal neoplasm.  She also has additional enlarged bilateral iliac chain lymph nodes, borderline enlarged perirectal lymph nodes and diffusely mildly prominent lymph nodes elsewhere throughout the abdomen and pelvis.  At the time, patient and her  declined further evaluation and agreed to home hospice services, but did not enroll.          INTERIM HISTORY: Ángela and her  are on the phone visit.  Her  says that she is doing well.  She is eating and drinking well and overall is pretty comfortable.        REVIEW OF SYSTEMS:   14 point ROS was reviewed and is negative other than as noted above in HPI.       HOME MEDICATIONS:  Current Outpatient Medications   Medication Sig Dispense Refill     acetaminophen (TYLENOL) 325 MG tablet Take 2 tablets (650 mg) by mouth every 4 hours as needed for other (surgical pain) 100 tablet 0     furosemide (LASIX) 40 MG tablet TAKE 1 TABLET BY MOUTH EVERY DAY  AT 10 AM 90 tablet 1     levothyroxine (SYNTHROID/LEVOTHROID) 50 MCG tablet TAKE 1 TABLET BY MOUTH EVERY DAY 90 tablet 2     order for DME Equipment being ordered: Yoan Lift with Hygiene Sling 1 Device 0     order for DME Use EZ Stand as needed       polyethylene glycol (MIRALAX/GLYCOLAX) Packet Take 17 g by mouth daily as needed for constipation 7 packet 0     senna-docusate (SENOKOT-S/PERICOLACE) 8.6-50 MG tablet Take 1 tablet by mouth 2 times daily as needed for constipation 100 tablet 0     tamsulosin (FLOMAX) 0.4 MG capsule TAKE 1 CAPSULE BY MOUTH EVERY DAY AT 10 AM 90 capsule 1     warfarin ANTICOAGULANT (COUMADIN) 2 MG tablet TAKE  4 MG BY MOUTH ON SUNDAY, WEDNESDAY, FRIDAY AND TAKE 2 MG REST OF WEEK 39 tablet 1     warfarin ANTICOAGULANT (COUMADIN) 4 MG tablet 4 mg every Sun, Wed, Fri; 2 mg all other days 45 tablet 0         ALLERGIES:  Allergies   Allergen Reactions     Shellfish Allergy Anaphylaxis     Aspirin      GI issues     Contrast Dye Hives and Itching     Flushed skin     Penicillins Hives     Sulfa Drugs Hives     Valtrex [Valacyclovir] Nausea     Ceftin [Cefuroxime] Rash     Nitrofurantoin Rash         PAST MEDICAL HISTORY:  Past Medical History:   Diagnosis Date     Adjustment disorder with depressed mood      Antiplatelet or antithrombotic long-term use      Cancer (H) 04/2016    Seems to be under control.  Dr Duran     Colitis, nonspecific 10/31/2017     Constipation      Dementia (H)     memory/anger     Depressive disorder last few months as condition became apparent    Lack of mobility a big problem     DVT (deep venous thrombosis) (H) 4/15    bilateral     Esophageal reflux      Fibromyalgia      Fracture of right femur (H) 1/15     Heart murmur      Herpes zoster without complication 08/16/2017    left buttocks     History of Clostridium difficile      Hyperlipidemia LDL goal < 130      Hypertension goal BP (blood pressure) < 140/90      Hypothyroid      Leukocytosis, unspecified     w/u  ?     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)      Lymphoproliferative disorder (H)     Dr Duran     MGUS (monoclonal gammopathy of unknown significance)     Dr Duran     Migraine      MR (mitral regurgitation)     Mild     NPH (normal pressure hydrocephalus) (H) 4/15    shunt placed but removed in 7/2015 due to erosion through the scalp, replaced 5/19/16     Osteoarthritis of both knees      Other atopic dermatitis and related conditions      PE (pulmonary embolism) 4/15    Saddle - IVC     Pelvic lymphadenopathy 10/31/2017     Pulmonary hypertension (H)     EF 70-75%     Rectal mass 10/2017    declines work up     Urinary tract infection, site not specified     MRSA - Dr Loo     Vertigo          PAST SURGICAL HISTORY:  Past Surgical History:   Procedure Laterality Date     ABDOMEN SURGERY  left side lower abdomen pain from Shunt we do roberth     COLONOSCOPY  2008     GYN SURGERY       [facility] LABOR AND DELIVERY VAGINAL  1960, 1967     HERNIA REPAIR       HYSTERECTOMY  1976    hysterectomy     IMPLANT SHUNT VENTRICULOPERITONEAL Left 5/19/2016    IMPLANT SHUNT VENTRICULOPERITONEAL - Dr Alba     OPTICAL TRACKING SYSTEM IMPLANT SHUNT VENTRICULOPERITONEAL Right 4/10/2015     Shunt - Dr Sierra     ORTHOPEDIC SURGERY  Knee pain that prohibits walking     REMOVE SHUNT VENTRICULOPERITONEAL N/A 7/3/2015    REMOVE SHUNT VENTRICULOPERITONEAL;  Surgeon: Emmanuel Sierra MD;  Location: [facility] OR     SURGICAL HISTORY OF -   1/15    right femur/hip surgery - 1/18/2015     SURGICAL HISTORY OF -   7/04    rectocele repair     SURGICAL HISTORY OF -   1997    abdominal adhesion lysis     SURGICAL HISTORY OF -   4/15    IVC filter placement     SURGICAL HISTORY OF -   6/15    IVC filter removal         SOCIAL HISTORY:  Social History     Socioeconomic History     Marital status:      Spouse name: Vernon     Number of children: 2     Years of education: Not on file     Highest education level:  Not on file   Occupational History     Not on file   Social Needs     Financial resource strain: Not on file     Food insecurity     Worry: Not on file     Inability: Not on file     Transportation needs     Medical: Not on file     Non-medical: Not on file   Tobacco Use     Smoking status: Former Smoker     Packs/day: 0.50     Years: 10.00     Pack years: 5.00     Types: Cigarettes     Start date: 1958     Last attempt to quit: 1982     Years since quittin.5     Smokeless tobacco: Never Used     Tobacco comment: quit    Substance and Sexual Activity     Alcohol use: Yes     Alcohol/week: 0.0 - 1.0 standard drinks     Comment: Very light     Drug use: No     Sexual activity: Not Currently     Partners: Male     Birth control/protection: None     Comment: not a problem   Lifestyle     Physical activity     Days per week: Not on file     Minutes per session: Not on file     Stress: Not on file   Relationships     Social connections     Talks on phone: Not on file     Gets together: Not on file     Attends Sikh service: Not on file     Active member of club or organization: Not on file     Attends meetings of clubs or organizations: Not on file     Relationship status: Not on file     Intimate partner violence     Fear of current or ex partner: Not on file     Emotionally abused: Not on file     Physically abused: Not on file     Forced sexual activity: Not on file   Other Topics Concern     Parent/sibling w/ CABG, MI or angioplasty before 65F 55M? No   Social History Narrative     Not on file   Ángela is from [place] and her  is from [place].  They used to live in [place], [place], and Ángela wants to move back there.        FAMILY HISTORY:  Family History   Problem Relation Age of Onset     Colon Cancer Father      Coronary Artery Disease Father      Diabetes Maternal Grandmother          PHYSICAL EXAM:  Vital signs:  There were no vitals taken for this visit.   Phone visit.      LABS:  CBC  RESULTS:   Recent Labs   Lab Test 07/02/20  1015   WBC 32.3*   RBC 4.53   HGB 12.8   HCT 40.4   MCV 89   MCH 28.3   MCHC 31.7   RDW 14.5        Recent Labs   Lab Test 07/02/20  1015 01/07/20  1112    139   POTASSIUM 4.1 4.3   CHLORIDE 108 108   CO2 23 25   ANIONGAP 7 6   * 94   BUN 24 27   CR 0.88 0.79   TAVIA 9.6 9.4     Lab Results   Component Value Date    AST 14 07/02/2020     Lab Results   Component Value Date    ALT 16 07/02/2020     No results found for: BILICONJ   Lab Results   Component Value Date    BILITOTAL 0.3 07/02/2020     Lab Results   Component Value Date    ALBUMIN 3.4 07/02/2020     Lab Results   Component Value Date    PROTTOTAL 7.6 07/02/2020      Lab Results   Component Value Date    ALKPHOS 77 07/02/2020             SPEP: M-spike of 0.9 on 9/28/17 4/12/18: 1.1 g/dL  4/11/19: 1.4 g/dL  7/2/20: 1.6 g/dL    Component      Latest Ref Rng & Units 7/2/2020          10:15 AM   Kappa Free Lt Chain      0.33 - 1.94 mg/dL 55.36 (H)   Lambda Free Lt Chain      0.57 - 2.63 mg/dL 0.28 (L)   Kappa Lambda Ratio      0.26 - 1.65 197.71 (H)     Peripheral smear 7/2/20:  FINAL DIAGNOSIS:   Peripheral blood.   - Leukocytosis with absolute atypical lymphocytosis.     COMMENT:   The peripheral blood findings are consistent with the known history of a   lymphoproliferative disorder.   Correlation with pending flow cytometry and clinical findings is required    Peripheral flow 7/2/20:  INTERPRETATION:   Blood        Kappa monotypic B cells negative for CD5 and CD10 (55%)        See comment     COMMENT:   The monotypic B cell population which was seen previously in this patient   in 2017 (case [id number]) is again   demonstrated. These immunophenotypic findings show persistent/recurrent B   cell lymphoproliferative process.   The differential diagnosis includes monoclonal B cell lymphocytosis,   marginal zone lymphoma, lymphoplasmacytic   lymphoma, CLL/SLL or less likely  follicular lymphoma  or  mantle cell   lymphoma. For final diagnostic   classification a biopsy of an involved lymph node would be recommended, if    clinically indicated. Final   interpretation requires correlation with results of other ancillary   studies, morphologic and clinical   features.         ASSESSMENT/PLAN:  Ángela Dumont is a 79 year old female with:    1) Leukocytosis: appears chronic since at least 2015, and on further chart review from [facility], likely chronic since at least 2007.  Peripheral smear shows absolute atypical lymphocytosis.  Hemoglobin and platelet count are normal.  She does not have frequent infections.      Peripheral flow cytometry shows concern for lymphoproliferative disorder.  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    We discussed that is usually a slow growing process, and treatment is usually not necessary.  We could do further evaluation, such as with PET scan and bone marrow biopsy.  Considering her age, dementia, immobility, and poor functional status, I favored watching and waiting and sparing her of invasive procedures that may not change her overall course.  She and her  agreed and opted to wait on getting additional scans or bone marrow biopsy at this time.        Her WBC has risen to 32.3K.  It has risen slowly over the years.  The other 2 cell lines remain normal.  This can likely be monitored for now.    -RTC in 6 months with repeat CBC/diff    2) History of PE and DVT: diagnosed in 2015, and she has been on warfarin ever since.  It was likely provoked at the time by surgery and immobility.  She remains wheelchair bound now, and remains at high risk for blood clots.  She has not had frequent falls, and INR has been under relatively good control.  I discussed and risks and benefits of continuing the warfarin.  She will  continue the warfarin for now, considering her high risk for clotting remains.  If it becomes that the risk of bleeding starts to outweigh her risk of clots, then it would be time to stop the warfarin.     3) MGUS: M-spike of 0.9-1.1.6 g/dL.  -as above, will hold off on further imaging or bone marrow biopsy  -will check SPEP with her other labs in 6 months months    4) Suspected rectal cancer: CT scan on 10/18/17 showed wall thickening at the posterior aspect of the low rectum near the anorectal junction, concerning for rectal cancer, with perirectal/mesorectal adenopathy.  She has symptoms of constipation and abdominal pain.  In the hospital, they declined further evaluation.  Her  says that they met with hospice, but has not enrolled yet.  I discussed that this does look like a rectal cancer on imaging, but only way to confirm it is to obtain pathology with a colonoscopy and biopsy.  Considering Ángela's dementia, immobility, and overall quite poor functional status, she would be a poor candidate for treatments such as surgery, chemotherapy or radiation.  I think it is reasonable to not further work this up and focus on patient's comfort and quality of life.  We discussed this again, and patient, , and daughter all agree.       Susanna Duran MD  Hematology/Oncology  [facility]        Phone call duration: 5 minutes                Again, thank you for allowing me to participate in the care of your patient.        Sincerely,        Susanna Duran MD

## 2020-07-09 NOTE — LETTER
"    7/9/2020         RE: Ángela Dumont  [address]  [facility] [address]        Dear Colleague,    Thank you for referring your patient, Ángela Dumont, to the [facility]. Please see a copy of my visit note below.    Ángela Dumont is a 79 year old female who is being evaluated via a billable telephone visit.      The patient has been notified of following:     \"This telephone visit will be conducted via a call between you and your physician/provider. We have found that certain health care needs can be provided without the need for a physical exam.  This service lets us provide the care you need with a short phone conversation.  If a prescription is necessary we can send it directly to your pharmacy.  If lab work is needed we can place an order for that and you can then stop by our lab to have the test done at a later time.    Telephone visits are billed at different rates depending on your insurance coverage. During this emergency period, for some insurers they may be billed the same as an in-person visit.  Please reach out to your insurance provider with any questions.    If during the course of the call the physician/provider feels a telephone visit is not appropriate, you will not be charged for this service.\"    Patient has given verbal consent for Telephone visit?  Yes    What phone number would you like to be contacted at? [phone]  Pt has \"communication issues\" per . He asked that you speak to him.     How would you like to obtain your AVS? 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  On chart " review, she has had a mildly elevated WBC count since at least 2015.  In July 2017, WBC was 18.1 K, and peripheral smear showed leukocytosis with mild atypical absolute lymphocytosis and increased rouleaux.  Hemoglobin and platelets were normal.      In 2015, she was diagnosed with pulmonary embolism and DVT.  Three months prior, she had fractured her femur and had undergone surgery.  She was immobile for 3 months.  She has been on warfarin since then.      Ángela has dementia, and her  answers the majority of the questions.  They live in a one level multiplex.  She is wheelchair-bound  He helps with lifting and transferring.  She does not get frequent infections.  She started taking vitamin D recently.      She was admitted to the hospital on 10/18/17-10/19/17 with abdominal pain and constipation.  CT scan on 10/18/17 showed asymmetric wall thickening involving the posterior aspect of the low rectum near the anorectal junction.  She also had extensive perirectal/mesorectal adenopathy, concerning for colorectal neoplasm.  She also has additional enlarged bilateral iliac chain lymph nodes, borderline enlarged perirectal lymph nodes and diffusely mildly prominent lymph nodes elsewhere throughout the abdomen and pelvis.  At the time, patient and her  declined further evaluation and agreed to home hospice services, but did not enroll.          INTERIM HISTORY: Ángela and her  are on the phone visit.  Her  says that she is doing well.  She is eating and drinking well and overall is pretty comfortable.        REVIEW OF SYSTEMS:   14 point ROS was reviewed and is negative other than as noted above in HPI.       HOME MEDICATIONS:  Current Outpatient Medications   Medication Sig Dispense Refill     acetaminophen (TYLENOL) 325 MG tablet Take 2 tablets (650 mg) by mouth every 4 hours as needed for other (surgical pain) 100 tablet 0     furosemide (LASIX) 40 MG tablet TAKE 1 TABLET BY MOUTH EVERY DAY  AT 10 AM 90 tablet 1     levothyroxine (SYNTHROID/LEVOTHROID) 50 MCG tablet TAKE 1 TABLET BY MOUTH EVERY DAY 90 tablet 2     order for DME Equipment being ordered: Yoan Lift with Hygiene Sling 1 Device 0     order for DME Use EZ Stand as needed       polyethylene glycol (MIRALAX/GLYCOLAX) Packet Take 17 g by mouth daily as needed for constipation 7 packet 0     senna-docusate (SENOKOT-S/PERICOLACE) 8.6-50 MG tablet Take 1 tablet by mouth 2 times daily as needed for constipation 100 tablet 0     tamsulosin (FLOMAX) 0.4 MG capsule TAKE 1 CAPSULE BY MOUTH EVERY DAY AT 10 AM 90 capsule 1     warfarin ANTICOAGULANT (COUMADIN) 2 MG tablet TAKE  4 MG BY MOUTH ON SUNDAY, WEDNESDAY, FRIDAY AND TAKE 2 MG REST OF WEEK 39 tablet 1     warfarin ANTICOAGULANT (COUMADIN) 4 MG tablet 4 mg every Sun, Wed, Fri; 2 mg all other days 45 tablet 0         ALLERGIES:  Allergies   Allergen Reactions     Shellfish Allergy Anaphylaxis     Aspirin      GI issues     Contrast Dye Hives and Itching     Flushed skin     Penicillins Hives     Sulfa Drugs Hives     Valtrex [Valacyclovir] Nausea     Ceftin [Cefuroxime] Rash     Nitrofurantoin Rash         PAST MEDICAL HISTORY:  Past Medical History:   Diagnosis Date     Adjustment disorder with depressed mood      Antiplatelet or antithrombotic long-term use      Cancer (H) 04/2016    Seems to be under control.  Dr Duran     Colitis, nonspecific 10/31/2017     Constipation      Dementia (H)     memory/anger     Depressive disorder last few months as condition became apparent    Lack of mobility a big problem     DVT (deep venous thrombosis) (H) 4/15    bilateral     Esophageal reflux      Fibromyalgia      Fracture of right femur (H) 1/15     Heart murmur      Herpes zoster without complication 08/16/2017    left buttocks     History of Clostridium difficile      Hyperlipidemia LDL goal < 130      Hypertension goal BP (blood pressure) < 140/90      Hypothyroid      Leukocytosis, unspecified     w/u  ?     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)      Lymphoproliferative disorder (H)     Dr Duran     MGUS (monoclonal gammopathy of unknown significance)     Dr Duran     Migraine      MR (mitral regurgitation)     Mild     NPH (normal pressure hydrocephalus) (H) 4/15    shunt placed but removed in 7/2015 due to erosion through the scalp, replaced 5/19/16     Osteoarthritis of both knees      Other atopic dermatitis and related conditions      PE (pulmonary embolism) 4/15    Saddle - IVC     Pelvic lymphadenopathy 10/31/2017     Pulmonary hypertension (H)     EF 70-75%     Rectal mass 10/2017    declines work up     Urinary tract infection, site not specified     MRSA - Dr Loo     Vertigo          PAST SURGICAL HISTORY:  Past Surgical History:   Procedure Laterality Date     ABDOMEN SURGERY  left side lower abdomen pain from Shunt we do roberth     COLONOSCOPY  2008     GYN SURGERY       H LABOR AND DELIVERY VAGINAL  1960, 1967     HERNIA REPAIR       HYSTERECTOMY  1976    hysterectomy     IMPLANT SHUNT VENTRICULOPERITONEAL Left 5/19/2016    IMPLANT SHUNT VENTRICULOPERITONEAL - Dr Alba     OPTICAL TRACKING SYSTEM IMPLANT SHUNT VENTRICULOPERITONEAL Right 4/10/2015     Shunt - Dr Sierra     ORTHOPEDIC SURGERY  Knee pain that prohibits walking     REMOVE SHUNT VENTRICULOPERITONEAL N/A 7/3/2015    REMOVE SHUNT VENTRICULOPERITONEAL;  Surgeon: Emmanuel Sierra MD;  Location: [facility] OR     SURGICAL HISTORY OF -   1/15    right femur/hip surgery - 1/18/2015     SURGICAL HISTORY OF -   7/04    rectocele repair     SURGICAL HISTORY OF -   1997    abdominal adhesion lysis     SURGICAL HISTORY OF -   4/15    IVC filter placement     SURGICAL HISTORY OF -   6/15    IVC filter removal         SOCIAL HISTORY:  Social History     Socioeconomic History     Marital status:      Spouse name: Vernon     Number of children: 2     Years of education: Not on file     Highest education level:  Not on file   Occupational History     Not on file   Social Needs     Financial resource strain: Not on file     Food insecurity     Worry: Not on file     Inability: Not on file     Transportation needs     Medical: Not on file     Non-medical: Not on file   Tobacco Use     Smoking status: Former Smoker     Packs/day: 0.50     Years: 10.00     Pack years: 5.00     Types: Cigarettes     Start date: 1958     Last attempt to quit: 1982     Years since quittin.5     Smokeless tobacco: Never Used     Tobacco comment: quit    Substance and Sexual Activity     Alcohol use: Yes     Alcohol/week: 0.0 - 1.0 standard drinks     Comment: Very light     Drug use: No     Sexual activity: Not Currently     Partners: Male     Birth control/protection: None     Comment: not a problem   Lifestyle     Physical activity     Days per week: Not on file     Minutes per session: Not on file     Stress: Not on file   Relationships     Social connections     Talks on phone: Not on file     Gets together: Not on file     Attends Taoism service: Not on file     Active member of club or organization: Not on file     Attends meetings of clubs or organizations: Not on file     Relationship status: Not on file     Intimate partner violence     Fear of current or ex partner: Not on file     Emotionally abused: Not on file     Physically abused: Not on file     Forced sexual activity: Not on file   Other Topics Concern     Parent/sibling w/ CABG, MI or angioplasty before 65F 55M? No   Social History Narrative     Not on file   Ángela is from [place] and her  is from [place].  They used to live in [place], [place], and Ángela wants to move back there.        FAMILY HISTORY:  Family History   Problem Relation Age of Onset     Colon Cancer Father      Coronary Artery Disease Father      Diabetes Maternal Grandmother          PHYSICAL EXAM:  Vital signs:  There were no vitals taken for this visit.   Phone visit.      LABS:  CBC  RESULTS:   Recent Labs   Lab Test 07/02/20  1015   WBC 32.3*   RBC 4.53   HGB 12.8   HCT 40.4   MCV 89   MCH 28.3   MCHC 31.7   RDW 14.5        Recent Labs   Lab Test 07/02/20  1015 01/07/20  1112    139   POTASSIUM 4.1 4.3   CHLORIDE 108 108   CO2 23 25   ANIONGAP 7 6   * 94   BUN 24 27   CR 0.88 0.79   TAVIA 9.6 9.4     Lab Results   Component Value Date    AST 14 07/02/2020     Lab Results   Component Value Date    ALT 16 07/02/2020     No results found for: BILICONJ   Lab Results   Component Value Date    BILITOTAL 0.3 07/02/2020     Lab Results   Component Value Date    ALBUMIN 3.4 07/02/2020     Lab Results   Component Value Date    PROTTOTAL 7.6 07/02/2020      Lab Results   Component Value Date    ALKPHOS 77 07/02/2020             SPEP: M-spike of 0.9 on 9/28/17 4/12/18: 1.1 g/dL  4/11/19: 1.4 g/dL  7/2/20: 1.6 g/dL    Component      Latest Ref Rng & Units 7/2/2020          10:15 AM   Kappa Free Lt Chain      0.33 - 1.94 mg/dL 55.36 (H)   Lambda Free Lt Chain      0.57 - 2.63 mg/dL 0.28 (L)   Kappa Lambda Ratio      0.26 - 1.65 197.71 (H)     Peripheral smear 7/2/20:  FINAL DIAGNOSIS:   Peripheral blood.   - Leukocytosis with absolute atypical lymphocytosis.     COMMENT:   The peripheral blood findings are consistent with the known history of a   lymphoproliferative disorder.   Correlation with pending flow cytometry and clinical findings is required    Peripheral flow 7/2/20:  INTERPRETATION:   Blood        Kappa monotypic B cells negative for CD5 and CD10 (55%)        See comment     COMMENT:   The monotypic B cell population which was seen previously in this patient   in 2017 (case [id number]) is again   demonstrated. These immunophenotypic findings show persistent/recurrent B   cell lymphoproliferative process.   The differential diagnosis includes monoclonal B cell lymphocytosis,   marginal zone lymphoma, lymphoplasmacytic   lymphoma, CLL/SLL or less likely  follicular lymphoma  or  mantle cell   lymphoma. For final diagnostic   classification a biopsy of an involved lymph node would be recommended, if    clinically indicated. Final   interpretation requires correlation with results of other ancillary   studies, morphologic and clinical   features.         ASSESSMENT/PLAN:  Ángela Dumont is a 79 year old female with:    1) Leukocytosis: appears chronic since at least 2015, and on further chart review from [facility], likely chronic since at least 2007.  Peripheral smear shows absolute atypical lymphocytosis.  Hemoglobin and platelet count are normal.  She does not have frequent infections.      Peripheral flow cytometry shows concern for lymphoproliferative disorder.  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    We discussed that is usually a slow growing process, and treatment is usually not necessary.  We could do further evaluation, such as with PET scan and bone marrow biopsy.  Considering her age, dementia, immobility, and poor functional status, I favored watching and waiting and sparing her of invasive procedures that may not change her overall course.  She and her  agreed and opted to wait on getting additional scans or bone marrow biopsy at this time.        Her WBC has risen to 32.3K.  It has risen slowly over the years.  The other 2 cell lines remain normal.  This can likely be monitored for now.    -RTC in 6 months with repeat CBC/diff    2) History of PE and DVT: diagnosed in 2015, and she has been on warfarin ever since.  It was likely provoked at the time by surgery and immobility.  She remains wheelchair bound now, and remains at high risk for blood clots.  She has not had frequent falls, and INR has been under relatively good control.  I discussed and risks and benefits of continuing the warfarin.  She will  continue the warfarin for now, considering her high risk for clotting remains.  If it becomes that the risk of bleeding starts to outweigh her risk of clots, then it would be time to stop the warfarin.     3) MGUS: M-spike of 0.9-1.1.6 g/dL.  -as above, will hold off on further imaging or bone marrow biopsy  -will check SPEP with her other labs in 6 months months    4) Suspected rectal cancer: CT scan on 10/18/17 showed wall thickening at the posterior aspect of the low rectum near the anorectal junction, concerning for rectal cancer, with perirectal/mesorectal adenopathy.  She has symptoms of constipation and abdominal pain.  In the hospital, they declined further evaluation.  Her  says that they met with hospice, but has not enrolled yet.  I discussed that this does look like a rectal cancer on imaging, but only way to confirm it is to obtain pathology with a colonoscopy and biopsy.  Considering Ángela's dementia, immobility, and overall quite poor functional status, she would be a poor candidate for treatments such as surgery, chemotherapy or radiation.  I think it is reasonable to not further work this up and focus on patient's comfort and quality of life.  We discussed this again, and patient, , and daughter all agree.       Susanna Duran MD  Hematology/Oncology  [facility]        Phone call duration: 5 minutes                Again, thank you for allowing me to participate in the care of your patient.        Sincerely,        Susanna Duran MD

## 2020-07-09 NOTE — PROGRESS NOTES
"Ángela Dumont is a 79 year old female who is being evaluated via a billable telephone visit.      The patient has been notified of following:     \"This telephone visit will be conducted via a call between you and your physician/provider. We have found that certain health care needs can be provided without the need for a physical exam.  This service lets us provide the care you need with a short phone conversation.  If a prescription is necessary we can send it directly to your pharmacy.  If lab work is needed we can place an order for that and you can then stop by our lab to have the test done at a later time.    Telephone visits are billed at different rates depending on your insurance coverage. During this emergency period, for some insurers they may be billed the same as an in-person visit.  Please reach out to your insurance provider with any questions.    If during the course of the call the physician/provider feels a telephone visit is not appropriate, you will not be charged for this service.\"    Patient has given verbal consent for Telephone visit?  Yes    What phone number would you like to be contacted at? [phone]  Pt has \"communication issues\" per . He asked that you speak to him.     How would you like to obtain your AVS? 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  On chart review, she has had a mildly elevated WBC count since at least 2015.  In July 2017, WBC was 18.1 K, and peripheral smear showed leukocytosis with mild atypical absolute lymphocytosis and increased rouleaux.  Hemoglobin and platelets were " normal.      In 2015, she was diagnosed with pulmonary embolism and DVT.  Three months prior, she had fractured her femur and had undergone surgery.  She was immobile for 3 months.  She has been on warfarin since then.      Ángela has dementia, and her  answers the majority of the questions.  They live in a one level multiplex.  She is wheelchair-bound  He helps with lifting and transferring.  She does not get frequent infections.  She started taking vitamin D recently.      She was admitted to the hospital on 10/18/17-10/19/17 with abdominal pain and constipation.  CT scan on 10/18/17 showed asymmetric wall thickening involving the posterior aspect of the low rectum near the anorectal junction.  She also had extensive perirectal/mesorectal adenopathy, concerning for colorectal neoplasm.  She also has additional enlarged bilateral iliac chain lymph nodes, borderline enlarged perirectal lymph nodes and diffusely mildly prominent lymph nodes elsewhere throughout the abdomen and pelvis.  At the time, patient and her  declined further evaluation and agreed to home hospice services, but did not enroll.          INTERIM HISTORY: Ángela and her  are on the phone visit.  Her  says that she is doing well.  She is eating and drinking well and overall is pretty comfortable.        REVIEW OF SYSTEMS:   14 point ROS was reviewed and is negative other than as noted above in HPI.       HOME MEDICATIONS:  Current Outpatient Medications   Medication Sig Dispense Refill     acetaminophen (TYLENOL) 325 MG tablet Take 2 tablets (650 mg) by mouth every 4 hours as needed for other (surgical pain) 100 tablet 0     furosemide (LASIX) 40 MG tablet TAKE 1 TABLET BY MOUTH EVERY DAY AT 10 AM 90 tablet 1     levothyroxine (SYNTHROID/LEVOTHROID) 50 MCG tablet TAKE 1 TABLET BY MOUTH EVERY DAY 90 tablet 2     order for DME Equipment being ordered: Yoan Lift with Hygiene Sling 1 Device 0     order for DME Use EZ Stand  as needed       polyethylene glycol (MIRALAX/GLYCOLAX) Packet Take 17 g by mouth daily as needed for constipation 7 packet 0     senna-docusate (SENOKOT-S/PERICOLACE) 8.6-50 MG tablet Take 1 tablet by mouth 2 times daily as needed for constipation 100 tablet 0     tamsulosin (FLOMAX) 0.4 MG capsule TAKE 1 CAPSULE BY MOUTH EVERY DAY AT 10 AM 90 capsule 1     warfarin ANTICOAGULANT (COUMADIN) 2 MG tablet TAKE  4 MG BY MOUTH ON SUNDAY, WEDNESDAY, FRIDAY AND TAKE 2 MG REST OF WEEK 39 tablet 1     warfarin ANTICOAGULANT (COUMADIN) 4 MG tablet 4 mg every Sun, Wed, Fri; 2 mg all other days 45 tablet 0         ALLERGIES:  Allergies   Allergen Reactions     Shellfish Allergy Anaphylaxis     Aspirin      GI issues     Contrast Dye Hives and Itching     Flushed skin     Penicillins Hives     Sulfa Drugs Hives     Valtrex [Valacyclovir] Nausea     Ceftin [Cefuroxime] Rash     Nitrofurantoin Rash         PAST MEDICAL HISTORY:  Past Medical History:   Diagnosis Date     Adjustment disorder with depressed mood      Antiplatelet or antithrombotic long-term use      Cancer (H) 04/2016    Seems to be under control.  Dr Duran     Colitis, nonspecific 10/31/2017     Constipation      Dementia (H)     memory/anger     Depressive disorder last few months as condition became apparent    Lack of mobility a big problem     DVT (deep venous thrombosis) (H) 4/15    bilateral     Esophageal reflux      Fibromyalgia      Fracture of right femur (H) 1/15     Heart murmur      Herpes zoster without complication 08/16/2017    left buttocks     History of Clostridium difficile      Hyperlipidemia LDL goal < 130      Hypertension goal BP (blood pressure) < 140/90      Hypothyroid      Leukocytosis, unspecified     w/u ?     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)      Lymphoproliferative disorder (H)     Dr Duran     MGUS (monoclonal gammopathy of unknown significance)     Dr Duran     Migraine      MR (mitral  regurgitation)     Mild     NPH (normal pressure hydrocephalus) (H) 4/15    shunt placed but removed in 7/2015 due to erosion through the scalp, replaced 5/19/16     Osteoarthritis of both knees      Other atopic dermatitis and related conditions      PE (pulmonary embolism) 4/15    Saddle - IVC     Pelvic lymphadenopathy 10/31/2017     Pulmonary hypertension (H)     EF 70-75%     Rectal mass 10/2017    declines work up     Urinary tract infection, site not specified     MRSA - Dr Loo     Vertigo          PAST SURGICAL HISTORY:  Past Surgical History:   Procedure Laterality Date     ABDOMEN SURGERY  left side lower abdomen pain from Shunt we do roberth     COLONOSCOPY  2008     GYN SURGERY       H LABOR AND DELIVERY VAGINAL  1960, 1967     HERNIA REPAIR       HYSTERECTOMY  1976    hysterectomy     IMPLANT SHUNT VENTRICULOPERITONEAL Left 5/19/2016    IMPLANT SHUNT VENTRICULOPERITONEAL - Dr Alba     OPTICAL TRACKING SYSTEM IMPLANT SHUNT VENTRICULOPERITONEAL Right 4/10/2015     Shunt - Dr Sierra     ORTHOPEDIC SURGERY  Knee pain that prohibits walking     REMOVE SHUNT VENTRICULOPERITONEAL N/A 7/3/2015    REMOVE SHUNT VENTRICULOPERITONEAL;  Surgeon: Emmanuel Sierra MD;  Location:  OR     SURGICAL HISTORY OF -   1/15    right femur/hip surgery - 1/18/2015     SURGICAL HISTORY OF -   7/04    rectocele repair     SURGICAL HISTORY OF -   1997    abdominal adhesion lysis     SURGICAL HISTORY OF -   4/15    IVC filter placement     SURGICAL HISTORY OF -   6/15    IVC filter removal         SOCIAL HISTORY:  Social History     Socioeconomic History     Marital status:      Spouse name: Vernon     Number of children: 2     Years of education: Not on file     Highest education level: Not on file   Occupational History     Not on file   Social Needs     Financial resource strain: Not on file     Food insecurity     Worry: Not on file     Inability: Not on file     Transportation needs     Medical: Not on  file     Non-medical: Not on file   Tobacco Use     Smoking status: Former Smoker     Packs/day: 0.50     Years: 10.00     Pack years: 5.00     Types: Cigarettes     Start date: 1958     Last attempt to quit: 1982     Years since quittin.5     Smokeless tobacco: Never Used     Tobacco comment: quit    Substance and Sexual Activity     Alcohol use: Yes     Alcohol/week: 0.0 - 1.0 standard drinks     Comment: Very light     Drug use: No     Sexual activity: Not Currently     Partners: Male     Birth control/protection: None     Comment: not a problem   Lifestyle     Physical activity     Days per week: Not on file     Minutes per session: Not on file     Stress: Not on file   Relationships     Social connections     Talks on phone: Not on file     Gets together: Not on file     Attends Gnosticism service: Not on file     Active member of club or organization: Not on file     Attends meetings of clubs or organizations: Not on file     Relationship status: Not on file     Intimate partner violence     Fear of current or ex partner: Not on file     Emotionally abused: Not on file     Physically abused: Not on file     Forced sexual activity: Not on file   Other Topics Concern     Parent/sibling w/ CABG, MI or angioplasty before 65F 55M? No   Social History Narrative     Not on file   Ángela is from [place] and her  is from [place].  They used to live in [place], [place], and Ángela wants to move back there.        FAMILY HISTORY:  Family History   Problem Relation Age of Onset     Colon Cancer Father      Coronary Artery Disease Father      Diabetes Maternal Grandmother          PHYSICAL EXAM:  Vital signs:  There were no vitals taken for this visit.   Phone visit.      LABS:  CBC RESULTS:   Recent Labs   Lab Test 20  1015   WBC 32.3*   RBC 4.53   HGB 12.8   HCT 40.4   MCV 89   MCH 28.3   MCHC 31.7   RDW 14.5        Recent Labs   Lab Test 20  1015 20  1112    139    POTASSIUM 4.1 4.3   CHLORIDE 108 108   CO2 23 25   ANIONGAP 7 6   * 94   BUN 24 27   CR 0.88 0.79   TAVIA 9.6 9.4     Lab Results   Component Value Date    AST 14 07/02/2020     Lab Results   Component Value Date    ALT 16 07/02/2020     No results found for: BILICONJ   Lab Results   Component Value Date    BILITOTAL 0.3 07/02/2020     Lab Results   Component Value Date    ALBUMIN 3.4 07/02/2020     Lab Results   Component Value Date    PROTTOTAL 7.6 07/02/2020      Lab Results   Component Value Date    ALKPHOS 77 07/02/2020             SPEP: M-spike of 0.9 on 9/28/17 4/12/18: 1.1 g/dL  4/11/19: 1.4 g/dL  7/2/20: 1.6 g/dL    Component      Latest Ref Rng & Units 7/2/2020          10:15 AM   Kappa Free Lt Chain      0.33 - 1.94 mg/dL 55.36 (H)   Lambda Free Lt Chain      0.57 - 2.63 mg/dL 0.28 (L)   Kappa Lambda Ratio      0.26 - 1.65 197.71 (H)     Peripheral smear 7/2/20:  FINAL DIAGNOSIS:   Peripheral blood.   - Leukocytosis with absolute atypical lymphocytosis.     COMMENT:   The peripheral blood findings are consistent with the known history of a   lymphoproliferative disorder.   Correlation with pending flow cytometry and clinical findings is required    Peripheral flow 7/2/20:  INTERPRETATION:   Blood        Kappa monotypic B cells negative for CD5 and CD10 (55%)        See comment     COMMENT:   The monotypic B cell population which was seen previously in this patient   in 2017 (case [id number]) is again   demonstrated. These immunophenotypic findings show persistent/recurrent B   cell lymphoproliferative process.   The differential diagnosis includes monoclonal B cell lymphocytosis,   marginal zone lymphoma, lymphoplasmacytic   lymphoma, CLL/SLL or less likely  follicular lymphoma  or mantle cell   lymphoma. For final diagnostic   classification a biopsy of an involved lymph node would be recommended, if    clinically indicated. Final   interpretation requires correlation with results of other  ancillary   studies, morphologic and clinical   features.         ASSESSMENT/PLAN:  Ángela Dumont is a 79 year old female with:    1) Leukocytosis: appears chronic since at least 2015, and on further chart review from Care Everywhere, likely chronic since at least 2007.  Peripheral smear shows absolute atypical lymphocytosis.  Hemoglobin and platelet count are normal.  She does not have frequent infections.      Peripheral flow cytometry shows concern for lymphoproliferative disorder.  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    We discussed that is usually a slow growing process, and treatment is usually not necessary.  We could do further evaluation, such as with PET scan and bone marrow biopsy.  Considering her age, dementia, immobility, and poor functional status, I favored watching and waiting and sparing her of invasive procedures that may not change her overall course.  She and her  agreed and opted to wait on getting additional scans or bone marrow biopsy at this time.        Her WBC has risen to 32.3K.  It has risen slowly over the years.  The other 2 cell lines remain normal.  This can likely be monitored for now.    -RTC in 6 months with repeat CBC/diff    2) History of PE and DVT: diagnosed in 2015, and she has been on warfarin ever since.  It was likely provoked at the time by surgery and immobility.  She remains wheelchair bound now, and remains at high risk for blood clots.  She has not had frequent falls, and INR has been under relatively good control.  I discussed and risks and benefits of continuing the warfarin.  She will continue the warfarin for now, considering her high risk for clotting remains.  If it becomes that the risk of bleeding starts to outweigh her risk of clots, then it would be time to stop the warfarin.     3) MGUS:  M-spike of 0.9-1.1.6 g/dL.  -as above, will hold off on further imaging or bone marrow biopsy  -will check SPEP with her other labs in 6 months months    4) Suspected rectal cancer: CT scan on 10/18/17 showed wall thickening at the posterior aspect of the low rectum near the anorectal junction, concerning for rectal cancer, with perirectal/mesorectal adenopathy.  She has symptoms of constipation and abdominal pain.  In the hospital, they declined further evaluation.  Her  says that they met with hospice, but has not enrolled yet.  I discussed that this does look like a rectal cancer on imaging, but only way to confirm it is to obtain pathology with a colonoscopy and biopsy.  Considering Ángela's dementia, immobility, and overall quite poor functional status, she would be a poor candidate for treatments such as surgery, chemotherapy or radiation.  I think it is reasonable to not further work this up and focus on patient's comfort and quality of life.  We discussed this again, and patient, , and daughter all agree.       Susanna Duran MD  Hematology/Oncology  [facility]        Phone call duration: 5 minutes

## 2020-07-10 NOTE — PATIENT INSTRUCTIONS
Labs scheduled 01/11/21  Appt with Dr. Duran scheduled 1/14/21  Teresa Domínguez, RN, BSN, [facility]  Patient Care Coordinator  [facility]  [phone]

## 2020-07-13 NOTE — PROGRESS NOTES
Clinic Care Coordination Contact  Email    Email sent to Siva on this date with the contact information to scheduled Pt's Ortho appointment. CHW will continue monthly outreaches. AYALA NUNES will follow-up in 6 weeks to complete a clinical review.     David Butler [facility]  Clinic Care Coordinator  Ph. [phone]  [email or web address]

## 2020-07-23 NOTE — PROGRESS NOTES
ANTICOAGULATION FOLLOW-UP CLINIC VISIT    Patient Name:  Ángela Dumont  Date:  7/23/2020  Contact Type:  Telephone/ left a detailed voicemail for patient with dosing and follow up instructions. Patient Education Systems message sent to patient as well.    SUBJECTIVE:  Patient Findings         Clinical Outcomes     Negatives:   Major bleeding event, Thromboembolic event, Anticoagulation-related hospital admission, Anticoagulation-related ED visit, Anticoagulation-related fatality           OBJECTIVE    Recent labs: (last 7 days)     07/23/20  1022   INR 3.1*       ASSESSMENT / PLAN  INR assessment SUPRA    Recheck INR In: 4 WEEKS    INR Location Clinic      Anticoagulation Summary  As of 7/23/2020    INR goal:   2.0-3.0   TTR:   89.2 % (1 y)   INR used for dosing:   3.1! (7/23/2020)   Warfarin maintenance plan:   4 mg (1 mg x 4) every Sun, Wed, Fri; 2 mg (1 mg x 2) all other days   Full warfarin instructions:   4 mg every Sun, Wed, Fri; 2 mg all other days   Weekly warfarin total:   20 mg   No change documented:   Autumn Lott RN   Plan last modified:   Cadence Gonzalez RN (10/2/2019)   Next INR check:   8/20/2020   Priority:   Maintenance   Target end date:       Indications    Long-term (current) use of anticoagulants [Z79.01] [Z79.01]  Pulmonary embolism (H) [I26.99]  DVT (deep venous thrombosis) (H) [I82.409]             Anticoagulation Episode Summary     INR check location:       Preferred lab:       Send INR reminders to:   [facility]    Comments:         Anticoagulation Care Providers     Provider Role Specialty Phone number    Joaquin Rockwell MD Referring Family Practice [phone]            See the Encounter Report to view Anticoagulation Flowsheet and Dosing Calendar (Go to Encounters tab in chart review, and find the Anticoagulation Therapy Visit)    BENJAMIN Guillen, RN

## 2020-07-27 NOTE — TELEPHONE ENCOUNTER
General Call:   Who is calling:  Vernon (spouse, CTC filed)  Reason for Call:  Patient's spouse calling, states he had to reschedule pt's appointment with ortho, but would like a call back to go over why she needs the appointment. States he was not aware of any hip pain.  Date of last appointment with provider: 2/24/2020  Okay to leave a detailed message:No at Home number on file [phone] (home)

## 2020-07-27 NOTE — TELEPHONE ENCOUNTER
Non-detailed message left for patient to return call  NAYELI BrasherN, RN  [employer] Triage

## 2020-07-28 NOTE — TELEPHONE ENCOUNTER
Spouse called, states he was just calling to inform that he had to cancel ortho appt due to scheduling conflict. I advised him to call them back to schedule at his convenience.   Lorena Tobar RN   [facility] - Triage

## 2020-07-28 NOTE — TELEPHONE ENCOUNTER
Left message on answering machine for patient  to call back.    Patient was see 06/12/20 for hip pain (outside clinic)    Ifrah GRUBBS RN BSN  [facility]  [phone]

## 2020-08-04 NOTE — PROGRESS NOTES
Clinic Care Coordination Contact  [facility]/Voicemail       Clinical Data: Care Coordinator Outreach  Outreach attempted x 1.  Left message on Vernon(spouse) voicemail with call back information and requested return call.    Plan:  Care Coordinator will try to reach patient again in 10 business days.    EROS Hsu  Clinic Care Coordination  [facility] Clinics : [facility], [place], and [place]  Phone: [phone]    ______________________  Next Outreach:  08/26/20  Planned Outreach Frequency: Monthly  Preferred Phone Number: Juan David(spouse) [phone]    Last Assessment Date: 08/23/19  Care Plan Completion Date: 05/07/20  ______________________

## 2020-08-24 NOTE — PROGRESS NOTES
Clinic Care Coordination Contact    Situation: Patient chart reviewed by care coordinator.    Background: Patient is enrolled in Clinic Care Coordination with the goal of following up with Ortho. Per chart review, CHW has been unable to reach Patient since [facility]'s last successful outreach, and will be outreaching to Patient again this week.    Assessment: Patient will benefit from ongoing CC involvement to support Pt and spouse towards goal progression. Following an appointment with Ortho, spouse will know what kind of functioning and mobility improvement is likely.    Plan/Recommendations: Updated Complex Care Plan sent to Patient on this date via [employer]. CHW will outreach within 1 week. [facility] will remain available and will schedule another clinical review in 6 weeks.    David Butler [facility]  Clinic Care Coordinator  Ph. [phone]  [email or web address]

## 2020-08-24 NOTE — LETTER
[place] CARE COORDINATION  [address] [address]  August 24, 2020        Ángela Dumont  [address] [address]          Dear Deny Motta is an updated Complex Care Plan for your continued enrollment in Care Coordination. Please let us know if you have additional questions, concerns, or goals that we can assist with.    Sincerely,        David Butler, [facility]  Clinic Care Coordinator  Ph. [phone]  [email or web address]  Complex Care Plan  About Me:    Patient Name:  Ángela Dumont    YOB: 1940  Age:         80 year old   [place] MRN:    [id number] Telephone Information:  Home Phone [phone]   Mobile [phone]       Address:  [address] [address] Email address:  [email or web address]      Emergency Contact(s)    Name Relationship Lgl Grd Work Phone Home Phone Mobile Phone   1. TONJA DUMONT Spouse  none [phone]    2. LEENA ROSENBERG Daughter  none none [phone]           Primary language:  English     needed? No   [place] [employer] [facility]:  [phone] op. 1  Other communication barriers: Caregiver  Preferred Method of Communication:  Mail  Current living arrangement: I live in a private home with spouse  Mobility Status/ Medical Equipment: Dependent/Assisted by Another    Health Maintenance  Health Maintenance Reviewed: Not assessed  Health Maintenance Due   Topic Date Due     DEXA  1940     MICROALBUMIN  1940     FALL RISK ASSESSMENT  08/23/2020     MEDICARE ANNUAL WELLNESS VISIT  07/30/2020       My Access Plan  Medical Emergency 911   Primary Clinic Line [facility] - [phone]   24 Hour Appointment Line [phone] or  [phone]) (toll-free)   24 Hour Nurse Line [phone] (toll-free)   Preferred Urgent Care [facility] [place], [phone]   Preferred Hospital [facility],  [place]  [phone]   Preferred Pharmacy [id number] - [place], [place] - [address]     Behavioral Health Crisis Line The National Suicide Prevention Lifeline at 1-162.312.5030 or 915       My Care Team Members  Patient Care Team       Relationship Specialty Notifications Start End    Joaquin Rockwell MD PCP - General Family Practice  6/16/15     Phone: [phone] Fax: [phone]         [address] [address]    Joaquin Rockwell MD Assigned PCP   5/14/15     Phone: [phone] Fax: [phone] [address] [address]    Care, [facility]  HOME HEALTH AGENCY ([facility] ([place])  7/30/19     Phone: [phone]         Lorene Pablo Community Health Worker Primary Care - CC  9/6/19     [place]; . [phone], Fax: [phone]    David Wise LGSW Lead Care Coordinator   11/20/19             My Care Plans  Self Management and Treatment Plan  Goals and (Comments)  Goals        General    2. Medical (pt-stated)     Notes - Note edited  6/29/2020  4:10 PM by David Wise LGSW    Goal Statement: I want to follow-up with Ortho within 2 months.  Date Goal set: 5.7.2020  Barriers: Appointment availability in-person during COVID-19.  Strengths: Recognition of need, strong support system including spouse.   Date to Achieve By: 8.29.2020  Patient expressed understanding of goal: Pt reports understanding and denies any additional questions or concerns at this times. SW CC engaged in AIDET communication during encounter.    Action steps to achieve this goal:  1. I will schedule an appointment with Ortho.  2. I will attend appointment.  3. I will get advice during appointment regarding mobility and expectations.                   Advance Care Plans/Directives Type:   Type Advanced Care Plans/Directives: POLST    My Medical and Care Information  Problem List   Patient Active Problem List   Diagnosis     S/P  shunt     NPH (normal pressure  hydrocephalus) (H)     Fracture of right femur (H)     DVT (deep venous thrombosis) (H)     Pulmonary embolism (H)     Dementia (H)     Hypothyroid     Hypertension goal BP (blood pressure) < 140/90     Adjustment disorder with depressed mood     Hyperlipidemia with target LDL less than 130     Leukocytosis     Esophageal reflux     UTI (urinary tract infection)     Constipation     Pulmonary hypertension (H)     MR (mitral regurgitation)     Fibromyalgia     Osteoarthritis of both knees     Health Care Home     Long-term (current) use of anticoagulants [Z79.01]     Advance Care Planning     SIRS (systemic inflammatory response syndrome) (H)     History of pulmonary embolism     History of deep venous thrombosis     Urinary retention     BPPV (benign paroxysmal positional vertigo), bilateral     Herpes zoster without complication     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  Current Outpatient Medications   Medication Instructions     acetaminophen (TYLENOL) 650 mg, Oral, EVERY 4 HOURS PRN     furosemide (LASIX) 40 MG tablet TAKE 1 TABLET BY MOUTH EVERY DAY AT 10 AM     levothyroxine (SYNTHROID/LEVOTHROID) 50 MCG tablet TAKE 1 TABLET BY MOUTH EVERY DAY     order for DME Use EZ Stand as needed     order for DME Equipment being ordered: Yoan Lift with Hygiene Sling     polyethylene glycol (MIRALAX) 17 g, Oral, DAILY PRN     senna-docusate (SENOKOT-S/PERICOLACE) 8.6-50 MG tablet 1 tablet, Oral, 2 TIMES DAILY PRN     tamsulosin (FLOMAX) 0.4 MG capsule TAKE 1 CAPSULE BY MOUTH EVERY DAY AT 10 AM     warfarin ANTICOAGULANT  (COUMADIN) 2 MG tablet TAKE  4 MG BY MOUTH ON SUNDAY, WEDNESDAY, FRIDAY AND TAKE 2 MG REST OF WEEK     warfarin ANTICOAGULANT (COUMADIN) 4 MG tablet 4 mg every Sun, Wed, Fri; 2 mg all other days       Care Coordination Start Date: 8/23/2019   Frequency of Care Coordination: monthly   Form Last Updated: 08/24/2020

## 2020-09-02 NOTE — PROGRESS NOTES
ANTICOAGULATION FOLLOW-UP CLINIC VISIT    Patient Name:  Ángela Dumont  Date:  9/2/2020  Contact Type:  Telephone/ spouse - Siva    SUBJECTIVE:  Patient Findings     Comments:   Patient denies any identifiable changes that caused the supratherapeutic INR.           Clinical Outcomes     Negatives:   Major bleeding event, Thromboembolic event, Anticoagulation-related hospital admission, Anticoagulation-related ED visit, Anticoagulation-related fatality    Comments:   Patient denies any identifiable changes that caused the supratherapeutic INR.              OBJECTIVE    Recent labs: (last 7 days)     09/02/20  1146   INR 3.4*       ASSESSMENT / PLAN  INR assessment SUPRA    Recheck INR In: 3 WEEKS    INR Location Clinic      Anticoagulation Summary  As of 9/2/2020    INR goal:   2.0-3.0   TTR:   77.9 % (1 y)   INR used for dosing:   3.4! (9/2/2020)   Warfarin maintenance plan:   4 mg (1 mg x 4) every Mon, Fri; 2 mg (1 mg x 2) all other days   Full warfarin instructions:   4 mg every Mon, Fri; 2 mg all other days   Weekly warfarin total:   18 mg   Plan last modified:   Tracy Tobar RN (9/2/2020)   Next INR check:   9/23/2020   Priority:   Maintenance   Target end date:   Indefinite    Indications    Long-term (current) use of anticoagulants [Z79.01] [Z79.01]  Pulmonary embolism (H) [I26.99]  DVT (deep venous thrombosis) (H) [I82.409]             Anticoagulation Episode Summary     INR check location:       Preferred lab:       Send INR reminders to:   [facility]    Comments:         Anticoagulation Care Providers     Provider Role Specialty Phone number    Joaquin Rockwell MD Referring [facility] [phone]            See the Encounter Report to view Anticoagulation Flowsheet and Dosing Calendar (Go to Encounters tab in chart review, and find the Anticoagulation Therapy Visit)    Patient INR is supra therapeutic today. No known factors and this is second supra INR. Patient will decrease  maintenance dosing to 18 mg and follow up in 3 weeks ( was adamant he will not come in 2 weeks - wanted to recheck in 6 weeks) or sooner if there are any concerns or problems.    Signs of bleeding and when appropriate to seek care for symptoms reviewed.    Adjustment Rational: 10%  Dosage adjustment made based on physician directed care plan.      Tracy Tobar RN

## 2020-09-03 NOTE — PROGRESS NOTES
Clinic Care Coordination Contact  Community Health Worker Follow Up  On 09/03/20 @ 3:51PM, Vernon returned phone call.    Goals:   Goals Addressed as of 9/3/2020 at 3:59 PM                 Today    6/29/20      2. Medical (pt-stated)   On Hold  10%    Added 5/7/20 by David Wise, [facility]     Goal Statement: I want to follow-up with Ortho within 2 months.  Date Goal set: 5.7.2020  Barriers: Appointment availability in-person during COVID-19.  Strengths: Recognition of need, strong support system including spouse.   Date to Achieve By: 8.29.2020  Patient expressed understanding of goal: Pt reports understanding and denies any additional questions or concerns at this times. SW CC engaged in AIDET communication during encounter.    Action steps to achieve this goal:  1. I will schedule an appointment with Ortho.  2. I will attend appointment.  3. I will get advice during appointment regarding mobility and expectations.      09/03, CHW:    Vernon has not taken Byron in for Ortho appointment as he's worried about taking her out of the home during this time.    However, patient reports that he would still be open to get her in once things settle down more with COVID.    We discussed putting goal on hold at this time.  Goal is on hold      Intervention and Education during outreach:   CHW inquired if there was anything else needed from CC at this time.  However, Vernon states that he had any other questions or concerns.  We discussed continuing monthly outreaches as Vernon appreciates the follow-ups.    CHW Plan: I will continue monthly outreaches as discussed.    Lorene Noyola, ANTONELLAW  Clinic Care Coordination  [facility] Clinics : [place], [place], and [place]  Phone: [phone]    ______________________  Next Outreach:  10/05/20  Planned Outreach Frequency: Monthly  Preferred Phone Number: Juan David(spouse) [phone]    Last Assessment Date: 08/23/19  Care Plan Completion Date:  08/24/20  ______________________

## 2020-09-03 NOTE — PROGRESS NOTES
Clinic Care Coordination Contact  [facility]/Voicemail       Clinical Data: Care Coordinator Outreach  Outreach attempted x 2.  Left message on patient's voicemail with call back information and requested return call.    Plan:  Care Coordinator will try to reach patient again in 10 business days.    EROS Hsu  Clinic Care Coordination  [facility] Clinics : [facility], [place], and [place]  Phone: [phone]    ______________________  Next Outreach:  09/16/20  Planned Outreach Frequency: Monthly  Preferred Phone Number: Juan David(spouse) [phone]    Last Assessment Date: 08/23/19  Care Plan Completion Date: 08/24/20  ______________________

## 2020-09-23 NOTE — PROGRESS NOTES
ANTICOAGULATION FOLLOW-UP CLINIC VISIT    Patient Name:  Ángela Dumont  Date:  2020  Contact Type:  Telephone/ Siva    SUBJECTIVE:  Patient Findings     Positives:   Missed doses    Comments:   Patient self adjusted dose and held  4 mg last two weeks - noted on calendar.         Clinical Outcomes     Negatives:   Major bleeding event, Thromboembolic event, Anticoagulation-related hospital admission, Anticoagulation-related ED visit, Anticoagulation-related fatality    Comments:   Patient self adjusted dose and held  4 mg last two weeks - noted on calendar.            OBJECTIVE    Recent labs: (last 7 days)     20  1015   INR 2.7*       ASSESSMENT / PLAN  INR assessment THER    Recheck INR In: 5 WEEKS    INR Location Clinic      Anticoagulation Summary  As of 2020    INR goal:   2.0-3.0   TTR:   75.7 % (1 y)   INR used for dosin.7 (2020)   Warfarin maintenance plan:   4 mg (1 mg x 4) every Mon, Fri; 2 mg (1 mg x 2) all other days   Full warfarin instructions:   4 mg every Mon, Fri; 2 mg all other days   Weekly warfarin total:   18 mg   No change documented:   Tracy Tobar RN   Plan last modified:   Tracy Tobar RN (2020)   Next INR check:   10/28/2020   Priority:   Maintenance   Target end date:   Indefinite    Indications    Long-term (current) use of anticoagulants [Z79.01] [Z79.01]  Pulmonary embolism (H) [I26.99]  DVT (deep venous thrombosis) (H) [I82.409]             Anticoagulation Episode Summary     INR check location:       Preferred lab:       Send INR reminders to:   [facility]    Comments:         Anticoagulation Care Providers     Provider Role Specialty Phone number    Joaquin Rockwell MD Referring [facility] [phone]            See the Encounter Report to view Anticoagulation Flowsheet and Dosing Calendar (Go to Encounters tab in chart review, and find the Anticoagulation Therapy Visit)    Patient INR is therapeutic.   Patient will continue to take 18 mg weekly dosing and follow up in 5 weeks or sooner for any problems or concerns.    Spouse and I had a lengthy conversation about self titrating dose. He did not feel I made an aggressive enough dose adjustment and held her dose on his own. I told him this was not acceptable. I explained mathematically to him that this dose adjustment likely put her in a subtherapeutic state the last two weeks. She is now in range because she has been on her maintenance dose for 7 days - not because of the holds she was on. It sounds like he has been self adjusting warfarin for quite some time, I asked him that we have a fresh start with eachother and if he has questions or concerns about my dosing that he call and ask me anytime and I will explain and try to compromise where able.    Tracy Tobar RN

## 2020-10-07 NOTE — PROGRESS NOTES
Clinic Care Coordination Contact  Community Health Worker Follow Up  Spoke to Vernon    Goals:   Goals Addressed as of 10/7/2020 at 10:23 AM                 Today    9/3/20      2. Medical (pt-stated)   40%  On Hold    Added 5/7/20 by David Wise, SW     Goal Statement: I want to follow-up with Ortho within 2 months.  Date Goal set: 5.7.2020  Barriers: Appointment availability in-person during COVID-19.  Strengths: Recognition of need, strong support system including spouse.   Date to Achieve By: 8.29.2020  Patient expressed understanding of goal: Pt reports understanding and denies any additional questions or concerns at this times. SW CC engaged in AIDET communication during encounter.    Action steps to achieve this goal:  1. I will schedule an appointment with Ortho.  2. I will attend appointment.  3. I will get advice during appointment regarding mobility and expectations.      10/07, CHW:    Vernon states that he would like to think about getting Byron to see Ortho again, since it's been on hold.    CHW pulled up original referral and gave info to Vernon for the [facility] .    Vernon will plan to contact the office to make an appointment        Intervention and Education during outreach:   CHW inquired if there was any other support needed at this time.  Vernon declined and thanked writer for the assistance, he will plan to contact the [facility]  to make an appointment for Byron.  CHW encouraged Vernon to contact CC if there was any changes.  Vernon acknowledged understanding.    CHW Plan: CHW will continue monthly outreaches to monitor progression of goals.    EROS Hsu  Clinic Care Coordination  [facility] Clinics : [place], [place], and [place]  Phone: [phone]    ______________________  Next Outreach:  11/06/20  Planned Outreach Frequency: Monthly  Preferred Phone Number: Juan David(spouse) [phone]    Last Assessment Date:  08/23/19  Care Plan Completion Date: 08/24/20  ______________________

## 2020-10-15 NOTE — PROGRESS NOTES
Clinic Care Coordination Contact    Situation: Patient chart reviewed by care coordinator.    Background: Patient is enrolled in Clinic Care Coordination with the goal of following up with Ortho regarding Pt's mobility. Per last CHW outreach, Patient's spouse will schedule an appointment.    Assessment: Appointment not yet scheduled per chart review. If this goal remains relevant, Patient will benefit from CHW monthly outreaches and support in making progress on goal action steps.    Plan/Recommendations: CHW will continue to outreach monthly to discuss goal progression and encourage progression and health coaching with addressing goal action steps. SW CC will remain available for CC needs and will schedule a clinical review in 6 weeks.    David Butler [facility]  Clinic Care Coordinator  Ph. [phone]  [email or web address]

## 2020-10-19 NOTE — TELEPHONE ENCOUNTER
Anticoagulation Monitoring Return Date Recheck   Latest Ref Rng & Units     9/23/2020 10/28/2020 5 WEEKS     Anticoagulation Monitoring Instructions   Latest Ref Rng & Units    9/23/2020 4 mg every Mon, Fri; 2 mg all other days   Prescription approved per [facility] Refill Protocol.  Andra Escalante, RN  Anticoagulation Nurse - [facility]

## 2020-10-28 NOTE — PROGRESS NOTES
ANTICOAGULATION FOLLOW-UP CLINIC VISIT    Patient Name:  Ángela Dumont  Date:  10/28/2020  Contact Type:  Telephone/ Spouse - Siva    SUBJECTIVE:  Patient Findings     Comments:  The patient was assessed for diet, medication, and activity level changes, missed or extra doses, bruising or bleeding, with no problem findings.          Clinical Outcomes     Negatives:  Major bleeding event, Thromboembolic event, Anticoagulation-related hospital admission, Anticoagulation-related ED visit, Anticoagulation-related fatality    Comments:  The patient was assessed for diet, medication, and activity level changes, missed or extra doses, bruising or bleeding, with no problem findings.             OBJECTIVE    Recent labs: (last 7 days)     10/28/20  1024   INR 2.6*       ASSESSMENT / PLAN  INR assessment THER    Recheck INR In: 6 WEEKS    INR Location Clinic      Anticoagulation Summary  As of 10/28/2020    INR goal:  2.0-3.0   TTR:  79.0 % (1 y)   INR used for dosin.6 (10/28/2020)   Warfarin maintenance plan:  4 mg (1 mg x 4) every Mon, Fri; 2 mg (1 mg x 2) all other days   Full warfarin instructions:  4 mg every Mon, Fri; 2 mg all other days   Weekly warfarin total:  18 mg   No change documented:  Tracy Tobar RN   Plan last modified:  Tracy Tobar RN (2020)   Next INR check:  2020   Priority:  Maintenance   Target end date:  Indefinite    Indications    Long-term (current) use of anticoagulants [Z79.01] [Z79.01]  Pulmonary embolism (H) [I26.99]  DVT (deep venous thrombosis) (H) [I82.409]             Anticoagulation Episode Summary     INR check location:      Preferred lab:      Send INR reminders to:  [facility]    Comments:  Patient has dementia - speak with spouse Siva      Anticoagulation Care Providers     Provider Role Specialty Phone number    Joaquin Rockwell MD Referring Family Medicine [phone]            See the Encounter Report to view Anticoagulation Flowsheet  and Dosing Calendar (Go to Encounters tab in chart review, and find the Anticoagulation Therapy Visit)    Patient INR is therapeutic.  Patient will continue to take 18 mg weekly dosing and follow up in 6 weeks or sooner for any problems or concerns.        Tracy Tobar RN

## 2020-11-13 NOTE — PROGRESS NOTES
Clinic Care Coordination Contact  Community Health Worker Follow Up  Spoke to Vernon    Goals:   Goals Addressed as of 11/13/2020 at 10:55 AM                 Today    10/7/20      2. Medical (pt-stated)   40%  40%    Added 5/7/20 by David Wise, AYALA     Goal Statement: I want to follow-up with Ortho by 4.15.2020.  Date Goal set: 5.7.2020  Barriers: Appointment availability in-person during COVID-19.  Strengths: Recognition of need, strong support system including spouse.   Date to Achieve By: 4.15.2020  Patient expressed understanding of goal: Pt reports understanding and denies any additional questions or concerns at this times. SW CC engaged in AIDET communication during encounter.    Action steps to achieve this goal:  1. I will schedule an appointment with Ortho.  2. I will attend appointment.  3. I will get advice during appointment regarding mobility and expectations.      11/13, CHW:    Vernon states that he hasn't made the appointment yet since our last outreach.    CHW inquired if there was anything that was holding him back from making the appointment.    He states that there has just been a lot going on and patient has been feeling very sensitive and BM's have been soft    Vernon is planning to make an appointment with PCP to address these issues.        Intervention and Education during outreach:   CHW inquired if there was any other support or resources needed at this time.  However, Vernon had no other needs or concerns, as he stated he'll probably know more after he follows up with PCP.  CHW encouraged patient to follow through with PCP.  Vernon acknowledged stating he will plan to contact office.    CHW Plan: CHW will continue monthly outreaches to monitor progression of goals.    EROS Hsu  Clinic Care Coordination  [facility] Clinics : [place], [place], [place], and [place]  Phone: [phone]    ______________________  Next Outreach:  12/11/20  Planned Outreach Frequency:  Monthly  Preferred Phone Number: Juan David(spouse) [phone]    Last Assessment Date: 08/23/19  Care Plan Completion Date: 08/24/20  ______________________

## 2020-11-27 NOTE — TELEPHONE ENCOUNTER
Patient Returning Call    Reason for call:  Return call    Information relayed to patient:  Vernon is calling back to speak to a triage nurse. He was offered the 4:40 spot today to come in and see Silvia Roland. Vernon said he has a hard time getting her in the car and transporting her. He says he is afraid of dropping her. He still wants to keep the virtual appointment with Jamee Gómez today at 2:30.    Okay to leave a detailed message?: No at Home number on file [phone] (home)

## 2020-11-27 NOTE — TELEPHONE ENCOUNTER
Attempt # 1  Called # [phone]   Attempting to change appointment to in clinic, held spot at 440 today 11/27/20 with LP      Left a non detailed VM to call back at [phone] and ask for any available Triage Nurse.    Josias Lynn RN   [facility] [place] Triage

## 2020-11-27 NOTE — PROGRESS NOTES
"Ángela Dumont is a 80 year old female who is being evaluated via a billable telephone visit.      The patient has been notified of following:     \"This telephone visit will be conducted via a call between you and your physician/provider. We have found that certain health care needs can be provided without the need for a physical exam.  This service lets us provide the care you need with a short phone conversation.  If a prescription is necessary we can send it directly to your pharmacy.  If lab work is needed we can place an order for that and you can then stop by our lab to have the test done at a later time.    Telephone visits are billed at different rates depending on your insurance coverage. During this emergency period, for some insurers they may be billed the same as an in-person visit.  Please reach out to your insurance provider with any questions.    If during the course of the call the physician/provider feels a telephone visit is not appropriate, you will not be charged for this service.\"    Patient has given verbal consent for Telephone visit?     What phone number would you like to be contacted at?  mai.    How would you like to obtain your AVS? Maryann Dick     Ángela Dumont is a 80 year old female who presents via phone visit today for the following health issues:    HPI      Concern - Sore on Buttock  Onset:  said has had for months   Description: near anus open sore  Intensity: mild  Progression of Symptoms:  same  Accompanying Signs & Symptoms: none - no bleeding or discharge, redness  Previous history of similar problem: none  Precipitating factors:        Worsened by: nothing  Alleviating factors:        Improved by: nothing  Therapies tried and outcome: Desitin - not working -- screams when applied because it burns    Pt needs help besides  getting in and out of car.  is waiting to hear from neighbor who has helped in the past. Have 4:40p appt on Silvia " Luan's schedule blocked for pt. Unsure if they can make it.  Needs between the hours of [id number] for visit as that is when he has help at home for his wife.   Does have appt on 12/04/2020 with Jamee Gómez at 10:50a set up if can not make it today.    Juan David patient's  called. He reports Byron has a dime sized open area of soreness near her anus.  He reports it does not appear to be infected and has been there for approximately a month.  Desitin causes her discomfort.  Denies any other concerns at this time states she is doing well.      Telephone visit to be canceled setting up office appointment for 440 today.    Review of Systems   Constitutional, HEENT, cardiovascular, pulmonary, GI, , musculoskeletal, neuro, skin, endocrine and psych systems are negative, except as otherwise noted in the HPI.       Objective        Vitals:  No vitals were obtained today due to virtual visit.  Conversation was with her  Juan David.   RESP: No cough, no audible wheezing, able to talk in full sentences  Remainder of exam unable to be completed due to telephone visits          Assessment/Plan:    Assessment & Plan     Ángela was seen today for derm problem.    Diagnoses and all orders for this visit:    Skin breakdown  Discussed with patient's  Siva, and he agrees, she should be seen in person for most accurate assessment.  Appointment set up today for 440 and he has help to transport his wife to clinic today.  Telephone appt.will be cancelled.   Addendum: Unfortunately he received a call after everything was set up stating that he could not have 2 office visits in one day.He unfortunately then cancelled his help getting wife to clinic today.    Apologized for the miscommunication-he was able to have an office visit as telephone visit was cancelled due to in office visit deemed appropriate.    No concern for an acute process going on.  Did discuss with him trying a prescription cream prior to being seen in  "clinic next week and he declines at this time.  There are multiple openings to set up in person appointment next week; he has been set up with his PCP Dr. Rockwell for her Monday am.    Red flag symptoms discussed and if these occur present to the emergency room or call 911.  Ángela verbalizes understanding of plan of care and is in agreement.     BMI:   Estimated body mass index is 30.21 kg/m  as calculated from the following:    Height as of 1/7/20: 1.626 m (5' 4\").    Weight as of 1/7/20: 79.8 kg (176 lb).        Return in about 3 days (around 11/30/2020).      Jamee Gómez, [facility] [place]    Phone call duration:  Two phone calls to discuss; total time combined 4 minutes.               "

## 2020-11-30 NOTE — PROGRESS NOTES
[facility]  [place]    SUBJECTIVE    Ángela Dumont is a 80 year old female who presents to clinic today for the following health issues:    Wound check    Upper midline buttocks 1 x 2.5 cm blanchable to subcutaneous, no signs of infection, no discharge, no significant redness - minimal activity, in lazy boy typically during the day, has wheelchair for transplant, bed at night, using pull ups, no f/c/s    Stable, appetite good, somewhat on/off, sleep good, bowels soft, urine ok    Reviewed and updated as needed this visit by Provider                   BP Readings from Last 3 Encounters:   11/30/20 119/58   02/24/20 131/62   01/07/20 129/64       body mass index is 30.21 kg/m .    Wt Readings from Last 4 Encounters:   01/07/20 79.8 kg (176 lb)   07/30/19 79.8 kg (176 lb)   01/07/19 88.5 kg (195 lb)   09/07/18 88.5 kg (195 lb)       Health Maintenance    Health Maintenance Due   Topic Date Due     MICROALBUMIN  1940     MEDICARE ANNUAL WELLNESS VISIT  07/30/2020     FALL RISK ASSESSMENT  08/23/2020       Current Problem List    Patient Active Problem List   Diagnosis     S/P  shunt     NPH (normal pressure hydrocephalus) (H)     Fracture of right femur (H)     DVT (deep venous thrombosis) (H)     Pulmonary embolism (H)     Dementia (H)     Hypothyroid     Hypertension goal BP (blood pressure) < 140/90     Adjustment disorder with depressed mood     Hyperlipidemia with target LDL less than 130     Leukocytosis     Esophageal reflux     UTI (urinary tract infection)     Constipation     Pulmonary hypertension (H)     MR (mitral regurgitation)     Fibromyalgia     Osteoarthritis of both knees     Health Care Home     Long-term (current) use of anticoagulants [Z79.01]     Advance Care Planning     SIRS (systemic inflammatory response syndrome) (H)     History of pulmonary embolism     History of deep venous thrombosis     Urinary retention     BPPV (benign paroxysmal positional vertigo), bilateral      Herpes zoster without complication     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  Dr Duran     Colitis, nonspecific 10/31/2017     Constipation      Dementia (H)     memory/anger     Depressive disorder last few months as condition became apparent    Lack of mobility a big problem     DVT (deep venous thrombosis) (H) 4/15    bilateral     Esophageal reflux      Fibromyalgia      Fracture of right femur (H) 1/15     Heart murmur      Herpes zoster without complication 08/16/2017    left buttocks     History of Clostridium difficile      Hyperlipidemia LDL goal < 130      Hypertension goal BP (blood pressure) < 140/90      Hypothyroid      Leukocytosis, unspecified     w/u ?     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)      Lymphoproliferative disorder (H)     Dr Duran     MGUS (monoclonal gammopathy of unknown significance)     Dr Duran     Migraine      MR (mitral regurgitation)     Mild     NPH (normal pressure hydrocephalus) (H) 4/15    shunt placed but removed in 7/2015 due to erosion through the scalp, replaced 5/19/16     Osteoarthritis of both knees      Other atopic dermatitis and related conditions      PE (pulmonary embolism) 4/15    Saddle - IVC     Pelvic lymphadenopathy 10/31/2017     Pulmonary hypertension (H)     EF 70-75%     Rectal mass 10/2017    declines work up     Urinary tract infection, site not specified     MRSA - Dr Loo      Vertigo        Past Surgical History    Past Surgical History:   Procedure Laterality Date     ABDOMEN SURGERY  left side lower abdomen pain from Shunt we do roberth     COLONOSCOPY  2008     GYN SURGERY       H LABOR AND DELIVERY VAGINAL  1960, 1967     HERNIA REPAIR       HYSTERECTOMY  1976    hysterectomy     IMPLANT SHUNT VENTRICULOPERITONEAL Left 5/19/2016    IMPLANT SHUNT VENTRICULOPERITONEAL - Dr Alba     OPTICAL TRACKING SYSTEM IMPLANT SHUNT VENTRICULOPERITONEAL Right 4/10/2015     Shunt - Dr Sierra     ORTHOPEDIC SURGERY  Knee pain that prohibits walking     REMOVE SHUNT VENTRICULOPERITONEAL N/A 7/3/2015    REMOVE SHUNT VENTRICULOPERITONEAL;  Surgeon: Emmanuel Sierra MD;  Location: [facility] OR     SURGICAL HISTORY OF -   1/15    right femur/hip surgery - 1/18/2015     SURGICAL HISTORY OF -   7/04    rectocele repair     SURGICAL HISTORY OF -   1997    abdominal adhesion lysis     SURGICAL HISTORY OF -   4/15    IVC filter placement     SURGICAL HISTORY OF -   6/15    IVC filter removal       Current Medications    Current Outpatient Medications   Medication Sig Dispense Refill     acetaminophen (TYLENOL) 325 MG tablet Take 2 tablets (650 mg) by mouth every 4 hours as needed for other (surgical pain) 100 tablet 0     furosemide (LASIX) 40 MG tablet TAKE 1 TABLET BY MOUTH EVERY DAY AT 10 AM 90 tablet 0     levothyroxine (SYNTHROID/LEVOTHROID) 50 MCG tablet TAKE 1 TABLET BY MOUTH EVERY DAY 90 tablet 2     order for DME Equipment being ordered: Yoan Lift with Hygiene Sling 1 Device 0     order for DME Use EZ Stand as needed       polyethylene glycol (MIRALAX/GLYCOLAX) Packet Take 17 g by mouth daily as needed for constipation 7 packet 0     senna-docusate (SENOKOT-S/PERICOLACE) 8.6-50 MG tablet Take 1 tablet by mouth 2 times daily as needed for constipation 100 tablet 0     tamsulosin (FLOMAX) 0.4 MG capsule TAKE 1 CAPSULE BY MOUTH EVERY DAY AT 10 AM 90 capsule 0     warfarin ANTICOAGULANT (COUMADIN)  2 MG tablet 4 mg Mon Fri, 2 mg all other days or as directed by [facility]. 60 tablet 1     warfarin ANTICOAGULANT (COUMADIN) 4 MG tablet TAKE 4MG (1 TAB) EVERY MON FRI, and Take 2MG (1/2 TAB) ALL OTHER DAYS OR AS DIRECTED BY INR 24 tablet 2     Wound Dressings (TRIAD HYDROPHILIC WOUND DRESSI) PSTE Externally apply 1 g topically 3 times daily 71 g 3       Allergies    Allergies   Allergen Reactions     Shellfish Allergy Anaphylaxis     Aspirin      GI issues     Contrast Dye Hives and Itching     Flushed skin     Penicillins Hives     Sulfa Drugs Hives     Valtrex [Valacyclovir] Nausea     Ceftin [Cefuroxime] Rash     Nitrofurantoin Rash       Immunizations    Immunization History   Administered Date(s) Administered     FLU 6-35 months 10/06/2011     Flu, Unspecified 11/03/2008     T0o0-19 Novel Flu 09/25/2015     Influenza (High Dose) 3 valent vaccine 09/19/2014, 09/25/2015, 09/19/2016, 10/05/2017, 12/27/2018, 10/02/2019     Influenza (IIV3) PF 10/23/2003, 10/04/2010, 10/06/2011, 09/27/2012, 09/19/2013     Influenza Vaccine IM Ages 6-35 Months 4 Valent (PF) 11/03/2008     Influenza, Quad, High Dose, Pf, 65yr + 09/23/2020     Pneumo Conj 13-V (2010&after) 09/25/2015     Pneumococcal 23 valent 10/04/2010     TD (ADULT, 7+) 10/31/2017     TDAP Vaccine (Adacel) 01/01/2004     Td (Adult), Adsorbed 10/31/2017     Tdap (Adacel,Boostrix) 01/01/2004     Zoster vaccine recombinant adjuvanted (SHINGRIX) 12/27/2018, 06/12/2019     Zoster vaccine, live 10/05/2007       Family History    Family History   Problem Relation Age of Onset     Colon Cancer Father      Coronary Artery Disease Father      Diabetes Maternal Grandmother        Social History    Social History     Socioeconomic History     Marital status:      Spouse name: Vernon     Number of children: 2     Years of education: Not on file     Highest education level: Not on file   Occupational History     Not on file   Social Needs     Financial resource strain: Not on  file     Food insecurity     Worry: Not on file     Inability: Not on file     Transportation needs     Medical: Not on file     Non-medical: Not on file   Tobacco Use     Smoking status: Former Smoker     Packs/day: 0.50     Years: 10.00     Pack years: 5.00     Types: Cigarettes     Start date: 1958     Quit date: 1982     Years since quittin.9     Smokeless tobacco: Never Used     Tobacco comment: quit    Substance and Sexual Activity     Alcohol use: Yes     Alcohol/week: 0.0 - 1.0 standard drinks     Comment: Very light     Drug use: No     Sexual activity: Not Currently     Partners: Male     Birth control/protection: None     Comment: not a problem   Lifestyle     Physical activity     Days per week: Not on file     Minutes per session: Not on file     Stress: Not on file   Relationships     Social connections     Talks on phone: Not on file     Gets together: Not on file     Attends Synagogue service: Not on file     Active member of club or organization: Not on file     Attends meetings of clubs or organizations: Not on file     Relationship status: Not on file     Intimate partner violence     Fear of current or ex partner: Not on file     Emotionally abused: Not on file     Physically abused: Not on file     Forced sexual activity: Not on file   Other Topics Concern     Parent/sibling w/ CABG, MI or angioplasty before 65F 55M? No   Social History Narrative     Not on file       All above reviewed and updated, all stable unless otherwise noted    Recent labs reviewed    ROS    CONSTITUTIONAL: NEGATIVE for fever, chills, change in weight  INTEGUMENTARY/SKIN: NEGATIVE for worrisome rashes, moles or lesions  EYES: NEGATIVE for vision changes or irritation  ENT/MOUTH: NEGATIVE for ear, mouth and throat problems  RESP: NEGATIVE for significant cough or SOB  CV: NEGATIVE for chest pain, palpitations or peripheral edema  GI: NEGATIVE for nausea, abdominal pain, heartburn, or change in bowel  "habits  : NEGATIVE for frequency, dysuria, or hematuria  MUSCULOSKELETAL: NEGATIVE for significant arthralgias or myalgia  NEURO: NEGATIVE for weakness, dizziness or paresthesias  ENDOCRINE: NEGATIVE for temperature intolerance, skin/hair changes  HEME: NEGATIVE for bleeding problems  PSYCHIATRIC: NEGATIVE for changes in mood or affect    OBJECTIVE    /58   Pulse 84   Temp 96.4  F (35.8  C) (Tympanic)   Ht 1.626 m (5' 4\")   SpO2 97%   BMI 30.21 kg/m    Body mass index is 30.21 kg/m .  GENERAL: healthy, alert and no distress  EYES: Eyes grossly normal to inspection, extraocular movements - intact, and PERRL  RESP: lungs clear to auscultation - no rales, no rhonchi, no wheezes  CV: regular rates and rhythm, normal S1 S2, no S3 or S4 and no murmur, no click or rub -  ABDOMEN: soft, no tenderness, no  hepatosplenomegaly, no masses, normal bowel sounds  MS: extremities- no gross deformities noted, no edema - limited ambulation needs assistance  SKIN: see above/below  NEURO: strength and tone- normal, sensory exam- grossly normal, mentation- decreased, speech- normal, reflexes- symmetric  BACK: no CVA tenderness, no paralumbar tenderness  PSYCH: Alert and oriented; speech- coherent , normal rate and volume; able to articulate logical thoughts      DIAGNOSTICS/PROCEDURE           ASSESSMENT      ICD-10-CM    1. Skin breakdown  R23.8 Wound Dressings (TRIAD HYDROPHILIC WOUND DRESSI) PSTE   2. Pressure injury of buttock, stage 1, unspecified laterality  L89.301 Wound Dressings (TRIAD HYDROPHILIC WOUND DRESSI) PSTE   3. Dementia with behavioral disturbance, unspecified dementia type (H)  F03.91    4. NPH (normal pressure hydrocephalus) (H)  G91.2    5. S/P  shunt  Z98.2    6. History of pulmonary embolism  Z86.711    7. History of deep venous thrombosis  Z86.718    8. 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 MGUS (monoclonal gammopathy of unknown significance)  D47.2    10. Rectal mass  " K62.89    11. Pelvic lymphadenopathy  R59.0    12. Long term (current) use of anticoagulants  Z79.01    13. Medication monitoring encounter  Z51.81        PLAN    Discussed treatment/modality options, including risk and benefits she desires:    1) consider antibiotics    2) wound cares reviewed, cleansed, triad hydrophilic cream    3) close follow up, increase fiber in diet    All diagnosis above reviewed and noted above, otherwise stable.  See [facility] orders for further details.     Return in about 1 week (around 12/7/2020), or if symptoms worsen or fail to improve, for Follow Up Acute, Medication Recheck Visit, Follow Up Chronic.    Health Maintenance Due   Topic Date Due     MICROALBUMIN  1940     MEDICARE ANNUAL WELLNESS VISIT  07/30/2020     FALL RISK ASSESSMENT  08/23/2020       See Patient Instructions             Joaquin Rockwell MD, FAAFP     [facility]  [address] [place] [id number]  [email or web address]   Office: [phone]  Fax: [phone]  Pager: [phone]

## 2020-12-04 NOTE — PROGRESS NOTES
Clinic Care Coordination Contact    Situation: Patient chart reviewed by care coordinator.    Background: Patient is enrolled in Clinic Care Coordination with the goal of following up with Orthopedics. Pt's spouse is assisting with managing cares.    Assessment: CHW will continue monthly outreaches. Patient may benefit from CC re-assessment.    Plan/Recommendations: Spouse will plan to schedule an Ortho appointment. CHW will continue to outreach monthly and offer SW CC re-assessment if Pt is agreeable. SW CC will remain available for CC needs and will schedule a clinical review in 6 weeks.     David Butler [facility]  Clinic Care Coordinator  Ph. [phone]  [email or web address]

## 2020-12-04 NOTE — LETTER
[place] CARE COORDINATION  [address] [facility] [address]  December 4, 2020        Ángela Dumont  [address] [facility] [address]          Dear Deny Motta is an updated Complex Care Plan for your continued enrollment in Care Coordination. Please let us know if you have additional questions, concerns, or goals that we can assist with.    Sincerely,        David Butler, [facility]  Clinic Care Coordinator  Ph. [phone]  [email or web address]  Complex Care Plan  About Me:    Patient Name:  Ángela Dumont    YOB: 1940  Age:         80 year old   [place] MRN:    [id number] Telephone Information:  Home Phone [phone]   Mobile [phone]       Address:  [address] [facility] [address] Email address:  [email or web address]      Emergency Contact(s)    Name Relationship Lgl Grd Work Phone Home Phone Mobile Phone   1. TONJA DUMONT Spouse  none [phone]    2. LEENA ROSENBERG Daughter  none none [phone]           Primary language:  English     needed? No   [place] [facility]:  [phone] op. 1  Other communication barriers: Caregiver  Preferred Method of Communication:  Mail  Current living arrangement: I live in a private home with spouse  Mobility Status/ Medical Equipment: Dependent/Assisted by Another    Health Maintenance  Health Maintenance Reviewed: Not assessed  Health Maintenance Due   Topic Date Due     DEXA  1940     MICROALBUMIN  1940     MEDICARE ANNUAL WELLNESS VISIT  07/30/2020     FALL RISK ASSESSMENT  08/23/2020       My Access Plan  Medical Emergency 911   Primary Clinic Line  [facility] - [phone]   24 Hour Appointment Line [phone] or  [phone]) (toll-free)   24 Hour Nurse Line [phone] (toll-free)   Preferred Urgent Care [facility] [place], [phone]   [facility]  [facility]  [phone]   Preferred Pharmacy [facility] - [place], [place] - [address] [address] [address]     Behavioral Health Crisis Line The National Suicide Prevention Lifeline at 1-175.968.9302 or 912       My Care Team Members  Patient Care Team       Relationship Specialty Notifications Start End    Joaquin Rockwell MD PCP - General Family Practice  6/16/15     Phone: [phone] Fax: [phone]         [address] [facility] [address]    Joaquin Rockwell MD Assigned PCP   5/14/15     Phone: [phone] Fax: [phone]         [address] [facility] [address]    [facility] ([facility] ([place])  7/30/19     Phone: [phone]         Lorene Pablo Community Health Worker Primary Care - CC  9/6/19     [place]; . [phone], Fax: [phone]    David Wise LGSW Lead Care Coordinator   11/20/19     Susanna Duarn MD Assigned Cancer Care Provider   10/23/20     Phone: [phone] Fax: [phone]         [address] [facility] [address]            My Care Plans  Self Management and Treatment Plan  Goals and (Comments)  Goals        General    2. Medical (pt-stated)     Notes - Note edited  12/4/2020  8:57 AM by David Wise LGSW    Goal Statement: I want to follow-up with Ortho by 4.15.2021.  Date Goal set: 5.7.2020  Barriers: Appointment availability in-person during COVID-19.  Strengths: Recognition of need, strong support system including spouse.   Date to Achieve By: 4.15.2021  Patient expressed understanding of goal: Pt reports understanding and denies any additional questions or concerns at this times. SW CC engaged in AIDET communication during encounter.    Action steps to achieve this goal:  1. I will schedule an appointment with Ortho.  2. I will attend appointment.  3. I will get advice during appointment regarding mobility and expectations.                   Advance Care Plans/Directives Type:   Type Advanced Care  Plans/Directives: POLST    My Medical and Care Information  Problem List   Patient Active Problem List   Diagnosis     S/P  shunt     NPH (normal pressure hydrocephalus) (H)     Fracture of right femur (H)     DVT (deep venous thrombosis) (H)     Pulmonary embolism (H)     Dementia (H)     Hypothyroid     Hypertension goal BP (blood pressure) < 140/90     Adjustment disorder with depressed mood     Hyperlipidemia with target LDL less than 130     Leukocytosis     Esophageal reflux     UTI (urinary tract infection)     Constipation     Pulmonary hypertension (H)     MR (mitral regurgitation)     Fibromyalgia     Osteoarthritis of both knees     Health Care Home     Long-term (current) use of anticoagulants [Z79.01]     Advance Care Planning     SIRS (systemic inflammatory response syndrome) (H)     History of pulmonary embolism     History of deep venous thrombosis     Urinary retention     BPPV (benign paroxysmal positional vertigo), bilateral     Herpes zoster without complication     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  Current Outpatient Medications   Medication Instructions     acetaminophen (TYLENOL) 650 mg, Oral, EVERY 4 HOURS PRN     furosemide (LASIX) 40 MG tablet TAKE 1 TABLET BY MOUTH EVERY DAY AT 10 AM     levothyroxine (SYNTHROID/LEVOTHROID) 50 MCG tablet TAKE 1 TABLET BY MOUTH EVERY DAY     order for DME Use EZ Stand as needed     order for DME Equipment being ordered: Yoan Lift with Hygiene Sling     polyethylene glycol (MIRALAX) 17 g, Oral, DAILY PRN     senna-docusate (SENOKOT-S/PERICOLACE)  8.6-50 MG tablet 1 tablet, Oral, 2 TIMES DAILY PRN     tamsulosin (FLOMAX) 0.4 MG capsule TAKE 1 CAPSULE BY MOUTH EVERY DAY AT 10 AM     warfarin ANTICOAGULANT (COUMADIN) 2 MG tablet 4 mg Mon Fri, 2 mg all other days or as directed by [facility].     warfarin ANTICOAGULANT (COUMADIN) 4 MG tablet TAKE 4MG (1 TAB) EVERY MON FRI, and Take 2MG (1/2 TAB) ALL OTHER DAYS OR AS DIRECTED BY INR     Wound Dressings (TRIAD HYDROPHILIC WOUND DRESSI) PSTE 1 g, Apply externally, 3 TIMES DAILY       Care Coordination Start Date: 8/23/2019   Frequency of Care Coordination: monthly   Form Last Updated: 12/04/2020

## 2020-12-09 NOTE — PROGRESS NOTES
ANTICOAGULATION FOLLOW-UP CLINIC VISIT    Patient Name:  Ángela Dumont  Date:  12/9/2020  Contact Type:  Telephone/ Siva - spouse    SUBJECTIVE:  Patient Findings     Positives:  Change in health    Comments:  Patient has a buttocks wound and laceration on right hand        Clinical Outcomes     Negatives:  Major bleeding event, Thromboembolic event, Anticoagulation-related hospital admission, Anticoagulation-related ED visit, Anticoagulation-related fatality    Comments:  Patient has a buttocks wound and laceration on right hand           OBJECTIVE    Recent labs: (last 7 days)     12/09/20  1019   INR 3.3*       ASSESSMENT / PLAN  INR assessment SUPRA    Recheck INR In: 1 WEEK    INR Location Clinic      Anticoagulation Summary  As of 12/9/2020    INR goal:  2.0-3.0   TTR:  74.1 % (1 y)   INR used for dosing:  3.3 (12/9/2020)   Warfarin maintenance plan:  4 mg (1 mg x 4) every Mon, Fri; 2 mg (1 mg x 2) all other days   Full warfarin instructions:  12/9: Hold; Otherwise 4 mg every Mon, Fri; 2 mg all other days   Weekly warfarin total:  18 mg   Plan last modified:  Tracy Tobar RN (9/2/2020)   Next INR check:  12/17/2020   Priority:  Maintenance   Target end date:  Indefinite    Indications    Long-term (current) use of anticoagulants [Z79.01] [Z79.01]  Pulmonary embolism (H) [I26.99]  DVT (deep venous thrombosis) (H) [I82.409]             Anticoagulation Episode Summary     INR check location:      Preferred lab:      Send INR reminders to:  [facility]    Comments:  Patient has dementia - speak with spouse Siva      Anticoagulation Care Providers     Provider Role Specialty Phone number    Joaquin Rockwell MD Referring Family Medicine [phone]            See the Encounter Report to view Anticoagulation Flowsheet and Dosing Calendar (Go to Encounters tab in chart review, and find the Anticoagulation Therapy Visit)    Patient INR is supra therapeutic today.  Patient will adjust dosing today by  holding and then resume maintenance dosing of 18 mg and follow up in 1 week or sooner if there are any concerns or problems.    Signs of bleeding and when appropriate to seek care for symptoms reviewed.    Adjustment Rational: Dosage adjustment made based on physician directed care plan.      Tracy Tobar RN

## 2020-12-09 NOTE — PROGRESS NOTES
Wound location Coccyx   Per LOV::Upper midline buttocks 1 x 2.5 cm blanchable to subcutaneous, no signs of infection, no discharge, no significant redness - minimal activity, in lazy boy typically during the day, has wheelchair for transplant, bed at night, using pull ups, no f/c/s    Thickness: Stage II    Size:   Width 1 cm  Length 2 cm  Depth 0.5 cm  Sinus tract: No  Undermining: No    Wound Base: Granulation  Wound Base Color: Red  Surrounding Tissue: Intact  Exudate: None  Exudate Color: None  Odor: No    Pain:  Yes - describe Pt has facial expressions indicative of pain.    Dressing Change: Yes - describe Wound was cleansed with chlorhexidine wound cleanser, coated in bacitracin and covered with padded dressing.       Pt was also noted to have a laceration on the right hand of unknown origin. Pt bandage removed to assess wound, found to still be bleeding. Wound was noted to have happened 2 weeks ago. Wound appeared to be closed at the distal end by scab, the proximal end was closed by reattachment of skin, the middle remained open. Wound was cleansed with chlorhexidine wound wash.     Provider advised of need to be seen. Provider assessed noted no need for sutures as timeframe is not adequate, Pt must keep clean, bacitracin, steri strips. Pt to return in 1 week to reassess hand and coccyx.    Next 5 appointments (look out 90 days)    Dec 17, 2020 10:30 AM  Nurse Only with RV RN VISITS  [facility] ([facility]) [address] [place] [address]  [phone]   Jan 14, 2021  2:45 PM  Return Visit with Susanna Duran MD  [facility] [place] ([facility]) [address]  [facility] [address]  [phone]        Josias MEJIA RN   [facility] Triage

## 2020-12-17 NOTE — PROGRESS NOTES
ANTICOAGULATION FOLLOW-UP CLINIC VISIT    Patient Name:  Ángela Dumont  Date:  2020  Contact Type:  Telephone/ spouse Siva    SUBJECTIVE:  Patient Findings     Positives:  Missed doses    Comments:  Spouse held half dose on Friday - see calendar        Clinical Outcomes     Negatives:  Major bleeding event, Thromboembolic event, Anticoagulation-related hospital admission, Anticoagulation-related ED visit, Anticoagulation-related fatality    Comments:  Spouse held half dose on Friday - see calendar           OBJECTIVE    Recent labs: (last 7 days)     20  1056   INR 2.3*       ASSESSMENT / PLAN  INR assessment THER    Recheck INR In: 3 WEEKS    INR Location Clinic      Anticoagulation Summary  As of 2020    INR goal:  2.0-3.0   TTR:  73.4 % (1 y)   INR used for dosin.3 (2020)   Warfarin maintenance plan:  4 mg (1 mg x 4) every Mon, Fri; 2 mg (1 mg x 2) all other days   Full warfarin instructions:  4 mg every Mon, Fri; 2 mg all other days   Weekly warfarin total:  18 mg   No change documented:  Tracy Tobar RN   Plan last modified:  Tracy Tobar RN (2020)   Next INR check:  2021   Priority:  Maintenance   Target end date:  Indefinite    Indications    Long-term (current) use of anticoagulants [Z79.01] [Z79.01]  Pulmonary embolism (H) [I26.99]  DVT (deep venous thrombosis) (H) [I82.409]             Anticoagulation Episode Summary     INR check location:      Preferred lab:      Send INR reminders to:  [facility]    Comments:  Patient has dementia - speak with spouse Siva      Anticoagulation Care Providers     Provider Role Specialty Phone number    Joaquin Rockwell MD Referring Family Medicine [phone]            See the Encounter Report to view Anticoagulation Flowsheet and Dosing Calendar (Go to Encounters tab in chart review, and find the Anticoagulation Therapy Visit)    Patient INR is therapeutic.  Patient will continue to take 18 mg weekly  dosing and follow up in 3 weeks or sooner for any problems or concerns.    I again spoke with spouse about not self titrating warfarin dose. I was not able to see whether hold was effective or maintenance dose should be changed with dose interference. I explained that an 11% dose decrease in addition to the hold I gave her was too much and she likely went subtherapeutic for a couple of days over the weekend. I wanted to recheck INR in 2 weeks to see if it started to increase again. Patient stated that was too soon and he could not bring her in that often. We settled for 3 weeks and he stated if therapeutic then he wants next check to be 6 weeks. I told him we will need to discuss that at next INR check.     Tracy Tobar RN

## 2020-12-18 NOTE — TELEPHONE ENCOUNTER
Reason for Call: Request for an order or referral:    Order or referral being requested: Orders    Date needed: as soon as possible    Has the patient been seen by the PCP for this problem? YES    Additional comments: Sis alvarenga home nurse called wanting wound care orders and next INR     Phone number Patient can be reached at:  Other phone number: [phone]    Best Time:  anytime    Can we leave a detailed message on this number?  YES    Call taken on 12/18/2020 at 3:05 PM by Isabelle Wilson

## 2020-12-21 NOTE — TELEPHONE ENCOUNTER
Writer called Sis at number below. Left detailed voicemail in regards to home care orders. See other telephone encounter as well for details.    Josias MEJIA RN   [facility]

## 2020-12-22 NOTE — PROGRESS NOTES
Clinic Care Coordination Contact  [facility]/Voicemail       Clinical Data: Care Coordinator Outreach  Outreach attempted x 1.  Left message on Vernon(spouse) voicemail with call back information and requested return call.    Plan: Care Coordinator will try to reach patient again in 10 business days.    EROS Hsu  Clinic Care Coordination  [facility] Clinics : [place], [place], and [place]  Phone: [phone]    ______________________  Next Outreach:  01/05/21  Planned Outreach Frequency: Monthly  Preferred Phone Number: Juan David(spouse) [phone]    Last Assessment Date: 08/23/19  Care Plan Completion Date: 12/04/20  ______________________

## 2020-12-31 NOTE — TELEPHONE ENCOUNTER
Reviewed, Agree             Joaquin Rockwell MD, FAAFP     [facility]  [address] [place] [address]  [email or web address]   Office: [phone]  Fax: [phone]  Pager: [phone]

## 2020-12-31 NOTE — TELEPHONE ENCOUNTER
Date Forms was received: December 31, 2020    Forms received by: Fax    Purpose of Form:  Wound supplies order    How the form needs to be returned for patient:  Fax    Form currently placed  [place]

## 2021-01-01 ENCOUNTER — TELEPHONE (OUTPATIENT)
Dept: FAMILY MEDICINE | Facility: CLINIC | Age: 81
End: 2021-01-01

## 2021-01-01 ENCOUNTER — HEALTH MAINTENANCE LETTER (OUTPATIENT)
Age: 81
End: 2021-01-01

## 2021-01-01 ENCOUNTER — MEDICAL CORRESPONDENCE (OUTPATIENT)
Dept: HEALTH INFORMATION MANAGEMENT | Facility: CLINIC | Age: 81
End: 2021-01-01

## 2021-01-01 ENCOUNTER — HOSPITAL ENCOUNTER (INPATIENT)
Facility: CLINIC | Age: 81
LOS: 3 days | DRG: 177 | End: 2021-04-18
Attending: EMERGENCY MEDICINE | Admitting: INTERNAL MEDICINE
Payer: COMMERCIAL

## 2021-01-01 ENCOUNTER — ANTICOAGULATION THERAPY VISIT (OUTPATIENT)
Dept: FAMILY MEDICINE | Facility: CLINIC | Age: 81
End: 2021-01-01

## 2021-01-01 ENCOUNTER — TRANSFERRED RECORDS (OUTPATIENT)
Dept: HEALTH INFORMATION MANAGEMENT | Facility: CLINIC | Age: 81
End: 2021-01-01

## 2021-01-01 ENCOUNTER — APPOINTMENT (OUTPATIENT)
Dept: GENERAL RADIOLOGY | Facility: CLINIC | Age: 81
DRG: 177 | End: 2021-01-01
Attending: EMERGENCY MEDICINE
Payer: COMMERCIAL

## 2021-01-01 ENCOUNTER — DOCUMENTATION ONLY (OUTPATIENT)
Dept: FAMILY MEDICINE | Facility: CLINIC | Age: 81
End: 2021-01-01

## 2021-01-01 ENCOUNTER — PATIENT OUTREACH (OUTPATIENT)
Dept: ONCOLOGY | Facility: CLINIC | Age: 81
End: 2021-01-01

## 2021-01-01 ENCOUNTER — PATIENT OUTREACH (OUTPATIENT)
Dept: NURSING | Facility: CLINIC | Age: 81
End: 2021-01-01
Payer: COMMERCIAL

## 2021-01-01 ENCOUNTER — APPOINTMENT (OUTPATIENT)
Dept: CT IMAGING | Facility: CLINIC | Age: 81
DRG: 177 | End: 2021-01-01
Attending: EMERGENCY MEDICINE
Payer: COMMERCIAL

## 2021-01-01 ENCOUNTER — ANTICOAGULATION THERAPY VISIT (OUTPATIENT)
Dept: FAMILY MEDICINE | Facility: CLINIC | Age: 81
End: 2021-01-01
Payer: COMMERCIAL

## 2021-01-01 ENCOUNTER — NURSE TRIAGE (OUTPATIENT)
Dept: NURSING | Facility: CLINIC | Age: 81
End: 2021-01-01

## 2021-01-01 ENCOUNTER — PATIENT OUTREACH (OUTPATIENT)
Dept: CARE COORDINATION | Facility: CLINIC | Age: 81
End: 2021-01-01

## 2021-01-01 ENCOUNTER — MYC MEDICAL ADVICE (OUTPATIENT)
Dept: FAMILY MEDICINE | Facility: CLINIC | Age: 81
End: 2021-01-01

## 2021-01-01 ENCOUNTER — VIRTUAL VISIT (OUTPATIENT)
Dept: FAMILY MEDICINE | Facility: CLINIC | Age: 81
End: 2021-01-01
Payer: COMMERCIAL

## 2021-01-01 VITALS
DIASTOLIC BLOOD PRESSURE: 75 MMHG | RESPIRATION RATE: 32 BRPM | OXYGEN SATURATION: 75 % | SYSTOLIC BLOOD PRESSURE: 123 MMHG | TEMPERATURE: 103 F | HEART RATE: 137 BPM

## 2021-01-01 DIAGNOSIS — S22.080D COMPRESSION FRACTURE OF T12 VERTEBRA WITH ROUTINE HEALING, SUBSEQUENT ENCOUNTER: ICD-10-CM

## 2021-01-01 DIAGNOSIS — I26.99 PULMONARY EMBOLISM (H): ICD-10-CM

## 2021-01-01 DIAGNOSIS — Z98.2 S/P VP SHUNT: ICD-10-CM

## 2021-01-01 DIAGNOSIS — R79.1 SUPRATHERAPEUTIC INR: ICD-10-CM

## 2021-01-01 DIAGNOSIS — I27.82 CHRONIC SEPTIC PULMONARY EMBOLISM WITH ACUTE COR PULMONALE (H): ICD-10-CM

## 2021-01-01 DIAGNOSIS — Z86.711 HISTORY OF PULMONARY EMBOLISM: ICD-10-CM

## 2021-01-01 DIAGNOSIS — Z79.01 LONG TERM CURRENT USE OF ANTICOAGULANT THERAPY: ICD-10-CM

## 2021-01-01 DIAGNOSIS — J96.01 ACUTE RESPIRATORY FAILURE WITH HYPOXIA (H): ICD-10-CM

## 2021-01-01 DIAGNOSIS — I82.409 DVT (DEEP VENOUS THROMBOSIS) (H): ICD-10-CM

## 2021-01-01 DIAGNOSIS — R33.9 URINARY RETENTION: ICD-10-CM

## 2021-01-01 DIAGNOSIS — G91.2 NPH (NORMAL PRESSURE HYDROCEPHALUS) (H): ICD-10-CM

## 2021-01-01 DIAGNOSIS — U07.1 PNEUMONIA DUE TO 2019 NOVEL CORONAVIRUS: ICD-10-CM

## 2021-01-01 DIAGNOSIS — D47.9 LYMPHOPROLIFERATIVE DISORDER (H): ICD-10-CM

## 2021-01-01 DIAGNOSIS — I10 HYPERTENSION GOAL BP (BLOOD PRESSURE) < 140/90: ICD-10-CM

## 2021-01-01 DIAGNOSIS — F03.91 DEMENTIA WITH BEHAVIORAL DISTURBANCE, UNSPECIFIED DEMENTIA TYPE: ICD-10-CM

## 2021-01-01 DIAGNOSIS — Z86.718 HISTORY OF DEEP VENOUS THROMBOSIS: ICD-10-CM

## 2021-01-01 DIAGNOSIS — N17.9 ACUTE KIDNEY INJURY (H): ICD-10-CM

## 2021-01-01 DIAGNOSIS — I82.401 ACUTE DEEP VEIN THROMBOSIS (DVT) OF RIGHT LOWER EXTREMITY, UNSPECIFIED VEIN (H): ICD-10-CM

## 2021-01-01 DIAGNOSIS — I26.01 CHRONIC SEPTIC PULMONARY EMBOLISM WITH ACUTE COR PULMONALE (H): ICD-10-CM

## 2021-01-01 DIAGNOSIS — K21.9 GASTROESOPHAGEAL REFLUX DISEASE WITHOUT ESOPHAGITIS: ICD-10-CM

## 2021-01-01 DIAGNOSIS — L89.309 PRESSURE INJURY OF SKIN OF BUTTOCK, UNSPECIFIED INJURY STAGE, UNSPECIFIED LATERALITY: ICD-10-CM
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
## 2021-01-01 DIAGNOSIS — J12.82 PNEUMONIA DUE TO 2019 NOVEL CORONAVIRUS: ICD-10-CM

## 2021-01-01 DIAGNOSIS — E78.5 HYPERLIPIDEMIA WITH TARGET LDL LESS THAN 130: ICD-10-CM

## 2021-01-01 DIAGNOSIS — Z51.5 HOSPICE CARE PATIENT: Primary | ICD-10-CM

## 2021-01-01 DIAGNOSIS — I27.20 PULMONARY HYPERTENSION (H): ICD-10-CM

## 2021-01-01 DIAGNOSIS — F43.21 ADJUSTMENT DISORDER WITH DEPRESSED MOOD: ICD-10-CM

## 2021-01-01 DIAGNOSIS — D47.2 MGUS (MONOCLONAL GAMMOPATHY OF UNKNOWN SIGNIFICANCE): ICD-10-CM

## 2021-01-01 DIAGNOSIS — D47.Z9 LOW GRADE B CELL LYMPHOPROLIFERATIVE DISORDER (H): ICD-10-CM

## 2021-01-01 DIAGNOSIS — D47.9 LYMPHOPROLIFERATIVE DISEASE (H): ICD-10-CM

## 2021-01-01 DIAGNOSIS — E03.9 HYPOTHYROIDISM, UNSPECIFIED TYPE: ICD-10-CM

## 2021-01-01 DIAGNOSIS — K62.89 RECTAL MASS: ICD-10-CM

## 2021-01-01 DIAGNOSIS — Z51.81 MEDICATION MONITORING ENCOUNTER: ICD-10-CM

## 2021-01-01 LAB
ALBUMIN SERPL ELPH-MCNC: 3.7 G/DL (ref 3.7–5.1)
ALBUMIN SERPL-MCNC: 3.1 G/DL (ref 3.4–5)
ALBUMIN SERPL-MCNC: 3.3 G/DL (ref 3.4–5)
ALP SERPL-CCNC: 71 U/L (ref 40–150)
ALP SERPL-CCNC: 85 U/L (ref 40–150)
ALPHA1 GLOB SERPL ELPH-MCNC: 0.3 G/DL (ref 0.2–0.4)
ALPHA2 GLOB SERPL ELPH-MCNC: 0.7 G/DL (ref 0.5–0.9)
ALT SERPL W P-5'-P-CCNC: 112 U/L (ref 0–50)
ALT SERPL W P-5'-P-CCNC: 15 U/L (ref 0–50)
ALT SERPL-CCNC: 11 IU/L (ref 8–45)
ANION GAP SERPL CALCULATED.3IONS-SCNC: 2 MMOL/L (ref 3–14)
ANION GAP SERPL CALCULATED.3IONS-SCNC: 6 MMOL/L (ref 3–14)
AST SERPL W P-5'-P-CCNC: 17 U/L (ref 0–45)
AST SERPL W P-5'-P-CCNC: 197 U/L (ref 0–45)
AST SERPL-CCNC: 27 IU/L (ref 2–40)
B-GLOBULIN SERPL ELPH-MCNC: 2 G/DL (ref 0.6–1)
BACTERIA SPEC CULT: ABNORMAL
BASE EXCESS BLDV CALC-SCNC: 3.6 MMOL/L
BASOPHILS # BLD AUTO: 0 10E9/L (ref 0–0.2)
BASOPHILS # BLD AUTO: 0 10E9/L (ref 0–0.2)
BASOPHILS NFR BLD AUTO: 0 %
BASOPHILS NFR BLD AUTO: 0 %
BILIRUB SERPL-MCNC: 0.3 MG/DL (ref 0.2–1.3)
BILIRUB SERPL-MCNC: 0.6 MG/DL (ref 0.2–1.3)
BUN SERPL-MCNC: 25 MG/DL (ref 7–30)
BUN SERPL-MCNC: 75 MG/DL (ref 7–30)
CALCIUM SERPL-MCNC: 10.1 MG/DL (ref 8.5–10.1)
CALCIUM SERPL-MCNC: 9.3 MG/DL (ref 8.5–10.1)
CHLORIDE SERPL-SCNC: 109 MMOL/L (ref 94–109)
CHLORIDE SERPL-SCNC: 119 MMOL/L (ref 94–109)
CO2 SERPL-SCNC: 27 MMOL/L (ref 20–32)
CO2 SERPL-SCNC: 28 MMOL/L (ref 20–32)
CREAT SERPL-MCNC: 0.8 MG/DL (ref 0.52–1.04)
CREAT SERPL-MCNC: 0.85 MG/DL (ref 0.57–1.11)
CREAT SERPL-MCNC: 1.64 MG/DL (ref 0.52–1.04)
DIFFERENTIAL METHOD BLD: ABNORMAL
DIFFERENTIAL METHOD BLD: ABNORMAL
EOSINOPHIL # BLD AUTO: 0 10E9/L (ref 0–0.7)
EOSINOPHIL # BLD AUTO: 0 10E9/L (ref 0–0.7)
EOSINOPHIL NFR BLD AUTO: 0 %
EOSINOPHIL NFR BLD AUTO: 0 %
ERYTHROCYTE [DISTWIDTH] IN BLOOD BY AUTOMATED COUNT: 13.7 % (ref 10–15)
ERYTHROCYTE [DISTWIDTH] IN BLOOD BY AUTOMATED COUNT: 16.1 % (ref 10–15)
FLUAV RNA RESP QL NAA+PROBE: NEGATIVE
FLUBV RNA RESP QL NAA+PROBE: NEGATIVE
GAMMA GLOB SERPL ELPH-MCNC: 0.3 G/DL (ref 0.7–1.6)
GFR SERPL CREATININE-BSD FRML MDRD: 29 ML/MIN/{1.73_M2}
GFR SERPL CREATININE-BSD FRML MDRD: 69 ML/MIN/{1.73_M2}
GFR SERPL CREATININE-BSD FRML MDRD: >60 ML/MIN/1.73M2
GLUCOSE SERPL-MCNC: 125 MG/DL (ref 70–99)
GLUCOSE SERPL-MCNC: 131 MG/DL (ref 65–100)
GLUCOSE SERPL-MCNC: 99 MG/DL (ref 70–99)
HCO3 BLDV-SCNC: 28 MMOL/L (ref 21–28)
HCT VFR BLD AUTO: 38.8 % (ref 35–47)
HCT VFR BLD AUTO: 42 % (ref 35–47)
HGB BLD-MCNC: 12 G/DL (ref 11.7–15.7)
HGB BLD-MCNC: 12.8 G/DL (ref 11.7–15.7)
IGA SERPL-MCNC: 49 MG/DL (ref 84–499)
IGG SERPL-MCNC: 1793 MG/DL (ref 610–1616)
IGM SERPL-MCNC: 10 MG/DL (ref 35–242)
INR PPP: 1.5 (ref 0.9–1.1)
INR PPP: 1.6 (ref 0.9–1.1)
INR PPP: 1.8
INR PPP: 1.8 (ref 0.9–1.1)
INR PPP: 2 (ref 0.9–1.1)
INR PPP: 2 (ref 0.9–1.1)
INR PPP: 2.8 (ref 0.9–1.1)
INR PPP: 2.8 (ref 0.9–1.1)
INR PPP: 3.3 (ref 0.9–1.1)
INR PPP: >10 (ref 0.86–1.14)
INTERPRETATION ECG - MUSE: NORMAL
KAPPA LC UR-MCNC: 49.36 MG/DL (ref 0.33–1.94)
KAPPA LC/LAMBDA SER: 170.21 {RATIO} (ref 0.26–1.65)
LABORATORY COMMENT REPORT: ABNORMAL
LACTATE BLD-SCNC: 1.7 MMOL/L (ref 0.7–2)
LAMBDA LC SERPL-MCNC: 0.29 MG/DL (ref 0.57–2.63)
LDH SERPL L TO P-CCNC: 303 U/L (ref 81–234)
LIPASE SERPL-CCNC: 94 U/L (ref 73–393)
LYMPHOCYTES # BLD AUTO: 50.5 10E9/L (ref 0.8–5.3)
LYMPHOCYTES # BLD AUTO: 66.9 10E9/L (ref 0.8–5.3)
LYMPHOCYTES NFR BLD AUTO: 71 %
LYMPHOCYTES NFR BLD AUTO: 88 %
M PROTEIN SERPL ELPH-MCNC: 1.4 G/DL
MCH RBC QN AUTO: 27.5 PG (ref 26.5–33)
MCH RBC QN AUTO: 28.1 PG (ref 26.5–33)
MCHC RBC AUTO-ENTMCNC: 30.5 G/DL (ref 31.5–36.5)
MCHC RBC AUTO-ENTMCNC: 30.9 G/DL (ref 31.5–36.5)
MCV RBC AUTO: 90 FL (ref 78–100)
MCV RBC AUTO: 91 FL (ref 78–100)
METAMYELOCYTES # BLD: 0.9 10E9/L
METAMYELOCYTES NFR BLD MANUAL: 1 %
MONOCYTES # BLD AUTO: 0.6 10E9/L (ref 0–1.3)
MONOCYTES # BLD AUTO: 5.7 10E9/L (ref 0–1.3)
MONOCYTES NFR BLD AUTO: 1 %
MONOCYTES NFR BLD AUTO: 6 %
NEUTROPHILS # BLD AUTO: 20.7 10E9/L (ref 1.6–8.3)
NEUTROPHILS # BLD AUTO: 6.3 10E9/L (ref 1.6–8.3)
NEUTROPHILS NFR BLD AUTO: 11 %
NEUTROPHILS NFR BLD AUTO: 22 %
NRBC # BLD AUTO: 0.9 10*3/UL
NRBC BLD AUTO-RTO: 1 /100
O2/TOTAL GAS SETTING VFR VENT: ABNORMAL %
OXYHGB MFR BLDV: 85 %
PCO2 BLDV: 39 MM HG (ref 40–50)
PH BLDV: 7.46 PH (ref 7.32–7.43)
PLATELET # BLD AUTO: 123 10E9/L (ref 150–450)
PLATELET # BLD AUTO: 158 10E9/L (ref 150–450)
PLATELET # BLD EST: ABNORMAL 10*3/UL
PLATELET # BLD EST: ABNORMAL 10*3/UL
PO2 BLDV: 50 MM HG (ref 25–47)
POTASSIUM SERPL-SCNC: 3.8 MMOL/L (ref 3.4–5.3)
POTASSIUM SERPL-SCNC: 4 MMOL/L (ref 3.4–5.3)
POTASSIUM SERPL-SCNC: 4.2 MMOL/L (ref 3.5–5)
PROT PATTERN SERPL ELPH-IMP: ABNORMAL
PROT PATTERN SERPL IFE-IMP: ABNORMAL
PROT SERPL-MCNC: 7.2 G/DL (ref 6.8–8.8)
PROT SERPL-MCNC: 8.5 G/DL (ref 6.8–8.8)
RBC # BLD AUTO: 4.27 10E12/L (ref 3.8–5.2)
RBC # BLD AUTO: 4.65 10E12/L (ref 3.8–5.2)
RBC MORPH BLD: ABNORMAL
RBC MORPH BLD: ABNORMAL
RSV RNA SPEC QL NAA+PROBE: ABNORMAL
SARS-COV-2 RNA RESP QL NAA+PROBE: POSITIVE
SODIUM SERPL-SCNC: 139 MMOL/L (ref 133–144)
SODIUM SERPL-SCNC: 152 MMOL/L (ref 133–144)
SPECIMEN SOURCE: ABNORMAL
SPECIMEN SOURCE: ABNORMAL
WBC # BLD AUTO: 57.4 10E9/L (ref 4–11)
WBC # BLD AUTO: 94.2 10E9/L (ref 4–11)

## 2021-01-01 PROCEDURE — 82805 BLOOD GASES W/O2 SATURATION: CPT | Performed by: EMERGENCY MEDICINE

## 2021-01-01 PROCEDURE — 88185 FLOWCYTOMETRY/TC ADD-ON: CPT | Performed by: INTERNAL MEDICINE

## 2021-01-01 PROCEDURE — 96375 TX/PRO/DX INJ NEW DRUG ADDON: CPT

## 2021-01-01 PROCEDURE — 83615 LACTATE (LD) (LDH) ENZYME: CPT | Performed by: INTERNAL MEDICINE

## 2021-01-01 PROCEDURE — 99238 HOSP IP/OBS DSCHRG MGMT 30/<: CPT | Performed by: INTERNAL MEDICINE

## 2021-01-01 PROCEDURE — 99223 1ST HOSP IP/OBS HIGH 75: CPT | Performed by: NURSE PRACTITIONER

## 2021-01-01 PROCEDURE — 87186 SC STD MICRODIL/AGAR DIL: CPT | Performed by: EMERGENCY MEDICINE

## 2021-01-01 PROCEDURE — 80053 COMPREHEN METABOLIC PANEL: CPT | Performed by: INTERNAL MEDICINE

## 2021-01-01 PROCEDURE — 82784 ASSAY IGA/IGD/IGG/IGM EACH: CPT | Performed by: INTERNAL MEDICINE

## 2021-01-01 PROCEDURE — 250N000013 HC RX MED GY IP 250 OP 250 PS 637: Performed by: NURSE PRACTITIONER

## 2021-01-01 PROCEDURE — 71250 CT THORAX DX C-: CPT

## 2021-01-01 PROCEDURE — 99285 EMERGENCY DEPT VISIT HI MDM: CPT | Mod: 25

## 2021-01-01 PROCEDURE — 93005 ELECTROCARDIOGRAM TRACING: CPT

## 2021-01-01 PROCEDURE — 250N000011 HC RX IP 250 OP 636: Performed by: EMERGENCY MEDICINE

## 2021-01-01 PROCEDURE — 99207 PR NO CHARGE NURSE ONLY: CPT | Performed by: FAMILY MEDICINE

## 2021-01-01 PROCEDURE — 86334 IMMUNOFIX E-PHORESIS SERUM: CPT | Performed by: PATHOLOGY

## 2021-01-01 PROCEDURE — 87040 BLOOD CULTURE FOR BACTERIA: CPT | Performed by: EMERGENCY MEDICINE

## 2021-01-01 PROCEDURE — 71045 X-RAY EXAM CHEST 1 VIEW: CPT

## 2021-01-01 PROCEDURE — 88189 FLOWCYTOMETRY/READ 16 & >: CPT | Performed by: PATHOLOGY

## 2021-01-01 PROCEDURE — 83883 ASSAY NEPHELOMETRY NOT SPEC: CPT | Performed by: INTERNAL MEDICINE

## 2021-01-01 PROCEDURE — 83605 ASSAY OF LACTIC ACID: CPT | Performed by: EMERGENCY MEDICINE

## 2021-01-01 PROCEDURE — 120N000001 HC R&B MED SURG/OB

## 2021-01-01 PROCEDURE — 87800 DETECT AGNT MULT DNA DIREC: CPT | Performed by: EMERGENCY MEDICINE

## 2021-01-01 PROCEDURE — 84165 PROTEIN E-PHORESIS SERUM: CPT | Performed by: PATHOLOGY

## 2021-01-01 PROCEDURE — 250N000013 HC RX MED GY IP 250 OP 250 PS 637: Performed by: EMERGENCY MEDICINE

## 2021-01-01 PROCEDURE — 83690 ASSAY OF LIPASE: CPT | Performed by: EMERGENCY MEDICINE

## 2021-01-01 PROCEDURE — 250N000013 HC RX MED GY IP 250 OP 250 PS 637: Performed by: INTERNAL MEDICINE

## 2021-01-01 PROCEDURE — 99N1036 PR STATISTIC TOTAL PROTEIN: Performed by: INTERNAL MEDICINE

## 2021-01-01 PROCEDURE — 99207 PR CONSULT E&M CHANGED TO INITIAL LEVEL: CPT | Performed by: NURSE PRACTITIONER

## 2021-01-01 PROCEDURE — 999N001137 HC STATISTIC FLOW >15 ABY TC 88189: Performed by: INTERNAL MEDICINE

## 2021-01-01 PROCEDURE — 99443 PR PHYSICIAN TELEPHONE EVALUATION 21-30 MIN: CPT | Mod: 95 | Performed by: FAMILY MEDICINE

## 2021-01-01 PROCEDURE — 36415 COLL VENOUS BLD VENIPUNCTURE: CPT | Performed by: INTERNAL MEDICINE

## 2021-01-01 PROCEDURE — 70450 CT HEAD/BRAIN W/O DYE: CPT

## 2021-01-01 PROCEDURE — 87636 SARSCOV2 & INF A&B AMP PRB: CPT | Performed by: EMERGENCY MEDICINE

## 2021-01-01 PROCEDURE — 80053 COMPREHEN METABOLIC PANEL: CPT | Performed by: EMERGENCY MEDICINE

## 2021-01-01 PROCEDURE — 250N000011 HC RX IP 250 OP 636: Performed by: INTERNAL MEDICINE

## 2021-01-01 PROCEDURE — 96365 THER/PROPH/DIAG IV INF INIT: CPT

## 2021-01-01 PROCEDURE — C9803 HOPD COVID-19 SPEC COLLECT: HCPCS

## 2021-01-01 PROCEDURE — 85025 COMPLETE CBC W/AUTO DIFF WBC: CPT | Performed by: INTERNAL MEDICINE

## 2021-01-01 PROCEDURE — 99232 SBSQ HOSP IP/OBS MODERATE 35: CPT | Performed by: INTERNAL MEDICINE

## 2021-01-01 PROCEDURE — 96367 TX/PROPH/DG ADDL SEQ IV INF: CPT

## 2021-01-01 PROCEDURE — 85025 COMPLETE CBC W/AUTO DIFF WBC: CPT | Performed by: EMERGENCY MEDICINE

## 2021-01-01 PROCEDURE — 88184 FLOWCYTOMETRY/ TC 1 MARKER: CPT | Performed by: INTERNAL MEDICINE

## 2021-01-01 PROCEDURE — 99222 1ST HOSP IP/OBS MODERATE 55: CPT | Mod: AI | Performed by: INTERNAL MEDICINE

## 2021-01-01 PROCEDURE — 250N000009 HC RX 250: Performed by: NURSE PRACTITIONER

## 2021-01-01 PROCEDURE — 85610 PROTHROMBIN TIME: CPT | Performed by: EMERGENCY MEDICINE

## 2021-01-01 PROCEDURE — 87077 CULTURE AEROBIC IDENTIFY: CPT | Performed by: EMERGENCY MEDICINE

## 2021-01-01 PROCEDURE — 258N000003 HC RX IP 258 OP 636: Performed by: EMERGENCY MEDICINE

## 2021-01-01 RX ORDER — FLUORIDE TOOTHPASTE
15 TOOTHPASTE DENTAL 4 TIMES DAILY PRN
Status: DISCONTINUED | OUTPATIENT
Start: 2021-01-01 | End: 2021-01-01

## 2021-01-01 RX ORDER — LIDOCAINE 40 MG/G
CREAM TOPICAL
Status: DISCONTINUED | OUTPATIENT
Start: 2021-01-01 | End: 2021-01-01 | Stop reason: HOSPADM

## 2021-01-01 RX ORDER — LORAZEPAM 2 MG/ML
0.5 CONCENTRATE ORAL EVERY 4 HOURS PRN
Status: DISCONTINUED | OUTPATIENT
Start: 2021-01-01 | End: 2021-01-01

## 2021-01-01 RX ORDER — WARFARIN SODIUM 4 MG/1
TABLET ORAL
Qty: 100 TABLET | Refills: 0 | Status: SHIPPED | OUTPATIENT
Start: 2021-01-01

## 2021-01-01 RX ORDER — HALOPERIDOL 2 MG/ML
0.5 SOLUTION ORAL EVERY 6 HOURS PRN
Status: DISCONTINUED | OUTPATIENT
Start: 2021-01-01 | End: 2021-01-01 | Stop reason: HOSPADM

## 2021-01-01 RX ORDER — ACETAMINOPHEN 650 MG/1
650 SUPPOSITORY RECTAL EVERY 6 HOURS PRN
Status: DISCONTINUED | OUTPATIENT
Start: 2021-01-01 | End: 2021-01-01 | Stop reason: HOSPADM

## 2021-01-01 RX ORDER — SALIVA STIMULANT COMB. NO.3
2 SPRAY, NON-AEROSOL (ML) MUCOUS MEMBRANE 4 TIMES DAILY
Status: DISCONTINUED | OUTPATIENT
Start: 2021-01-01 | End: 2021-01-01 | Stop reason: HOSPADM

## 2021-01-01 RX ORDER — HALOPERIDOL 2 MG/ML
SOLUTION ORAL
Qty: 15 ML | Refills: 0 | Status: SHIPPED | OUTPATIENT
Start: 2021-01-01

## 2021-01-01 RX ORDER — NALOXONE HYDROCHLORIDE 0.4 MG/ML
0.4 INJECTION, SOLUTION INTRAMUSCULAR; INTRAVENOUS; SUBCUTANEOUS
Status: DISCONTINUED | OUTPATIENT
Start: 2021-01-01 | End: 2021-01-01 | Stop reason: HOSPADM

## 2021-01-01 RX ORDER — NALOXONE HYDROCHLORIDE 0.4 MG/ML
0.2 INJECTION, SOLUTION INTRAMUSCULAR; INTRAVENOUS; SUBCUTANEOUS
Status: DISCONTINUED | OUTPATIENT
Start: 2021-01-01 | End: 2021-01-01 | Stop reason: HOSPADM

## 2021-01-01 RX ORDER — ACETAMINOPHEN 650 MG/1
650 SUPPOSITORY RECTAL EVERY 4 HOURS PRN
Qty: 2 SUPPOSITORY | Refills: 0 | Status: SHIPPED | OUTPATIENT
Start: 2021-01-01

## 2021-01-01 RX ORDER — HYDROMORPHONE HYDROCHLORIDE 1 MG/ML
1-2 SOLUTION ORAL
Status: DISCONTINUED | OUTPATIENT
Start: 2021-01-01 | End: 2021-01-01

## 2021-01-01 RX ORDER — ATROPINE SULFATE 10 MG/ML
2 SOLUTION/ DROPS OPHTHALMIC EVERY 4 HOURS PRN
Qty: 2 ML | Refills: 0 | Status: SHIPPED | OUTPATIENT
Start: 2021-01-01

## 2021-01-01 RX ORDER — FUROSEMIDE 40 MG
40 TABLET ORAL DAILY
COMMUNITY

## 2021-01-01 RX ORDER — WARFARIN SODIUM 2 MG/1
TABLET ORAL EVERY EVENING
Status: ON HOLD | COMMUNITY
End: 2021-01-01

## 2021-01-01 RX ORDER — WARFARIN SODIUM 2 MG/1
TABLET ORAL
Qty: 110 TABLET | Refills: 0 | Status: SHIPPED | OUTPATIENT
Start: 2021-01-01

## 2021-01-01 RX ORDER — CALCIUM CARBONATE 500 MG/1
1000 TABLET, CHEWABLE ORAL EVERY 4 HOURS PRN
Status: DISCONTINUED | OUTPATIENT
Start: 2021-01-01 | End: 2021-01-01

## 2021-01-01 RX ORDER — ACETAMINOPHEN 650 MG/1
650 SUPPOSITORY RECTAL ONCE
Status: COMPLETED | OUTPATIENT
Start: 2021-01-01 | End: 2021-01-01

## 2021-01-01 RX ORDER — LORAZEPAM 2 MG/ML
1 INJECTION INTRAMUSCULAR
Status: DISCONTINUED | OUTPATIENT
Start: 2021-01-01 | End: 2021-01-01

## 2021-01-01 RX ORDER — HYDROMORPHONE HYDROCHLORIDE 1 MG/ML
1 SOLUTION ORAL
Status: DISCONTINUED | OUTPATIENT
Start: 2021-01-01 | End: 2021-01-01 | Stop reason: HOSPADM

## 2021-01-01 RX ORDER — SALIVA STIMULANT COMB. NO.3
1 SPRAY, NON-AEROSOL (ML) MUCOUS MEMBRANE EVERY 6 HOURS PRN
Status: DISCONTINUED | OUTPATIENT
Start: 2021-01-01 | End: 2021-01-01

## 2021-01-01 RX ORDER — HYDROMORPHONE HYDROCHLORIDE 1 MG/ML
1 SOLUTION ORAL
Qty: 30 ML | Refills: 0 | Status: SHIPPED | OUTPATIENT
Start: 2021-01-01

## 2021-01-01 RX ORDER — LORAZEPAM 1 MG/1
1 TABLET ORAL
Status: DISCONTINUED | OUTPATIENT
Start: 2021-01-01 | End: 2021-01-01

## 2021-01-01 RX ORDER — HALOPERIDOL 2 MG/ML
0.5 SOLUTION ORAL EVERY 8 HOURS
Status: DISCONTINUED | OUTPATIENT
Start: 2021-01-01 | End: 2021-01-01 | Stop reason: HOSPADM

## 2021-01-01 RX ORDER — CARBOXYMETHYLCELLULOSE SODIUM 5 MG/ML
2 SOLUTION/ DROPS OPHTHALMIC
Status: DISCONTINUED | OUTPATIENT
Start: 2021-01-01 | End: 2021-01-01 | Stop reason: HOSPADM

## 2021-01-01 RX ORDER — ATROPINE SULFATE 10 MG/ML
2 SOLUTION/ DROPS OPHTHALMIC
Status: DISCONTINUED | OUTPATIENT
Start: 2021-01-01 | End: 2021-01-01 | Stop reason: HOSPADM

## 2021-01-01 RX ORDER — LORAZEPAM 2 MG/ML
0.5 CONCENTRATE ORAL EVERY 4 HOURS PRN
Qty: 30 ML | Refills: 0 | Status: SHIPPED | OUTPATIENT
Start: 2021-01-01

## 2021-01-01 RX ORDER — HYDROMORPHONE HYDROCHLORIDE 1 MG/ML
.3-.5 INJECTION, SOLUTION INTRAMUSCULAR; INTRAVENOUS; SUBCUTANEOUS
Status: DISCONTINUED | OUTPATIENT
Start: 2021-01-01 | End: 2021-01-01 | Stop reason: HOSPADM

## 2021-01-01 RX ORDER — DEXAMETHASONE SODIUM PHOSPHATE 10 MG/ML
10 INJECTION, SOLUTION INTRAMUSCULAR; INTRAVENOUS ONCE
Status: COMPLETED | OUTPATIENT
Start: 2021-01-01 | End: 2021-01-01

## 2021-01-01 RX ORDER — ACETAMINOPHEN 325 MG/1
650 TABLET ORAL EVERY 6 HOURS PRN
Status: DISCONTINUED | OUTPATIENT
Start: 2021-01-01 | End: 2021-01-01 | Stop reason: HOSPADM

## 2021-01-01 RX ORDER — BISACODYL 10 MG
10 SUPPOSITORY, RECTAL RECTAL DAILY PRN
Qty: 2 SUPPOSITORY | Refills: 0 | Status: SHIPPED | OUTPATIENT
Start: 2021-01-01

## 2021-01-01 RX ORDER — LEVOTHYROXINE SODIUM 50 UG/1
50 TABLET ORAL DAILY
Status: ON HOLD | COMMUNITY
End: 2021-01-01

## 2021-01-01 RX ORDER — SALIVA STIMULANT COMB. NO.3
2 SPRAY, NON-AEROSOL (ML) MUCOUS MEMBRANE 4 TIMES DAILY
Qty: 44.3 ML | Refills: 0 | Status: SHIPPED | OUTPATIENT
Start: 2021-01-01

## 2021-01-01 RX ORDER — LORAZEPAM 2 MG/ML
0.5 CONCENTRATE ORAL
Status: DISCONTINUED | OUTPATIENT
Start: 2021-01-01 | End: 2021-01-01 | Stop reason: HOSPADM

## 2021-01-01 RX ORDER — TAMSULOSIN HYDROCHLORIDE 0.4 MG/1
0.4 CAPSULE ORAL DAILY
Status: ON HOLD | COMMUNITY
End: 2021-01-01

## 2021-01-01 RX ADMIN — Medication 2 SPRAY: at 23:11

## 2021-01-01 RX ADMIN — HYDROMORPHONE HYDROCHLORIDE 1 MG: 5 SOLUTION ORAL at 16:26

## 2021-01-01 RX ADMIN — HYDROMORPHONE HYDROCHLORIDE 1 MG: 5 SOLUTION ORAL at 12:15

## 2021-01-01 RX ADMIN — Medication 2 SPRAY: at 08:16

## 2021-01-01 RX ADMIN — LORAZEPAM 1 MG: 2 INJECTION INTRAMUSCULAR; INTRAVENOUS at 19:53

## 2021-01-01 RX ADMIN — HYDROMORPHONE HYDROCHLORIDE 1 MG: 5 SOLUTION ORAL at 08:16

## 2021-01-01 RX ADMIN — HYDROMORPHONE HYDROCHLORIDE 0.5 MG: 1 INJECTION, SOLUTION INTRAMUSCULAR; INTRAVENOUS; SUBCUTANEOUS at 05:43

## 2021-01-01 RX ADMIN — SODIUM CHLORIDE 1000 ML: 9 INJECTION, SOLUTION INTRAVENOUS at 14:07

## 2021-01-01 RX ADMIN — HYDROMORPHONE HYDROCHLORIDE 1 MG: 5 SOLUTION ORAL at 05:29

## 2021-01-01 RX ADMIN — HALOPERIDOL 0.5 MG: 2 SOLUTION ORAL at 18:24

## 2021-01-01 RX ADMIN — Medication 2 SPRAY: at 18:24

## 2021-01-01 RX ADMIN — ATROPINE SULFATE 2 DROP: 10 SOLUTION/ DROPS OPHTHALMIC at 03:40

## 2021-01-01 RX ADMIN — HYDROMORPHONE HYDROCHLORIDE 0.5 MG: 1 INJECTION, SOLUTION INTRAMUSCULAR; INTRAVENOUS; SUBCUTANEOUS at 19:54

## 2021-01-01 RX ADMIN — HYDROMORPHONE HYDROCHLORIDE 1 MG: 5 SOLUTION ORAL at 05:35

## 2021-01-01 RX ADMIN — HALOPERIDOL 0.5 MG: 2 SOLUTION ORAL at 19:00

## 2021-01-01 RX ADMIN — DEXAMETHASONE SODIUM PHOSPHATE 10 MG: 10 INJECTION, SOLUTION INTRAMUSCULAR; INTRAVENOUS at 14:24

## 2021-01-01 RX ADMIN — HYDROMORPHONE HYDROCHLORIDE 1 MG: 5 SOLUTION ORAL at 22:51

## 2021-01-01 RX ADMIN — ACETAMINOPHEN 650 MG: 650 SUPPOSITORY RECTAL at 23:40

## 2021-01-01 RX ADMIN — ACETAMINOPHEN 650 MG: 650 SUPPOSITORY RECTAL at 01:26

## 2021-01-01 RX ADMIN — HYDROMORPHONE HYDROCHLORIDE 1 MG: 5 SOLUTION ORAL at 23:40

## 2021-01-01 RX ADMIN — HYDROMORPHONE HYDROCHLORIDE 1 MG: 5 SOLUTION ORAL at 20:40

## 2021-01-01 RX ADMIN — HALOPERIDOL 0.5 MG: 2 SOLUTION ORAL at 02:44

## 2021-01-01 RX ADMIN — HYDROMORPHONE HYDROCHLORIDE 0.5 MG: 1 INJECTION, SOLUTION INTRAMUSCULAR; INTRAVENOUS; SUBCUTANEOUS at 09:15

## 2021-01-01 RX ADMIN — HYDROMORPHONE HYDROCHLORIDE 1 MG: 5 SOLUTION ORAL at 14:22

## 2021-01-01 RX ADMIN — HYDROMORPHONE HYDROCHLORIDE 1 MG: 5 SOLUTION ORAL at 01:16

## 2021-01-01 RX ADMIN — HALOPERIDOL 0.5 MG: 2 SOLUTION ORAL at 03:38

## 2021-01-01 RX ADMIN — HALOPERIDOL 0.5 MG: 2 SOLUTION ORAL at 11:00

## 2021-01-01 RX ADMIN — PHYTONADIONE 10 MG: 10 INJECTION, EMULSION INTRAMUSCULAR; INTRAVENOUS; SUBCUTANEOUS at 14:39

## 2021-01-01 RX ADMIN — CEFEPIME HYDROCHLORIDE 2 G: 2 INJECTION, POWDER, FOR SOLUTION INTRAVENOUS at 14:01

## 2021-01-01 RX ADMIN — HYDROMORPHONE HYDROCHLORIDE 0.5 MG: 1 INJECTION, SOLUTION INTRAMUSCULAR; INTRAVENOUS; SUBCUTANEOUS at 13:42

## 2021-01-01 RX ADMIN — Medication 2 SPRAY: at 18:59

## 2021-01-01 RX ADMIN — ACETAMINOPHEN 650 MG: 650 SUPPOSITORY RECTAL at 14:27

## 2021-01-01 RX ADMIN — Medication 2 SPRAY: at 12:15

## 2021-01-01 RX ADMIN — HALOPERIDOL 0.5 MG: 2 SOLUTION ORAL at 10:14

## 2021-01-01 RX ADMIN — HYDROMORPHONE HYDROCHLORIDE 1 MG: 5 SOLUTION ORAL at 16:21

## 2021-01-01 ASSESSMENT — ACTIVITIES OF DAILY LIVING (ADL)
ADLS_ACUITY_SCORE: 18
DEPENDENT_IADLS:: CLEANING;COOKING;LAUNDRY;SHOPPING;MEAL PREPARATION;MEDICATION MANAGEMENT;MONEY MANAGEMENT;TRANSPORTATION;INCONTINENCE
ADLS_ACUITY_SCORE: 18
DEPENDENT_IADLS:: CLEANING;COOKING;LAUNDRY;SHOPPING;MEAL PREPARATION;MEDICATION MANAGEMENT;MONEY MANAGEMENT;TRANSPORTATION;INCONTINENCE
ADLS_ACUITY_SCORE: 18
DEPENDENT_IADLS:: CLEANING;COOKING;LAUNDRY;SHOPPING;MEAL PREPARATION;MEDICATION MANAGEMENT;MONEY MANAGEMENT;TRANSPORTATION;INCONTINENCE
DEPENDENT_IADLS:: CLEANING;COOKING;LAUNDRY;SHOPPING;MEAL PREPARATION;MEDICATION MANAGEMENT;MONEY MANAGEMENT
ADLS_ACUITY_SCORE: 18

## 2021-01-01 ASSESSMENT — ENCOUNTER SYMPTOMS
COUGH: 1
ACTIVITY CHANGE: 1
WOUND: 1
SHORTNESS OF BREATH: 1
FEVER: 1

## 2021-01-04 NOTE — TELEPHONE ENCOUNTER
Completed forms faxed back to [facility]  at [phone].   Originals sent to be scanned.       Ifrah Arango

## 2021-01-07 NOTE — PROGRESS NOTES
ANTICOAGULATION FOLLOW-UP CLINIC VISIT    Patient Name:  Ángela Dumont  Date:  2021  Contact Type:  Telephone    SUBJECTIVE:  Patient Findings     Comments:  SMITA Godinez [facility] [place] [facility], calling ([phone]). INR therapeutic. The patient was assessed for diet, medication, and activity level changes, missed or extra doses, bruising or bleeding, with no problem findings. Reviewed maintenance warfarin dosing with patient. Patient will remain on the same dose until next INR check. No other questions or concerns. Scheduled next lab-only INR in 2 weeks.  Melissa JUAN RN  Anticoagulation Team          Clinical Outcomes     Negatives:  Major bleeding event, Thromboembolic event, Anticoagulation-related hospital admission, Anticoagulation-related ED visit, Anticoagulation-related fatality    Comments:  SMITA Godinez from [place] [facility], calling ([phone]). INR therapeutic. The patient was assessed for diet, medication, and activity level changes, missed or extra doses, bruising or bleeding, with no problem findings. Reviewed maintenance warfarin dosing with patient. Patient will remain on the same dose until next INR check. No other questions or concerns. Scheduled next lab-only INR in 2 weeks.  Melissa JUAN RN  Anticoagulation Team             OBJECTIVE    Recent labs: (last 7 days)     21   INR 2.0*       ASSESSMENT / PLAN  INR assessment THER    Recheck INR In: 2 WEEKS    INR Location Clinic      Anticoagulation Summary  As of 2021    INR goal:  2.0-3.0   TTR:  77.4 % (1 y)   INR used for dosin.0 (2021)   Warfarin maintenance plan:  4 mg (1 mg x 4) every Mon, Fri; 2 mg (1 mg x 2) all other days   Full warfarin instructions:  4 mg every Mon, Fri; 2 mg all other days   Weekly warfarin total:  18 mg   No change documented:  Melissa Waggoner RN   Plan last modified:  Tracy Tobar RN (2020)   Next INR check:  2021   Priority:  Maintenance   Target end date:   Indefinite    Indications    Long-term (current) use of anticoagulants [Z79.01] [Z79.01]  Pulmonary embolism (H) [I26.99]  DVT (deep venous thrombosis) (H) [I82.409]             Anticoagulation Episode Summary     INR check location:      Preferred lab:      Send INR reminders to:  [facility]    Comments:  Patient has dementia - speak with spouse Siva      Anticoagulation Care Providers     Provider Role Specialty Phone number    Joaquin Rockwell MD Referring Family Medicine [phone]            See the Encounter Report to view Anticoagulation Flowsheet and Dosing Calendar (Go to Encounters tab in chart review, and find the Anticoagulation Therapy Visit)    Melissa Waggoner RN

## 2021-01-07 NOTE — PROGRESS NOTES
"Clinic Care Coordination Contact  Community Health Worker Follow Up  Spoke to Juan David    Goals:   Goals Addressed as of 1/7/2021 at 11:04 AM                 Today    11/13/20      2. Medical (pt-stated)   0%  40%    Added 5/7/20 by David Wise LGSW     Goal Statement: I want to follow-up with Ortho by 4.15.2021.  Date Goal set: 5.7.2020  Barriers: Appointment availability in-person during COVID-19.  Strengths: Recognition of need, strong support system including spouse.   Date to Achieve By: 4.15.2021  Patient expressed understanding of goal: Pt reports understanding and denies any additional questions or concerns at this times. SW CC engaged in AIDET communication during encounter.    Action steps to achieve this goal:  1. I will schedule an appointment with Ortho.  2. I will attend appointment.  3. I will get advice during appointment regarding mobility and expectations.      01/07, CHW:    Juan David reports that they haven't done anything further with this goal, as he didn't understand \"the point\" in seeing the orthopedic doctor. CHW suggested that it would be best to follow up with PCP's suggestions as he made this recommendation previously.    However, Juan David states that this has been on the back of his mind, because he doesn't think it would do anything for Byron, and they've been more concerned about her wound.    CHW inquired Vernon's intent to continue with this goal, however, he didn't really know what to do.        Intervention and Education during outreach:   Juan David reports that they've been dealing with a wound and have been continuing to work with wound care and PCP office, but it's not getting resolved.  CHW offered re-assessment with CC AYALA, to address additional support and re-establish goals to work on.  Vernon agreed to plan.  Scheduled with DES CAI for re-assessment on 01/28/21    CHW Plan: CHW will hold off on next outreach until notified by DES Talavera.    Lorene Noyola, " CHW  Clinic Care Coordination  [facility] Clinics : [place], [place], and [place]  Phone: [phone]    ______________________  Next Outreach:  TBD  Planned Outreach Frequency: Monthly  Preferred Phone Number: Juan David(spouse) [phone]    Last Assessment Date: 08/23/19  Care Plan Completion Date: 12/04/20  ______________________

## 2021-01-21 NOTE — PROGRESS NOTES
ANTICOAGULATION MANAGEMENT     Patient Name:  Ángela Dumont  Date:  2021    ASSESSMENT /SUBJECTIVE:    Today's INR result of 2.8 is therapeutic. Goal INR of 2.0-3.0      Warfarin dose taken: Warfarin taken as instructed    Diet: No new diet changes affecting INR    Medication changes/ interactions: No new medications/supplements affecting INR    Previous INR: Therapeutic     S/S of bleeding or thromboembolism: No    New injury or illness: No    Upcoming surgery, procedure or cardioversion: No    Additional findings: None      PLAN:    Detailed message left for home care nurse Adrienne regarding INR result and instructed:     Warfarin Dosing Instructions: Continue your current warfarin dose 4 mg Mon Fri; 2 mg all other days    Instructed patient to follow up no later than: 2 weeks  Orders given to  Homecare nurse/facility to recheck    Education provided: Please call back if any changes to your diet, medications or how you've been taking warfarin    Instructed to call the Anticoagulation Clinic for any changes, questions or concerns. (#[phone])        Andra Escalante RN      OBJECTIVE:  Recent labs: (last 7 days)     21   INR 2.8*         No question data found.  Anticoagulation Summary  As of 2021    INR goal:  2.0-3.0   TTR:  78.7 % (1 y)   INR used for dosin.8 (2021)   Warfarin maintenance plan:  4 mg (1 mg x 4) every Mon, Fri; 2 mg (1 mg x 2) all other days   Full warfarin instructions:  4 mg every Mon, Fri; 2 mg all other days   Weekly warfarin total:  18 mg   No change documented:  Andra Escalante RN   Plan last modified:  Tracy Tobar RN (2020)   Next INR check:  2021   Priority:  Maintenance   Target end date:  Indefinite    Indications    Long-term (current) use of anticoagulants [Z79.01] [Z79.01]  Pulmonary embolism (H) [I26.99]  DVT (deep venous thrombosis) (H) [I82.409]             Anticoagulation Episode Summary     INR check location:      Preferred lab:   EXTERNAL LAB    Send INR reminders to:  JORDI SPAIN    Comments:  Patient has dementia - speak with spouse Siva // Home Care Services      Anticoagulation Care Providers     Provider Role Specialty Phone number    Joaquin Rockwell MD Referring Family Medicine [phone]

## 2021-01-28 NOTE — LETTER
[place] CARE COORDINATION  [address] [facility] [address]  January 29, 2021        Ángela Dumont  [address] [address]          Dear Deny Motta is an updated Complex Care Plan for your continued enrollment in Care Coordination. Please let us know if you have additional questions, concerns, or goals that we can assist with.    Sincerely,        David Butler, [facility]  Clinic Care Coordinator  Ph. [phone]  [email or web address]  Complex Care Plan  About Me:    Patient Name:  Ángela Dumont    YOB: 1940  Age:         80 year old   [place] MRN:    [id number] Telephone Information:  Home Phone [phone]   Mobile [phone]       Address:  [address] [address] Email address:  [email or web address]      Emergency Contact(s)    Name Relationship Lgl Grd Work Phone Home Phone Mobile Phone   1. TONJA DUMONT Spouse  none [phone]    2. LEENA ROSENBERG Daughter  none none [phone]           Primary language:  English     needed? No   [place] [facility]:  [phone] op. 1  Other communication barriers: Caregiver  Preferred Method of Communication:  Mail  Current living arrangement: I live in a private home with spouse  Mobility Status/ Medical Equipment: Dependent/Assisted by Another    Health Maintenance  Health Maintenance Reviewed: Not assessed  Health Maintenance Due   Topic Date Due     DEXA  1940     MICROALBUMIN  1940     COVID-19 Vaccine (1 of 2) 07/17/1956     MEDICARE ANNUAL WELLNESS VISIT  07/30/2020     PHQ-2  01/01/2021     My Access Plan  Medical Emergency 911   Primary Clinic Line [facility] - [phone]   24 Hour Appointment Line [phone] or  [phone]) (toll-free)   24 Hour Nurse Line [phone] (toll-free)   Preferred Urgent Care [facility], [phone]   Preferred  [facility]  [phone]   Preferred Pharmacy [facility] [place] [place] [facility] [address] [address] [place] [address]     Behavioral Health Crisis Line The National Suicide Prevention Lifeline at 1-195.555.2204 or 912       My Care Team Members  Patient Care Team       Relationship Specialty Notifications Start End    Joaquin Rockwell MD PCP - General Family Practice  6/16/15     Phone: [phone] Fax: [phone]         [address] [facility] [address]    Joaquin Rockwell MD Assigned PCP   5/14/15     Phone: [phone] Fax: [phone]         [address] [place] [address]    [facility]  HOME HEALTH AGENCY ([facility] ([place])  7/30/19     Phone: [phone]         Lorene Pablo Community Health Worker Primary Care - CC  9/6/19     [place]; . [phone], Fax: [phone]    David Wise LGSW Lead Care Coordinator   11/20/19     Susanna Duran MD Assigned Cancer Care Provider   10/23/20     Phone: [phone] Fax: [phone]         [address] [facility] [address]            My Care Plans  Self Management and Treatment Plan  Goals and (Comments)  Goals        General    3. Medical (pt-stated)     Notes - Note created  1/28/2021  3:48 PM by David Wise LGSW    Goal Statement: I want my wound to be healed within 6 months.   Date Goal set: 1.28.2021  Barriers: Reducing pressure, Pt requires 24/7 caregiver.   Strengths: Spouse support/caregiving support, home care involvement.   Date to Achieve By: 7.28.2021  Patient expressed understanding of goal: Pt reports understanding and denies any additional questions or concerns at this times. SW CC engaged in AIDET communication during encounter.    Action steps to achieve this goal:  1. I will receive wound care from home care.  2. My spouse will receive home care teaching.   3. I will receive wound care as recommended until healed.               Advance Care Plans/Directives Type:    Type Advanced Care Plans/Directives: POLST    My Medical and Care Information  Problem List   Patient Active Problem List   Diagnosis     S/P  shunt     NPH (normal pressure hydrocephalus) (H)     Fracture of right femur (H)     DVT (deep venous thrombosis) (H)     Pulmonary embolism (H)     Dementia (H)     Hypothyroid     Hypertension goal BP (blood pressure) < 140/90     Adjustment disorder with depressed mood     Hyperlipidemia with target LDL less than 130     Leukocytosis     Esophageal reflux     UTI (urinary tract infection)     Constipation     Pulmonary hypertension (H)     MR (mitral regurgitation)     Fibromyalgia     Osteoarthritis of both knees     Health Care Home     Long-term (current) use of anticoagulants [Z79.01]     Advance Care Planning     SIRS (systemic inflammatory response syndrome) (H)     History of pulmonary embolism     History of deep venous thrombosis     Urinary retention     BPPV (benign paroxysmal positional vertigo), bilateral     Herpes zoster without complication     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  Current Outpatient Medications   Medication Instructions     acetaminophen (TYLENOL) 650 mg, Oral, EVERY 4 HOURS PRN     furosemide (LASIX) 40 MG tablet TAKE 1 TABLET BY MOUTH EVERY DAY AT 10 AM     levothyroxine (SYNTHROID/LEVOTHROID) 50 MCG tablet TAKE 1 TABLET BY MOUTH EVERY DAY     order for DME Use EZ Stand as needed     order for DME Equipment being ordered: Yoan Lift with Hygiene Sling     polyethylene glycol (MIRALAX) 17 g, Oral, DAILY PRN     senna-docusate  (SENOKOT-S/PERICOLACE) 8.6-50 MG tablet 1 tablet, Oral, 2 TIMES DAILY PRN     tamsulosin (FLOMAX) 0.4 MG capsule TAKE 1 CAPSULE BY MOUTH EVERY DAY AT 10 AM     warfarin ANTICOAGULANT (COUMADIN) 2 MG tablet 4 mg Mon Fri, 2 mg all other days or as directed by ACC.     warfarin ANTICOAGULANT (COUMADIN) 4 MG tablet TAKE 4MG (1 TAB) EVERY MON FRI, and Take 2MG (1/2 TAB) ALL OTHER DAYS OR AS DIRECTED BY INR     Wound Dressings (TRIAD HYDROPHILIC WOUND DRESSI) PSTE 1 g, Apply externally, 3 TIMES DAILY       Care Coordination Start Date: 8/23/2019   Frequency of Care Coordination: monthly   Form Last Updated: 01/29/2021

## 2021-01-28 NOTE — PROGRESS NOTES
Patient has CLL.  Will monitor and discuss at her upcoming appointment with me.    Per Dr. Duran.    No change in the plan of care.    Luz Moran RN

## 2021-01-28 NOTE — PROGRESS NOTES
Clinic Care Coordination Contact    Clinic Care Coordination Contact  OUTREACH    Referral Information:  Referral Source: PCP  Primary Diagnosis: Frailty/Failure to thrive(DME/ community resources)    Chief Complaint   Patient presents with     Clinic Care Coordination - Follow-up     Care navigation        Universal Utilization: Reviewed on this date. Appropriate per chart review.   Difficulty keeping appointments: No  Compliance Concerns: No  No-Show Concerns: No  No PCP office visit in Past Year: No  Utilization    Last refreshed: 1/28/2021  4:11 PM: Hospital Admissions 0           Last refreshed: 1/28/2021  4:11 PM: ED Visits 0           Last refreshed: 1/28/2021  4:11 PM: No Show Count (past year) 0              Current as of: 1/28/2021  4:11 PM            Clinical Concerns:  Current Medical Concerns:    Patient Active Problem List   Diagnosis     S/P  shunt     NPH (normal pressure hydrocephalus) (H)     Fracture of right femur (H)     DVT (deep venous thrombosis) (H)     Pulmonary embolism (H)     Dementia (H)     Hypothyroid     Hypertension goal BP (blood pressure) < 140/90     Adjustment disorder with depressed mood     Hyperlipidemia with target LDL less than 130     Leukocytosis     Esophageal reflux     UTI (urinary tract infection)     Constipation     Pulmonary hypertension (H)     MR (mitral regurgitation)     Fibromyalgia     Osteoarthritis of both knees     Health Care Home     Long-term (current) use of anticoagulants [Z79.01]     Advance Care Planning     SIRS (systemic inflammatory response syndrome) (H)     History of pulmonary embolism     History of deep venous thrombosis     Urinary retention     BPPV (benign paroxysmal positional vertigo), bilateral     Herpes zoster without complication     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 Patient requires 24/7 caregiver.     Education Provided to patient: Role of AYALA CC and reason for outreach.    Pain  Type: Chronic (>3mo)  Radiating: No  Progression: Unchanged  Limitation of routine activities due to chronic pain: No  Health Maintenance Reviewed: Not assessed  Clinical Pathway: None    Medication Management:  Managed by spouse.      Functional Status:  Dependent ADLs: Wheelchair-with assist, Bathing, Dressing, Eating, Grooming, Incontinence, Positioning, Transfers, Toileting  Dependent IADLs: Cleaning, Cooking, Laundry, Shopping, Meal Preparation, Medication Management, Money Management, Transportation, Incontinence  Bed or wheelchair confined: Yes  Mobility Status: Dependent/Assisted by Another  Fallen 2 or more times in the past year?: No  Any fall with injury in the past year?: No    Living Situation:  Current living arrangement: I live in a private home with spouse  Type of residence: [facility]    Lifestyle & Psychosocial Needs:  Diet: Regular  Inadequate nutrition : No  Tube Feeding: No  Inadequate activity/exercise : Yes  Significant changes in sleep pattern : No  Transportation means: Family  Financial/Insurance concerns : No  Islam or spiritual beliefs that impact treatment: No  Mental health DX: Yes(Dementia)  Mental health DX how managed: None  Mental health management concern : No  Chemical Dependency Status: No Current Concerns  Informal Support system: Spouse   Socioeconomic History     Marital status:      Spouse name: Vernon     Number of children: 2     Years of education: Not on file     Highest education level: Not on file     Tobacco Use     Smoking status: Former Smoker     Packs/day: 0.50     Years:  "10.00     Pack years: 5.00     Types: Cigarettes     Start date: 1958     Quit date: 1982     Years since quittin.1     Smokeless tobacco: Never Used     Tobacco comment: quit    Substance and Sexual Activity     Alcohol use: Yes     Alcohol/week: 0.0 - 1.0 standard drinks     Comment: Very light     Drug use: No     Sexual activity: Not Currently     Partners: Male     Birth control/protection: None     Comment: not a problem       We reviewed that Care Coordination was previously outreaching because Vernon was interested in working with Ortho to further address Pt's mobility. Pt's primary concern is now wound care, and Ortho follow-up is no longer being pursued.     Wound care is being managed by [facility] and spouse, Vernon. Vernon reports that [facility] will likely be signing off in the coming weeks, and he has concerns that Pt's wound has not started healing yet. He reports that the wound is \"staying the same.\"    We discussed that Vernon will eventually be getting surgery and will need 6 weeks of recovery. AYALA  encouraged Vernon to outreach to [facility] if additional support is needed setting up respite care. Vernon is in agreement with this plan.      Resources and Interventions:  Current Resources:   List of home care services: Physical Therapy, Home Health Aid, Skilled Nursing  Community Resources: PCA, Home Care(Private Pay PCA 2 hrs per day - [facility])  Supplies used at home: Incontinence Supplies, Chux, Gloves  Equipment Currently Used at Home: shower chair, lift device, other (see comments), wheelchair, manual(sit to stand lift)  Employment Status: retired)    Advance Care Plan/Directive  Advanced Care Plans/Directives on file: Yes  Type Advanced Care Plans/Directives: POLST  Advanced Care Plan/Directive Status: Not Applicable    Referrals Placed: [facility] Resources, [facility] Line, Community Resources, Other (Respite/stay by the day)     Goals:   Goals        General    3. Medical (pt-stated)     Notes " - Note created  1/28/2021  3:48 PM by David Wise LGSW    Goal Statement: I want my wound to be healed within 6 months.   Date Goal set: 1.28.2021  Barriers: Reducing pressure, Pt requires 24/7 caregiver.   Strengths: Spouse support/caregiving support, home care involvement.   Date to Achieve By: 7.28.2021  Patient expressed understanding of goal: Pt reports understanding and denies any additional questions or concerns at this times. AYALA CC engaged in AIDET communication during encounter.    Action steps to achieve this goal:  1. I will receive wound care from home care.  2. My spouse will receive home care teaching.   3. I will receive wound care as recommended until healed.              Patient/Caregiver understanding: Pt reports understanding and denies any additional questions or concerns at this times. AYALA CC engaged in AIDET communication during encounter.    Outreach Frequency: monthly  Future Appointments              In 3 weeks Susanna Duran MD [facility]          Plan: AYALA CC will outreach to Vernon in one month to address likely home care discharge and goal progression. [facility] will continue to work with Patient until appropriate for discharge. AYALA CC will remain available for CC needs.     JENNIFER Talavera  Clinic Care Coordinator  Ph. [phone]  [email or web address]

## 2021-01-28 NOTE — PROGRESS NOTES
Writer received a call that patient's WBC is critical at 57.4.  Previous noted WBC in 7/2020 was 32.3.    Writer routed message to Dr. Duran and BENNY Moore.     Luz Moran RN

## 2021-02-08 PROBLEM — S22.080D COMPRESSION FRACTURE OF T12 VERTEBRA WITH ROUTINE HEALING: Status: ACTIVE | Noted: 2021-01-01

## 2021-02-11 NOTE — PROGRESS NOTES
ANTICOAGULATION     Ángela Dumont is overdue for INR check.      Spoke with home care, Herbert ([phone]) requesting if they are seeing patient still. She reports that pateint is being seen by home care but that has denied last two visits. They are continuing to attempt to see patient and will check INR when able. Mychart sent to patient as well.    Cece Salas RN

## 2021-02-11 NOTE — PROGRESS NOTES
Home care nurse Herbert left a detailed message. She states they've been trying to reach patient and  for a week.   will not return their many phone calls.  Last week another nurse tried to see patient and  refused the visit.    They will continue to try to reach patient/.    Andra Escalante, RN  Anticoagulation Nurse - [facility]

## 2021-02-19 NOTE — CONFIDENTIAL NOTE
Warfarin refill approved per [facility] protocol. Jesusita Rowley, BSN, RN
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
## 2021-02-25 NOTE — TELEPHONE ENCOUNTER
Message handled by Nurse Triage with [facility] - provider name: WILBER.    Noted, is following hematology. May need to have conversation in TCU with Pt and with  (seperately) to discuss care plan.    Writer called Patrizia Gandara back to advise. Noted Patrizia has discuss with  a little, Patrizia may try to involve daughter as well to help with decision making.    Josias MEJIA RN   [facility]

## 2021-02-26 NOTE — TELEPHONE ENCOUNTER
Patient's spouse, Juan David Dumont, called. Wanted to update Provider and would like a call back regarding patient. Juan David stated patient was in the ER and is current;y recovering and under quarantine at [facility]. She will most likely be out of quarantine either today or tomorrow. Juan David would like a call from Provider to discuss the change in medication that is being recommended, more specifically the Warfarin     Nadera B

## 2021-02-27 NOTE — TELEPHONE ENCOUNTER
Reason for call:  Other   Patient called regarding (reason for call): call back  Additional comments: According to , patient is at [facility] in [place] and they're not communicating well. They're giving her Oxycontin and her body can not tolerate it well, they have also changed the Warfarin to 1.1-1.3 now.  would like to get her out and to home but needs special set up for her at home, wondering if there is anything more can do please?    Phone number to reach patient:  Home number on file [phone] (home)    Best Time:  Anytime    Can we leave a detailed message on this number?  YES    Travel screening: Not Applicable

## 2021-03-02 NOTE — TELEPHONE ENCOUNTER
Writer called [facility], spoke with Cadence ORDOÑEZ who noted Pt is taking Warfarin to maintain therapeutic range of 2-3. Pt most recently 3.3. Pt was also started on Vitamin D d/t level of 21. Pt will be taking Vitamin D 50,000 units 2x per week for 6 weeks, then 2000 units there after.       Called # [phone]     Pt  called advised of findings. Patient  stated an understanding and agreed with plan.      Josias Lynn RN   [facility]
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
## 2021-03-10 NOTE — PROGRESS NOTES
Discharged from [facility] to home today, 1.6. Patient was doing 3 mg daily 3/4-3/9.  Patient will be discharged home with home care from TCU, TCU to give dosing and recheck to patient. New rx sent to [facility] in [place] per TCU request.       Spoke with juancarlos at [facility] ([phone]) to determine that they are opening patient to home care and next inr check date passed to them.    Cece Salas RN

## 2021-03-11 NOTE — TELEPHONE ENCOUNTER
AYALA Landrum with [facility], calling    Patient was hospitalized then went to TCU at [facility]. Was discharge home yesterday to home. Is being seen by [facility] No currently.     Also stated that someone from [facility] called [facility] regarding the status of patient's cancer - thought it was advanced or the life expectancy was decreased.     Bell spoke with spouse and he thought it was doing fine.     Bell requesting updated status of cancer.     Ph. [phone]    Routing to PCP for further review/recommendations/orders.      Tessy Gandara RN  [facility]

## 2021-03-12 NOTE — TELEPHONE ENCOUNTER
Patient and  have declined work up, seen Dr Duran, oncology, in past desired hospice, but then did not enroll    Please update SW             Joaquin Rockwell MD, FAAFP     [facility]  [address] [place] [address]  [email or web address]   Office: [phone]  Fax: [phone]  Pager: [phone]

## 2021-03-12 NOTE — TELEPHONE ENCOUNTER
Spoke with AYALA Luu- Ph. [phone]  Informed her of last oncology visit was in 7/2020. They wanted her to return in 6 months with CBC/diff.     Call was disconnected. Attempted call back but went to secure voicemail, left message to call back if she wants to discuss further.     NAYELI LeachN, RN  [facility]

## 2021-03-15 NOTE — TELEPHONE ENCOUNTER
Called , Juan David, for an update on how Byron is doing. He states she is doing better now, about to get in the shower. States she didn't sleep last night, had clammy skin. Denies fever. Hasn't called [facility] for RN to assess, doesn't feel she needs it as she is doing better now. States he will call back if it happens again or develops new symptoms.       NAYELI LeachN, RN  [place] Triage
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
## 2021-03-16 NOTE — TELEPHONE ENCOUNTER
Home care asking why pt is taking Flomax - reviewed reason why - see med list     Cadence Gonzalez RN, BSN  [facility]     Statement Selected

## 2021-03-16 NOTE — PROGRESS NOTES
Clinic Care Coordination Contact  [facility] [[email or web address]]/[email or web address]       Clinical Data: Care Coordinator Outreach  Outreach attempted x 1.  Left message on patient's voicemail with call back information and requested return call.  Plan: Care Coordinator will try to reach patient again in 1-2 business days.      ALF Talavera  Clinic Care Coordinator  Ph. [phone]  [email or web address]
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
## 2021-03-17 NOTE — TELEPHONE ENCOUNTER
Called # [phone]     Pt  called and scheduled follow up visit.        Josias Lynn RN   [facility]

## 2021-03-17 NOTE — TELEPHONE ENCOUNTER
Completed forms faxed back to [facility]  at [phone].   Originals sent to be scanned.       Ifrah Arango

## 2021-03-17 NOTE — TELEPHONE ENCOUNTER
Homecare is checking INRs, Jeremiah is managing at this time.     Lorena Tobar RN   [facility]  [place], [place], [place]

## 2021-03-17 NOTE — PROGRESS NOTES
Juan David called back and the below dosing was given to him. Juan David is in agreemet with the plan.

## 2021-03-17 NOTE — PROGRESS NOTES
ANTICOAGULATION FOLLOW-UP CLINIC VISIT    Patient Name:  Ángela Dumont  Date:  3/17/2021  Contact Type:  Telephone    SUBJECTIVE:  Patient Findings     Comments:  [facility] RNMelissa states  is very anxious. Homecare RN would like [facility] RN to call  to explain dosing recommendations. He thinks 1.9 is therapeutic and is resistant to a changing her dosing and wants next INR in 6 weeks. The patient was assessed for diet, medication, and activity level changes, missed or extra doses, bruising or bleeding, with no problem findings. Advised new maintenance dose to reflect last week of dosing, 4 mg M, W, F, 2 mg ROW. [facility] RN attempted to call Juan David but he was not home and PCA knew very little English and was only able to take a call back number.  Called gerson Torres RN back to explain situation. She will try to contact Juan Davdi again tonight with dosing recommendations and will recheck INR in 1 week. However, if Juan David calls [facility] RNs back we can help explain dosing recommendations and why we need to recheck in 1-2 weeks.  Melissa JUAN RN  Anticoagulation Team          Clinical Outcomes     Negatives:  Major bleeding event, Thromboembolic event, Anticoagulation-related hospital admission, Anticoagulation-related ED visit, Anticoagulation-related fatality    Comments:  [facility] Melissa ORDOÑEZ states  is very anxious. Homecare RN would like [facility] RN to call  to explain dosing recommendations. He thinks 1.9 is therapeutic and is resistant to a changing her dosing and wants next INR in 6 weeks. The patient was assessed for diet, medication, and activity level changes, missed or extra doses, bruising or bleeding, with no problem findings. Advised new maintenance dose to reflect last week of dosing, 4 mg M, W, F, 2 mg ROW. [facility] RN attempted to call Juan David but he was not home and PCA knew very little English and was only able to take a call back number.  Called gerson Torres RN back to  explain situation. She will try to contact Juan David griggs with dosing recommendations and will recheck INR in 1 week. However, if Juan David calls [facility] RNs back we can help explain dosing recommendations and why we need to recheck in 1-2 weeks.  Melissa JUAN RN  Anticoagulation Team             OBJECTIVE    Recent labs: (last 7 days)     21   INR 1.8*       ASSESSMENT / PLAN  INR assessment SUB    Recheck INR In: 1 WEEK    INR Location Clinic      Anticoagulation Summary  As of 3/17/2021    INR goal:  2.0-3.0   TTR:  72.9 % (1 y)   INR used for dosin.8 (3/17/2021)   Warfarin maintenance plan:  4 mg (1 mg x 4) every Mon, Wed, Fri; 2 mg (1 mg x 2) all other days   Full warfarin instructions:  4 mg every Mon, Wed, Fri; 2 mg all other days   Weekly warfarin total:  20 mg   Plan last modified:  Melissa Waggoner RN (3/17/2021)   Next INR check:  3/24/2021   Priority:  Maintenance   Target end date:  Indefinite    Indications    Long-term (current) use of anticoagulants [Z79.01] [Z79.01]  Pulmonary embolism (H) [I26.99]  DVT (deep venous thrombosis) (H) [I82.409]             Anticoagulation Episode Summary     INR check location:      Preferred lab:  EXTERNAL LAB    Send INR reminders to:  JORDI SPAIN    Comments:  Patient has dementia - speak with spouse Siva // [facility] Care [facility]      Anticoagulation Care Providers     Provider Role Specialty Phone number    Joaquin Rockwell MD Referring Family Medicine [phone]            See the Encounter Report to view Anticoagulation Flowsheet and Dosing Calendar (Go to Encounters tab in chart review, and find the Anticoagulation Therapy Visit)    Dosage adjustment made based on physician directed care plan.    Melissa Waggoner RN

## 2021-03-17 NOTE — TELEPHONE ENCOUNTER
Today's INR: 1.8    Current Dose: 3/10: 4 mg; Otherwise 4 mg every Mon, Fri; 2 mg all other days    Indication: DVT and PE  Bleeding Signs/Symptoms:  None  Thromboembolic Signs/Symptoms:  None  Medication Changes:  No  Dietary Changes:  No  Activity Changes:  No  Bacterial/Viral Infection:  No  Missed Warfarin Doses:  None  Other Concerns:  No    Best # [phone]   Ok to  ? yes    Please call Melissa back to discuss dose and next check        Josias MEJIA RN   [facility]

## 2021-03-17 NOTE — TELEPHONE ENCOUNTER
Reason for Call:  Form, our goal is to have forms completed with 72 hours, however, some forms may require a visit or additional information.    Type of letter, form or note:  medical    Who is the form from?: Home care    Where did the form come from: form was faxed in    What clinic location was the form placed at?: [place]    Where the form was placed: Joaquin Rockwell MD Box/Folder    What number is listed as a contact on the form?: [phone]    Call taken on 3/17/2021 at 6:57 AM by Zakia Reis

## 2021-03-18 NOTE — TELEPHONE ENCOUNTER
Received call from Juan David, patient's . He states he does not want to use [facility], and wants to change companies. He is asking if there needs to be a supervising nurse. He is using an independent private pay aide the last 8-9 months. [facility] is providing an aide 6 hours/day. Spoke with a private aide that was there today, they were told by a nurse  that there needs to be a supervising nurse overseeing care. Informed them to call  or talk with [facility] regarding that issue.     Monica Butler RN  [facility]

## 2021-03-18 NOTE — TELEPHONE ENCOUNTER
Reason for Call:  Form, our goal is to have forms completed with 72 hours, however, some forms may require a visit or additional information.    Type of letter, form or note:  medical    Who is the form from?: Home care    Where did the form come from: form was faxed in    What clinic location was the form placed at?: [place]    Where the form was placed: Joaquin Rockwell MD Box/Folder    What number is listed as a contact on the form?: [phone]       Additional comments: Physical therapy    Call taken on 3/18/2021 at 6:56 AM by Zakia Reis

## 2021-03-18 NOTE — TELEPHONE ENCOUNTER
Completed forms faxed back to [facility]  at [phone].   Originals sent to be scanned.       Ifrah Arango

## 2021-03-18 NOTE — TELEPHONE ENCOUNTER
Seems warm to the touch  Thermometer 96.7 oral  Clammy   Can't sleep, seems disturbed and distressed  Moaning often  Tossing and turning  Put blanket on and she is Hot and take the blanket off and she is cold   Complaining of Body aches    In and out of facilities, just out to home from one last week  - does not want her taking oxycodone    No treatments tried    No fever  No difficulty breathing   No confusion  No urinary issues     This happened 2 days ago, but then she was fine yesterday  Seems to only happen at night  Couldn't sleep 2 nights ago, then last night slept 13 hours, then can't sleep again tonight    Patient has communication difficulties, but can answer questions to a point    Triaged to a disposition of See PCP within 3 days. Patient's  is agreeable. Call transferred to scheduling. Home Care advice given. Will try tylenol tonight to see if it helps, but he thinks that if it does not help he will be bringing her to ED since he cannot tell what is going on with her. This RN feels that this is a reasonable course of action.     COVID 19 Nurse Triage Plan/Patient Instructions    Please be aware that novel coronavirus (COVID-19) may be circulating in the community. If you develop symptoms such as fever, cough, or SOB or if you have concerns about the presence of another infection including coronavirus (COVID-19), please contact your health care provider or visit [email or web address].     Disposition/Instructions    In-Person Visit with provider recommended. Reference Visit Selection Guide.    Thank you for taking steps to prevent the spread of this virus.  o Limit your contact with others.  o Wear a simple mask to cover your cough.  o Wash your hands well and often.    Resources    [facility]: About COVID-19: [email or web address].org/covid19/    CDC: What to Do If You're Sick: www.cdc.gov/coronavirus/2019-ncov/about/steps-when-sick.html    CDC: Ending Home Isolation:  www.cdc.gov/coronavirus/2019-ncov/hcp/disposition-in-home-patients.html     CDC: Caring for Someone: www.cdc.gov/coronavirus/2019-ncov/if-you-are-sick/care-for-someone.html     [facility]: Interim Guidance for Hospital Discharge to Home: www.health.[facility].mn.us/diseases/coronavirus/hcp/hospdischarge.pdf    [facility] clinical trials (COVID-19 research studies): clinicalaffairs.[facility]/umn-clinical-trials     Below are the COVID-19 hotlines at the [place] Department of Health ([facility]). Interpreters are available.   o For health questions: Call 346-333-6505 or 1-627.889.4031 (7 a.m. to 7 p.m.)  o For questions about schools and childcare: Call 191-224-0731 or 1-994.140.8854 (7 a.m. to 7 p.m.)    Additional Information    Negative: Shock suspected (e.g., cold/pale/clammy skin, too weak to stand, low BP, rapid pulse)    Negative: Difficult to awaken or acting confused (e.g., disoriented, slurred speech)    Negative: Sounds like a life-threatening emergency to the triager    Negative: Chest pain    Negative: Arm pains with exertion (e.g., walking)    Negative: Muscle aches from influenza (flu) suspected    Negative: Sickle cell pain crisis (sickle cell attack) suspected    Negative: Muscle aches from heat exposure suspected    Negative: Lyme disease suspected (e.g., bull's eye rash or tick bite / exposure in past month)    Negative: Pain only in back    Negative: Pain in one arm OR arm pains caused by recent vigorous activity (e.g., sports, lifting, overuse)    Negative: Pain in one leg OR leg pains caused by recent vigorous activity (e.g. sports, lifting, overuse)    Negative: Rash over large area or most of the body (widespread or generalized)    Negative: Dark (cola colored) or red-colored urine    Negative: [1] Drinking very little AND [2] dehydration suspected (e.g., no urine > 12 hours, very dry mouth, very lightheaded)    Negative: Patient sounds very sick or weak to the triager    Negative: [1] SEVERE pain  (e.g., excruciating, unable to do any normal activities) AND [2] not improved 2 hours after pain medicine    Negative: [1] SEVERE pain AND [2] taking a statin medicine (a lipid or cholesterol lowering drug)    Negative: Fever > 104 F (40 C)    Negative: [1] Fever > 101 F (38.3 C) AND [2] age > 60    Negative: [1] Fever > 100.0 F (37.8 C) AND [2] bedridden (e.g., nursing home patient, CVA, chronic illness, recovering from surgery)    Negative: [1] Fever > 100.0 F (37.8 C) AND [2] indwelling urinary catheter (e.g., Moore, Coude)    Negative: [1] Fever > 100.0 F (37.8 C) AND [2] diabetes mellitus or weak immune system (e.g., HIV positive, cancer chemo, splenectomy, organ transplant, chronic steroids)    Negative: Fever present > 3 days (72 hours)    Negative: [1] Muscle aches are unexplained AND [2] occur within 1 month of a tick bite    [1] MODERATE pain (e.g., interferes with normal activities) AND [2] present > 3 days    Negative: Diabetes mellitus or weak immune system (e.g., HIV positive, cancer chemo, splenectomy, organ transplant, chronic steroids)    Protocols used: MUSCLE ACHES AND BODY PAIN-A-    Kendy Mc RN on 3/18/2021 at 2:00 AM

## 2021-03-19 NOTE — TELEPHONE ENCOUNTER
Reason for Call:  Form, our goal is to have forms completed with 72 hours, however, some forms may require a visit or additional information.    Type of letter, form or note:  medical    Who is the form from?: Home care    Where did the form come from: form was faxed in    What clinic location was the form placed at?: [place]    Where the form was placed: Joaquin Rockwell MD Box/Folder    What number is listed as a contact on the form?: [phone]       Additional comments: [facility]    Call taken on 3/19/2021 at 6:53 AM by Zakia Reis

## 2021-03-19 NOTE — TELEPHONE ENCOUNTER
"Received call from Juan David, patient's spouse. He states the aide that was there today told him her sore on her tailbone \"didn't look good\". He states she slept for 13 hours last night, was awake for couple hours and then fell back asleep and is worried about that. He is unable to bring her into clinic. He is unsure how to contact a home care nurse to come out and look at it.   Left message with Ann Price [phone] . Expected to get a call back within an hour.     Received call back for nAn, she will call Juan David and send a nurse out to look at wound.     Monica Butler RN  [facility]      "

## 2021-03-22 NOTE — TELEPHONE ENCOUNTER
Jose from [facility] care [facility] calling for verbal orders for treatment of chronic coccyx wound. Jose would like to trial a Jennifer collagen dressing and a nutrition referral to discuss nutrition and hydration. Jose would also like to implement multi-vitamin along with vitamin C 1000mg daily.    Verbal order given for all of above. Advised to fax in written order for MD signature.    Josias MEJIA RN   [facility]

## 2021-03-23 NOTE — TELEPHONE ENCOUNTER
Reason for Call:  Form, our goal is to have forms completed with 72 hours, however, some forms may require a visit or additional information.    Type of letter, form or note:  medical    Who is the form from?: [facility] (if other please explain)    Where did the form come from: form was faxed in    What clinic location was the form placed at?: [place]    Where the form was placed: Joaquin Rockwell MD Box/Folder    What number is listed as a contact on the form?: [phone]       Additional comments: [facility]    Call taken on 3/23/2021 at 2:52 PM by Zakia Reis

## 2021-03-24 NOTE — PROGRESS NOTES
ANTICOAGULATION MANAGEMENT     Patient Name:  Ángela Dumont  Date:  3/24/2021    ASSESSMENT /SUBJECTIVE:    Today's INR result of 1.5 is subtherapeutic. Goal INR of 2.0-3.0      Warfarin dose taken: Warfarin taken as instructed    Diet: Increased boost to 1 shake/day    Medication changes/ interactions: No new medications/supplements affecting INR    Previous INR: Subtherapeutic     S/S of bleeding or thromboembolism: No    New injury or illness: No    Upcoming surgery, procedure or cardioversion: No    Additional findings: None      PLAN:    Telephone call with home care nurse Sondra regarding INR result and instructed:     Warfarin Dosing Instructions: 8 mg tonight then continue your current warfarin dose of 4 mg on mon/wed/fri and 2 mg all other days.  is reluctant to increase warfarin dosing. Educated that ensure/boost have vitamin K and if she's going to have these daily consistently an increase of dosing is needed.  reports she will no longer take these supplements. Agrees to one time boost to bump up and then would like to continue dosing previously without ensure to see where she is in one week.  Agreed but educated on higher risk of patient being outside of goal range on lower dose as medication is not working effectively versus a lower dose of warfarin.  Spouse agrees.      Instructed patient to follow up no later than: 1 week  Orders given to  Homecare nurse/facility to recheck    Education provided: None required      Sondra, home care, verbalizes understanding and agrees to warfarin dosing plan.    Instructed to call the Anticoagulation Clinic for any changes, questions or concerns. (#[phone])        Cece Salas RN      OBJECTIVE:  No results for input(s): INR, [id number], AXA in the last 168 hours.      No question data found.  Anticoagulation Summary  As of 3/24/2021    INR goal:  2.0-3.0   TTR:  72.4 % (11.9 mo)   INR used for dosing:     Plan last modified:  [place]  Melissa LEAVITT, RN (3/17/2021)   Next INR check:     Target end date:  Indefinite    Indications    Long-term (current) use of anticoagulants [Z79.01] [Z79.01]  Pulmonary embolism (H) [I26.99]  DVT (deep venous thrombosis) (H) [I82.409]             Anticoagulation Episode Summary     INR check location:      Preferred lab:  EXTERNAL LAB    Send INR reminders to:  JORDI SPAIN    Comments:  Patient has dementia - speak with spouse Siva // Home Care Services      Anticoagulation Care Providers     Provider Role Specialty Phone number    Joaquin Rockwell MD Referring Family Medicine [phone]

## 2021-03-24 NOTE — TELEPHONE ENCOUNTER
Reviewed, Agree, signed             Joaquin Rockwell MD, FAAFP     [facility]  [address] [place] [id number]  [email or web address]   Office: [phone]  Fax: [phone]  Pager: [phone]

## 2021-03-24 NOTE — TELEPHONE ENCOUNTER
Completed forms faxed back to [facility] at [phone].   Originals sent to be scanned.       Ifrah Arango

## 2021-03-25 NOTE — TELEPHONE ENCOUNTER
Completed forms faxed back to [facility] at [phone].   Originals sent to be scanned.       Ifrah Arango

## 2021-03-25 NOTE — TELEPHONE ENCOUNTER
Reason for Call:  Form, our goal is to have forms completed with 72 hours, however, some forms may require a visit or additional information.    Type of letter, form or note:  medical    Who is the form from?: [facility] (if other please explain)    Where did the form come from: form was faxed in    What clinic location was the form placed at?: [place]    Where the form was placed: Joaquin Rockwell MD Box/Folder    What number is listed as a contact on the form?: [phone]       Additional comments: Wound care    Call taken on 3/25/2021 at 6:49 AM by Zakia Reis

## 2021-03-26 NOTE — TELEPHONE ENCOUNTER
Reason for Call:  Form, our goal is to have forms completed with 72 hours, however, some forms may require a visit or additional information.    Type of letter, form or note:  medical    Who is the form from?: Home care[facility]    Where did the form come from: form was faxed in    What clinic location was the form placed at?: [place]    Where the form was placed: Joaquin Rockwell MD Box/Folder    What number is listed as a contact on the form?: [phone]       Additional comments: [facility]    Call taken on 3/26/2021 at 7:02 AM by Zakia Reis

## 2021-03-26 NOTE — PROGRESS NOTES
Clinic Care Coordination Contact    Clinic Care Coordination Contact  OUTREACH    Referral Information:  Referral Source: PCP  Primary Diagnosis: Frailty/Failure to thrive(DME/ community resources)    Chief Complaint   Patient presents with     Clinic Care Coordination - Follow-up     TCU Discharge        Universal Utilization: Reviewed on this date.   Difficulty keeping appointments: No  Compliance Concerns: No  No-Show Concerns: No  No PCP office visit in Past Year: No  Utilization    Last refreshed: 3/26/2021  2:15 PM: Hospital Admissions 0           Last refreshed: 3/26/2021  2:15 PM: ED Visits 0           Last refreshed: 3/26/2021  2:15 PM: No Show Count (past year) 0              Current as of: 3/26/2021  2:15 PM              Clinical Concerns:  Current Medical Concerns:    Patient Active Problem List   Diagnosis     S/P  shunt     NPH (normal pressure hydrocephalus) (H)     Fracture of right femur (H)     DVT (deep venous thrombosis) (H)     Pulmonary embolism (H)     Dementia (H)     Hypothyroid     Hypertension goal BP (blood pressure) < 140/90     Adjustment disorder with depressed mood     Hyperlipidemia with target LDL less than 130     Leukocytosis     Esophageal reflux     UTI (urinary tract infection)     Constipation     Pulmonary hypertension (H)     MR (mitral regurgitation)     Fibromyalgia     Osteoarthritis of both knees     Health Care Home     Long-term (current) use of anticoagulants [Z79.01]     Advance Care Planning     SIRS (systemic inflammatory response syndrome) (H)     History of pulmonary embolism     History of deep venous thrombosis     Urinary retention     BPPV (benign paroxysmal positional vertigo), bilateral     Herpes zoster without complication     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   Current Behavioral Concerns: Not addressed at this time.     Education Provided to patient: Role of AYALA CC and reason for outreach.    Health Maintenance Reviewed: Not assessed  Clinical Pathway: None    Medication Management:  No concerns regarding medication management. [facility] is involved.     Functional Status:  Dependent ADLs: Wheelchair-with assist, Bathing, Dressing, Eating, Grooming, Incontinence, Positioning, Transfers, Toileting  Dependent IADLs: Cleaning, Cooking, Laundry, Shopping, Meal Preparation, Medication Management, Money Management, Transportation, Incontinence  Bed or wheelchair confined: Yes  Mobility Status: Dependent/Assisted by Another    Living Situation:  Current living arrangement: I live in a private home with spouse  Type of residence: Private home - [facility]    Lifestyle & Psychosocial Needs:  Diet: Regular  Inadequate nutrition : No  Tube Feeding: No  Inadequate activity/exercise : Yes  Significant changes in sleep pattern : No  Transportation means: Family  Financial/Insurance concerns : No  Presybeterian or spiritual beliefs that impact treatment: No  Mental health DX: Yes(Dementia)  Mental health DX how managed: None  Mental health management concern : No  Informal Support system: Spouse   Socioeconomic History     Marital status:      Spouse name: Vernon     Number of children: 2     Years of education: Not on file     Highest education level: Not on file     Tobacco Use     Smoking status: Former Smoker     Packs/day: 0.50     Years: 10.00     Pack years: 5.00     Types: Cigarettes     Start date: 7/17/1958     Quit date:  1982     Years since quittin.2     Smokeless tobacco: Never Used     Tobacco comment: quit    Substance and Sexual Activity     Alcohol use: Yes     Alcohol/week: 0.0 - 1.0 standard drinks     Comment: Very light     Drug use: No     Sexual activity: Not Currently     Partners: Male     Birth control/protection: None     Comment: not a problem       [facility] Patient is receiving [facility] and [facility].    Vernon reports that Pt continues to receive services through [facility] for PCAs, but that he may want to consider switching to a new agency. Vernon is agreeable to [facility] sending additional agency options via [employer].     Per chart review, [facility] may be following up with Patient and spouse.      Resources and Interventions:  Current Resources:   Skilled Home Care Services: Physical Therapy, Home Health Aid, Skilled Nursing  Community Resources: PCA, Home Care(Private Pay PCA 2 hrs per day - [facility])  Supplies used at home: Incontinence Supplies, Chux, Gloves  Equipment Currently Used at Home: shower chair, lift device, other (see comments), wheelchair, manual(sit to stand lift)  Employment Status: retired)    Advance Care Plan/Directive  Advanced Care Plans/Directives on file: Yes  Type Advanced Care Plans/Directives: POLST  Advanced Care Plan/Directive Status: Not Applicable    Referrals Placed: [facility] Resources, [facility] Linkage Line, Community Resources, Other (Respite/stay by the day)     Goals:   Goals        General    3. Medical (pt-stated)     Notes - Note created  2021  3:48 PM by David Wise, JENNIFER    Goal Statement: I want my wound to be healed within 6 months.   Date Goal set: 2021  Barriers: Reducing pressure, Pt requires  caregiver.   Strengths: Spouse support/caregiving support, home care involvement.   Date to Achieve By: 2021  Patient expressed understanding of goal: Pt reports  understanding and denies any additional questions or concerns at this times. SW CC engaged in AIDET communication during encounter.    Action steps to achieve this goal:  1. I will receive wound care from home care.  2. My spouse will receive home care teaching.   3. I will receive wound care as recommended until healed.              Patient/Caregiver understanding: Pt reports understanding and denies any additional questions or concerns at this times. SW CC engaged in AIDET communication during encounter.    Outreach Frequency: monthly  Future Appointments              In 5 days Joaquin Rockwell MD [facility]          Plan: [employer] message sent with PCA/private pay agencies. Patient will continue to receive [facility]. CHW will outreach to Patient/spouse in one month to address goal progression. SW CC will remain available for CC needs and will schedule a clinical review in 6 weeks.     ALF Talavera  Clinic Care Coordinator  Ph. [phone]  [email or web address]

## 2021-03-26 NOTE — LETTER
[facility] CARE COORDINATION  [address] [address]  March 26, 2021        Ángela Dumont  [address] [address]          Dear Deny Motta is an updated Complex Care Plan for your continued enrollment in Care Coordination. I have also included information for PCA agencies and private pay home care agencies:    Private Duty Home Care:    -[facility]: P. [phone]  [email or web address]  -[facility] [place]: P. [phone]  Https://[email or web address]  -[facility]: Ph. [phone]   [email or web address]/[email or web address]/505?[email or web address]  -[facility]: Ph. [phone]  [email or web address]://[email or web address].[employer]/locations/home-health/ddmnw-[email or web address]-5076    Resources that help review community care providers/agencies:    -Care Options Network: Care Options Network, now in a first-time offering, provides the strength of our professional association to you in this fully searchable website. Use this website to search for many senior care professionals ready to serve you.  https://www.careoptionsnetwork.org/    - Direct Support Connect from [facility]: https://directsupportCuracaonect.com/    -Senior LinkAge Line: The Senior LinkAge Line is a free statewide service of the [place] Board on Aging in partnership with [place]'s area agencies on aging. The Senior LinkAge Line assists older [place] and caregivers, by connecting them to local services, finding answers and getting the help they need.   P. [phone]  https://[place].gov[place]senior-linkage-line/         Please let us know if you have additional questions, concerns, or goals that we can assist with.    Sincerely,      ALF Talavera  Clinic Care Coordinator  Ph. [phone]  [email or web address]            [facility]  Complex Care Plan  About Me:    Patient Name:  Ángela Dumont    YOB: 1940  Age:         80 year old   [place] MRN:    [id number] Telephone  Information:  Home Phone [phone]   Mobile [phone]       Address:  [address] [address] Email address:  [email or web address]      Emergency Contact(s)    Name Relationship Lgl Grd Work Phone Home Phone Mobile Phone   TONJA LANDRUM Spouse  none [phone]    2. LEENA ROSENBERG Daughter  none none [phone]           Primary language:  English     needed? No   [facility] [employer] [facility]:  [phone] op. 1  Other communication barriers: Caregiver  Preferred Method of Communication:  Mail  Current living arrangement: I live in a private home with spouse  Mobility Status/ Medical Equipment: Dependent/Assisted by Another    Health Maintenance  Health Maintenance Reviewed: Not assessed  Health Maintenance Due   Topic Date Due     DEXA  Never done     MICROALBUMIN  Never done     MEDICARE ANNUAL WELLNESS VISIT  07/30/2020     PHQ-2  01/01/2021     COVID-19 Vaccine (2 - Moderna 2-dose series) 03/22/2021       My Access Plan  Medical Emergency 911   Primary Clinic Line [facility] - [phone]   24 Hour Appointment Line [phone] or  [phone]) (toll-free)   24 Hour Nurse Line [phone] (toll-free)   Preferred Urgent Care     Preferred Hospital [facility]  [phone]   Preferred Pharmacy [facility] [address] [address]     Behavioral Health Crisis Line The National Suicide Prevention Lifeline at 1-867.907.4046 or 911       My Care Team Members  Patient Care Team       Relationship Specialty Notifications Start End    Joaquin Rockwell MD PCP - General Family Practice  6/16/15     Phone: [phone] Fax: [phone] [address] [address]    Joaquin Rockwell MD Assigned PCP   5/14/15     Phone: [phone] Fax: [phone] [address] [facility] [address]    Lorene Pablo Community Health Worker Primary Care - CC  9/6/19     [place];  Ph. [phone], Fax: [phone]    David Wise LGSW Lead Care Coordinator   11/20/19     Susanna Duran MD Assigned Cancer Care Provider   10/23/20     Phone: [phone] Fax: [phone] [address] [address]            My Care Plans  Self Management and Treatment Plan  Goals and (Comments)  Goals        General    3. Medical (pt-stated)     Notes - Note created  1/28/2021  3:48 PM by David Wise LGSW    Goal Statement: I want my wound to be healed within 6 months.   Date Goal set: 1.28.2021  Barriers: Reducing pressure, Pt requires 24/7 caregiver.   Strengths: Spouse support/caregiving support, home care involvement.   Date to Achieve By: 7.28.2021  Patient expressed understanding of goal: Pt reports understanding and denies any additional questions or concerns at this times. SW CC engaged in AIDET communication during encounter.    Action steps to achieve this goal:  1. I will receive wound care from home care.  2. My spouse will receive home care teaching.   3. I will receive wound care as recommended until healed.               Advance Care Plans/Directives Type:   Type Advanced Care Plans/Directives: POLST    My Medical and Care Information  Problem List   Patient Active Problem List   Diagnosis     S/P  shunt     NPH (normal pressure hydrocephalus) (H)     Fracture of right femur (H)     DVT (deep venous thrombosis) (H)     Pulmonary embolism (H)     Dementia (H)     Hypothyroid     Hypertension goal BP (blood pressure) < 140/90     Adjustment disorder with depressed mood     Hyperlipidemia with target LDL less than 130     Leukocytosis     Esophageal reflux     UTI (urinary tract infection)     Constipation     Pulmonary hypertension (H)     MR (mitral regurgitation)     Fibromyalgia     Osteoarthritis of both knees     Health Care Home     Long-term (current) use of anticoagulants [Z79.01]     Advance Care Planning     SIRS (systemic inflammatory  response syndrome) (H)     History of pulmonary embolism     History of deep venous thrombosis     Urinary retention     BPPV (benign paroxysmal positional vertigo), bilateral     Herpes zoster without complication     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  Current Outpatient Medications   Medication Instructions     acetaminophen (TYLENOL) 650 mg, Oral, EVERY 4 HOURS PRN     furosemide (LASIX) 40 MG tablet TAKE 1 TABLET BY MOUTH EVERY DAY AT 10 AM     levothyroxine (SYNTHROID/LEVOTHROID) 50 MCG tablet TAKE 1 TABLET BY MOUTH EVERY DAY     order for DME Use EZ Stand as needed     order for DME Equipment being ordered: Yoan Lift with Hygiene Sling     polyethylene glycol (MIRALAX) 17 g, Oral, DAILY PRN     senna-docusate (SENOKOT-S/PERICOLACE) 8.6-50 MG tablet 1 tablet, Oral, 2 TIMES DAILY PRN     tamsulosin (FLOMAX) 0.4 MG capsule TAKE 1 CAPSULE BY MOUTH EVERY DAY AT 10 AM     warfarin ANTICOAGULANT (COUMADIN) 2 MG tablet Current dose (2/19/21): TAKE 4MG MON FRI, 2MG ALL OTHER DAYS OR AS DIRECTED BY [facility].     warfarin ANTICOAGULANT (COUMADIN) 4 MG tablet TAKE 4MG (1 TAB) EVERY MON FRI, and Take 2MG (1/2 TAB) ALL OTHER DAYS OR AS DIRECTED BY INR     Wound Dressings (TRIAD HYDROPHILIC WOUND DRESSI) PSTE 1 g, Apply externally, 3 TIMES DAILY        Allergies   Allergen Reactions     Shellfish Allergy Anaphylaxis     Aspirin      GI issues     Contrast Dye Hives and Itching     Flushed skin     Penicillins Hives     Sulfa Drugs Hives     Valtrex [Valacyclovir]  Nausea     Ceftin [Cefuroxime] Rash     Nitrofurantoin Rash       Care Coordination Start Date: 8/23/2019   Frequency of Care Coordination: monthly   Form Last Updated: 03/26/2021

## 2021-03-31 PROBLEM — L89.309 PRESSURE INJURY OF SKIN OF BUTTOCK, UNSPECIFIED INJURY STAGE, UNSPECIFIED LATERALITY: Status: ACTIVE | Noted: 2021-01-01

## 2021-03-31 NOTE — TELEPHONE ENCOUNTER
ANTICOAGULATION MANAGEMENT     Patient Name:  Ángela Dumont  Date:  3/31/2021    ASSESSMENT /SUBJECTIVE:    Today's INR result of 2.0 is therapeutic. Goal INR of 2.0-3.0      Warfarin dose taken: Warfarin taken as instructed    Diet: No new diet changes affecting INR    Medication changes/ interactions: No new medications/supplements affecting INR    Previous INR: Subtherapeutic     S/S of bleeding or thromboembolism: No    New injury or illness: No    Upcoming surgery, procedure or cardioversion: No    Additional findings: None      PLAN:    Telephone call with home care nurse Sondra  regarding INR result and instructed:     Warfarin Dosing Instructions: Continue your current warfarin dose 4mg MWF & 2mg OAD    Instructed patient to follow up no later than: 1 week  Orders given to  Homecare nurse/facility to recheck    Education provided: Contact [facility] [place] [facility]: [phone]  with any changes, questions or concerns.       Sondra RN verbalizes understanding and agrees to warfarin dosing plan.    Instructed to call the Anticoagulation Clinic for any changes, questions or concerns. (#[phone])        Jennifer Albright RN      OBJECTIVE:  Recent labs: (last 7 days)     21   INR 2.0*             Anticoagulation Summary  As of 3/31/2021    INR goal:  2.0-3.0   TTR:  69.1 % (1 y)   INR used for dosin.0 (3/31/2021)   Warfarin maintenance plan:  4 mg (1 mg x 4) every Mon, Wed, Fri; 2 mg (1 mg x 2) all other days   Full warfarin instructions:  4 mg every Mon, Wed, Fri; 2 mg all other days   Weekly warfarin total:  20 mg   Plan last modified:  Cece Salas RN (3/24/2021)   Next INR check:  2021   Priority:  Maintenance   Target end date:  Indefinite    Indications    Long-term (current) use of anticoagulants [Z79.01] [Z79.01]  Pulmonary embolism (H) [I26.99]  DVT (deep venous thrombosis) (H) [I82.409]             Anticoagulation Episode Summary     INR check location:       Preferred lab:  EXTERNAL LAB    Send INR reminders to:  JORDI SPAIN    Comments:  Patient has dementia - speak with spouse Siva // Home Care Services      Anticoagulation Care Providers     Provider Role Specialty Phone number    Joaquin Rockwell MD Referring Family Medicine [phone]         Jennifer Ingram, RN, BSN, PHN

## 2021-03-31 NOTE — TELEPHONE ENCOUNTER
Anticoagulation Management    Unable to reach Norma home care nurse - noted that no number or name of home care left by routing team member... called Sondra as she has historically seen patient for home care today.    Today's INR result of 2.0 is therapeutic (goal INR of 2.0-3.0).  Result received from: Home Care    Follow up required to confirm warfarin dose taken and assess for changes    Left message to continue current dose of warfarin 4 mg tonight. and to call back [facility] to confirm message and dosing.       Anticoagulation clinic to follow up      Jennifer Ingram, RN, BSN, PHN
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
## 2021-03-31 NOTE — TELEPHONE ENCOUNTER
Received a call from Home Care Nurse. Stated INR was 2.0 and wanted to know if the dose should remain the same. Routing to INR Team for follow up.    Radha HAIDER

## 2021-03-31 NOTE — TELEPHONE ENCOUNTER
Managed by Jeremiah currently.    Lorena Tobar, SMITA   [facility]  [place], [place], [place]

## 2021-04-01 NOTE — TELEPHONE ENCOUNTER
Form singed by Dr Marshall [phone]  Original sent to be scanned      Ifrah Arango

## 2021-04-01 NOTE — TELEPHONE ENCOUNTER
Reason for Call:  Form, our goal is to have forms completed with 72 hours, however, some forms may require a visit or additional information.    Type of letter, form or note:  medical    Who is the form from?: Home care    Where did the form come from: form was faxed in    What clinic location was the form placed at?: [place]    Where the form was placed: Joaquin Rockwell MD Box/Folder    What number is listed as a contact on the form?: [phone]      Call taken on 4/1/2021 at 6:54 AM by Zakia Reis

## 2021-04-02 NOTE — TELEPHONE ENCOUNTER
Writer filed a [facility] report, ID#[id number]  Report was printed and held in file with writer for follow up or future reference.        Called # [phone]     Pt's  called to discuss Pt's care.  noted he is not fond of [facility] and noted he thinks its a scam.  report [facility] providers come out, sit and watch television, talk on the phone, and use up his electricity.  noted [facility] does not perform many tasks, but does threaten to bill him.  stated he had signed a blank contract and [facility] filled in the blanks afterward.  stated he does have multiple agencies involved.  stated he has care for the morning cares with one company, unable to remember the name, but stated he is happy with them.  noted that [facility] does come out every so often to help with PT and wound care.     the noted while Pt was at [facility] she received the first Covid shot,  stated he was not told that they had given this shot on Feb 22nd 2021. [facility] stated they did inform him.  again denied being told that and is one reason for leaving that facility is that they were not straight forward with him and Pt's care.     noted, after being questioned, that he is looking into getting more care for the Pt through another agency but did not provide name, noting he is hoping the people that come in the morning will  the evenings.        Josias Lynn RN   [facility] [place] Triage

## 2021-04-05 NOTE — TELEPHONE ENCOUNTER
Reason for Call:  Form, our goal is to have forms completed with 72 hours, however, some forms may require a visit or additional information.    Type of letter, form or note:  medical    Who is the form from?: Home care    Where did the form come from: form was faxed in    What clinic location was the form placed at?: [place]    Where the form was placed: Dr Rockwell Box/Folder    What number is listed as a contact on the form?: [phone]       Additional comments:     Call taken on 4/5/2021 at 8:00 AM by Radha Umaña

## 2021-04-06 NOTE — TELEPHONE ENCOUNTER
[facility] nurse calling  - asking for discharging the velasquez  Dressing.  But continue on with the dressing changes    Gave verbal okay     Cadence Gonzalez RN, BSN  [facility] Triage         .

## 2021-04-06 NOTE — TELEPHONE ENCOUNTER
Reason for Call:  Form, our goal is to have forms completed with 72 hours, however, some forms may require a visit or additional information.    Type of letter, form or note:  medical    Who is the form from?: Home care, [facility]    Where did the form come from: form was faxed in    What clinic location was the form placed at?: [place]    Where the form was placed: Joaquin Rockwell MD Box/Folder    What number is listed as a contact on the form?: [phone]       Additional comments: Discharge from OT    Call taken on 4/6/2021 at 6:51 AM by Zakia Reis

## 2021-04-06 NOTE — TELEPHONE ENCOUNTER
Completed form faxed to [facility] [phone], copy to abstract and filed.      Radha HAIDER

## 2021-04-06 NOTE — TELEPHONE ENCOUNTER
Completed form faxed to [facility] [phone], copy to abstract and filed.      Radha HAIDER

## 2021-04-07 NOTE — TELEPHONE ENCOUNTER
Reason for Call:  Form, our goal is to have forms completed with 72 hours, however, some forms may require a visit or additional information.    Type of letter, form or note:  medical    Who is the form from?: Home care    Where did the form come from: form was faxed in    What clinic location was the form placed at?: [place]    Where the form was placed: Joaquin Rockwell MD Box/Folder    What number is listed as a contact on the form?: [phone]       Call taken on 4/7/2021 at 6:46 AM by Zakia Reis

## 2021-04-08 NOTE — TELEPHONE ENCOUNTER
Completed forms faxed back to [facility]  at [phone].   Originals sent to be scanned.       Ifrah Arango

## 2021-04-08 NOTE — PROGRESS NOTES
Please triage current issues with  [facility], please offer increased home care/palliative care/hospice, depending on what they/family desire

## 2021-04-08 NOTE — TELEPHONE ENCOUNTER
VM received from , [facility] ([phone]). [facility] states that the pt will have INR drawn today, 04/08, by home care.

## 2021-04-08 NOTE — TELEPHONE ENCOUNTER
Reason for Call:  Form, our goal is to have forms completed with 72 hours, however, some forms may require a visit or additional information.    Type of letter, form or note:  medical    Who is the form from?: Home care    Where did the form come from: form was faxed in    What clinic location was the form placed at?: [place]    Where the form was placed: Dr. Rockwell Box/Folder    What number is listed as a contact on the form?: [phone]       Additional comments: wound care orders    Call taken on 4/8/2021 at 7:14 AM by Jackie MA

## 2021-04-08 NOTE — TELEPHONE ENCOUNTER
Received call from Mary at [facility] # [phone]  She is requesting hospice orders    Please fax okay for hospice orders to [phone] Attn: Care Navigation     Thank you  Margarette Burnett RN on 4/8/2021 at 4:19 PM

## 2021-04-08 NOTE — TELEPHONE ENCOUNTER
Completed forms faxed back to [facility] at [phone]   Originals sent to be scanned.       Ifrah Arango

## 2021-04-08 NOTE — PROGRESS NOTES
"Received call from, , Juan David Dumont. Vernon states \"she's slept most of the day\", hasn't been coughing the last 2-3 hours, and has no concerns at this time    Monica Butler RN  [facility]    "

## 2021-04-08 NOTE — PROGRESS NOTES
ANTICOAGULATION MANAGEMENT     Patient Name:  Ángela Dumont  Date:  2021    ASSESSMENT /SUBJECTIVE:    Today's INR result of 2.8 is therapeutic. Goal INR of 2.0-3.0      Warfarin dose taken: Warfarin taken as instructed    Diet: No new diet changes affecting INR    Medication changes/ interactions: No new medications/supplements affecting INR    Previous INR: Therapeutic     S/S of bleeding or thromboembolism: No    New injury or illness: Increased secretions/gurgling.    Upcoming surgery, procedure or cardioversion: No    Additional findings: None      PLAN:    Telephone call with home care nurse Mary regarding INR result and instructed:     Warfarin Dosing Instructions: Continue your current warfarin dose 4mg every Mon, Wed, Fri; 2mg all other days    Instructed patient to follow up no later than: 1 week  Orders given to  Homecare nurse/facility to recheck    Education provided: Target INR goal and significance of current INR result      Mary verbalizes understanding and agrees to warfarin dosing plan.    Instructed to call the Anticoagulation Clinic for any changes, questions or concerns. (#[phone])        Vicente Jeter RN      OBJECTIVE:  Recent labs: (last 7 days)     21   INR 2.8*         No question data found.  Anticoagulation Summary  As of 2021    INR goal:  2.0-3.0   TTR:  69.1 % (1 y)   INR used for dosin.8 (2021)   Warfarin maintenance plan:  4 mg (1 mg x 4) every Mon, Wed, Fri; 2 mg (1 mg x 2) all other days   Full warfarin instructions:  4 mg every Mon, Wed, Fri; 2 mg all other days   Weekly warfarin total:  20 mg   Plan last modified:  Cece Salas RN (3/24/2021)   Next INR check:     Priority:  Maintenance   Target end date:  Indefinite    Indications    Long-term (current) use of anticoagulants [Z79.01] [Z79.01]  Pulmonary embolism (H) [I26.99]  DVT (deep venous thrombosis) (H) [I82.409]             Anticoagulation Episode Summary     INR check location:       Preferred lab:  EXTERNAL LAB    Send INR reminders to:  JORDI SPAIN    Comments:  Patient has dementia - speak with spouse Siva // Home Care Services      Anticoagulation Care Providers     Provider Role Specialty Phone number    Joaquin Rockwell MD Referring Family Medicine [phone]

## 2021-04-08 NOTE — PROGRESS NOTES
Per message sent by home care:  TERESA from home care nurse Mary: starting this morning, pt has been having increased coughing and gurgling of secretions. She is unable to cough anything up. Her vitals are stable. Per HC nurse, she does not appear in acute distress. HC RN is discussing goals of care today, if hospice vs. Home care is appropriate.    Routing comment      Attempt # 1  Called # [phone]     Left a non detailed VM to call back at [phone] and ask for any available Triage Nurse.    Josias Lynn RN   [facility] [place] Triage

## 2021-04-09 NOTE — TELEPHONE ENCOUNTER
Notes home care has suggested palliative/hospice care - wants best for Byron - notes burnt out on facilities, wants to keep her at home - doesn't think AL's give good care - notes he shah's his best - notes frequent turnover of staff - reviewed Norma  notes    Notes cold x 4 days - not improving, using nasal spray - Afrin - has not used to date - trying to get to work, but hasn't been able to yet - no f/c/s - some wheezing and congestion - no nasal drainage - no ST - nasal and chest congestion - productive ? - few nights ago had vomiting ( same), no diarrhea, last 1-2 days    Advised UC/ER evaluation, offered 911,  desires to watch at home for now, reviewed no one can provide 24/7 cares by themselves, reviewed in great detail - risks & benefits, discussion over 20 minutes, 4/9/2021, 5:00 pm    OK with home care/palliative care/hospice

## 2021-04-12 NOTE — TELEPHONE ENCOUNTER
Mary calling back.  States she has not received the order for ok for palliative care.     Asking to have that order faxed to her.    This telephone encounter faxed to her at the number below.

## 2021-04-15 PROBLEM — N17.9 ACUTE KIDNEY INJURY (H): Status: ACTIVE | Noted: 2021-01-01

## 2021-04-15 PROBLEM — U07.1 PNEUMONIA DUE TO 2019 NOVEL CORONAVIRUS: Status: ACTIVE | Noted: 2021-01-01

## 2021-04-15 PROBLEM — D47.9 LYMPHOPROLIFERATIVE DISEASE (H): Status: ACTIVE | Noted: 2021-01-01

## 2021-04-15 PROBLEM — J96.01 ACUTE RESPIRATORY FAILURE WITH HYPOXIA (H): Status: ACTIVE | Noted: 2021-01-01

## 2021-04-15 PROBLEM — J12.82 PNEUMONIA DUE TO 2019 NOVEL CORONAVIRUS: Status: ACTIVE | Noted: 2021-01-01

## 2021-04-15 PROBLEM — R79.1 SUPRATHERAPEUTIC INR: Status: ACTIVE | Noted: 2021-01-01
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
## 2021-04-15 NOTE — H&P
"[facility] History and Physical    Ángela Dumont MRN# [id number]   Age: 80 year old YOB: 1940     Date of Admission:  4/15/2021    Home clinic: [facility]  Primary care provider: Joaquin Rockwell          Assessment and Plan:   Assessment:   Ángela Dumont had come to attention under this mrn, which does not include any of her PMH.  The correct MRN and full chart: [id number].    Ángela Dumont is a 80 year old who has been at home with family where they have been trying to support her in her dying days. It appears that her family and PMD have been trying to convince Mr. Dumont, her  that he needed more help for appropriate care.   Mr. Dumont got started with pursuing hospice but they have not had time to get engaged in her care as yet. In addition, he has had many conflicts with the team that he has employed for her basic care at home.      Chief concern expressed to the triage was that of \"fever\" and apparent increased \"lethargy\".  Son also includes that he has long been concerned about his mother's long-standing limitations, clear generalized decline over the past couple of weeks to months and obvious difficulty of her  to adequately care for her.     In the ED, Ms. Dumont was at first not linked to the correct MRN which lead to quite aggressive evaluation and management including evaluation, fluid resuscitation and IV antibiotics. I was asked to admit her with plans to enter Hospice.     I contacted both the pt's spouse and her son who confirmed that they are interested in pursuing Hospice care. I told them that we cannot predict well when she will , but she appears dehydrated, hypoxic and possibly infected. Her WBC 94 K with high lymphocytes, her sodium is 152 with creat 1.64 and LFT abnormalities: /.  COVID PCR positive.    Dx:  1.  End-stage wasting, acute fever, COVID infection (unclear acuity).    2.  Dehydration with ANDRES. Baseline creat 0.8, " "current creat 1.64.  3.  Advanced Dementia.  4.  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 Susanna Duran's note from 7/9/2020.    5.  Hypoxia.  No clear explanation based on the Chest CT.    6.  COVID PCR positive.  Pt reportedly had immunizations for COVID, and currently has minimal classic COVID infections.   7.  Supratherapeutic INR (last value on 4./8/2021 was 2.8).        Plan:   1.  Admit to inpatient on comfort care.   2.  I specifically indicated that as part of Comfort Care, the patient will NOT be offered IVF, antibiotics or further testing of any kind.  3.  Palliative care and Hospice consultations.              Chief Complaint:   Pt is unable to give meaningful information.      History is obtained from the patient's son and patient's spouse as well as Dr. Garcia and EMR.    Ms. Dumont reportedly has been severely limited with regard to communication for about the past 2 years, according to the patient's son. Her , who is apparently very hard to interact with (he has apparently said nasty, racist things to care-givers in his home) has been attempting to manage her cares on his own.      According to her , Ms. Dumont and he communicate \"in a very limited way\".  She apparently says \"yes\" and \"no\", though I am not certain that she is reliable.      Based on report from her son, she has not eaten for several days.         Past Medical History:   MGUS   Lymphoproliferative disorder  Depression  Dementia  HTN  Dyslipidemia  Chronic anticoagulation due to hx VTE  Normal pressure hydrocephalus         Past Surgical History:     shunt placement   GYN surgery  ABd surgery, not specified         Social History:   Remote smoker, non-drinker  Lives with          Family History:   Not contributory to this hospitalization.          Allergies:     Allergies   Allergen Reactions     Penicillins              " Medications:   Furosemide 40 mg daily  Levothyroxine 50 mcg daily  Tamsulosin 0.4 mg daily  Warfarin 2 mg daily         Review of Systems:   Review of systems is limited by patient factors - mental status, confusion and sedation           Physical Exam:   Vitals were reviewed  Temp: 98.4  F (36.9  C) Temp src: Tympanic BP: 123/75 Pulse: 121   Resp: (!) 32 SpO2: 95 % O2 Device: Oxymask Oxygen Delivery: 11 LPM  Constitutional: warm to touch.  Minimally reactive with me. No apparent distress.   Neck: Supple, symmetrical, trachea midline, no adenopathy, thyroid symmetric, not enlarged and no tenderness, skin normal.  Hematologic / Lymphatic: No cervical lymphadenopathy and no supraclavicular lymphadenopathy.  Lungs: No increased work of breathing, good air exchange, clear to auscultation bilaterally, no crackles or wheezing.  Cardiovascular: Regular rate and rhythm.  Abdomen: No scars, normal bowel sounds, soft, non-distended, non-tender.  Musculoskeletal: no obvious defects  Neurologic: somnolent  Skin: No rashes, erythema, pallor, petechia or purpura.          Data:     Results for orders placed or performed during the hospital encounter of 04/15/21 (from the past 24 hour(s))   CBC with platelets differential   Result Value Ref Range    WBC 94.2 (HH) 4.0 - 11.0 10e9/L    RBC Count 4.65 3.8 - 5.2 10e12/L    Hemoglobin 12.8 11.7 - 15.7 g/dL    Hematocrit 42.0 35.0 - 47.0 %    MCV 90 78 - 100 fl    MCH 27.5 26.5 - 33.0 pg    MCHC 30.5 (L) 31.5 - 36.5 g/dL    RDW 16.1 (H) 10.0 - 15.0 %    Platelet Count 158 150 - 450 10e9/L    Diff Method Manual Differential     % Neutrophils 22.0 %    % Lymphocytes 71.0 %    % Monocytes 6.0 %    % Eosinophils 0.0 %    % Basophils 0.0 %    % Metamyelocytes 1.0 %    Nucleated RBCs 1 (H) 0 /100    Absolute Neutrophil 20.7 (H) 1.6 - 8.3 10e9/L    Absolute Lymphocytes 66.9 (H) 0.8 - 5.3 10e9/L    Absolute Monocytes 5.7 (H) 0.0 - 1.3 10e9/L    Absolute Eosinophils 0.0 0.0 - 0.7 10e9/L     Absolute Basophils 0.0 0.0 - 0.2 10e9/L    Absolute Metamyelocytes 0.9 (H) 0 10e9/L    Absolute Nucleated RBC 0.9     RBC Morphology Consistent with reported results     Platelet Estimate       Automated count confirmed.  Platelet morphology is normal.   INR   Result Value Ref Range    INR >10.00 (HH) 0.86 - 1.14   Comprehensive metabolic panel   Result Value Ref Range    Sodium 152 (H) 133 - 144 mmol/L    Potassium 3.8 3.4 - 5.3 mmol/L    Chloride 119 (H) 94 - 109 mmol/L    Carbon Dioxide 27 20 - 32 mmol/L    Anion Gap 6 3 - 14 mmol/L    Glucose 125 (H) 70 - 99 mg/dL    Urea Nitrogen 75 (H) 7 - 30 mg/dL    Creatinine 1.64 (H) 0.52 - 1.04 mg/dL    GFR Estimate 29 (L) >60 mL/min/[1.73_m2]    GFR Estimate If Black 34 (L) >60 mL/min/[1.73_m2]    Calcium 10.1 8.5 - 10.1 mg/dL    Bilirubin Total 0.6 0.2 - 1.3 mg/dL    Albumin 3.3 (L) 3.4 - 5.0 g/dL    Protein Total 8.5 6.8 - 8.8 g/dL    Alkaline Phosphatase 85 40 - 150 U/L     (H) 0 - 50 U/L     (H) 0 - 45 U/L   Lipase   Result Value Ref Range    Lipase 94 73 - 393 U/L   Lactic acid whole blood   Result Value Ref Range    Lactic Acid 1.7 0.7 - 2.0 mmol/L   Blood culture    Specimen: Blood    Right Arm   Result Value Ref Range    Specimen Description Blood Right Arm     Culture Micro No growth after 2 hours    Blood gas venous and oxyhgb   Result Value Ref Range    Ph Venous 7.46 (H) 7.32 - 7.43 pH    PCO2 Venous 39 (L) 40 - 50 mm Hg    PO2 Venous 50 (H) 25 - 47 mm Hg    Bicarbonate Venous 28 21 - 28 mmol/L    FIO2 6L     Oxyhemoglobin Venous 85 %    Base Excess Venous 3.6 mmol/L   EKG 12-lead, tracing only   Result Value Ref Range    Interpretation ECG Click View Image link to view waveform and result    Symptomatic Influenza A/B & SARS-CoV2 (COVID-19) Virus PCR Multiplex    Specimen: Nasopharyngeal   Result Value Ref Range    Flu A/B & SARS-COV-2 PCR Source Nasopharyngeal     SARS-CoV-2 PCR Result POSITIVE (AA)     Influenza A PCR Negative NEG^Negative     Influenza B PCR Negative NEG^Negative    Respiratory Syncytial Virus PCR (Note)     Flu A/B & SARS-CoV-2 PCR Comment (Note)    XR Chest Port 1 View    Narrative    XR CHEST PORT 1 VW 4/15/2021 1:13 PM    HISTORY: cough, fever    COMPARISON: None.      Impression    IMPRESSION: Linear opacities at the left lung base could represent  atelectasis or developing pneumonia. No pleural effusion, or  pneumothorax. The cardiac and mediastinal silhouettes are normal.  Ventriculoperitoneal shunt tubing coursing over the left chest.    MILAGROS MAIN MD   CT Head w/o Contrast    Narrative    CT SCAN OF THE HEAD WITHOUT CONTRAST   4/15/2021 2:01 PM     HISTORY: Mental status change, unknown cause. Confusion.    TECHNIQUE:  Axial images of the head and coronal reformations without  IV contrast material.  Radiation dose for this scan was reduced using  automated exposure control, adjustment of the mA and/or kV according  to patient size, or iterative reconstruction technique.    COMPARISON: None.    FINDINGS: There is a left frontal ventricular catheter whose tip  transverses the left frontal horn, septum pellucidum and ends in the  right frontal horn. There is moderate to severe ventriculomegaly with  some associated moderate prominence of the subarachnoid spaces.  Temporal horns are also enlarged. There is no evidence for acute  infarct, intracranial hemorrhage, mass effect, or skull fracture.  Visualized paranasal sinuses and mastoid air cells are clear.      Impression    IMPRESSION:  1. Moderate to severe ventriculomegaly slightly out of proportion to  the size of the subarachnoid space with ventricular catheter in place.  Without old films, it is difficult to exclude hydrocephalus, but the  findings are probably due to a compensated communicating hydrocephalus  with some associated cerebral atrophy. Comparison with old films could  be helpful in further evaluation if they can be obtained.  2. No evidence for intracranial  hemorrhage, acute infarct, or any mass  effect.    GUERITA COLLAZO MD   CT Chest Abdomen Pelvis w/o Contrast    Addendum: 4/15/2021    RANJIT GAN  Accession # [id number]    The original report on this patient was dictated by me.      Impression #6: Rectal fecal impaction with a large amount of formed  stool in the rectum and moderate amount of formed stool in the colon.    MILAGROS MAIN MD (Date of Addendum: 4/15/2021 2:31 PM)             MILAGROS MAIN MD      Narrative    CT CHEST ABDOMEN PELVIS W/O CONTRAST 4/15/2021 2:03 PM    CLINICAL HISTORY: Sepsis    TECHNIQUE: CT scan of the chest, abdomen, and pelvis was performed  without IV contrast. Multiplanar reformats were obtained. Dose  reduction techniques were used.   CONTRAST: None.    COMPARISON: Chest radiograph earlier today    FINDINGS:   LUNGS AND PLEURA: Left lower lobe consolidative opacity, likely  pneumonia. No pleural effusion or pneumothorax. Small cluster of  nodules in the right upper lobe measuring up to 5 mm (series 5, image  55) may be infectious or postinfectious. Few other small pulmonary  nodules including a 3 mm subcentimeter right upper lobe nodule (series  5, image 66).    MEDIASTINUM/AXILLAE: Bilateral axillary and mediastinal  lymphadenopathy. For example, there is a 1.4 cm short axis right  axillary lymph node (series 3, image 64), 3.0 x 2.3 cm left axillary  lymph node (series 3, image 61) and 1.5 cm subcarinal lymph node  (series 3, image 60). Numerous small bilateral supraclavicular lymph  nodes. No thoracic aortic aneurysm. Severe coronary artery  calcifications. Trace pericardial effusion.    HEPATOBILIARY: Normal liver. Distended gallbladder without calcified  gallstones. No biliary ductal dilatation.    PANCREAS: Normal.    SPLEEN: Splenomegaly with the spleen measuring 13.3 cm.    ADRENAL GLANDS: Normal.    KIDNEYS/BLADDER: No hydronephrosis or renal masses. Left renal simple  cyst requiring no specific follow-up.    BOWEL: Normal  caliber loops of small bowel. Large amount of formed  stool in the rectum and moderate amount of formed stool in the  remaining colon. No inflammatory changes.    LYMPH NODES: Enlarged bilateral pelvic lymph nodes and multiple  prominent retroperitoneal lymph nodes. For example, there is a 3.8 x  2.3 cm right distal external iliac lymph node (series 3, image 60) and  a 4.2 x 1.6 and meter left distal external iliac lymph node (series 3,  image 252). Multiple mildly enlarged retroperitoneal lymph nodes  including a 1.2 cm short axis left para-aortic lymph node (series 3,  image 174).    VASCULATURE: No abdominal aortic aneurysm. Aortobiiliac  atherosclerotic calcifications.    PELVIC ORGANS: Hysterectomy.    OTHER: Ventriculoperitoneal shunt tubing coiling in the left lower  quadrant of the abdomen. No free fluid or extraluminal air. No  intra-abdominal fluid collections.    MUSCULOSKELETAL: Right femur intramedullary molina and screw. Osteopenia.  Degenerative changes in the spine. No suspicious lesions in the bones.  Wedge compression deformity of the upper endplate of L1.      Impression    IMPRESSION:  1.  Left lower lobe consolidation is likely pneumonia. A follow-up  chest radiograph in 1 to 3 months to ensure resolution should be  considered.  2.  Few small pulmonary nodules measuring 4 to 5 mm. These are likely  infectious/postinfectious.   3.  Axillary, mediastinal, pelvic and retroperitoneal lymphadenopathy.  Mild splenomegaly. Findings are suspicious for lymphoproliferative  disorder.  4.  Mild gallbladder distention. No calcified gallstones. If there is  clinical suspicion for acute cholecystitis, right upper quadrant  ultrasound can be considered.  5.  Age-indeterminate wedge compression deformity of the L1 vertebral  body, likely chronic.    MILAGROS MAIN MD   Care Management / Social Work IP Consult    Narrative    Angela Coffey RN     4/15/2021  5:48 PM  Care Management Initial Consult    General  Information  Assessment completed with: Spouse or significant other, Call   placed to martin Paniagua  Type of CM/SW Visit: Initial Assessment    Primary Care Provider verified and updated as needed: No   Readmission within the last 30 days: no previous admission in   last 30 days         Advance Care Planning: Advance Care Planning Reviewed: other   (comment)( states he discussed with MD.)          Communication Assessment  Patient's communication style: spoken language (English or   Bilingual)(Patient is non responsive per nurse)             Cognitive  Cognitive/Neuro/Behavioral: (Unable to assess.)                      Living Environment:   People in home: spouse  Patient and  Siva  Current living Arrangements: other (see comments)(4 plex home)        Able to return to prior arrangements:         Family/Social Support:  Care provided by: spouse/significant other, other (see   comments)( and private pay help.)  Provides care for:    Marital Status:   Martin uJniorip       Description of Support System:      Support Assessment: Other (see comments)(Currently pay privately   for help from 10 am-1 pm. No agency.)    Current Resources:   Patient receiving home care services: (Home help. No agency.)     Community Resources:    Equipment currently used at home: other (see comments)(sit to   stand transfer ([employer]))  Supplies currently used at home:      Employment/Financial:  Employment Status:          Financial Concerns:             Lifestyle & Psychosocial Needs:        Socioeconomic History     Marital status:      Spouse name: Not on file     Number of children: Not on file     Years of education: Not on file     Highest education level: Not on file          Functional Status:  Prior to admission patient needed assistance:   Dependent ADLs:: Bathing, Dressing, Eating, Grooming,   Incontinence, Positioning, Transfers, Toileting  Dependent IADLs:: Cleaning, Cooking, Laundry,  Shopping, Meal   Preparation, Medication Management, Money Management,   Transportation, Incontinence  Assesssment of Functional Status: Not at  functional baseline    Mental Health Status:          Chemical Dependency Status:                Values/Beliefs:  Spiritual, Cultural Beliefs, Bahai Practices, Values that   affect care:                 Additional Information:  Patient is unresponsive per nurse and she is in isolation due to   COVID. Call placed to her  Siva and he provided all   information.  He is her main caregiver and he also has hired help   from 10 am- 2pm.  He is trying to get more help in the evening.    This help is not from an agency.      Siva states she has the beginnings of dementia but he feels she   knows who he is and he is able to communicate with her and   guiding her to the task at hand.  She is total care and he uses a   [facility] transfer to get her out of bed and on to the toilet.    He feeds her and she likes soft foods.  They have been    for  58 years.  Family helps at times but they have their own   lives.    Siva was informed that she is positive for COVID. He is   surprised by this as she has had one shot and she does not go   anywhere.  He states he has also had one shot of the vaccine so   far.      MD states that patient is on comfort cares and may go on to   Hospice and  agreed.  He does want to visit her and I   encouraged him to call before coming to confirm with the nursing   staff and their rules.    VIVEK JERONIMO RN          EKG results:   Performed on admission        Normal sinus rhythm, normal axis, no acute ischemic ST segment or T wave changes      All imaging studies reviewed by me.      Attestation:  I have reviewed today's vital signs, notes, medications, labs and imaging.  Total time: 30 minutes     Jaxson Ramos MD

## 2021-04-15 NOTE — PHARMACY-ADMISSION MEDICATION HISTORY
Admission medication history interview status for this patient is complete. See [facility] admission navigator for allergy information, prior to admission medications and immunization status.     Medication history interview done, indicate source(s): Patient's  Siva via phone ([phone])  Medication history resources (including written lists, pill bottles, clinic record):  had pill bottles      Changes made to PTA medication list:  Added: all meds  Deleted: none  Changed: none    Actions taken by pharmacist (provider contacted, etc):None     Additional medication history information:    **Last anticoagulation clinic note had warfarin dose at 4mg on Mon-Fri & 2mg all other days.  states they were doing 4mg M-W-F & 2mg all other days.       Medication reconciliation/reorder completed by provider prior to medication history?  N   (Y/N)         Prior to Admission medications    Medication Sig Last Dose Taking? Auth Provider   furosemide (LASIX) 40 MG tablet Take 40 mg by mouth daily 4/14/2021 at am Yes Unknown, Entered By History   levothyroxine (SYNTHROID/LEVOTHROID) 50 MCG tablet Take 50 mcg by mouth daily 4/15/2021 at am Yes Unknown, Entered By History   Multiple Vitamins-Minerals (EQ MULTIVITAMINS ADULT GUMMY PO) Take 2 chew tab by mouth daily 4/14/2021 at am Yes Unknown, Entered By History   tamsulosin (FLOMAX) 0.4 MG capsule Take 0.4 mg by mouth daily 4/14/2021 at am Yes Unknown, Entered By History   warfarin ANTICOAGULANT (COUMADIN) 2 MG tablet Take by mouth every evening 4mg Rrczcc-Qyhjuhkza-Rnnypz & 2mg Eyhpvm-Gzzqpbx-Psmvsnqi-Saturday 4/14/2021 at pm Yes Unknown, Entered By History

## 2021-04-15 NOTE — ED NOTES
Bed: ED17  Expected date: 4/15/21  Expected time: 11:24 AM  Means of arrival: Ambulance  Comments:  Norma 513- 79 yo weakness

## 2021-04-15 NOTE — TELEPHONE ENCOUNTER
"Patient's spouse, Juan David, calling    Stated they have a home care nurse coming at 2pm today.     Stated patient had a bleed out from both of her arms. Unsure what triggered it. Is on warfarin.   Is still bleeding, started last night.   Wounds are \"pretty big.\" Will likely need stitches.   P 120   T 101F    Advised to have patient to proceed to ED ASAP. Spouse to call 911.     Writer called [facility] ED to update.       Tessy Gandara RN  [facility]  "

## 2021-04-15 NOTE — ED TRIAGE NOTES
Arrives via EMS from home where lives with  and 24/7 caregiver.  Has hospital bed, ramps, wheelchair at home. Patient is non-verbal.  Unknown what hx is. EMS were called due to several skin tears opening last night. ZCD=592's en route, EL=845's en route.  Patient feels warm. 101.0T temporal, will obtain a rectal temp. Satting 87% on RA.  Placed on oxymask at 6L satting 90%-91%.  Patient moaning. Does not open eyes. Multiple bruises, skin tears.  Patient  stated to EMS patient is on coumadin and her levels are probably off. Unknown when last seen PCP.

## 2021-04-15 NOTE — CONSULTS
Care Management Initial Consult    General Information  Assessment completed with: Spouse or significant other, Call placed to  Siva  Type of CM/SW Visit: Initial Assessment    Primary Care Provider verified and updated as needed: No   Readmission within the last 30 days: no previous admission in last 30 days         Advance Care Planning: Advance Care Planning Reviewed: other (comment)( states he discussed with MD.)          Communication Assessment  Patient's communication style: spoken language (English or Bilingual)(Patient is non responsive per nurse)             Cognitive  Cognitive/Neuro/Behavioral: (Unable to assess.)                      Living Environment:   People in home: spouse  Patient and  Siva  Current living Arrangements: other (see comments)(4 plex home)      Able to return to prior arrangements:         Family/Social Support:  Care provided by: spouse/significant other, other (see comments)( and private pay help.)  Provides care for:    Marital Status:     Siva       Description of Support System:      Support Assessment: Other (see comments)(Currently pay privately for help from 10 am-1 pm. No agency.)    Current Resources:   Patient receiving home care services: (Home help. No agency.)     Community Resources:    Equipment currently used at home: other (see comments)(sit to stand transfer ([employer]))  Supplies currently used at home:      Employment/Financial:  Employment Status:          Financial Concerns:             Lifestyle & Psychosocial Needs:        Socioeconomic History     Marital status:      Spouse name: Not on file     Number of children: Not on file     Years of education: Not on file     Highest education level: Not on file          Functional Status:  Prior to admission patient needed assistance:   Dependent ADLs:: Bathing, Dressing, Eating, Grooming, Incontinence, Positioning, Transfers, Toileting  Dependent IADLs::  Cleaning, Cooking, Laundry, Shopping, Meal Preparation, Medication Management, Money Management, Transportation, Incontinence  Assesssment of Functional Status: Not at  functional baseline    Mental Health Status:          Chemical Dependency Status:                Values/Beliefs:  Spiritual, Cultural Beliefs, Mosque Practices, Values that affect care:                 Additional Information:  Patient is unresponsive per nurse and she is in isolation due to COVID. Call placed to her  Siva and he provided all information.  He is her main caregiver and he also has hired help from 10 am- 2pm.  He is trying to get more help in the evening.  This help is not from an agency.      Siva states she has the beginnings of dementia but he feels she knows who he is and he is able to communicate with her and guiding her to the task at hand.  She is total care and he uses a [facility] transfer to get her out of bed and on to the toilet.  He feeds her and she likes soft foods.  They have been  for  58 years.  Family helps at times but they have their own lives.    Siva was informed that she is positive for COVID. He is surprised by this as she has had one shot and she does not go anywhere.  He states he has also had one shot of the vaccine so far.      MD states that patient is on comfort cares and may go on to Hospice and  agreed.  He does want to visit her and I encouraged him to call before coming to confirm with the nursing staff and their rules.    VIVEK JERONIMO, SMITA

## 2021-04-15 NOTE — ED NOTES
RECEIVING UNIT ED HANDOFF REVIEW    Above ED Nurse Handoff Report was reviewed: Yes  Reviewed by: Franny Law RN on April 15, 2021 at 5:04 PM   .  [facility]  ED Nurse Handoff Report    Ángela Dumont is a 80 year old female   ED Chief complaint: No chief complaint on file.  . ED Diagnosis:   Final diagnoses:   Supratherapeutic INR   Pneumonia due to 2019 novel coronavirus   Acute respiratory failure with hypoxia (H)   Lymphoproliferative disease (H)   Acute kidney injury (H)     Allergies:   Allergies   Allergen Reactions     Penicillins        Code Status: DNR / DNI  Activity level - Baseline/Home:  Total Care. Activity Level - Current:   Total Care. Lift room needed: No. Bariatric: No   Needed: No   Isolation: Yes. Infection: Not Applicable  COVID r/o and special precautions.     Vital Signs:   Vitals:    04/15/21 1440 04/15/21 1445 04/15/21 1500 04/15/21 1515   BP:   114/62    Pulse: 121 122 120 121   Resp: 27 (!) 31 27    Temp:       TempSrc:       SpO2: (!) 89% 90% 91% 92%       Cardiac Rhythm:  ,      Pain level:    Patient confused: Yes. Patient Falls Risk: Yes.   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 Per EMS, the patient lives at home with her  and a 24/7 caregiver. EMS noted hospital beds, ramps, and wheelchair at home. Patient history of unknown. EMS says that they were called due to the patient's skin tears that had opened up last night. On arrival EMS noted that the patient had a fever of 101, was tachycardiac at 120's, and was satting 87% on room air. The patient's  says that the patient is on coumadin and her levels are probably off. It is unknown when her last PCP visit was. Here in the ED the patient is noted to have a fever, cough, congestion, and  lethargic.      Per , the patient has been more lethargic lately. He says that the patient has not been having bloody stools, blood in her vomit, or blood when she coughs. He also says that the patient has been taking all of her medications appropriately. .   Focused Assessment:   Respiratory Respiratory - Respiratory WDL: .WDL except; all  Rhythm/Pattern, Respiratory: shortness of breath; hypopnea; tachypnea  Cough Frequency: frequent  Cough Type: good; productive   Respiratory Assistance - Oxygen Therapy Device: mask (oxymask at 10L) LA     12:10 Skin Color/Condition Skin - Skin WDL: .WDL except  Skin Temperature: hot  Skin Integrity/Characteristics: bruised; other (see comments) (numerous large skin tears to bilat arms) Skin Comment:  (deep wound on coccyx; duoderm is applied from home)  Other flowsheet entries - Bruised (ecchymotic) location: Right; Left; Face; Upper Arm; Fore Arm      Neurological Cognitive - Cognitive/Neuro/Behavioral WDL: .WDL except (patient is nonverbal; baseline mental status per EMS. Has not opened eyes. Moaning on and off)          Tests Performed: EKG, labs, urine CT head, CXR. Abnormal Results: .  Labs Ordered and Resulted from Time of ED Arrival Up to the Time of Departure from the ED   CBC WITH PLATELETS DIFFERENTIAL - Abnormal; Notable for the following components:       Result Value    WBC 94.2 (*)     MCHC 30.5 (*)     RDW 16.1 (*)     Nucleated RBCs 1 (*)     Absolute Neutrophil 20.7 (*)     Absolute Lymphocytes 66.9 (*)     Absolute Monocytes 5.7 (*)     Absolute Metamyelocytes 0.9 (*)     All other components within normal limits   INR - Abnormal; Notable for the following components:    INR >10.00 (*)     All other components within normal limits   COMPREHENSIVE METABOLIC PANEL - Abnormal; Notable for the following components:    Sodium 152 (*)     Chloride 119 (*)     Glucose 125 (*)     Urea Nitrogen 75 (*)     Creatinine 1.64 (*)     GFR Estimate 29 (*)     GFR  Estimate If Black 34 (*)     Albumin 3.3 (*)      (*)      (*)     All other components within normal limits   INFLUENZA A/B & SARS-COV2 PCR MULTIPLEX - Abnormal; Notable for the following components:    SARS-CoV-2 PCR Result POSITIVE (*)     All other components within normal limits   BLOOD GAS VENOUS AND OXYHGB - Abnormal; Notable for the following components:    Ph Venous 7.46 (*)     PCO2 Venous 39 (*)     PO2 Venous 50 (*)     All other components within normal limits   LIPASE   LACTIC ACID WHOLE BLOOD   UA MACROSCOPIC WITH REFLEX TO MICRO AND CULTURE   BLOOD CULTURE   BLOOD CULTURE     .  CT Chest Abdomen Pelvis w/o Contrast   Final Result   Addendum 1 of Jorge A GAN   Accession # [id number]      The original report on this patient was dictated by me.        Impression #6: Rectal fecal impaction with a large amount of formed   stool in the rectum and moderate amount of formed stool in the colon.      MILAGROS MAIN MD (Date of Addendum: 4/15/2021 2:31 PM)                   MILAGROS MAIN MD      Final      CT Head w/o Contrast   Preliminary Result   IMPRESSION:   1. Moderate to severe ventriculomegaly slightly out of proportion to   the size of the subarachnoid space with ventricular catheter in place.   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 Comparison with old films could   be helpful in further evaluation if they can be obtained.   2. No evidence for intracranial hemorrhage, acute infarct, or any mass   effect.      XR Chest Port 1 View   Final Result   IMPRESSION: Linear opacities at the left lung base could represent   atelectasis or developing pneumonia. No pleural effusion, or   pneumothorax. The cardiac and mediastinal silhouettes are normal.   Ventriculoperitoneal shunt tubing coursing over the left chest.      MILAGROS MAIN MD        .    Treatments provided: see ED meds below  Family Comments: NA  OBS brochure/video discussed/provided to patient:  N/A  ED Medications:   Medications   HOLD: warfarin (COUMADIN) therapy (has no administration in time range)   ceFEPIme (MAXIPIME) 2 g vial to attach to  ml bag for ADULTS or 50 ml bag for PEDS (0 g Intravenous Stopped 4/15/21 1437)   phytonadione (AQUA-MEPHYTON) 10 mg in sodium chloride 0.9 % 50 mL intermittent infusion (0 mg Intravenous Stopped 4/15/21 1509)   acetaminophen (TYLENOL) Suppository 650 mg (650 mg Rectal Given 4/15/21 1427)   0.9% sodium chloride BOLUS (0 mLs Intravenous Stopped 4/15/21 1437)   dexamethasone PF (DECADRON) injection 10 mg (10 mg Intravenous Given 4/15/21 1424)     Drips infusing:  No  For the majority of the shift, the patient's behavior Green. Interventions performed were NA.    Sepsis treatment initiated: No     Patient tested for COVID 19 prior to admission: YES is positive    ED Nurse Name/Phone Number: Amara Domínguez RN,   3:26 PM

## 2021-04-16 NOTE — TELEPHONE ENCOUNTER
Received call from  at [facility]. Patient is currently admitted, stable, plan is to discharge on hospice. SW asks if orders were faxed. Reviewed notes from [facility], which state they received orders for hospice. Informed AYALA to contact [facility] as that is who she is going to be receiving care through. Number given for :    GÉNESIS Gonzalez, [facility]    [facility]  [phone]    Monica Butler RN  [facility]

## 2021-04-16 NOTE — PLAN OF CARE
To Do:  End of Shift Summary  For vital signs and complete assessments, please see documentation flowsheets.     Pertinent assessments:unresponsive --- o2 on per oxy mask for comfort sats 88%     pure wick placed --- mouth cares done                       Major Shift Events  comfort care --- family here   Treatment Plan:  comfort   Bedside Nurse: Franny Law RN

## 2021-04-16 NOTE — CONSULTS
[facility]  Palliative Care Consultation   Text Page    Assessment & Plan   Ángela Dumont is a 80 year old female who was admitted on 4/15/2021.   Consulted by Dr. Ramos to assist with symptom management, goals of care, and development of plan of care.     Recommendations:  1. Goals of Care- No CPR- Do NOT Intubate- comfort cares  Hospitalization goals discussed  Discussed comfort care and hospice on discharge.  reports that he has had bad experiences with facilities in the past  Decisional Capacity- Unreliable. Patient does not have an advance directive. Per  informed consent policy next of kin should be involved if patient becomes unable.  -NOK is   Siva Dumont    2. Generalized discomfort   Patient's opioid use in past 24 hours: 1mg IV = 20 mg Daily Morphine Equivalent  [place] Board of Pharmacy Data Base Reviewed:    YES; As expected, no concern for misuse/abuse of controlled medications based on this report.  -Hydromorphone 1 mg every 2 hours PRN    3. Delirium, restlessness  -haloperidol 0.5 mg every 8 hours and 0.5 mg every 6 hours PRN    4. Comfort cares  -Atropine 2 drops every 2 hours PRN secretions  -Lorazepam 0.5 mg every 4 hours PRN  -Haloperidol 0.5 mg every 8 hours scheduled every 6 hours PRN  Artifical saliva 2 sprays 4 times daily    5. Spiritual Care  Oriented to Spiritual Health as part of Palliative Care team. Appreciate Care of  Rodolfo Srinivasan.  Spiritual Background: Restorationism    6. Care Planning  Appreciate Care of [facility] for discharge planning.  Medications for discharge hospice medications-liquid with no ranges ordered    Medical Decision Making and Goals of Care:  Discussed on April 16, 2021 with Josie SEGUNDO, CNP: Phone call placed to spouse Siva. Reviewed current condition and goals of care. Siva stated she is comofrt cares at this time and is hoping he is able to come visit her soon. Discussed that due to  "her positive COVID and he was already exposed that he is not aloweed to come visit. When asked if they are hoping to take her home or a facility. Siva reports that he \"cares for her the best and no one is good enough\" discussed that in the best of circumstances it is hard for one person to care for loved ones by themselves. He stated he is willing to get help in the home but is not sure how much he will needs. Discussed the care needs 24 hours a day. He said he has been to many facilities and he does not like the care she gets there. Asked if it would be ok to reach out to daughter Cadence. He stated it would be ok. Cadence was contacted by phone. Reviewed the above. She expressed concerns about him caring for him at home. She says he has been caring for her as he is able but is tired. She expressed that she does not think he's sick but mostly run down. She said she will talk with her family. She is hoping to also talk to the  about plans. SW updated on the above. Hospice medications ordered.    Thank you for involving us in the patient's care.     Josie SEGUNDO CNP  Pain Management and Palliative Care  [facility]  Pgr: [phone]    Time Spent on this Encounter   Total unit/floor time 87 minutes, time consisted of the following, examination of the patient, reviewing the record and completing documentation. >50% of time spent in counseling and coordination of care, Bedside Nurse Nayeli and Hospitalist Dr Guillermo.  Time spend counseling with family consisted of the following topics, goals of care, education about diagnosis, education about prognosis, care planning for discharge and symptom management.    Understanding of disease process:   This has been discussed with  Siva.    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 Found to have dehydration, hypoxia and positive COVID. She was made comfort cares in the ED.     Past Medical History   I have reviewed this patient's medical history and updated it with pertinent information if needed.   No past medical history on file.    Past Surgical History   I have reviewed this patient's surgical history and updated it with pertinent information if needed.  No past surgical history on file.    Prior to Admission Medications   Prior to Admission Medications   Prescriptions Last Dose Informant Patient Reported? Taking?   Multiple Vitamins-Minerals (EQ MULTIVITAMINS ADULT GUMMY PO) 4/14/2021 at am  Yes Yes   Sig: Take 2 chew tab by mouth daily   furosemide (LASIX) 40 MG tablet 4/14/2021 at am  Yes Yes   Sig: Take 40 mg by mouth daily   levothyroxine (SYNTHROID/LEVOTHROID) 50 MCG tablet 4/15/2021 at am  Yes Yes   Sig: Take 50 mcg by mouth daily   tamsulosin (FLOMAX) 0.4 MG capsule 4/14/2021 at am  Yes Yes   Sig: Take 0.4 mg by mouth daily   warfarin ANTICOAGULANT (COUMADIN) 2 MG tablet 4/14/2021 at pm  Yes Yes   Sig: Take by mouth every evening 4mg Vdfkrq-Noffuvxnk-Ckvkaf & 2mg Lzoskg-Umagxmy-Qrqdvngn-Saturday      Facility-Administered Medications: None     Allergies   Allergies   Allergen Reactions     Penicillins        Social History   I have updated and reviewed the following Social History Narrative:   Social History     Social History Narrative     Not on file        Family History   I have reviewed this patient's family history and updated it with pertinent information if needed.   No family history on file.    Review of Systems   Review of systems not obtained due to patient factors - confusion and mental status    Physical Exam   Temp:  [98.4  F  (36.9  C)-101  F (38.3  C)] 98.4  F (36.9  C)  Pulse:  [118-126] 121  Resp:  [18-32] 32  BP: (105-127)/(62-95) 123/75  SpO2:  [86 %-95 %] 95 %  0 lbs 0 oz  Exam: limited assessment due to COVID positive isolation  GENERAL APPEARANCE:  thin, appears ill, lethargic  ENT:  lips appear driy  EYES:  eye closed on assessment  RESP:  breathing is even and nonlabored  CV:  rate-normal  PSYCH:  confused, lethargic    Delirium Screen/CAM:  Delirium = (#1 and #2 = YES) + (#3 and/or #4)   1) Acute onset and fluctuating course:   YES   (acute change in mental status from baseline over last 24 hours)  2) Inattention:   No   (difficulty focusing, distractible, can't follow conversation)  3) Disorganized thinking:   YES   (score only if #1 and #2 are YES)  (rambling/irrelevant conversation, unclear/illogical thoughts, inconsistency)  4) Altered level of consciousness:   YES   (score only if #1 and #2 are YES)  (other than alert, calm, cooperative)    Delirium/CAM score: 3/4  Interpretation:  1)  Delirium:  Present  2)  Type:  mixed  3)  Severity:  moderate    Data   Results for orders placed or performed during the hospital encounter of 04/15/21 (from the past 24 hour(s))   CBC with platelets differential   Result Value Ref Range    WBC 94.2 (HH) 4.0 - 11.0 10e9/L    RBC Count 4.65 3.8 - 5.2 10e12/L    Hemoglobin 12.8 11.7 - 15.7 g/dL    Hematocrit 42.0 35.0 - 47.0 %    MCV 90 78 - 100 fl    MCH 27.5 26.5 - 33.0 pg    MCHC 30.5 (L) 31.5 - 36.5 g/dL    RDW 16.1 (H) 10.0 - 15.0 %    Platelet Count 158 150 - 450 10e9/L    Diff Method Manual Differential     % Neutrophils 22.0 %    % Lymphocytes 71.0 %    % Monocytes 6.0 %    % Eosinophils 0.0 %    % Basophils 0.0 %    % Metamyelocytes 1.0 %    Nucleated RBCs 1 (H) 0 /100    Absolute Neutrophil 20.7 (H) 1.6 - 8.3 10e9/L    Absolute Lymphocytes 66.9 (H) 0.8 - 5.3 10e9/L    Absolute Monocytes 5.7 (H) 0.0 - 1.3 10e9/L    Absolute Eosinophils 0.0 0.0 - 0.7 10e9/L    Absolute Basophils 0.0  0.0 - 0.2 10e9/L    Absolute Metamyelocytes 0.9 (H) 0 10e9/L    Absolute Nucleated RBC 0.9     RBC Morphology Consistent with reported results     Platelet Estimate       Automated count confirmed.  Platelet morphology is normal.   INR   Result Value Ref Range    INR >10.00 (HH) 0.86 - 1.14   Comprehensive metabolic panel   Result Value Ref Range    Sodium 152 (H) 133 - 144 mmol/L    Potassium 3.8 3.4 - 5.3 mmol/L    Chloride 119 (H) 94 - 109 mmol/L    Carbon Dioxide 27 20 - 32 mmol/L    Anion Gap 6 3 - 14 mmol/L    Glucose 125 (H) 70 - 99 mg/dL    Urea Nitrogen 75 (H) 7 - 30 mg/dL    Creatinine 1.64 (H) 0.52 - 1.04 mg/dL    GFR Estimate 29 (L) >60 mL/min/[1.73_m2]    GFR Estimate If Black 34 (L) >60 mL/min/[1.73_m2]    Calcium 10.1 8.5 - 10.1 mg/dL    Bilirubin Total 0.6 0.2 - 1.3 mg/dL    Albumin 3.3 (L) 3.4 - 5.0 g/dL    Protein Total 8.5 6.8 - 8.8 g/dL    Alkaline Phosphatase 85 40 - 150 U/L     (H) 0 - 50 U/L     (H) 0 - 45 U/L   Lipase   Result Value Ref Range    Lipase 94 73 - 393 U/L   Lactic acid whole blood   Result Value Ref Range    Lactic Acid 1.7 0.7 - 2.0 mmol/L   Blood culture    Specimen: Blood    Right Arm   Result Value Ref Range    Specimen Description Blood Right Arm     Culture Micro No growth after 16 hours    Blood gas venous and oxyhgb   Result Value Ref Range    Ph Venous 7.46 (H) 7.32 - 7.43 pH    PCO2 Venous 39 (L) 40 - 50 mm Hg    PO2 Venous 50 (H) 25 - 47 mm Hg    Bicarbonate Venous 28 21 - 28 mmol/L    FIO2 6L     Oxyhemoglobin Venous 85 %    Base Excess Venous 3.6 mmol/L   EKG 12-lead, tracing only   Result Value Ref Range    Interpretation ECG Click View Image link to view waveform and result    Symptomatic Influenza A/B & SARS-CoV2 (COVID-19) Virus PCR Multiplex    Specimen: Nasopharyngeal   Result Value Ref Range    Flu A/B & SARS-COV-2 PCR Source Nasopharyngeal     SARS-CoV-2 PCR Result POSITIVE (AA)     Influenza A PCR Negative NEG^Negative    Influenza B PCR  Negative NEG^Negative    Respiratory Syncytial Virus PCR (Note)     Flu A/B & SARS-CoV-2 PCR Comment (Note)    XR Chest Port 1 View    Narrative    XR CHEST PORT 1 VW 4/15/2021 1:13 PM    HISTORY: cough, fever    COMPARISON: None.      Impression    IMPRESSION: Linear opacities at the left lung base could represent  atelectasis or developing pneumonia. No pleural effusion, or  pneumothorax. The cardiac and mediastinal silhouettes are normal.  Ventriculoperitoneal shunt tubing coursing over the left chest.    MILAGROS MAIN MD   CT Head w/o Contrast    Narrative    CT SCAN OF THE HEAD WITHOUT CONTRAST   4/15/2021 2:01 PM     HISTORY: Mental status change, unknown cause. Confusion.    TECHNIQUE:  Axial images of the head and coronal reformations without  IV contrast material.  Radiation dose for this scan was reduced using  automated exposure control, adjustment of the mA and/or kV according  to patient size, or iterative reconstruction technique.    COMPARISON: None.    FINDINGS: There is a left frontal ventricular catheter whose tip  transverses the left frontal horn, septum pellucidum and ends in the  right frontal horn. There is moderate to severe ventriculomegaly with  some associated moderate prominence of the subarachnoid spaces.  Temporal horns are also enlarged. There is no evidence for acute  infarct, intracranial hemorrhage, mass effect, or skull fracture.  Visualized paranasal sinuses and mastoid air cells are clear.      Impression    IMPRESSION:  1. Moderate to severe ventriculomegaly slightly out of proportion to  the size of the subarachnoid space with ventricular catheter in place.  Without old films, it is difficult to exclude hydrocephalus, but the  findings are probably due to a compensated communicating hydrocephalus  with some associated cerebral atrophy. Comparison with old films could  be helpful in further evaluation if they can be obtained.  2. No evidence for intracranial hemorrhage, acute  infarct, or any mass  effect.    GUERITA COLLAZO MD   CT Chest Abdomen Pelvis w/o Contrast    Addendum: 4/15/2021    RANJIT GAN  Accession # [id number]    The original report on this patient was dictated by me.      Impression #6: Rectal fecal impaction with a large amount of formed  stool in the rectum and moderate amount of formed stool in the colon.    MILAGROS MAIN MD (Date of Addendum: 4/15/2021 2:31 PM)             MILAGROS MAIN MD      Narrative    CT CHEST ABDOMEN PELVIS W/O CONTRAST 4/15/2021 2:03 PM    CLINICAL HISTORY: Sepsis    TECHNIQUE: CT scan of the chest, abdomen, and pelvis was performed  without IV contrast. Multiplanar reformats were obtained. Dose  reduction techniques were used.   CONTRAST: None.    COMPARISON: Chest radiograph earlier today    FINDINGS:   LUNGS AND PLEURA: Left lower lobe consolidative opacity, likely  pneumonia. No pleural effusion or pneumothorax. Small cluster of  nodules in the right upper lobe measuring up to 5 mm (series 5, image  55) may be infectious or postinfectious. Few other small pulmonary  nodules including a 3 mm subcentimeter right upper lobe nodule (series  5, image 66).    MEDIASTINUM/AXILLAE: Bilateral axillary and mediastinal  lymphadenopathy. For example, there is a 1.4 cm short axis right  axillary lymph node (series 3, image 64), 3.0 x 2.3 cm left axillary  lymph node (series 3, image 61) and 1.5 cm subcarinal lymph node  (series 3, image 60). Numerous small bilateral supraclavicular lymph  nodes. No thoracic aortic aneurysm. Severe coronary artery  calcifications. Trace pericardial effusion.    HEPATOBILIARY: Normal liver. Distended gallbladder without calcified  gallstones. No biliary ductal dilatation.    PANCREAS: Normal.    SPLEEN: Splenomegaly with the spleen measuring 13.3 cm.    ADRENAL GLANDS: Normal.    KIDNEYS/BLADDER: No hydronephrosis or renal masses. Left renal simple  cyst requiring no specific follow-up.    BOWEL: Normal caliber loops of  small bowel. Large amount of formed  stool in the rectum and moderate amount of formed stool in the  remaining colon. No inflammatory changes.    LYMPH NODES: Enlarged bilateral pelvic lymph nodes and multiple  prominent retroperitoneal lymph nodes. For example, there is a 3.8 x  2.3 cm right distal external iliac lymph node (series 3, image 60) and  a 4.2 x 1.6 and meter left distal external iliac lymph node (series 3,  image 252). Multiple mildly enlarged retroperitoneal lymph nodes  including a 1.2 cm short axis left para-aortic lymph node (series 3,  image 174).    VASCULATURE: No abdominal aortic aneurysm. Aortobiiliac  atherosclerotic calcifications.    PELVIC ORGANS: Hysterectomy.    OTHER: Ventriculoperitoneal shunt tubing coiling in the left lower  quadrant of the abdomen. No free fluid or extraluminal air. No  intra-abdominal fluid collections.    MUSCULOSKELETAL: Right femur intramedullary molina and screw. Osteopenia.  Degenerative changes in the spine. No suspicious lesions in the bones.  Wedge compression deformity of the upper endplate of L1.      Impression    IMPRESSION:  1.  Left lower lobe consolidation is likely pneumonia. A follow-up  chest radiograph in 1 to 3 months to ensure resolution should be  considered.  2.  Few small pulmonary nodules measuring 4 to 5 mm. These are likely  infectious/postinfectious.   3.  Axillary, mediastinal, pelvic and retroperitoneal lymphadenopathy.  Mild splenomegaly. Findings are suspicious for lymphoproliferative  disorder.  4.  Mild gallbladder distention. No calcified gallstones. If there is  clinical suspicion for acute cholecystitis, right upper quadrant  ultrasound can be considered.  5.  Age-indeterminate wedge compression deformity of the L1 vertebral  body, likely chronic.    MILAGROS MAIN MD   Blood culture    Specimen: Blood    Unspecified Site   Result Value Ref Range    Specimen Description Blood Unspecified Site     Special Requests Received in aerobic  bottle only     Culture Micro No growth after 11 hours    Care Management / Social Work IP Consult    Narrative    Angela Coffey RN     4/15/2021  5:48 PM  Care Management Initial Consult    General Information  Assessment completed with: Spouse or significant other, Call   placed to  Siva  Type of CM/SW Visit: Initial Assessment    Primary Care Provider verified and updated as needed: No   Readmission within the last 30 days: no previous admission in   last 30 days         Advance Care Planning: Advance Care Planning Reviewed: other   (comment)( states he discussed with MD.)          Communication Assessment  Patient's communication style: spoken language (English or   Bilingual)(Patient is non responsive per nurse)             Cognitive  Cognitive/Neuro/Behavioral: (Unable to assess.)                      Living Environment:   People in home: spouse  Patient and  Siva  Current living Arrangements: other (see comments)(4 plex home)        Able to return to prior arrangements:         Family/Social Support:  Care provided by: spouse/significant other, other (see   comments)( and private pay help.)  Provides care for:    Marital Status:     Siva       Description of Support System:      Support Assessment: Other (see comments)(Currently pay privately   for help from 10 am-1 pm. No agency.)    Current Resources:   Patient receiving home care services: (Home help. No agency.)     Community Resources:    Equipment currently used at home: other (see comments)(sit to   stand transfer ([employer]))  Supplies currently used at home:      Employment/Financial:  Employment Status:          Financial Concerns:             Lifestyle & Psychosocial Needs:        Socioeconomic History     Marital status:      Spouse name: Not on file     Number of children: Not on file     Years of education: Not on file     Highest education level: Not on file          Functional  Status:  Prior to admission patient needed assistance:   Dependent ADLs:: Bathing, Dressing, Eating, Grooming,   Incontinence, Positioning, Transfers, Toileting  Dependent IADLs:: Cleaning, Cooking, Laundry, Shopping, Meal   Preparation, Medication Management, Money Management,   Transportation, Incontinence  Assesssment of Functional Status: Not at  functional baseline    Mental Health Status:          Chemical Dependency Status:                Values/Beliefs:  Spiritual, Cultural Beliefs, Jainism Practices, Values that   affect care:                 Additional Information:  Patient is unresponsive per nurse and she is in isolation due to   COVID. Call placed to her  Siva and he provided all   information.  He is her main caregiver and he also has hired help   from 10 am- 2pm.  He is trying to get more help in the evening.    This help is not from an agency.      Siva states she has the beginnings of dementia but he feels she   knows who he is and he is able to communicate with her and   guiding her to the task at hand.  She is total care and he uses a   serasteady transfer to get her out of bed and on to the toilet.    He feeds her and she likes soft foods.  They have been    for  58 years.  Family helps at times but they have their own   lives.    Siva was informed that she is positive for COVID. He is   surprised by this as she has had one shot and she does not go   anywhere.  He states he has also had one shot of the vaccine so   far.      MD states that patient is on comfort cares and may go on to   Hospice and  agreed.  He does want to visit her and I   encouraged him to call before coming to confirm with the nursing   staff and their rules.    VIVEK JERONIMO, RN

## 2021-04-16 NOTE — PROGRESS NOTES
"SPIRITUAL HEALTH SERVICES: Tele-Encounter  Patient Location:  Med. Surg. 5  Spoke with: Patient's Son    Referral Source: Staff referral requesting  support as pt is imminently dying.    Intervention: Pt Ángela has a history of dementia per documentation, and RN reported that she has been nonverbal.  Called pt's son Nir to introduce myself, orient him to [facility], and to assess emotional/spiritual resources and needs.    Nir reported that Ángela is \"the most non-Hindu person I know.\"  He shared that she likes the music of John Cantu (who wrote [employer] Superstar and Phantom of the Opera) and of Drew Kirkland.    Nir shared that his father has been in denial for many years about Ángela's decline in health and reported that she has not been able to talk or move her limbs for two years.  His father has been her primary caregiver and has had \"confrontations\" with care providers coming into the home to assist with her cares.  Nir reflected that his parent have been  for over fifty years and his father has faithfully cared for her.    Offered emotional support through reflective listening and validation of feelings and life experience.    PLAN: Informed pt's son how he and his family can request further  support.  Weekend on-call chaplains remain available per family's request.  On-call  pager is [phone].    Chaplain Rodolfo Garcia M.Div., [facility]  Staff   Pager [phone]  Phone [phone]    ______________________________    Type of service:  Telephone Visit     has received verbal consent for a TelephoneVisit from the patient? Yes    Distance Provider Location: designated [facility] office or home office (secure setting)    Mode of Communication: telephone (via [employer] phone or [employer] tele-call-number ([phone]))      "

## 2021-04-16 NOTE — CONSULTS
Care Management Initial Consult    General Information  Assessment completed with: Children, Spouse or significant other, Spouse Siva, Nir son,  Type of CM/SW Visit: Initial Assessment    Primary Care Provider verified and updated as needed: No   Readmission within the last 30 days: no previous admission in last 30 days      Reason for Consult: discharge planning, end of life/hospice  Advance Care Planning: Advance Care Planning Reviewed: other (comment)( states he discussed with MD.)          Communication Assessment  Patient's communication style: spoken language (English or Bilingual)(Patient is non responsive per nurse)             Cognitive  Cognitive/Neuro/Behavioral: .WDL except  Level of Consciousness: unresponsive  Arousal Level: unresponsive                Living Environment:   People in home: spouse  Patient and  Siva  Current living Arrangements: house    - has a ramp   Able to return to prior arrangements: yes        Family/Social Support:  Care provided by: spouse/significant other  Provides care for: no one  Marital Status:   , Children  Siva       Description of Support System: Supportive, Involved    Support Assessment: Caregiver difficulty providing physical care/safety    Current Resources:   Patient receiving home care services: No     Community Resources: None  Equipment currently used at home: lift device  Supplies currently used at home: None    Employment/Financial:  Employment Status: retired        Financial Concerns: No concerns identified           Lifestyle & Psychosocial Needs:        Socioeconomic History     Marital status:      Spouse name: Not on file     Number of children: Not on file     Years of education: Not on file     Highest education level: Not on file          Functional Status:  Prior to admission patient needed assistance:   Dependent ADLs:: Bathing, Dressing, Eating, Grooming, Incontinence, Positioning, Transfers  Dependent  "IADLs:: Cleaning, Cooking, Laundry, Shopping, Meal Preparation, Medication Management, Money Management  Assesssment of Functional Status: Not at  functional baseline    Mental Health Status:  Mental Health Status: No Current Concerns       Chemical Dependency Status:  Chemical Dependency Status: No Current Concerns             Values/Beliefs:  Spiritual, Cultural Beliefs, [employer] Practices, Values that affect care:                 Additional Information:  Spoke with spouse Siva on the phone. PT is (+) COVID, dementia and septic..  Initially a referral had been sent to [facility] for Hospice support. AYALA has called and left VM message for Lashawn CAI CM through [facility] Advanced care team.. [phone]. PT had been open to [facility] for RN WOC support..   Pt per MD is stable enough to d.c home on Hospice. AYALA did ask the spouse if he would consider allowing placement for pt.. Juan David stated that he is NOT willing to consider placement into on of \" those \" facilities.. AYALA from [facility] updated this writer that their home care agency has filled a few VA reports to [facility].. They have concerns about how well Siva is able to care for pt/ Prior to admission pt had been sleeping 12-14 hours per day.. spouse was not repositioning pt because he felt it would disrupt her sleep..   PT was to have 2 caregivers at home at all time.. Not clear that this was actually the type of care pt had.  Spouse has been known to fire staff or refuse support from staff, especially is not \" White\" caregivers    AYALA will file a VA report based on conversation with son Nir.. AYALA has tentatively set up stretcher transport for 1230 to home.. [facility] anticipates meeting pt at her home setting at 1300.  AYALA will request weekend staff to call spouse in the AM due to 2nd conversation with spouse and hearing him sound SOB.. AYALA does have concerns that he may be experiencing symptoms of COVID but unable to assess that over the phone.     Corinne " BRYANNA Flores, GÉNESIS       Addendum  AYALA filed TAISHA Report today     Right after report was filed spouse called back and now seems to be leaning towards Placement. Called [facility] as this is the closed COVID facility to family  Admission will assess.. AYALA will need to update Norma if location changes to [place] rather than home    AYALA did mention to spouse that he may need to come up with $5,000 deposit   Called [facility], they are full for COVID pt's     \Waiting to hear from [facility] at this time. AYALA will need to call Norma @ [phone] to update if location changes. O2 was being ordered for home     MLM declined   Ayala called spouse to update that a new referral has not been sent To [facility] for possible COVID admission. Siva is aware  AYALA called Norma to update.. This discharge plan may need to be delayed till Sunday pending call from [facility]    IF [facility] is not able to accept spouse will then take pt home.. Placement is the best option for and level of care

## 2021-04-16 NOTE — PROGRESS NOTES
Brief Hospitalist Cross cover note  Notified positive blood culture from blood culture obtained on April 16 at 1213.  Patient is on comfort measures only.  No further intervention other than comfort measures indicated at this time.

## 2021-04-16 NOTE — PLAN OF CARE
End of Shift Summary  For vital signs and complete assessments, please see documentation flowsheets.     Pertinent assessments: Pt moaning and grimacing at beginning of shift, ativan and dilaudid given with good effect, resting quiet soon after, repositioned and oral cares completed.     Shift Events  comfort care, hospice.     Treatment Plan:  comfort     Bedside Nurse: Goldie Ramesh RN

## 2021-04-16 NOTE — PROGRESS NOTES
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 Per EMS, the patient lives at home with her  and a 24/7 caregiver. EMS noted hospital beds, ramps, and wheelchair at home. Patient history of unknown. EMS says that they were called due to the patient's skin tears that had opened up last night. On arrival EMS noted that the patient had a fever of 101, was tachycardiac at 120's, and was satting 87% on room air. The patient's  says that the patient is on coumadin and her levels are probably off. It is unknown when her last PCP visit was. Here in the ED the patient is noted to have a fever, cough, congestion, and lethargic.     Patient progress during stay: Patient was admitted and given patient's advanced dementia and significant comorbid medical conditions as well as acute febrile illness and kidney injury, family decided to pursue comfort measures only treatment for his condition.        Problem List with Assessment and Plan      1. Acute febrile illness with COVID-19 infection and pneumonia  2. Acute kidney injury  3. Advanced dementia  4. Lymphoproliferative disease  5. Acute encephalopathy -toxic metabolic and infectious      Plan for today:    Comfort measures only and hospice consultation for discharge over the weekend        LDA     Access : Peripheral     Moore Catheter: not present        FEN :    Orders Placed This Encounter      Combination Diet Regular Diet Adult            Intake/Output Summary (Last 24 hours) at 4/16/2021 1725  Last data filed at 4/15/2021 2202  Gross per 24 hour   Intake 0 ml   Output 0 ml   Net 0 ml          DVT Prophylaxis:  None     Code Status:      DNR / DNI    Estimated discharge  disposition and plan:      May discharge  to home with hospice  in 1-2 day(s)           Interval History (Subjective):        Patient is seen and examined by me today and medical record reviewed.Overnight events noted and care discussed with nursing staff.  Patient care assumed today.  Patient was seen and examined by me.  Comfort measures plan of of care noted.  Patient is lethargic but looks comfortable       Physical Exam:        Last Vital Signs:  /75   Pulse 130   Temp 97.2  F (36.2  C) (Axillary)   Resp 20   SpO2 (!) 83%     Wt Readings from Last 1 Encounters:   No data found for Wt       Wt Readings from Last 5 Encounters:   No data found for Wt        Constitutional:  Lethargic, encephalopathic appears comfortable with no distress     Respiratory: Clear to auscultation bilaterally, no crackles or wheezing   Cardiovascular: Regular rate and rhythm, normal S1 and S2, and no murmur noted   Abdomen: Normal bowel sounds, soft, non-distended, non-tender   Skin: No new rashes, no cyanosis, dry to touch   Neuro: lethargic, unarousable   Extremities: No edema   Other(s):        All other systems:  Unable to perform          Medications:        All current medications were reviewed with changes reflected in problem list.         Data:      All new lab and imaging data was reviewed.      Data reviewed today: I reviewed all new labs and imaging results over the last 24 hours. I personally reviewed no images or EKG's today      Recent Labs   Lab 04/15/21  1213   WBC 94.2*   HGB 12.8   HCT 42.0   MCV 90        Recent Labs   Lab 04/15/21  1445 04/15/21  1213   CULT No growth after 19 hours Cultured on the 1st day of incubation:  Gram positive cocci in clusters  *   Critical Value/Significant Value, preliminary result only, called to and read back by  Nayeli Dougherty R.N. on RH55 at 10:06am 04.16.2021 JT.    (Note)  POSITIVE for Staphylococci other than S.aureus, S.epidermidis and  S.lugdunensis, by FirmPlayigene multiplex nucleic acid test.  Coagulase-negative staphylococci are the most common venipuncture or  collection associated skin CONTAMINANTS grown in blood cultures.  Final identification and antimicrobial susceptibility testing will be  verified by standard methods.    Specimen tested with Verigene multiplex, gram-positive blood culture  nucleic acid test for the following targets: Staph aureus, Staph  epidermidis, Staph lugdunensis, other Staph species, Enterococcus  faecalis, Enterococcus faecium, Streptococcus species, S. agalactiae,  S. anginosus grp., S. pneumoniae, S. pyogenes, Listeria sp., mecA  (methicillin resistance) and Tiago/B (vancomycin resistance).    Critical Value/Significant Value called to and read back by Heaven Boss RN, 4.16.21 @ [facility] pt.       Recent Labs   Lab 04/15/21  1213   *   POTASSIUM 3.8   CHLORIDE 119*   CO2 27   ANIONGAP 6   *   BUN 75*   CR 1.64*   GFRESTIMATED 29*   GFRESTBLACK 34*   TAVIA 10.1   PROTTOTAL 8.5   ALBUMIN 3.3*   BILITOTAL 0.6   ALKPHOS 85   *   *       Recent Labs   Lab 04/15/21  1213   *       Recent Labs   Lab 04/15/21  1213   INR >10.00*         No results for input(s): TROPONIN, TROPI, TROPR in the last 168 hours.    Invalid input(s): TROP, TROPONINIES    Recent Results (from the past 48 hour(s))   XR Chest Port 1 View    Narrative    XR CHEST PORT 1 VW 4/15/2021 1:13 PM    HISTORY: cough, fever    COMPARISON: None.      Impression    IMPRESSION: Linear opacities at the left lung base could represent  atelectasis or developing pneumonia. No pleural effusion, or  pneumothorax. The cardiac and mediastinal silhouettes are normal.  Ventriculoperitoneal shunt tubing coursing over the left  chest.    MILAGROS MAIN MD   CT Head w/o Contrast    Narrative    CT SCAN OF THE HEAD WITHOUT CONTRAST   4/15/2021 2:01 PM     HISTORY: Mental status change, unknown cause. Confusion.    TECHNIQUE:  Axial images of the head and coronal reformations without  IV contrast material.  Radiation dose for this scan was reduced using  automated exposure control, adjustment of the mA and/or kV according  to patient size, or iterative reconstruction technique.    COMPARISON: None.    FINDINGS: There is a left frontal ventricular catheter whose tip  transverses the left frontal horn, septum pellucidum and ends in the  right frontal horn. There is moderate to severe ventriculomegaly with  some associated moderate prominence of the subarachnoid spaces.  Temporal horns are also enlarged. There is no evidence for acute  infarct, intracranial hemorrhage, mass effect, or skull fracture.  Visualized paranasal sinuses and mastoid air cells are clear.      Impression    IMPRESSION:  1. Moderate to severe ventriculomegaly slightly out of proportion to  the size of the subarachnoid space with ventricular catheter in place.  Without old films, it is difficult to exclude hydrocephalus, but the  findings are probably due to a compensated communicating hydrocephalus  with some associated cerebral atrophy. Comparison with old films could  be helpful in further evaluation if they can be obtained.  2. No evidence for intracranial hemorrhage, acute infarct, or any mass  effect.    GUERITA COLLAZO MD   CT Chest Abdomen Pelvis w/o Contrast    Addendum: 4/15/2021    RANJIT GAN  Accession # [id number]    The original report on this patient was dictated by me.      Impression #6: Rectal fecal impaction with a large amount of formed  stool in the rectum and moderate amount of formed stool in the colon.    MILAGROS MAIN MD (Date of Addendum: 4/15/2021 2:31 PM)             MILAGROS MAIN MD      Narrative    CT CHEST ABDOMEN PELVIS W/O CONTRAST 4/15/2021  2:03 PM    CLINICAL HISTORY: Sepsis    TECHNIQUE: CT scan of the chest, abdomen, and pelvis was performed  without IV contrast. Multiplanar reformats were obtained. Dose  reduction techniques were used.   CONTRAST: None.    COMPARISON: Chest radiograph earlier today    FINDINGS:   LUNGS AND PLEURA: Left lower lobe consolidative opacity, likely  pneumonia. No pleural effusion or pneumothorax. Small cluster of  nodules in the right upper lobe measuring up to 5 mm (series 5, image  55) may be infectious or postinfectious. Few other small pulmonary  nodules including a 3 mm subcentimeter right upper lobe nodule (series  5, image 66).    MEDIASTINUM/AXILLAE: Bilateral axillary and mediastinal  lymphadenopathy. For example, there is a 1.4 cm short axis right  axillary lymph node (series 3, image 64), 3.0 x 2.3 cm left axillary  lymph node (series 3, image 61) and 1.5 cm subcarinal lymph node  (series 3, image 60). Numerous small bilateral supraclavicular lymph  nodes. No thoracic aortic aneurysm. Severe coronary artery  calcifications. Trace pericardial effusion.    HEPATOBILIARY: Normal liver. Distended gallbladder without calcified  gallstones. No biliary ductal dilatation.    PANCREAS: Normal.    SPLEEN: Splenomegaly with the spleen measuring 13.3 cm.    ADRENAL GLANDS: Normal.    KIDNEYS/BLADDER: No hydronephrosis or renal masses. Left renal simple  cyst requiring no specific follow-up.    BOWEL: Normal caliber loops of small bowel. Large amount of formed  stool in the rectum and moderate amount of formed stool in the  remaining colon. No inflammatory changes.    LYMPH NODES: Enlarged bilateral pelvic lymph nodes and multiple  prominent retroperitoneal lymph nodes. For example, there is a 3.8 x  2.3 cm right distal external iliac lymph node (series 3, image 60) and  a 4.2 x 1.6 and meter left distal external iliac lymph node (series 3,  image 252). Multiple mildly enlarged retroperitoneal lymph nodes  including a 1.2  cm short axis left para-aortic lymph node (series 3,  image 174).    VASCULATURE: No abdominal aortic aneurysm. Aortobiiliac  atherosclerotic calcifications.    PELVIC ORGANS: Hysterectomy.    OTHER: Ventriculoperitoneal shunt tubing coiling in the left lower  quadrant of the abdomen. No free fluid or extraluminal air. No  intra-abdominal fluid collections.    MUSCULOSKELETAL: Right femur intramedullary molina and screw. Osteopenia.  Degenerative changes in the spine. No suspicious lesions in the bones.  Wedge compression deformity of the upper endplate of L1.      Impression    IMPRESSION:  1.  Left lower lobe consolidation is likely pneumonia. A follow-up  chest radiograph in 1 to 3 months to ensure resolution should be  considered.  2.  Few small pulmonary nodules measuring 4 to 5 mm. These are likely  infectious/postinfectious.   3.  Axillary, mediastinal, pelvic and retroperitoneal lymphadenopathy.  Mild splenomegaly. Findings are suspicious for lymphoproliferative  disorder.  4.  Mild gallbladder distention. No calcified gallstones. If there is  clinical suspicion for acute cholecystitis, right upper quadrant  ultrasound can be considered.  5.  Age-indeterminate wedge compression deformity of the L1 vertebral  body, likely chronic.    MILAGROS MAIN MD       COVID Status:  COVID-19 PCR Results    COVID-19 PCR Results 4/15/21   Flu A/B & SARS-COV-2 PCR Source Nasopharyngeal   SARS-CoV-2 PCR Result POSITIVE (A)   (A) Abnormal value       Comments are available for some flowsheets but are not being displayed.         COVID-19 Antibody Results, Testing for Immunity    COVID-19 Antibody Results, Testing for Immunity   No data to display.              Disclaimer: This note consists of symbols derived from keyboarding, dictation and/or voice recognition software. As a result, there may be errors in the script that have gone undetected. Please consider this when interpreting information found in this chart.

## 2021-04-17 NOTE — PLAN OF CARE
End of Shift Summary  For vital signs and complete assessments, please see documentation flowsheets.     Pertinent assessments: Decreased alertness as shift progressed - initially following staff with eyes, but later essentially unresponsive to any kind of stimulus. Some moaning with repositioning, otherwise appears comfortable - frequent PRN dilaudid given. Sats mid-70's when spot checked, no signs of respiratory distress but occasional loose cough. Mottling noted in bilateral feet. Repositioned q2h, no UOP this shift. Attempted to updated rancho Loyola by phone, voicemail left.  Major Shift Events: n/a  Treatment Plan: Comfort - scheduled Haldol, PRN SL dilaudid  Bedside Nurse: Nayeli Hong RN

## 2021-04-17 NOTE — PLAN OF CARE
End of Shift Summary  For vital signs and complete assessments, please see documentation flowsheets.     Pertinent assessments: Opens eyes to voice @ times. No resp distress noted. Appears comfortable most of the time except w/repositioning where she tenses up, moans & twitches. T&R Q2HRS. Incontinent of moderate amount urine.     Major Shift Events: Moaning, twitching, skin hot, diaphoretic, temp 101.1 ax, tylenol supp & dilaudid given.     Treatment Plan: Comfort cares/hospice.

## 2021-04-17 NOTE — PROGRESS NOTES
Care Management Follow Up    Length of Stay (days): 2    Expected Discharge Date: 04/19/21     Concerns to be Addressed: discharge planning     Patient plan of care discussed at interdisciplinary rounds: Yes    Anticipated Discharge Disposition: Hospice and [facility]     Anticipated Discharge Services:  Hospice/room and board  Anticipated Discharge DME:  TBD    Patient/family educated on Medicare website which has current facility and service quality ratings: yes  Education Provided on the Discharge Plan:  yes  Patient/Family in Agreement with the Plan:  yes    Referrals Placed by CM/SW:  n/a  Private pay costs discussed: private room/amenity fees $5000 up front for daily rate of $300.     Additional Information:  [facility] accepted pt and they can take her Sunday after 2pm-, need weight and height, assistance level and eating/diet information. Spoke with bedside RN and pt's weight approx 117Lbs, and looks average heights, estimated 5'6. Bedridden, NPO but regular diet.   Pt will be private pay due to being on hospice while at [facility], need $5000 up front. Writer spoke with spouse Juan David and he confirmed that he can pay the deposit and informed him that facility would take care of that Monday.  Writer spoke to son Nir and informed him of plan, and he also stated spouse is able to afford the $5000 up front.   Writer spoke to [facility] and they scheduled admit for 2pm as that is the latest possible.   Writer scheduled HE stretcher transport for 1:30pm p/u from [facility], informed all parties.     Addendum 2:30pm:  Payment will need to be made tomorrow, check can be given to .  Family will need to be present to sign hospice paperwork. Writer spoke with rancho Loyola and he will discuss payment and family presence with his sister and call this writer back.         Susanna Dolan

## 2021-04-17 NOTE — PLAN OF CARE
End of Shift Summary  For vital signs and complete assessments, please see documentation flowsheets.     Pertinent assessments: Pt initially unresponsive aside from moving mouth when stimulated with oral swab - eyes open and more alert later this evening, nonverbal. Respiratory status stable, sats in low-90's on 2LPM oxymask, O2 removed. Patient tremulous with cares, some moaning and labored breathing - scheduled haldol & PRN dilaudid given x3, resting comfortably. Repositioned frequently. No UOP. Patient sweating profusely on head, no fevers. Miguel Loyola updated by phone, iPad at bedside for family support.  Major Shift Events: n/a  Treatment Plan: Comfort  Bedside Nurse: Nayeli Hong RN

## 2021-04-17 NOTE — PROGRESS NOTES
Patient was seen and examined by me today.I discussed care plan with and addressed questions and concerns of patient family and staff.    Ángela Dumont is a 80 year old female with a history of DVT and hydrocephalus on Coumadin who presents via EMS with fever and skin tears on her arms. Per EMS, the patient lives at home with her  and a 24/7 caregiver. EMS noted hospital beds, ramps, and wheelchair at home. Patient history of unknown. EMS says that they were called due to the patient's skin tears that had opened up last night. On arrival EMS noted that the patient had a fever of 101, was tachycardiac at 120's, and was satting 87% on room air. The patient's  says that the patient is on coumadin and her levels are probably off. It is unknown when her last PCP visit was. Here in the ED the patient is noted to have a fever, cough, congestion, and lethargic.     Patient was admitted and given patient's advanced dementia and significant comorbid medical conditions as well as acute febrile illness and kidney injury, family decided to pursue comfort measures only treatment for his condition.    Patient and family elected for comfort measures only and AYALA HANNON following patient to assist with disposition planning.    Currently patient appears  comfortable and responding well to comfort medications     Plan to continue current care and plan disposition.Discussed with AYALA and bedside nurse.

## 2021-04-18 NOTE — PLAN OF CARE
End of Shift Summary  For vital signs and complete assessments, please see documentation flowsheets.     Pertinent assessments: No significant change from previous shift, remains unresponsive. PRN dilaudid given for tachypnea. Family at bedside.   Major Shift Events: Febrile, tylenol supp given. Pt nearing the very end, showing signs of air hunger, MD paged. New orders: Ativan frequency increased.     Treatment Plan: Comfort - scheduled Haldol, PRN SL dilaudid & Ativan.      Addendum: At 6.15 am pt noted to have no RR, no audible breath sounds, no cardiac activity, MD paged.

## 2021-04-18 NOTE — DEATH PRONOUNCEMENT
MD DEATH PRONOUNCEMENT    Called to pronounce Ángela leal.    Physical Exam: Spontaneous respirations absent, Breath sounds absent, Heart sounds absent, Pupillary light reflex absent, Corneal blink reflex absent, and unresponsive to painful stimuli.     Patient was pronounced dead at 0615 AM, 2021.    Preliminary Cause of Death: COVID-19 infection and pneumonia complicated by advanced dementia and lymphoproliferative disease.     Active Problems:    Lymphoproliferative disease (H)    Acute kidney injury (H)    Supratherapeutic INR    Acute respiratory failure with hypoxia (H)    Pneumonia due to 2019 novel coronavirus     Infectious disease present?: YES    Communicable disease present? (examples: HIV, chicken pox, TB, Ebola, CJD) :  NO    Multi-drug resistant organism present? (example: MRSA): NO    Please consider an autopsy if any of the following exist:  NO Unexpected or unexplained death during or following any dental, medical, or surgical diagnostic treatment procedures.   NO Death of mother at or up to seven days after delivery.     NO All  and pediatric deaths.     NO Death where the cause is sufficiently obscure to delay completion of the death certificate.   NO Deaths in which autopsy would confirm a suspected illness/condition that would affect surviving family members or recipients of transplanted organs.     The following deaths must be reported to the 's Office:  NO A death that may be due entirely or in part to any factors other than natural disease (recent surgery, recent trauma, suspected abuse/neglect).   NO A death that may be an accident, suicide, or homicide.     NO Any sudden, unexpected death in which there is no prior history of significant heart disease or any other condition associated with sudden death.   NO A death under suspicious, unusual, or unexpected circumstances.    NO Any death which is apparently due to natural causes but in which the  does  not have a personal physician familiar with the patient s medical history, social, or environmental situation or the circumstances of the terminal event.   NO Any death apparently due to Sudden Infant Death Syndrome.     NO Deaths that occur during, in association with, or as consequences of a diagnostic, therapeutic, or anesthetic procedure.   NO Any death in which a fracture of a major bone has occurred within the past (6) six months.   Unknown A death of persons note seen by their physician within 120 days of demise.     NO Any death in which the  was an inmate of a public institution or was in the custody of Law Enforcement personnel.   NO  All unexpected deaths of children   NO Solid organ donors   NO Unidentified bodies   Unknown Deaths of persons whose bodies are to be cremated or otherwise disposed of so that the bodies will later be unavailable for examination;   NO Deaths unattended by a physician outside of a licensed healthcare facility or licensed residential hospice program   NO Deaths occurring within 24 hours of arrival to a health care facility if death is unexpected.    NO Deaths associated with the decedent s employment.   NO Deaths attributed to acts of terrorism.   NO Any death in which there is uncertainty as to whether it is a medical examiner s care should be discussed with the medical investigator.        Body disposition: Autopsy was discussed with family member in person. Permission for autopsy was declined.

## 2021-04-18 NOTE — PROGRESS NOTES
SWS aware of pt's death. Notified [facility], HE, and Norma Hobson to cancel appts for the day.     No further needs.     Kendy DONG, Casual   Inpatient Care Coordination  [facility]  [phone]

## 2021-04-19 LAB — COPATH REPORT: NORMAL

## 2021-04-20 ENCOUNTER — PATIENT OUTREACH (OUTPATIENT)
Dept: CARE COORDINATION | Facility: CLINIC | Age: 81
End: 2021-04-20

## 2021-04-20 NOTE — PROGRESS NOTES
Clinic Care Coordination Contact  RUST/Voicemail       Clinical Data: Care Coordinator Outreach  Outreach attempted x 1.  Left message on patient's voicemail with call back information and requested return call.    Plan:  Care Coordinator will try to reach patient again in 15 business days.    EROS Hsu  Clinic Care Coordination  [facility] : [place], [place], and [place]  Phone: [phone]    ______________________  Next Outreach:  05/11/21  Planned Outreach Frequency: Monthly  Preferred Phone Number: Juan David(spouse) [phone]    Last Assessment Date: 08/23/19  Care Plan Completion Date: 03/26/21  ______________________

## 2021-04-20 NOTE — DISCHARGE SUMMARY
"Physician Discharge Summary     Discharge diagnosis     1. Severe acute COVID 19  infection   2.  Dehydration with ANDRES. Baseline creat 0.8, current creat 1.64.  3.  Advanced Dementia.  4.  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 Susanna Duran's note from 7/9/2020.    5.  Acute hypoxic respiratory failure   7.  Supratherapeutic INR (last value on 4./8/2021 was 2.8).    8. Comfort measures only      MD DEATH PRONOUNCEMENT/SUMMARY    Called to pronounce Ángela Dumont dead.  MRN# [id number]  Patient was pronounced dead at 0615 AM, April 18, 2021    Events preceding: Ángela Dumont is a 80 year old female with a history of DVT and hydrocephalus on Coumadin who presents via EMS with fever and skin tears on her arms. Per EMS, the patient lives at home with her  and a 24/7 caregiver. EMS noted hospital beds, ramps, and wheelchair at home. Patient history of unknown. EMS says that they were called due to the patient's skin tears that had opened up last night. On arrival EMS noted that the patient had a fever of 101, was tachycardiac at 120's, and was satting 87% on room air. The patient's  says that the patient is on coumadin and her levels are probably off. It is unknown when her last PCP visit was. Here in the ED the patient is noted to have a fever, cough, congestion, and lethargic.      Patient was admitted and given patient's advanced dementia and significant comorbid medical conditions as well as acute febrile illness and kidney injury, family decided to pursue comfort measures only treatment for his condition.    Immediate Cause of Death: ANDRES     Intermediate Cause of Death:COVID-19     Undelying Cause of Death:chronic low grade B-cell lymphoproliferative disorder    Other significant Conditions:      "

## 2021-04-21 LAB
BACTERIA SPEC CULT: NO GROWTH
Lab: NORMAL
SPECIMEN SOURCE: NORMAL

## 2021-05-14 ENCOUNTER — MEDICAL CORRESPONDENCE (OUTPATIENT)
Dept: HEALTH INFORMATION MANAGEMENT | Facility: CLINIC | Age: 81
End: 2021-05-14

## 2021-05-14 ENCOUNTER — TELEPHONE (OUTPATIENT)
Dept: FAMILY MEDICINE | Facility: CLINIC | Age: 81
End: 2021-05-14

## 2021-05-14 NOTE — TELEPHONE ENCOUNTER
Reason for Call:  Form, our goal is to have forms completed with 72 hours, however, some forms may require a visit or additional information.    Type of letter, form or note:  medical    Who is the form from?: Home care    Where did the form come from: form was faxed in    What clinic location was the form placed at?: [place]    Where the form was placed: Joaquin Rockwell MD Box/Folder    What number is listed as a contact on the form?: Fax to [phone]       Additional comments: Discharge orders    Call taken on 5/14/2021 at 6:52 AM by Zakia Reis

## 2021-05-14 NOTE — TELEPHONE ENCOUNTER
Discharge orders formed faxed to [phone].    Original placed in TC basket.    Anabel Patricio MA

## 2021-06-08 ENCOUNTER — TELEPHONE (OUTPATIENT)
Dept: FAMILY MEDICINE | Facility: CLINIC | Age: 81
End: 2021-06-08

## 2021-06-08 ENCOUNTER — MEDICAL CORRESPONDENCE (OUTPATIENT)
Dept: HEALTH INFORMATION MANAGEMENT | Facility: CLINIC | Age: 81
End: 2021-06-08

## 2021-06-08 NOTE — TELEPHONE ENCOUNTER
Reason for Call:  Form, our goal is to have forms completed with 72 hours, however, some forms may require a visit or additional information.    Type of letter, form or note:  medical    Who is the form from?: Home care    Where did the form come from: form was faxed in    What clinic location was the form placed at?: [place]    Where the form was placed: Joaquin Rockwell MD Box/Folder    What number is listed as a contact on the form?: Fax to [phone]       Additional comments: Certification period 03/12/2021 to 05/10/2021    Call taken on 6/8/2021 at 7:13 AM by Zakia Reis

## 2021-06-09 NOTE — TELEPHONE ENCOUNTER
Completed forms faxed back to [facility]  at [phone].   Originals sent to be scanned.       Ifrah Arango

## 2022-01-02 NOTE — TELEPHONE ENCOUNTER
Apparently we had the wrong phone number, they have been updated   Breath sounds clear and equal bilaterally.

## 2022-06-09 ENCOUNTER — DOCUMENTATION ONLY (OUTPATIENT)
Dept: ANTICOAGULATION | Facility: CLINIC | Age: 82
End: 2022-06-09
Payer: COMMERCIAL

## 2022-06-09 DIAGNOSIS — I26.99 PULMONARY EMBOLISM (H): ICD-10-CM

## 2022-06-09 DIAGNOSIS — I82.409 DVT (DEEP VENOUS THROMBOSIS) (H): ICD-10-CM

## 2022-06-09 DIAGNOSIS — Z79.01 LONG TERM CURRENT USE OF ANTICOAGULANT THERAPY: Primary | ICD-10-CM

## 2022-06-09 NOTE — PROGRESS NOTES
ANTICOAGULATION  MANAGEMENT    Ángela Dumont is being discharged from the [facility]ticoagulation [facility] ([facility]).    Reason for discharge:     Anticoagulation episode resolved, ACC referral closed and Standing order discontinued    Patient has passed away    Cece Salas RN

## 2022-07-14 NOTE — TELEPHONE ENCOUNTER
Patient's spouse, Vernon, returning call    Advised of MD GUSTAVO message below     States patient went into the hospital for severe abdominal pain. Found out the shunt was working properly. States a lot of testing had been done, patient refused colonoscopy. States they did get out of the hospital rather quickly and they are home now.   Patient was given a new cream for a rash, states it did not work well so they are back with Dr. Rockwell's remedy.   Patient was also experiencing constipation - had patient go 1 day without water and food (did have an IV in place) as patient's bowels are very sensitive. Patient was given polyethylene glycol (MIRALAX/GLYCOLAX) Packet and senna-docusate (SENOKOT-S;PERICOLACE) 8.6-50 MG per tablet. States the Senna doesn't do anything, and he isn't sure about the Miralax.     States they are doing OK right now, the pain is gone, and can schedule with Dr. Rockwell for next week.     OV scheduled with MD GUSTAVO for 10/31/2017 @ 11:00am.   Advised patient that if new or worsening symptoms appear (reviewed new & worsening symptoms) to be seen in the the ER - Patient stated an understanding and agreed with plan.    Routing to PCP as an FYI.    Tessy Gandara RN  [place] Triage     Closed fracture dislocation of right shoulder, initial encounter Closed fracture dislocation of right shoulder, initial encounter Closed fracture dislocation of right shoulder, initial encounter

## 2023-07-04 NOTE — TELEPHONE ENCOUNTER
JANTOVEN 2 MG tablet    Last Written Prescription Date: 12/26/2016  Last Fill Qty: 180 tablet, # refills: 1  Last Office Visit with G, P or [facility] prescribing provider: 3/6/2017       Date and Result of Last PT/INR:   Lab Results   Component Value Date    INR 1.6 05/01/2017    INR 3.5 04/03/2017    PT 36.8 03/19/2016            
Prescription approved per [facility].    Nereyda Padilla, NAYELI, RN, PHN  [facility]          
152.4

## 2023-08-29 NOTE — TELEPHONE ENCOUNTER
Call received from Na Hill of [facility], [phone]. Na stated she received a call from Patient's spouse telling her not to visit today. Patient needed wound care and Na is concerned that patient will not receive the care that she needs. Na also stated Patient's spouse stated that they are double billing him and would not listen to the nurse even though she tried explaining they have not billed anything yet. Na stated patient's spouse's behavior was very erratic and she is concerned about the patient. Routing to  Triage for follow up.      Radha HAIDER        Spoke to pt and representative Reagan and conveyed results and recommendations.     A beer or two a week with a meal is all pt drinks. Not changed or increased.     Placed Hepatic Function Panel for 2 weeks. They have an appointment at a different clinic then and they will use that lab. No further questions or concerns.

## 2023-09-29 NOTE — PROVIDER NOTIFICATION
DATE:  4/16/2021   TIME OF RECEIPT FROM LAB:  1005  LAB TEST:  Blood cultures (drawn 4/16 at 1213 for right arm)  LAB VALUE:  Gram positive cocci in clusters  Results FYI paged to provider. Patient currently on comfort care.     No

## 2023-12-15 NOTE — ED NOTES
Pt arrived via EMS with acute left upper abdominal pain after having a bowel movement. Pt given 0.5 mg dilaudid in route which relieved the pain. Per EMS pt has some baseline confusion. Pt has a shunt that empties into her left abdominal region. Per pt's  they were instructed to come to [facility] with any acute left abdominal pain. Pt has a history of encephalopathy and dementia. On arrival pt had one episode of emesis and has garbled speech post emesis. Pt refused suctioning.   Central Line Insertion

## 2025-03-12 NOTE — TELEPHONE ENCOUNTER
"Requested Prescriptions   Pending Prescriptions Disp Refills     warfarin (COUMADIN) 2 MG tablet [Pharmacy Med Name: WARFARIN SODIUM 2 MG TABLET]    Last Written Prescription Date:  2.11.19  Last Fill Quantity: 45 tablet,  # refills: 0   Last office visit: 1/7/2019 with prescribing provider:  Joaquin Rockwell MD       Future Office Visit:       45 tablet 0     Sig: TAKE 2MG BY MOUTH ON SUNDAY, WEDNESDAY, FRIDAY AND TAKE 4MG REST OF WEEK       Vitamin K Antagonists Failed - 4/14/2019  8:33 AM        Failed - INR is within goal in the past 6 weeks     Confirm INR is within goal in the past 6 weeks.     Recent Labs   Lab Test 03/26/19   INR 2.1                       Passed - Recent (12 mo) or future (30 days) visit within the authorizing provider's specialty     Patient had office visit in the last 12 months or has a visit in the next 30 days with authorizing provider or within the authorizing provider's specialty.  See \"Patient Info\" tab in inbasket, or \"Choose Columns\" in Meds & Orders section of the refill encounter.              Passed - Medication is active on med list        Passed - Patient is 18 years of age or older        Passed - Patient is not pregnant        Passed - No positive pregnancy on file in past 12 months        " Hide Additional Notes?: No Detail Level: Generalized